# Patient Record
Sex: MALE | Race: WHITE | NOT HISPANIC OR LATINO | Employment: FULL TIME | URBAN - METROPOLITAN AREA
[De-identification: names, ages, dates, MRNs, and addresses within clinical notes are randomized per-mention and may not be internally consistent; named-entity substitution may affect disease eponyms.]

---

## 2017-08-03 ENCOUNTER — ALLSCRIPTS OFFICE VISIT (OUTPATIENT)
Dept: OTHER | Facility: OTHER | Age: 27
End: 2017-08-03

## 2017-09-07 ENCOUNTER — APPOINTMENT (OUTPATIENT)
Dept: URGENT CARE | Facility: MEDICAL CENTER | Age: 27
End: 2017-09-07
Payer: OTHER MISCELLANEOUS

## 2017-09-07 ENCOUNTER — TRANSCRIBE ORDERS (OUTPATIENT)
Dept: URGENT CARE | Facility: MEDICAL CENTER | Age: 27
End: 2017-09-07

## 2017-09-07 ENCOUNTER — APPOINTMENT (OUTPATIENT)
Dept: RADIOLOGY | Facility: MEDICAL CENTER | Age: 27
End: 2017-09-07
Payer: OTHER MISCELLANEOUS

## 2017-09-07 DIAGNOSIS — T14.90XA INJURY: Primary | ICD-10-CM

## 2017-09-07 DIAGNOSIS — T14.90XA INJURY: ICD-10-CM

## 2017-09-07 PROCEDURE — G0382 LEV 3 HOSP TYPE B ED VISIT: HCPCS

## 2017-09-07 PROCEDURE — 99283 EMERGENCY DEPT VISIT LOW MDM: CPT

## 2017-09-07 PROCEDURE — 73110 X-RAY EXAM OF WRIST: CPT

## 2017-09-11 ENCOUNTER — APPOINTMENT (OUTPATIENT)
Dept: URGENT CARE | Facility: MEDICAL CENTER | Age: 27
End: 2017-09-11
Payer: OTHER MISCELLANEOUS

## 2017-09-11 PROCEDURE — 99213 OFFICE O/P EST LOW 20 MIN: CPT

## 2017-09-18 ENCOUNTER — APPOINTMENT (OUTPATIENT)
Dept: URGENT CARE | Facility: MEDICAL CENTER | Age: 27
End: 2017-09-18
Payer: OTHER MISCELLANEOUS

## 2017-09-18 PROCEDURE — 99213 OFFICE O/P EST LOW 20 MIN: CPT

## 2017-09-27 ENCOUNTER — APPOINTMENT (OUTPATIENT)
Dept: URGENT CARE | Facility: MEDICAL CENTER | Age: 27
End: 2017-09-27
Payer: OTHER MISCELLANEOUS

## 2017-09-27 PROCEDURE — 99213 OFFICE O/P EST LOW 20 MIN: CPT

## 2018-01-10 NOTE — RESULT NOTES
Verified Results  * XR WRIST 3+ VIEW RIGHT 20Jan2016 02:03PM [facility] Order Number: [id number]     Test Name Result Flag Reference   XR WRIST 3+ VW RIGHT (Report)     RIGHT WRIST     INDICATION:  Right wrist pain  COMPARISON: None     VIEWS: 3; 3 images     FINDINGS:     Plate within the distal radius for previous fracture fixation  Fracture appears healed  Old ulnar styloid fracture fragments noted  Appropriate alignment of the distal radioulnar joint  No acute fracture  No degenerative changes  No lytic or blastic lesions are seen  Soft tissues are unremarkable  IMPRESSION:     No acute osseous abnormality  Signed by:   Saad Nix DO   1/20/16     * XR HAND 3+ VIEW LEFT 20Jan2016 02:02PM [facility] Order Number: [id number]     Test Name Result Flag Reference   XR HAND 3+ VW LEFT (Report)     LEFT HAND     INDICATION: Mid 1st metacarpal pain  COMPARISON: None     VIEWS: 3; 4 images     For the purposes of institution wide universal language the following terms will apply: (thumb=1st digit/finger, index finger=2nd digit/finger, long finger=3rd digit/finger, ring=4th digit/finger and small finger=5th digit/finger)     FINDINGS:     There is no acute fracture or dislocation  No degenerative changes  No lytic or blastic lesions are seen  Soft tissues are unremarkable  IMPRESSION:     No acute osseous abnormality           Signed by:   Saad Nix DO   1/20/16

## 2018-01-11 NOTE — RESULT NOTES
Verified Results  (1) CBC/PLT/DIFF [id number] 03:13PM [facility]     Test Name Result Flag Reference   WHITE BLOOD CELL COUNT 8 5 Thousand/uL  3 8-10 8   RED BLOOD CELL COUNT 5 80 Million/uL  4 20-5 80   HEMOGLOBIN 17 4 g/dL H 13 2-17 1   HEMATOCRIT 52 7 % H 38 5-50 0   MCV 90 9 fL  80 0-100 0   MCH 30 0 pg  27 0-33 0   MCHC 33 0 g/dL  32 0-36 0   RDW 13 1 %  11 0-15 0   PLATELET COUNT 327 Thousand/uL  140-400   MPV 9 3 fL  7 5-11 5   ABSOLUTE NEUTROPHILS 5338 cells/uL  1441-4889   ABSOLUTE LYMPHOCYTES 2219 cells/uL  850-3900   ABSOLUTE MONOCYTES 765 cells/uL  200-950   ABSOLUTE EOSINOPHILS 128 cells/uL     ABSOLUTE BASOPHILS 51 cells/uL  0-200   NEUTROPHILS 62 8 %     LYMPHOCYTES 26 1 %     MONOCYTES 9 0 %     EOSINOPHILS 1 5 %     BASOPHILS 0 6 %       (1) COMPREHENSIVE METABOLIC PANEL [id number] 20:41AG [facility]     Test Name Result Flag Reference   GLUCOSE 75 mg/dL  65-99   Fasting reference interval   UREA NITROGEN (BUN) 11 mg/dL  7-25   CREATININE 0 88 mg/dL  0 60-1 35   eGFR NON-AFR   AMERICAN 119 mL/min/1 73m2  > OR = 60   eGFR AFRICAN AMERICAN 138 mL/min/1 73m2  > OR = 60   BUN/CREATININE RATIO   7-64   NOT APPLICABLE (calc)   SODIUM 137 mmol/L  135-146   POTASSIUM 4 4 mmol/L  3 5-5 3   CHLORIDE 101 mmol/L     CARBON DIOXIDE 25 mmol/L  19-30   CALCIUM 9 7 mg/dL  8 6-10 3   PROTEIN, TOTAL 7 5 g/dL  6 1-8 1   ALBUMIN 4 6 g/dL  3 6-5 1   GLOBULIN 2 9 g/dL (calc)  1 9-3 7   ALBUMIN/GLOBULIN RATIO 1 6 (calc)  1 0-2 5   BILIRUBIN, TOTAL 2 1 mg/dL H 0 2-1 2   ALKALINE PHOSPHATASE 40 U/L     AST 30 U/L  10-40   ALT 46 U/L  9-46     (1) LIPID PANEL, FASTING [id number] 03:13PM [facility]     Test Name Result Flag Reference   CHOLESTEROL, TOTAL 161 mg/dL  125-200   HDL CHOLESTEROL 39 mg/dL L > OR = 40   TRIGLICERIDES 82 mg/dL  <226   LDL-CHOLESTEROL 106 mg/dL (calc)  <130   Desirable range <100 mg/dL for patients with CHD or  diabetes and <70 mg/dL for diabetic patients with  known heart disease  CHOL/HDLC RATIO 4 1 (calc)  < OR = 5 0   NON HDL CHOLESTEROL 122 mg/dL (calc)     Target for non-HDL cholesterol is 30 mg/dL higher than   LDL cholesterol target  (Q) TSH, 3RD GENERATION [id number] 03:13PM [facility]     Test Name Result Flag Reference   TSH 1 05 mIU/L  0 40-4 50     (Q) HEMOGLOBIN A1c [id number] 03:13PM [facility]   REPORT COMMENT:  FASTING:YES     Test Name Result Flag Reference   HEMOGLOBIN A1c 5 3 % of total Hgb  <5 7   According to ADA guidelines, hemoglobin A1c <7 0%  represents optimal control in non-pregnant diabetic  patients  Different metrics may apply to specific  patient populations  Standards of Medical Care in  [phone]  Diabetes Care  2013;36:s11-s66     For the purpose of screening for the presence of  diabetes  <5 7%       Consistent with the absence of diabetes  5 7-6 4%    Consistent with increased risk for diabetes              (prediabetes)  >or=6 5%    Consistent with diabetes     This assay result is consistent with a decreased risk  of diabetes  Currently, no consensus exists for use of hemoglobin  A1c for diagnosis of diabetes for children

## 2018-01-12 NOTE — MISCELLANEOUS
Provider Comments  Provider Comments:   Dear Tenzin Parish called the office to cancel your appointment for today but did not reschedule for another day  It is very important that you follow up with us so that we can assess your physical and nutritional safety  Please call our office at [phone] to reschedule your appointment       Sincerely,     Kevin Akers [facility]        Signatures   Electronically signed by : Miko Hurtado, ; Jun 1 2016  2:41PM EST                       (Author)    Electronically signed by : HARRIET Espinoza ; Jun 2 2016  2:31PM EST                       (Co-author)

## 2018-01-13 NOTE — PROGRESS NOTES
History of Present Illness  HPI: Damion Frazier presented for menu planning  He used to just consume 3 large meals daily  He saw Dr Beny Clancy and began a modified fast of 4454-8353 calories daily using meal replacements and he dropped it himself to 800 for the rest of the month  He will increase to 1200 on his exercise days  We reviewed interval eating, menu planning, appropriate snacks  Dr Beny Clancy instructed to liberalize to 5735-7906 moving forward  [facility]ric [facility]:   Weight Assessment: IBW: 193# ( 88kg), Goal Weight: 225#  Excess calories come from in between meal snacking, large portions at mealtimes and high calorie high fat food choices  Dietary Recall:   Breakfast: 6am - 2 eggs        Lunch: Atkins protein bar (200/20 gram )  Dinner: chicken / pork   or protein shake (whey 16 grams with milk 16 and 12 from milk carb)  Snack: goldfish / yogurt   Beverages: water - energy drinks   Estimated fluid intake: >64oz   He dines out seldom  Exercise Frequency:  He exercises kick   His obesity/being overweight is related to excessive energy intake and as evidenced by BMI 35 5  Nutrition Prescription: Estimated calories for weight loss: Ecal BMR: 2318 Weight loss range 1109-7999 calories, Estimated daily protein needs: 106-132 gm ( 1 2-1 5 gm/kg IBW) and Estimated daily fluid needs: 103oz ( 35ml/kgIBW)  Breakfast: 300  Snack: 150  Lunch: 300  Snack: 150  Dinner: 300-400  Snack: 150   Nutrition Intervention: Counseling provided with emphasis on meal planning, portion sizes, healthy snack choices, hydration, fiber intake, protein intake, exercise and food journaling  Education materials provided: calorie controlled menus, low carbohydrate meal planning, low carb, high protein snack choices, lean protein food choices and food journal tips  Comprehension: good     Goals: follow meal plan prescribed, reduce portion sizes at mealtimes, plan meals and snacks daily, Choose lower-calorie, lower-fat meal options at home and when dining out, food journal, do not skip meals or snacks, increase protein intake 100-130 grams daily, increase fluid intake 100oz daily and 5507-1808 calories daily  Monitor/Evaluation: weekly weight, food journal, fluid intake and exercise level  Active Problems    1  Adult BMI > 30 (V85 30) (E66 8)   2  Allergic contact dermatitis due to other agents (692 89) (L23 89)   3  Asthma (493 90) (J45 909)   4  Left wrist pain (719 43) (M25 532)   5  Nodule of finger (782 2) (R22 30)   6  Obesity (BMI 30-39 9) (278 00) (E66 9)   7  Snoring (786 09) (R06 83)   8  Weight gain (783 1) (R63 5)    Past Medical History    1  History of Dental disorder (525 9) (K08 9)   2  History of Fracture Of The Nasal Bones (802 0)   3  History of Gross hematuria (599 71) (R31 0)   4  History of acute bronchitis (V12 69) (Z87 09)   5  History of esophageal reflux (V12 79) (Z87 19)   6  History of Orthostatic hypotension (458 0) (I95 1)   7  History of Pain in joint of right wrist (719 43) (M25 531)   8  Viral gastroenteritis (008 8) (A08 4)    Surgical History    1  History of Wrist Surgery    Family History  Father    1  Family history of Type 2 diabetes mellitus with diabetic retinopathy and without macular   edema, unspecified retinopathy severity    Social History    · Denied: History of Alcohol   · Denied: History of Current Smoker   · Former smoker (V15 82) (T39 735)   · No drug use    Current Meds   1  No Reported Medications Recorded    Allergies    1  Penicillins   2   Sulfa Drugs    Vitals  Signs [Data Includes: Current Encounter]   Recorded: 23Apr2016 07:50AM   Height: 6 ft 2 5 in  Weight: 280 lb 4 8 oz  BMI Calculated: 35 51  BSA Calculated: 2 52    Results/Data  Encounter Results   Tanita Flowsheet 22Apr2016 07:55AM Micah Del Rio     Test Name Result Flag Reference   Height 74 5     Weight 278     Body Fat % 34 3     Fat Mass 95 4     FFM ( Fat Free Mass) 182 6 Muscle Mass 173 6     TBW ( Total Body Water) 126     TBW % 45 3     Bone Mass 9 0     BMR ( Basal Metabolic Rate) 6054     Metabolic Age 40     Visceral Fat Rating 14     BMI 35 2         Future Appointments    Date/Time Provider Specialty Site   06/02/2016 03:00 PM Kacie Zamudio  [facility] [place]   06/02/2016 03:30 PM HARRIET Gu   Bariatric Medicine [facility] [place]     Signatures   Electronically signed by : Mati Merrill, ; Apr 23 2016  7:53AM EST                       (Author)    Electronically signed by : HARRIET Matthews ; Apr 25 2016  7:54AM EST                       (Co-author)

## 2018-01-13 NOTE — PROGRESS NOTES
Assessment    1  Allergic rhinitis (477 9) (J30 9)   2  Impacted molar (520 6) (K01 1)    Plan  Allergic rhinitis    · Montelukast Sodium 10 MG Oral Tablet; take 1 tablet by mouth daily  Impacted molar    · Dentist Referral Other Physician Referral  Consult  Status: Hold For - Scheduling   Requested for: [id number]  are Referring to a non- Preferred Provider : Services not provided in network  Care Summary provided  : Yes    Refer to Dr Tyshawn Gonsalez DDS     Chief Complaint    1  Cold Symptoms  COUGH,CONGESTION,SORE THROAT X 1 WK  TAKING IBUPROFEN  History of Present Illness  HPI: Patient is a 24-year-old male complaining of coughing, congestion, sinus pressure for over a week  Having trouble with his wisdom teeth also  His dentist wants him to give $5000 upfront to get them out  Cold Symptoms:   Associated symptoms include sneezing, nasal congestion, runny nose, post nasal drainage, scratchy throat and dry cough, but no productive cough, no facial pressure, no facial pain, no headache, no ear pain, no swollen lymph nodes, no wheezing, no shortness of breath, no fatigue, no weakness, no nausea, no vomiting, no diarrhea, no fever and no chills  Active Problems    1  Adult BMI > 30 (V85 30) (E66 8)   2  Allergic contact dermatitis due to other agents (692 89) (L23 89)   3  Asthma (493 90) (J45 909)   4  Dysfunction of both eustachian tubes (381 81) (H69 83)   5  Left wrist pain (719 43) (M25 532)   6  Nodule of finger (782 2) (R22 30)   7  Obesity (BMI 30-39 9) (278 00) (E66 9)   8  Snoring (786 09) (R06 83)   9  Weight gain (783 1) (R63 5)    Past Medical History    1  History of Dental disorder (525 9) (K08 9)   2  History of Fracture Of The Nasal Bones (802 0)   3  History of Gross hematuria (599 71) (R31 0)   4  History of acute bronchitis (V12 69) (Z87 09)   5  History of esophageal reflux (V12 79) (Z87 19)   6  History of Orthostatic hypotension (458 0) (I95 1)   7   History of Pain in joint of right wrist (719 43) (M25 301)   8  Viral gastroenteritis (008 8) (A08 4)    Family History  Father    1  Family history of Type 2 diabetes mellitus with diabetic retinopathy and without macular   edema, unspecified retinopathy severity    Social History    · Denied: History of Alcohol   · Denied: History of Current Smoker   · Former smoker (V15 82) (A51 956)   · No drug use  The social history was reviewed and updated today  Surgical History    1  History of Wrist Surgery  Surgical History Reviewed: The surgical history was reviewed and updated today  Current Meds   1  Fluticasone Propionate 50 MCG/ACT Nasal Suspension; USE 2 SPRAYS IN EACH   NOSTRIL ONCE DAILY; Therapy: 41Bsr0194 to (Last Rx:45Tkb2544)  Requested for: 75Pro2112 Ordered   2  Pseudoephedrine HCl - 30 MG Oral Tablet; 1 tablet every 8 hours for 5 days; Therapy: 13Lfe8757 to (Last Rx:64Exi2542)  Requested for: 10Ylw1929 Ordered    The medication list was reviewed and updated today  Allergies    1  Penicillins   2  Sulfa Drugs    Vitals   Recorded: 55MES9691 47:44NO   Systolic 333   Diastolic 82   Temperature 98 4 F   Weight 279 lb    BMI Calculated 35 34   BSA Calculated 2 52     Physical Exam    Constitutional   General appearance: No acute distress, well appearing and well nourished  Eyes   Conjunctiva and lids: No swelling, erythema, or discharge  Pupils and irises: Equal, round and reactive to light  Ears, Nose, Mouth, and Throat   External inspection of ears and nose: Normal     Otoscopic examination: Tympanic membrance translucent with normal light reflex  Canals patent without erythema  Nasal mucosa, septum, and turbinates: Abnormal   There was clear rhinorrhea from both nares  The bilateral nasal mucosa was boggy and edematous  Oropharynx: Abnormal   All 4 wisdom teeth are impacted  Crowded in the posterior pharynx  Pulmonary   Respiratory effort: No increased work of breathing or signs of respiratory distress  Auscultation of lungs: Clear to auscultation, equal breath sounds bilaterally, no wheezes, no rales, no rhonci  Cardiovascular   Palpation of heart: Normal PMI, no thrills  Auscultation of heart: Normal rate and rhythm, normal S1 and S2, without murmurs  Examination of extremities for edema and/or varicosities: Normal     Carotid pulses: Normal     Abdomen   Abdomen: Non-tender, no masses  Liver and spleen: No hepatomegaly or splenomegaly  Lymphatic   Palpation of lymph nodes in neck: No lymphadenopathy  Musculoskeletal   Gait and station: Normal     Digits and nails: Normal without clubbing or cyanosis  Inspection/palpation of joints, bones, and muscles: Normal     Skin   Skin and subcutaneous tissue: Normal without rashes or lesions  Neurologic   Cranial nerves: Cranial nerves 2-12 intact  Reflexes: 2+ and symmetric  Sensation: No sensory loss      Psychiatric   Orientation to person, place and time: Normal     Mood and affect: Normal          Signatures   Electronically signed by : Robles Huerta DO; Sep 14 2016  3:27PM EST                       (Author)

## 2018-01-14 VITALS
SYSTOLIC BLOOD PRESSURE: 124 MMHG | DIASTOLIC BLOOD PRESSURE: 80 MMHG | BODY MASS INDEX: 36.93 KG/M2 | WEIGHT: 297 LBS | HEIGHT: 75 IN

## 2018-01-14 NOTE — CONSULTS
Chief Complaint  Chief Complaint Free Text Note Form: Pt is here for his MWM consult  Referred by Dr Alee Cuadra, Stop [facility] 5/8  Pt currently works out at [employer] several times a week  History of Present Illness  Free Text HPI: Obesity-  Severity: Moderate  Onset: Since teenage years  Modifiers: has tried to cut down portions, but never feels satisfied -always feels hungry, binges  Associated Symptoms: increased back pain  Past Medical History    1  History of Dental disorder (525 9) (K08 9)   2  History of Fracture Of The Nasal Bones (802 0)   3  History of Gross hematuria (599 71) (R31 0)   4  History of acute bronchitis (V12 69) (Z87 09)   5  History of esophageal reflux (V12 79) (Z87 19)   6  History of Orthostatic hypotension (458 0) (I95 1)   7  History of Pain in joint of right wrist (719 43) (M25 531)   8  Viral gastroenteritis (008 8) (A08 4)  Active Problems And Past Medical History Reviewed: The active problems and past medical history were reviewed and updated today  Assessment    1  Weight gain (783 1) (R63 5)   2  Obesity (BMI 30-39 9) (278 00) (E66 9)   3  Adult BMI > 30 (V85 30) (E66 8)    Discussion/Summary  Discussion Summary:   21 yo male w/ obesity here to pursue medical weight management to improve weight and health      Obesity class 2:  -discussed options of conservative vs NANCY program +/- meal replacement  -initial focus of 5-10% weight loss over 3-6 mos for improved health  -screening labs-completed, within acceptable range  -Try VLCD-try to "reshrink" stomach, create stimulus control, re-recognize hunger  -will check electrolyte panel at next visit  -try to distinguish between a true behavioral binging issue or whether through appropriate diet modification we can   make progress his feelings for satiety  -may be a candidate for certain medications, vyvanse- if true BED(phentermine may also work) but would also suggest behavioral therapy, may be a candidate for satiety inducing medications ie Saxenda, Belviq     RTC in 2 weeks    Goals:  1  Food log www  [email or web address]  2  No sugary beverages  At least 64oz of water daily  3  Decrease activity to no more than walking 30 minutes daily  4  4 protein shakes per day, may substitute 1 shake w/ lean protein, try different non-starchy veggies and different preparations  5  Have regular sodium broth daily  6  Take a multivitamin daily  7  Stay under 50 grams of carbs per day  Bariatric Medicine Diagnostic [facility]:   Patient Discussion: minute visit, greater than half of the time was spent on counseling  Counseling spent on physiology of feel VLCD and ketosis as well as potential side effects  We also discussed the need to decreased physical activity during this time and the importance of trying to get over his dislike for all non-starchy vegetables      Review of Systems  Focused-Male:   Constitutional: fever  ENT: no sore throat  Cardiovascular: no chest pain and no palpitations  Respiratory: no shortness of breath  Gastrointestinal: no abdominal pain  Genitourinary: no dysuria  Musculoskeletal: arthralgias  Integumentary: no rashes  Neurological: no headache  Other Symptoms: Psych: Denies depression/anxiety  Active Problems    1  Allergic contact dermatitis due to other agents (692 89) (L23 89)   2  Asthma (493 90) (J45 909)   3  Left wrist pain (719 43) (M25 532)   4  Nodule of finger (782 2) (R22 30)   5  Obesity (BMI 30-39 9) (278 00) (E66 9)   6  Snoring (786 09) (R06 83)    Surgical History    1  History of Wrist Surgery  Surgical History Reviewed: The surgical history was reviewed and updated today  Family History    1  Family history of Type 2 diabetes mellitus with diabetic retinopathy and without macular   edema, unspecified retinopathy severity  Family History Reviewed: The family history was reviewed and updated today         Social History    · Denied: History of Alcohol   · Denied: History of Current Smoker  Social History Reviewed: The social history was reviewed and updated today  Current Meds   1  No Reported Medications Recorded  Medication List Reviewed: The medication list was reviewed and updated today  Allergies    1  Penicillins   2  Sulfa Drugs    Vitals  Vital Signs [Data Includes: Current Encounter]    Recorded: 42OAW7709 09:44AM   Temperature 97 6 F    Heart Rate 92    Respiration 16    Systolic 235    Diastolic 78    Height 6 ft 2 5 in    Weight 297 lb 9 6 oz    BMI Calculated 37 7    BSA Calculated 2 59    Waist Circumference  50   Neck Circumference  17     Physical Exam    Constitutional   General appearance: No acute distress, well appearing and well nourished  well developed and obese  Eyes conjunctival pallor  Ears, Nose, Mouth, and Throat Moist oral mucosa  Pulmonary   Respiratory effort: No increased work of breathing or signs of respiratory distress  Auscultation of lungs: Clear to auscultation, equal breath sounds bilaterally, no wheezes, no rales, no rhonci  Cardiovascular   Auscultation of heart: Normal rate and rhythm, normal S1 and S2, without murmurs  Examination of extremities for edema and/or varicosities: Normal     Abdomen   Abdomen: Non-tender, no masses  The abdomen was obese  The abdomen was soft and nontender     Musculoskeletal   Gait and station: Normal     Psychiatric   Orientation to person, place and time: Normal     Mood and affect: Normal          Results/Data  Encounter Results   STOP BANG Questionnaire 69FIQ3562 10:07AM User, Ahs     Test Name Result Flag Reference   STOP BANG Questionnaire Risk of JASON High Risk     Snoring: Yes  Tired: Yes  Observed: No  Blood Pressure: No  BMI: Yes  Age: No  Neck Circumference: Yes  Gender: Yes   STOP BANG Questionnaire JASON Total Score 5     Snoring: Yes  Tired: Yes  Observed: No  Blood Pressure: No  BMI: Yes  Age: No  Neck Circumference: Yes  Gender: Yes       Future Appointments    Date/Time Provider Specialty Site   04/21/2016 03:45 PM HARRIET Guidry   Bariatric Medicine [facility]     Signatures   Electronically signed by : HARRIET Carr ; Mar 30 2016 11:12AM EST                       (Author)

## 2018-01-16 NOTE — PROGRESS NOTES
Chief Complaint  Chief Complaint Free Text Note Form: Pt is here for his 3 week MWM f/u  He stated he is eating smaller portions, doing kickboxing almost on a daily basis and watching his calorie intake  History of Present Illness  HPI: excellent, lost 18lbs  sticking to ~1000 luz  feels tired and hungry      Past Medical History    1  History of Dental disorder (525 9) (K08 9)   2  History of Fracture Of The Nasal Bones (802 0)   3  History of Gross hematuria (599 71) (R31 0)   4  History of acute bronchitis (V12 69) (Z87 09)   5  History of esophageal reflux (V12 79) (Z87 19)   6  History of Orthostatic hypotension (458 0) (I95 1)   7  History of Pain in joint of right wrist (719 43) (M25 531)   8  Viral gastroenteritis (008 8) (A08 4)    Assessment    1  Weight gain (783 1) (R63 5)   2  Adult BMI > 30 (V85 30) (E66 8)    Discussion/Summary  Discussion Summary:   23 yo male w/ obesity here to pursue medical weight management to improve weight and health  Obesity class 2:  -enrolled in conservative program   -initial focus of 5-10% weight loss over 3-6 mos for improved health  -screening labs-completed, within acceptable range  -on restrictive diet-felt full while being more strict, no binging, when he started adding regular food-experienced increased hunger     RTC in 2 weeks w/ RD to review a more balanced diet(does not like veggies, enjoys more carbs) f/u w/ me in 6 weeks from then    Goals:  1  Food log www  [email or web address]  2  No sugary beverages  At least 64oz of water daily  3  Decrease activity to no more than walking 30 minutes daily  4  Liberalize calories to 6026-3837 calories per day, and <100g CHO  5  Take a multivitamin daily     Active Problems    1  Adult BMI > 30 (V85 30) (E66 8)   2  Allergic contact dermatitis due to other agents (692 89) (L23 89)   3  Asthma (493 90) (J45 909)   4  Left wrist pain (719 43) (M25 532)   5  Nodule of finger (782 2) (R22 30)   6  Obesity (BMI 30-39  9) (278 00) (E66 9)   7  Snoring (786 09) (R06 83)   8  Weight gain (783 1) (R63 5)    Surgical History    1  History of Wrist Surgery    Family History  Father    1  Family history of Type 2 diabetes mellitus with diabetic retinopathy and without macular   edema, unspecified retinopathy severity    Social History    · Denied: History of Alcohol   · Denied: History of Current Smoker   · Former smoker (V15 82) (F20 512)   · No drug use    Current Meds   1  No Reported Medications Recorded    Allergies    1  Penicillins   2  Sulfa Drugs    Vitals  Vital Signs [Data Includes: Current Encounter]    Recorded: 21Apr2016 03:41PM   Temperature 98 3 F   Heart Rate 84   Respiration 16   Systolic 619   Diastolic 70   Height 6 ft 1 5 in   Weight 280 lb 4 8 oz   BMI Calculated 36 48   BSA Calculated 2 49     Physical Exam    Constitutional   General appearance: No acute distress, well appearing and well nourished  well developed and obese  Eyes conjunctival pallor  Ears, Nose, Mouth, and Throat Moist oral mucosa  Pulmonary   Respiratory effort: No increased work of breathing or signs of respiratory distress  Auscultation of lungs: Clear to auscultation, equal breath sounds bilaterally, no wheezes, no rales, no rhonci  Cardiovascular   Auscultation of heart: Normal rate and rhythm, normal S1 and S2, without murmurs  Examination of extremities for edema and/or varicosities: Normal     Abdomen   Abdomen: Non-tender, no masses  The abdomen was obese  The abdomen was soft and nontender     Musculoskeletal   Gait and station: Normal     Psychiatric   Orientation to person, place and time: Normal     Mood and affect: Normal          Signatures   Electronically signed by : HARRIET Juarez ; Apr 21 2016  4:06PM EST                       (Author)

## 2018-08-03 ENCOUNTER — OFFICE VISIT (OUTPATIENT)
Dept: FAMILY MEDICINE CLINIC | Facility: CLINIC | Age: 28
End: 2018-08-03
Payer: COMMERCIAL

## 2018-08-03 VITALS
HEIGHT: 77 IN | DIASTOLIC BLOOD PRESSURE: 68 MMHG | BODY MASS INDEX: 37.19 KG/M2 | TEMPERATURE: 97.4 F | SYSTOLIC BLOOD PRESSURE: 112 MMHG | WEIGHT: 315 LBS

## 2018-08-03 DIAGNOSIS — R19.4 CHANGE IN BOWEL HABITS: Primary | ICD-10-CM

## 2018-08-03 DIAGNOSIS — R19.7 DIARRHEA IN ADULT PATIENT: ICD-10-CM

## 2018-08-03 PROCEDURE — 99214 OFFICE O/P EST MOD 30 MIN: CPT | Performed by: FAMILY MEDICINE

## 2018-08-03 PROCEDURE — 3008F BODY MASS INDEX DOCD: CPT | Performed by: FAMILY MEDICINE

## 2018-08-03 RX ORDER — POLYETHYLENE GLYCOL 3350 17 G/17G
POWDER, FOR SOLUTION ORAL
Qty: 850 G | Refills: 0 | Status: SHIPPED | OUTPATIENT
Start: 2018-08-03 | End: 2019-08-23

## 2018-08-03 NOTE — PATIENT INSTRUCTIONS
Patient likely with functional diarrhea due to lack of fiber Patient will have labs done He will use the miralax daily Patient needs to increase fruits and vegetables to 5 servings per day Patient should also do a trial of eliminating all dairy and all caffeine from his diet    Chronic Diarrhea   AMBULATORY CARE:   Chronic diarrhea  lasts more than 4 weeks  You may have 3 or more episodes of diarrhea each day  Signs and symptoms that may happen with chronic diarrhea:   · Abdominal tenderness     · Nausea and vomiting    · Urgent need to have a bowel movement, or loss of bowel control     · Weight loss    · Anal irritation and inflammation  Seek care immediately if:   · Your skin, mouth, and tongue are dry, and you feel very thirsty  · You have blood or pus in your bowel movement  · You have trouble eating, drinking, or keeping food down  · You have severe abdominal pain  · You feel lightheaded, weak, or you faint  · Your heart beats faster than normal or you have trouble breathing  · You are confused or cannot think clearly  Contact your healthcare provider if:   · You have a fever  · You have new symptoms  · Your symptoms do not improve, or they get worse  · You have questions or concerns about your condition or care  Treatment for chronic diarrhea  will depend on the condition causing your chronic diarrhea  Medicines may be given to treat an infection  Medicines may also be given to stop or slow the diarrhea  Medicines that are causing diarrhea may be stopped or changed  You may need to change your diet and avoid certain foods  Manage chronic diarrhea:   · Drink more liquids  to replace body fluids lost through diarrhea  You may also need to drink an oral rehydration solution (ORS)  An ORS has the right amounts of sugar, salt, and minerals in water to replace body fluids  ORS can be found at most grocery stores or pharmacies   Ask how much liquid to drink each day and which liquids are best for you  Do not drink liquids with caffeine or alcohol  These can increase your risk for dehydration  · Do not drink or eat foods that may make your symptoms worse  These include milk and dairy products, greasy and fatty foods, spicy foods, caffeine, and alcohol  Keep a food diary to see if your symptoms are caused by certain foods  Bring this to your follow-up visits  · Eat foods that are easy to digest   These include bananas, boiled potatoes, cooked carrots, cooked chicken, plain rice, and toast  You can also try yogurt and applesauce  · Wash your hands often  Germs on your hands can get into your mouth and cause diarrhea  Use soap and water  Use an alcohol-based hand rub if soap and water are not available  Wash your hands after you use the bathroom, change a child's diaper, or sneeze  Wash your hands before you prepare or eat food  Follow up with your healthcare provider as directed:  Write down your questions so you remember to ask them during your visits  © 2017 [employer]  Information is for End User's use only and may not be sold, redistributed or otherwise used for commercial purposes  All illustrations and images included in CareNotes® are the copyrighted property of A [employer] M [employer]  or [employer]  The above information is an  only  It is not intended as medical advice for individual conditions or treatments  Talk to your doctor, nurse or pharmacist before following any medical regimen to see if it is safe and effective for you

## 2018-08-03 NOTE — ASSESSMENT & PLAN NOTE
Suspect that this is dietary in nature Patient will have labs done Patient should increase fiber and needs to eliminate fatty foods

## 2018-08-03 NOTE — PROGRESS NOTES
Assessment/Plan:    No problem-specific Assessment & Plan notes found for this encounter  There are no diagnoses linked to this encounter  Subjective:   Chief Complaint   Patient presents with    Diarrhea     "for awhile"        Patient ID: Krupa Taylor is a 32 y o  male  Patient is here for change in his bowel habits He feels it started one year ago Patient would notice that after eating out he would immediately upon finishing eating have to have a bowel movement The bowel movement will be loose  Patient has this about 2 times per day every day where he has a soft stool immediately after eating Patient notes that it will happen more often with fatty foods mainly and also with meats  He has gained 31 pounds in the last one year Patient states that his diet is only one fruit, no vegetables and a lot of carbohydrates Patient has a 11 month old daughter now and thinks this contributes as he is not working out and he is not dieting any longer Patient has not taken anything for this       Diarrhea    This is a chronic problem  The current episode started more than 1 year ago  The problem occurs 2 to 4 times per day  The problem has been unchanged  The stool consistency is described as watery  The patient states that diarrhea does not awaken him from sleep  Pertinent negatives include no abdominal pain, arthralgias, bloating, chills, coughing, fever, headaches, increased  flatus, myalgias, sweats, URI, vomiting or weight loss  Nothing aggravates the symptoms  There are no known risk factors  He has tried nothing for the symptoms  There is no history of bowel resection, inflammatory bowel disease, irritable bowel syndrome, malabsorption, a recent abdominal surgery or short gut syndrome         The following portions of the patient's history were reviewed and updated as appropriate: allergies, current medications, past social history and problem list     Review of Systems   Constitutional: Positive for unexpected weight change  Negative for chills, fever and weight loss  Weight gain   Respiratory: Negative for cough  Gastrointestinal: Positive for diarrhea  Negative for abdominal distention, abdominal pain, anal bleeding, bloating, blood in stool, constipation, flatus, nausea, rectal pain and vomiting  Musculoskeletal: Negative for arthralgias and myalgias  Neurological: Negative for headaches  Objective:      /68   Temp (!) 97 4 °F (36 3 °C)   Ht 6' 5" (1 956 m)   Wt (!) 149 kg (328 lb 4 oz)   BMI 38 92 kg/m²          Physical Exam   Constitutional: He is oriented to person, place, and time  He appears well-developed  HENT:   Head: Normocephalic and atraumatic  Right Ear: External ear normal    Left Ear: External ear normal    Nose: Nose normal    Mouth/Throat: Oropharynx is clear and moist    Eyes: Conjunctivae and EOM are normal  Pupils are equal, round, and reactive to light  Neck: Normal range of motion  Neck supple  Cardiovascular: Normal rate, regular rhythm and normal heart sounds  Pulmonary/Chest: Effort normal and breath sounds normal  No respiratory distress  He has no wheezes  He has no rales  Abdominal: Soft  Bowel sounds are normal  He exhibits no distension and no mass  There is no tenderness  There is no rebound and no guarding  Lymphadenopathy:     He has no cervical adenopathy  Neurological: He is alert and oriented to person, place, and time  Psychiatric: He has a normal mood and affect   His behavior is normal  Judgment and thought content normal

## 2019-08-01 ENCOUNTER — OFFICE VISIT (OUTPATIENT)
Dept: FAMILY MEDICINE CLINIC | Facility: CLINIC | Age: 29
End: 2019-08-01

## 2019-08-01 VITALS
DIASTOLIC BLOOD PRESSURE: 80 MMHG | HEIGHT: 75 IN | BODY MASS INDEX: 39.17 KG/M2 | WEIGHT: 315 LBS | SYSTOLIC BLOOD PRESSURE: 118 MMHG

## 2019-08-01 DIAGNOSIS — L23.9 ALLERGIC DERMATITIS: Primary | ICD-10-CM

## 2019-08-01 PROCEDURE — 99212 OFFICE O/P EST SF 10 MIN: CPT | Performed by: FAMILY MEDICINE

## 2019-08-01 RX ORDER — HYDROXYZINE PAMOATE 25 MG/1
25 CAPSULE ORAL 3 TIMES DAILY PRN
Qty: 30 CAPSULE | Refills: 0 | Status: SHIPPED | OUTPATIENT
Start: 2019-08-01 | End: 2019-08-23

## 2019-08-01 RX ORDER — PREDNISONE 10 MG/1
TABLET ORAL
Qty: 15 TABLET | Refills: 0 | Status: SHIPPED | OUTPATIENT
Start: 2019-08-01 | End: 2019-08-23

## 2019-08-01 NOTE — PROGRESS NOTES
Assessment/Plan:    Allergic dermatitis    Will check with the landlord and see if there was a flea problem in the house before  May need an   If symptoms do not improve, would refer to Dermatology  Diagnoses and all orders for this visit:    Allergic dermatitis  -     predniSONE 10 mg tablet; 5 x 1 day, 4 x1 day, 3 x 1 day,2 x1 day,  1 x 1 day  -     hydrOXYzine pamoate (VISTARIL) 25 mg capsule; Take 1 capsule (25 mg total) by mouth 3 (three) times a day as needed for itching          Subjective:   Chief Complaint   Patient presents with    Rash          Patient ID: Hermes Caceres is a 29 y o  male  Patient is a 49-year-old male presenting to the office complaining of a rash on his torso and lower extremities  His wife has similar rash  They recently moved into a new home  Carpeting was torn out, they think there may have been pets therapy for, they do not have any pets  Water has a very strong chlorine smell  Denies any recent exposure to [place], tree lines, or weeds  Denies any new soaps, detergents, fabric softeners  Itching is worse at night  The following portions of the patient's history were reviewed and updated as appropriate: allergies, current medications, past family history, past medical history, past social history, past surgical history and problem list     Review of Systems   Constitutional: Negative  HENT: Negative  Eyes: Negative for visual disturbance  Respiratory: Negative  Cardiovascular: Negative  Gastrointestinal: Negative  Endocrine: Negative for cold intolerance and heat intolerance  Genitourinary: Negative  Musculoskeletal: Negative  Skin: Positive for rash  Allergic/Immunologic: Negative for immunocompromised state  Neurological: Negative  Psychiatric/Behavioral: Negative            Objective:      /80   Ht 6' 3" (1 905 m)   Wt (!) 148 kg (327 lb 3 2 oz)   BMI 40 90 kg/m²          Physical Exam   Constitutional: He is oriented to person, place, and time  He appears well-developed and well-nourished  HENT:   Head: Normocephalic and atraumatic  Eyes: EOM are normal    Cardiovascular: Normal heart sounds  Pulmonary/Chest: Effort normal and breath sounds normal    Abdominal: Soft  Bowel sounds are normal    Neurological: He is alert and oriented to person, place, and time  Skin:     Multiple excoriated patches on the lower extremities, upper extremities, and torso  Face and scalp are spared  No interdigital lesions  Palm and plantar surfaces are clear  Nursing note and vitals reviewed

## 2019-08-01 NOTE — ASSESSMENT & PLAN NOTE
Will check with the landlord and see if there was a flea problem in the house before  May need an   If symptoms do not improve, would refer to Dermatology

## 2019-08-03 ENCOUNTER — TELEPHONE (OUTPATIENT)
Dept: OTHER | Facility: OTHER | Age: 29
End: 2019-08-03

## 2019-08-03 NOTE — TELEPHONE ENCOUNTER
Mayuri Browning 1990  CONFIDENTIALTY NOTICE: This fax transmission is intended only for the addressee  It contains information that is legally privileged,  confidential or otherwise protected from use or disclosure  If you are not the intended recipient, you are strictly prohibited from reviewing,  disclosing, copying using or disseminating any of this information or taking any action in reliance on or regarding this information  If you have  received this fax in error, please notify us immediately by telephone so that we can arrange for its return to us  Page:   Call Id: [id number]  Health Call  Standard Call Report  Health Call  Patient Name: Mayuri Browning  Gender: Male  : 1990  Age: 29 Y 6 M 15 D  Return Phone  Number: [phone] (Home)  Address: [address] [place] [address]  City/State/Zip: [address] [place] [id number]  Practice Name: [id number] [facility]  Practice Charged:  Physician:  [facility] Name:  Relationship To  Patient: Self  Return Phone Number: [phone] (Home)  Presenting Problem: " I'm not sure how take my prednisone  One at a time or split up "  Service Type: Triage  Charged Service 1: Messages  Pharmacy Name and  Number:  Nurse Assessment  Protocols  Protocol Title Nurse Date/Time  Disp  Time Disposition Final User  8/3/2019 1:11:46 PM Send to Follow Up Kei Allen RN, Luis Miguel Sams  8/3/2019 1:28:37 PM Close Yes Garfield Henry RN, Luis Miguel Sams  Comments  User: Leslie Goldstein RN Date/Time: 8/3/2019 1:11:38 PM  Attempted to call patient back  Rings several times and then goes to voice mail  Message left for pt  to call back  I will attempt a  second call back within 15 minutes  User: Leslie Goldstein RN Date/Time: 8/3/2019 1:27:57 PM  Called pt a second time and was able to speak with him  Patient wanted to know how to take them   Looking at pt's chart, Patient  is to take (5) 10 mg tablets day 1, (4) 10 mg tablets the next day, (3) 10 mg tablets the next day, (2) 10 mg tablets the next day,  and (1) 10 mg tablet the last day  Advised patient that he could take each daily dose at once and advised that he eat something  prior to taking them  Patient verbalized understanding understanding  Patient stated that he did also call the pharmacy who told  him the same information

## 2019-08-23 ENCOUNTER — OFFICE VISIT (OUTPATIENT)
Dept: FAMILY MEDICINE CLINIC | Facility: CLINIC | Age: 29
End: 2019-08-23

## 2019-08-23 VITALS
BODY MASS INDEX: 39.17 KG/M2 | SYSTOLIC BLOOD PRESSURE: 120 MMHG | HEIGHT: 75 IN | DIASTOLIC BLOOD PRESSURE: 80 MMHG | WEIGHT: 315 LBS

## 2019-08-23 DIAGNOSIS — L23.9 ALLERGIC DERMATITIS: Primary | ICD-10-CM

## 2019-08-23 PROCEDURE — 99212 OFFICE O/P EST SF 10 MIN: CPT | Performed by: FAMILY MEDICINE

## 2019-08-23 RX ORDER — TRIAMCINOLONE ACETONIDE 1 MG/G
CREAM TOPICAL 2 TIMES DAILY
Qty: 45 G | Refills: 2 | Status: SHIPPED | OUTPATIENT
Start: 2019-08-23 | End: 2020-12-28

## 2019-08-23 RX ORDER — DESLORATADINE 5 MG/1
5 TABLET ORAL DAILY
Qty: 30 TABLET | Refills: 2 | Status: SHIPPED | OUTPATIENT
Start: 2019-08-23 | End: 2020-12-28

## 2019-08-23 NOTE — PROGRESS NOTES
Assessment/Plan:         Diagnoses and all orders for this visit:    Allergic dermatitis  -     triamcinolone (KENALOG) 0 1 % cream; Apply topically 2 (two) times a day  -     desloratadine (CLARINEX) 5 MG tablet; Take 1 tablet (5 mg total) by mouth daily          Subjective:   Chief Complaint   Patient presents with    Rash     "worse"        Patient ID: Rajinder Hemphill is a 29 y o  male  Patient is a 59-year-old male presenting to the office for follow-up on his rash  States that when he was taking the higher dose of the prednisone that the rash did improve somewhat but as soon as he started to taper it the rash came back with a vengeance  States that his landlord told him there have never been any pets in his apartment  They do not have pets  He is not sure what his wife uses as far as soaps and detergents for the health hold  They do not use fabric softeners  His wife has similar rash, and his 3year-old daughter has the rash as well  The following portions of the patient's history were reviewed and updated as appropriate: allergies, current medications, past family history, past medical history, past social history, past surgical history and problem list     Review of Systems   Constitutional: Negative  HENT: Negative  Eyes: Negative  Respiratory: Negative  Cardiovascular: Negative  Gastrointestinal: Negative  Endocrine: Negative  Genitourinary: Negative  Musculoskeletal: Negative for arthralgias and myalgias  Skin: Positive for rash (  Generalized rash over the extremities   And torso)  Negative for color change  Allergic/Immunologic: Negative for immunocompromised state  Neurological: Negative  Psychiatric/Behavioral: Negative  Objective:      /80   Ht 6' 3" (1 905 m)   Wt (!) 150 kg (331 lb 2 oz)   BMI 41 39 kg/m²          Physical Exam   Constitutional: He is oriented to person, place, and time      Morbidly obese   HENT:   Head: Normocephalic and atraumatic  Eyes: Pupils are equal, round, and reactive to light  Conjunctivae and EOM are normal    Cardiovascular: Normal rate and regular rhythm  Pulmonary/Chest: Effort normal and breath sounds normal    Abdominal: Bowel sounds are normal    Neurological: He is alert and oriented to person, place, and time  Skin:    Nonspecific macular poorly demarcated rash over the torso, and extremities  The face and palmar surfaces of the hands and soles of the feet are spared  There are some very discrete areas of focal intensity that could represent insect bites but that could also represent excoriation from the patient scratching

## 2019-08-23 NOTE — ASSESSMENT & PLAN NOTE
Highly recommend he switch to hypoallergenic soaps and detergents  May want to few min gait his  Apartment  If symptoms do not improve, I would recommend he follow up with Dermatology

## 2020-12-21 ENCOUNTER — APPOINTMENT (EMERGENCY)
Dept: CT IMAGING | Facility: HOSPITAL | Age: 30
DRG: 446 | End: 2020-12-21

## 2020-12-21 ENCOUNTER — APPOINTMENT (EMERGENCY)
Dept: RADIOLOGY | Facility: HOSPITAL | Age: 30
DRG: 446 | End: 2020-12-21

## 2020-12-21 ENCOUNTER — HOSPITAL ENCOUNTER (INPATIENT)
Facility: HOSPITAL | Age: 30
LOS: 1 days | Discharge: HOME | DRG: 446 | End: 2020-12-22
Attending: EMERGENCY MEDICINE | Admitting: FAMILY MEDICINE

## 2020-12-21 DIAGNOSIS — E80.6 HYPERBILIRUBINEMIA: ICD-10-CM

## 2020-12-21 DIAGNOSIS — R10.9 ABDOMINAL PAIN: ICD-10-CM

## 2020-12-21 DIAGNOSIS — K81.9 CHOLECYSTITIS: Primary | ICD-10-CM

## 2020-12-21 DIAGNOSIS — R07.9 CHEST PAIN: ICD-10-CM

## 2020-12-21 LAB
ALBUMIN SERPL BCP-MCNC: 3.8 G/DL (ref 3.5–5)
ALP SERPL-CCNC: 53 U/L (ref 46–116)
ALT SERPL W P-5'-P-CCNC: 47 U/L (ref 12–78)
ANION GAP SERPL CALCULATED.3IONS-SCNC: 12 MMOL/L (ref 4–13)
AST SERPL W P-5'-P-CCNC: 29 U/L (ref 5–45)
BASOPHILS # BLD AUTO: 0.06 THOUSANDS/ΜL (ref 0–0.1)
BASOPHILS NFR BLD AUTO: 0 % (ref 0–1)
BILIRUB SERPL-MCNC: 2.79 MG/DL (ref 0.2–1)
BUN SERPL-MCNC: 9 MG/DL (ref 5–25)
CALCIUM SERPL-MCNC: 9.4 MG/DL (ref 8.3–10.1)
CHLORIDE SERPL-SCNC: 100 MMOL/L (ref 100–108)
CO2 SERPL-SCNC: 26 MMOL/L (ref 21–32)
CREAT SERPL-MCNC: 1.14 MG/DL (ref 0.6–1.3)
D DIMER PPP FEU-MCNC: 1.1 UG/ML FEU
EOSINOPHIL # BLD AUTO: 0.21 THOUSAND/ΜL (ref 0–0.61)
EOSINOPHIL NFR BLD AUTO: 2 % (ref 0–6)
ERYTHROCYTE [DISTWIDTH] IN BLOOD BY AUTOMATED COUNT: 12.5 % (ref 11.6–15.1)
GFR SERPL CREATININE-BSD FRML MDRD: 86 ML/MIN/1.73SQ M
GLUCOSE SERPL-MCNC: 102 MG/DL (ref 65–140)
HCT VFR BLD AUTO: 50.5 % (ref 36.5–49.3)
HGB BLD-MCNC: 16.9 G/DL (ref 12–17)
IMM GRANULOCYTES # BLD AUTO: 0.05 THOUSAND/UL (ref 0–0.2)
IMM GRANULOCYTES NFR BLD AUTO: 0 % (ref 0–2)
LIPASE SERPL-CCNC: 107 U/L (ref 73–393)
LYMPHOCYTES # BLD AUTO: 2 THOUSANDS/ΜL (ref 0.6–4.47)
LYMPHOCYTES NFR BLD AUTO: 15 % (ref 14–44)
MCH RBC QN AUTO: 30.3 PG (ref 26.8–34.3)
MCHC RBC AUTO-ENTMCNC: 33.5 G/DL (ref 31.4–37.4)
MCV RBC AUTO: 91 FL (ref 82–98)
MONOCYTES # BLD AUTO: 1.16 THOUSAND/ΜL (ref 0.17–1.22)
MONOCYTES NFR BLD AUTO: 9 % (ref 4–12)
NEUTROPHILS # BLD AUTO: 9.94 THOUSANDS/ΜL (ref 1.85–7.62)
NEUTS SEG NFR BLD AUTO: 74 % (ref 43–75)
NRBC BLD AUTO-RTO: 0 /100 WBCS
PLATELET # BLD AUTO: 398 THOUSANDS/UL (ref 149–390)
PMV BLD AUTO: 10 FL (ref 8.9–12.7)
POTASSIUM SERPL-SCNC: 3.8 MMOL/L (ref 3.5–5.3)
PROT SERPL-MCNC: 8.4 G/DL (ref 6.4–8.2)
RBC # BLD AUTO: 5.57 MILLION/UL (ref 3.88–5.62)
SODIUM SERPL-SCNC: 138 MMOL/L (ref 136–145)
TROPONIN I SERPL-MCNC: <0.02 NG/ML
WBC # BLD AUTO: 13.42 THOUSAND/UL (ref 4.31–10.16)

## 2020-12-21 PROCEDURE — U0003 INFECTIOUS AGENT DETECTION BY NUCLEIC ACID (DNA OR RNA); SEVERE ACUTE RESPIRATORY SYNDROME CORONAVIRUS 2 (SARS-COV-2) (CORONAVIRUS DISEASE [COVID-19]), AMPLIFIED PROBE TECHNIQUE, MAKING USE OF HIGH THROUGHPUT TECHNOLOGIES AS DESCRIBED BY CMS-2020-01-R: HCPCS | Performed by: EMERGENCY MEDICINE

## 2020-12-21 PROCEDURE — 85379 FIBRIN DEGRADATION QUANT: CPT | Performed by: EMERGENCY MEDICINE

## 2020-12-21 PROCEDURE — 99285 EMERGENCY DEPT VISIT HI MDM: CPT

## 2020-12-21 PROCEDURE — 83690 ASSAY OF LIPASE: CPT | Performed by: EMERGENCY MEDICINE

## 2020-12-21 PROCEDURE — 36415 COLL VENOUS BLD VENIPUNCTURE: CPT | Performed by: EMERGENCY MEDICINE

## 2020-12-21 PROCEDURE — 80053 COMPREHEN METABOLIC PANEL: CPT | Performed by: EMERGENCY MEDICINE

## 2020-12-21 PROCEDURE — 93005 ELECTROCARDIOGRAM TRACING: CPT

## 2020-12-21 PROCEDURE — 71045 X-RAY EXAM CHEST 1 VIEW: CPT

## 2020-12-21 PROCEDURE — 85025 COMPLETE CBC W/AUTO DIFF WBC: CPT | Performed by: EMERGENCY MEDICINE

## 2020-12-21 PROCEDURE — G1004 CDSM NDSC: HCPCS

## 2020-12-21 PROCEDURE — 71275 CT ANGIOGRAPHY CHEST: CPT

## 2020-12-21 PROCEDURE — 99285 EMERGENCY DEPT VISIT HI MDM: CPT | Performed by: EMERGENCY MEDICINE

## 2020-12-21 PROCEDURE — 84484 ASSAY OF TROPONIN QUANT: CPT | Performed by: EMERGENCY MEDICINE

## 2020-12-21 RX ADMIN — IOHEXOL 85 ML: 350 INJECTION, SOLUTION INTRAVENOUS at 22:57

## 2020-12-22 ENCOUNTER — APPOINTMENT (INPATIENT)
Dept: RADIOLOGY | Facility: HOSPITAL | Age: 30
DRG: 446 | End: 2020-12-22

## 2020-12-22 ENCOUNTER — APPOINTMENT (EMERGENCY)
Dept: ULTRASOUND IMAGING | Facility: HOSPITAL | Age: 30
DRG: 446 | End: 2020-12-22

## 2020-12-22 VITALS
SYSTOLIC BLOOD PRESSURE: 122 MMHG | OXYGEN SATURATION: 96 % | TEMPERATURE: 98.8 F | WEIGHT: 315 LBS | HEART RATE: 107 BPM | DIASTOLIC BLOOD PRESSURE: 73 MMHG | RESPIRATION RATE: 18 BRPM | HEIGHT: 75 IN | BODY MASS INDEX: 39.17 KG/M2

## 2020-12-22 PROBLEM — K80.20 CALCULUS OF GALLBLADDER WITHOUT BILIARY OBSTRUCTION: Status: ACTIVE | Noted: 2020-12-22

## 2020-12-22 PROBLEM — K81.9 CHOLECYSTITIS: Status: ACTIVE | Noted: 2020-12-22

## 2020-12-22 PROBLEM — R65.10 SIRS (SYSTEMIC INFLAMMATORY RESPONSE SYNDROME) (HCC): Status: ACTIVE | Noted: 2020-12-22

## 2020-12-22 LAB
ALBUMIN SERPL BCP-MCNC: 3.4 G/DL (ref 3.5–5)
ALP SERPL-CCNC: 47 U/L (ref 46–116)
ALT SERPL W P-5'-P-CCNC: 48 U/L (ref 12–78)
ANION GAP SERPL CALCULATED.3IONS-SCNC: 14 MMOL/L (ref 4–13)
APTT PPP: 27 SECONDS (ref 23–37)
AST SERPL W P-5'-P-CCNC: 30 U/L (ref 5–45)
ATRIAL RATE: 0 BPM
ATRIAL RATE: 118 BPM
BASOPHILS # BLD AUTO: 0.03 THOUSANDS/ΜL (ref 0–0.1)
BASOPHILS NFR BLD AUTO: 0 % (ref 0–1)
BILIRUB DIRECT SERPL-MCNC: 0.49 MG/DL (ref 0–0.2)
BILIRUB SERPL-MCNC: 2.72 MG/DL (ref 0.2–1)
BUN SERPL-MCNC: 12 MG/DL (ref 5–25)
CALCIUM ALBUM COR SERPL-MCNC: 9.5 MG/DL (ref 8.3–10.1)
CALCIUM SERPL-MCNC: 9 MG/DL (ref 8.3–10.1)
CHLORIDE SERPL-SCNC: 100 MMOL/L (ref 100–108)
CO2 SERPL-SCNC: 24 MMOL/L (ref 21–32)
CREAT SERPL-MCNC: 1.12 MG/DL (ref 0.6–1.3)
EOSINOPHIL # BLD AUTO: 0.02 THOUSAND/ΜL (ref 0–0.61)
EOSINOPHIL NFR BLD AUTO: 0 % (ref 0–6)
ERYTHROCYTE [DISTWIDTH] IN BLOOD BY AUTOMATED COUNT: 12.5 % (ref 11.6–15.1)
FLUAV RNA RESP QL NAA+PROBE: NEGATIVE
FLUBV RNA RESP QL NAA+PROBE: NEGATIVE
GFR SERPL CREATININE-BSD FRML MDRD: 88 ML/MIN/1.73SQ M
GLUCOSE SERPL-MCNC: 106 MG/DL (ref 65–140)
HCT VFR BLD AUTO: 47.4 % (ref 36.5–49.3)
HGB BLD-MCNC: 15.8 G/DL (ref 12–17)
IMM GRANULOCYTES # BLD AUTO: 0.05 THOUSAND/UL (ref 0–0.2)
IMM GRANULOCYTES NFR BLD AUTO: 0 % (ref 0–2)
INR PPP: 1.01 (ref 0.84–1.19)
LACTATE SERPL-SCNC: 1.4 MMOL/L (ref 0.5–2)
LYMPHOCYTES # BLD AUTO: 1.23 THOUSANDS/ΜL (ref 0.6–4.47)
LYMPHOCYTES NFR BLD AUTO: 9 % (ref 14–44)
MCH RBC QN AUTO: 30.4 PG (ref 26.8–34.3)
MCHC RBC AUTO-ENTMCNC: 33.3 G/DL (ref 31.4–37.4)
MCV RBC AUTO: 91 FL (ref 82–98)
MONOCYTES # BLD AUTO: 1.23 THOUSAND/ΜL (ref 0.17–1.22)
MONOCYTES NFR BLD AUTO: 9 % (ref 4–12)
NEUTROPHILS # BLD AUTO: 10.99 THOUSANDS/ΜL (ref 1.85–7.62)
NEUTS SEG NFR BLD AUTO: 82 % (ref 43–75)
NRBC BLD AUTO-RTO: 0 /100 WBCS
P AXIS: 64 DEGREES
PLATELET # BLD AUTO: 334 THOUSANDS/UL (ref 149–390)
PMV BLD AUTO: 9.8 FL (ref 8.9–12.7)
POTASSIUM SERPL-SCNC: 3.9 MMOL/L (ref 3.5–5.3)
PR INTERVAL: 154 MS
PROT SERPL-MCNC: 8 G/DL (ref 6.4–8.2)
PROTHROMBIN TIME: 13.1 SECONDS (ref 11.6–14.5)
QRS AXIS: 0 DEGREES
QRS AXIS: 32 DEGREES
QRSD INTERVAL: 0 MS
QRSD INTERVAL: 84 MS
QT INTERVAL: 0 MS
QT INTERVAL: 302 MS
QTC INTERVAL: 0 MS
QTC INTERVAL: 423 MS
RBC # BLD AUTO: 5.2 MILLION/UL (ref 3.88–5.62)
RSV RNA RESP QL NAA+PROBE: NEGATIVE
SARS-COV-2 RNA RESP QL NAA+PROBE: NEGATIVE
SODIUM SERPL-SCNC: 138 MMOL/L (ref 136–145)
T WAVE AXIS: 0 DEGREES
T WAVE AXIS: 57 DEGREES
VENTRICULAR RATE: 0 BPM
VENTRICULAR RATE: 118 BPM
WBC # BLD AUTO: 13.55 THOUSAND/UL (ref 4.31–10.16)

## 2020-12-22 PROCEDURE — 85730 THROMBOPLASTIN TIME PARTIAL: CPT | Performed by: EMERGENCY MEDICINE

## 2020-12-22 PROCEDURE — 85610 PROTHROMBIN TIME: CPT | Performed by: EMERGENCY MEDICINE

## 2020-12-22 PROCEDURE — 76705 ECHO EXAM OF ABDOMEN: CPT

## 2020-12-22 PROCEDURE — NC001 PR NO CHARGE: Performed by: SURGERY

## 2020-12-22 PROCEDURE — 83605 ASSAY OF LACTIC ACID: CPT | Performed by: EMERGENCY MEDICINE

## 2020-12-22 PROCEDURE — 0241U HB NFCT DS VIR RESP RNA 4 TRGT: CPT | Performed by: EMERGENCY MEDICINE

## 2020-12-22 PROCEDURE — 93010 ELECTROCARDIOGRAM REPORT: CPT

## 2020-12-22 PROCEDURE — 87040 BLOOD CULTURE FOR BACTERIA: CPT | Performed by: EMERGENCY MEDICINE

## 2020-12-22 PROCEDURE — 82248 BILIRUBIN DIRECT: CPT | Performed by: PHYSICIAN ASSISTANT

## 2020-12-22 PROCEDURE — 99236 HOSP IP/OBS SAME DATE HI 85: CPT | Performed by: FAMILY MEDICINE

## 2020-12-22 PROCEDURE — 85025 COMPLETE CBC W/AUTO DIFF WBC: CPT | Performed by: PHYSICIAN ASSISTANT

## 2020-12-22 PROCEDURE — 80053 COMPREHEN METABOLIC PANEL: CPT | Performed by: PHYSICIAN ASSISTANT

## 2020-12-22 PROCEDURE — 36415 COLL VENOUS BLD VENIPUNCTURE: CPT | Performed by: EMERGENCY MEDICINE

## 2020-12-22 PROCEDURE — 96360 HYDRATION IV INFUSION INIT: CPT

## 2020-12-22 PROCEDURE — 99222 1ST HOSP IP/OBS MODERATE 55: CPT | Performed by: FAMILY MEDICINE

## 2020-12-22 PROCEDURE — 99203 OFFICE O/P NEW LOW 30 MIN: CPT | Performed by: INTERNAL MEDICINE

## 2020-12-22 RX ORDER — ONDANSETRON 2 MG/ML
4 INJECTION INTRAMUSCULAR; INTRAVENOUS EVERY 6 HOURS PRN
Status: DISCONTINUED | OUTPATIENT
Start: 2020-12-22 | End: 2020-12-22 | Stop reason: HOSPADM

## 2020-12-22 RX ORDER — ACETAMINOPHEN 325 MG/1
650 TABLET ORAL EVERY 6 HOURS PRN
Status: DISCONTINUED | OUTPATIENT
Start: 2020-12-22 | End: 2020-12-22 | Stop reason: HOSPADM

## 2020-12-22 RX ORDER — MAGNESIUM HYDROXIDE/ALUMINUM HYDROXICE/SIMETHICONE 120; 1200; 1200 MG/30ML; MG/30ML; MG/30ML
30 SUSPENSION ORAL EVERY 6 HOURS PRN
Status: DISCONTINUED | OUTPATIENT
Start: 2020-12-22 | End: 2020-12-22 | Stop reason: HOSPADM

## 2020-12-22 RX ORDER — SODIUM CHLORIDE, SODIUM LACTATE, POTASSIUM CHLORIDE, CALCIUM CHLORIDE 600; 310; 30; 20 MG/100ML; MG/100ML; MG/100ML; MG/100ML
175 INJECTION, SOLUTION INTRAVENOUS CONTINUOUS
Status: DISCONTINUED | OUTPATIENT
Start: 2020-12-22 | End: 2020-12-22 | Stop reason: HOSPADM

## 2020-12-22 RX ORDER — METRONIDAZOLE 500 MG/1
500 TABLET ORAL EVERY 8 HOURS SCHEDULED
Qty: 30 TABLET | Refills: 0 | Status: SHIPPED | OUTPATIENT
Start: 2020-12-22 | End: 2021-01-01

## 2020-12-22 RX ORDER — LEVOFLOXACIN 500 MG/1
500 TABLET, FILM COATED ORAL EVERY 24 HOURS
Qty: 10 TABLET | Refills: 0 | Status: SHIPPED | OUTPATIENT
Start: 2020-12-22 | End: 2021-01-01

## 2020-12-22 RX ADMIN — METRONIDAZOLE 500 MG: 500 INJECTION, SOLUTION INTRAVENOUS at 11:51

## 2020-12-22 RX ADMIN — CEFTRIAXONE 2000 MG: 2 INJECTION, POWDER, FOR SOLUTION INTRAMUSCULAR; INTRAVENOUS at 07:39

## 2020-12-22 RX ADMIN — METRONIDAZOLE 500 MG: 500 INJECTION, SOLUTION INTRAVENOUS at 03:50

## 2020-12-22 RX ADMIN — CEFEPIME HYDROCHLORIDE 2000 MG: 2 INJECTION, POWDER, FOR SOLUTION INTRAVENOUS at 03:50

## 2020-12-22 RX ADMIN — SODIUM CHLORIDE, SODIUM LACTATE, POTASSIUM CHLORIDE, AND CALCIUM CHLORIDE 175 ML/HR: .6; .31; .03; .02 INJECTION, SOLUTION INTRAVENOUS at 02:48

## 2020-12-22 NOTE — QUICK NOTE
Patient concerned about lapse in his health insurance  States that he is feeling better  Discussed with Dr Kym Mancilla by phone and patient is okay for discharge with the understanding that if he comes back with renewed symptoms he will most likely require a Perc Chely tube by IR as opposed to a laparoscopic cholecystectomy  The patient states that he understands  He has been seen by Case Management and will also be seen by representative to discuss whether he qualifies for Medicaid or not prior to leaving  We will give him Rx's for 10 day course of Levaquin and Flagyl  He can follow up with Dr Abhay Elizalde as an outpatient prn

## 2020-12-22 NOTE — H&P
H&P- Isabella Loving 1990, 27 y o  male MRN: [id number]    Unit/Bed#: ED 21 Encounter: [id number]    Primary Care Provider: Beth Sylvester DO   Date and time admitted to hospital: 12/21/2020  8:31 PM        * Cholecystitis  Assessment & Plan  Pt presents with epigastric pain, back pain, and vomiting  CTA 12/21 - Inflammatory changes around the gallbladder  No PE  RUQ U/S 12/21 - Mild hepatomegaly and moderate hepatic steatosis  Cholelithiasis with gallbladder wall thickening   Findings are equivocal for acute cholecystitis  AST 29  ALT 47  ALK Phos 35  T  Bili 2 79 --> concerning for choledocholithiasis  Will order direct bili  GI and ACS were consulted  Appreciate recs  Obesity (BMI 30-39  9)  Assessment & Plan  Lifestyle modification counseling       VTE Prophylaxis: ambulate patient  / sequential compression device   Code Status: Full code   Discussion with family: patient    Anticipated Length of Stay:  Patient will be admitted on an Inpatient basis with an anticipated length of stay of  Greater than 2 midnights  Justification for Hospital Stay:  Cholecystitis with possible choledocholithiasis requiring further workup    Total Time for Visit, including Counseling / Coordination of Care: 30 minutes  Greater than 50% of this total time spent on direct patient counseling and coordination of care  Chief Complaint:   Epigastric pain    History of Present Illness:    Isabella Loving is a 27 y o  male with no past medical history who presents with epigastric pain x2 days  Patient reports initially pain starting in his lower and upper back which he attributed to sleeping wrong  Pain wraps around to his epigastrium, which feels like a soreness  Patient states he may have pulled abdominal muscle  Then, patient had severe pain following dinner and had associated vomiting which she describes as bilious but nonbloody    In the ED, patient received CTA for epigastric/chest pain which revealed inflammation around the gallbladder without evidence for pulmonary embolism  Patient then received right upper quadrant ultrasound which revealed gallstones with gallbladder wall thickening  Review of Systems:    Review of Systems   Constitutional: Negative for chills, diaphoresis, fatigue and fever  HENT: Negative for sore throat and trouble swallowing  Eyes: Negative  Negative for visual disturbance  Respiratory: Negative for cough, choking, chest tightness, shortness of breath, wheezing and stridor  Cardiovascular: Positive for chest pain  Negative for palpitations and leg swelling  Gastrointestinal: Positive for abdominal pain  Negative for abdominal distention, anal bleeding, constipation, diarrhea, nausea and vomiting  Endocrine: Negative  Genitourinary: Negative  Musculoskeletal: Positive for back pain  Skin: Negative  Neurological: Negative for dizziness, syncope, weakness, light-headedness, numbness and headaches  Past Medical and Surgical History:     Past Medical History:   Diagnosis Date    Fracture of nasal bone     GERD (gastroesophageal reflux disease)     Gross hematuria     Orthostatic hypotension        Past Surgical History:   Procedure Laterality Date    WRIST SURGERY         Meds/Allergies:    Prior to Admission medications    Medication Sig Start Date End Date Taking? Authorizing Provider   desloratadine (CLARINEX) 5 MG tablet Take 1 tablet (5 mg total) by mouth daily  Patient not taking: Reported on 12/21/2020 8/23/19   Yessica Gonzalez DO   triamcinolone (KENALOG) 0 1 % cream Apply topically 2 (two) times a day  Patient not taking: Reported on 12/21/2020 8/23/19   Yessica Gonzalez DO     I have reviewed home medications with patient personally  Allergies:    Allergies   Allergen Reactions    Penicillins     Sulfa Antibiotics        Social History:     Marital Status: Single   Occupation: Non contributory  Patient Pre-hospital Living Situation: Independent  Patient Pre-hospital Level of Mobility: independent  Patient Pre-hospital Diet Restrictions: independent  Substance Use History:   Social History     Substance and Sexual Activity   Alcohol Use No     Social History     Tobacco Use   Smoking Status Former Smoker    Quit date: 2014    Years since quittin 9   Smokeless Tobacco Never Used     Social History     Substance and Sexual Activity   Drug Use No       Family History:    non-contributory    Physical Exam:     Vitals:   Blood Pressure: 129/81 (20)  Pulse: 72 (20)  Temperature: 97 9 °F (36 6 °C) (20)  Temp Source: Temporal (20)  Respirations: 18 (20)  Height: 6' 3" (190 5 cm) (20)  Weight - Scale: (!) 157 kg (345 lb 14 4 oz) (20)  SpO2: 94 % (20)    Physical Exam  Vitals signs and nursing note reviewed  Constitutional:       General: He is not in acute distress  Appearance: He is obese  HENT:      Mouth/Throat:      Mouth: Mucous membranes are moist       Pharynx: Oropharynx is clear  No oropharyngeal exudate  Cardiovascular:      Rate and Rhythm: Normal rate and regular rhythm  Pulses: Normal pulses  Heart sounds: Normal heart sounds  No murmur  No friction rub  No gallop  Pulmonary:      Effort: Pulmonary effort is normal  No respiratory distress  Breath sounds: Normal breath sounds  No stridor  No wheezing, rhonchi or rales  Abdominal:      General: Abdomen is flat  Bowel sounds are normal       Palpations: Abdomen is soft  Tenderness: There is abdominal tenderness (Epigastric)  Musculoskeletal: Normal range of motion  Skin:     General: Skin is warm and dry  Neurological:      Mental Status: He is alert and oriented to person, place, and time  Additional Data:     Lab Results: I have personally reviewed pertinent reports        Results from last 7 days   Lab Units 20   WBC Thousand/uL 13 42* HEMOGLOBIN g/dL 16 9   HEMATOCRIT % 50 5*   PLATELETS Thousands/uL 398*   NEUTROS PCT % 74   LYMPHS PCT % 15   MONOS PCT % 9   EOS PCT % 2     Results from last 7 days   Lab Units 12/21/20  2054   SODIUM mmol/L 138   POTASSIUM mmol/L 3 8   CHLORIDE mmol/L 100   CO2 mmol/L 26   BUN mg/dL 9   CREATININE mg/dL 1 14   ANION GAP mmol/L 12   CALCIUM mg/dL 9 4   ALBUMIN g/dL 3 8   TOTAL BILIRUBIN mg/dL 2 79*   ALK PHOS U/L 53   ALT U/L 47   AST U/L 29   GLUCOSE RANDOM mg/dL 102     Results from last 7 days   Lab Units 12/22/20  0340   INR  1 01             Results from last 7 days   Lab Units 12/22/20  0340   LACTIC ACID mmol/L 1 4       Imaging: I have personally reviewed pertinent reports  US right upper quadrant   Final Result by Ava Samano DO (12/22 0118)      Limited study as described above  Mild hepatomegaly and moderate hepatic steatosis  Cholelithiasis with gallbladder wall thickening  Findings are equivocal for acute cholecystitis  Clinical correlation is recommended  The study was marked in [facility] for immediate notification  Workstation performed: [id number]         CTA ED chest PE Study   Final Result by Ava Samano DO (12/21 5339)      No evidence of pulmonary artery embolus      Nodular airspace opacities scattered throughout the lungs which may represent infection  Recommend short-term follow-up chest CT scan evaluate for resolution in 3 months  Inflammatory changes around the gallbladder  Findings are suspicious for acute cholecystitis  Recommend confirmation with right upper quadrant sonogram       The study was marked in EPIC for immediate notification  Workstation performed: [id number]         XR chest 1 view portable   ED Interpretation by Husam Drew MD (12/21 2100)   No acute cardiopulmonary abnormality      Final Result by Luciano Starr MD (12/21 2155)      No acute cardiopulmonary disease                    Workstation performed: [id number] EKG, Pathology, and Other Studies Reviewed on Admission:   · EKG: NSR     Allscripts / Epic Records Reviewed: Yes     ** Please Note: This note has been constructed using a voice recognition system   **

## 2020-12-22 NOTE — CONSULTS
Consultation - General Surgery   Jadon Forman 27 y o  male MRN: [id number]  Unit/Bed#: ED 21 Encounter: [id number]    Assessment/Plan     Assessment:  The patient is here with choledocholithiasis and probable cholecystitis  He is currently hemodynamically stable  He has had no prior surgeries  Plan:  Admit to [facility]   GI consult for ERCP  NPO  IV fluids  IV antibiotics, recommend levaquin and flagyl  Pain control  Eventual laparoscopic cholecystectomy this admission, the day after the ERCP is done  History of Present Illness       HPI:  Jadon Forman is a 27 y o  male who presents with abdominal pain and chest pain since 1 day  He says he had pain over the lower abdomen which subsequently moved up into his chest  Pain is not associated with food  He occasionally feels nausea  He says that he is having bowel movements but has the feeling of difficulty with evacuating his bowel  He has no prior surgeries on the abdomen  Consults    Review of Systems   Constitutional: Negative for activity change, chills, fatigue and unexpected weight change  HENT: Negative for congestion, drooling, facial swelling, mouth sores, rhinorrhea, sinus pressure, tinnitus and voice change  Eyes: Negative for photophobia, pain, discharge and visual disturbance  Respiratory: Negative for apnea, cough, choking, chest tightness, shortness of breath, wheezing and stridor  Cardiovascular: Negative for chest pain, palpitations and leg swelling  Gastrointestinal: Positive for abdominal pain and nausea  Negative for abdominal distention, anal bleeding, blood in stool, constipation, diarrhea, rectal pain and vomiting  Endocrine: Negative for cold intolerance, heat intolerance, polydipsia, polyphagia and polyuria  Genitourinary: Negative for dysuria and flank pain  Musculoskeletal: Positive for back pain  Negative for arthralgias, gait problem, joint swelling and neck stiffness     Skin: Negative for color change, pallor, rash and wound  Allergic/Immunologic: Negative for environmental allergies and food allergies  Neurological: Negative  Negative for dizziness, seizures, syncope, facial asymmetry, weakness and light-headedness  Psychiatric/Behavioral: Negative for agitation, behavioral problems, confusion, decreased concentration, hallucinations and sleep disturbance  The patient is not nervous/anxious          Historical Information   Past Medical History:   Diagnosis Date    Fracture of nasal bone     GERD (gastroesophageal reflux disease)     Gross hematuria     Orthostatic hypotension      Past Surgical History:   Procedure Laterality Date    WRIST SURGERY       Social History   Social History     Substance and Sexual Activity   Alcohol Use No     Social History     Substance and Sexual Activity   Drug Use No     E-Cigarette/Vaping    E-Cigarette Use Never User      E-Cigarette/Vaping Substances     Social History     Tobacco Use   Smoking Status Former Smoker    Quit date: 2014    Years since quittin 9   Smokeless Tobacco Never Used     Family History: non-contributory    Meds/Allergies   all current active meds have been reviewed  Allergies   Allergen Reactions    Penicillins     Sulfa Antibiotics        Objective   First Vitals:   Blood Pressure: 135/69 (20)  Pulse: (!) 123 (20)  Temperature: 97 9 °F (36 6 °C) (20)  Temp Source: Temporal (20)  Respirations: 20 (20)  Weight - Scale: (!) 157 kg (345 lb 14 4 oz) (20)  SpO2: 95 % (20)    Current Vitals:   Blood Pressure: 129/81 (20)  Pulse: 72 (20)  Temperature: 97 9 °F (36 6 °C) (20)  Temp Source: Temporal (20)  Respirations: 18 (20)  Weight - Scale: (!) 157 kg (345 lb 14 4 oz) (20)  SpO2: 94 % (20)    No intake or output data in the 24 hours ending 20    Invasive Devices     Peripheral Intravenous Line            Peripheral IV 12/21/20 Left Antecubital less than 1 day                Physical Exam  Constitutional:       General: He is not in acute distress  Appearance: He is well-developed  He is not diaphoretic  HENT:      Head: Normocephalic and atraumatic  Right Ear: External ear normal       Left Ear: External ear normal       Nose: Nose normal       Mouth/Throat:      Pharynx: No oropharyngeal exudate  Eyes:      General: No scleral icterus  Right eye: No discharge  Left eye: No discharge  Conjunctiva/sclera: Conjunctivae normal       Pupils: Pupils are equal, round, and reactive to light  Neck:      Musculoskeletal: Normal range of motion and neck supple  Thyroid: No thyromegaly  Vascular: No JVD  Trachea: No tracheal deviation  Cardiovascular:      Rate and Rhythm: Normal rate and regular rhythm  Heart sounds: Normal heart sounds  No murmur  No friction rub  No gallop  Pulmonary:      Effort: Pulmonary effort is normal  No respiratory distress  Breath sounds: Normal breath sounds  No stridor  No wheezing or rales  Chest:      Chest wall: No tenderness  Abdominal:      General: There is no distension  Palpations: Abdomen is soft  There is no mass  Tenderness: There is no abdominal tenderness  There is no guarding or rebound  Hernia: No hernia is present  Musculoskeletal: Normal range of motion  General: No tenderness or deformity  Lymphadenopathy:      Cervical: No cervical adenopathy  Skin:     General: Skin is warm and dry  Capillary Refill: Capillary refill takes less than 2 seconds  Coloration: Skin is not pale  Findings: No erythema or rash  Neurological:      Mental Status: He is alert and oriented to person, place, and time  Cranial Nerves: No cranial nerve deficit  Sensory: No sensory deficit  Motor: No abnormal muscle tone        Coordination: Coordination normal       Deep Tendon Reflexes: Reflexes normal    Psychiatric:         Behavior: Behavior normal          Thought Content: Thought content normal          Judgment: Judgment normal          Lab Results: I have personally reviewed pertinent lab results  Imaging: I have personally reviewed pertinent reports  EKG, Pathology, and Other Studies: I have personally reviewed pertinent reports  Counseling / Coordination of Care  Total floor / unit time spent today 30 minutes  Greater than 50% of total time was spent with the patient and / or family counseling and / or coordination of care  A description of the counseling / coordination of care: new consult

## 2020-12-22 NOTE — ED ATTENDING ATTESTATION
12/21/2020  I, Turner Navarrete MD, saw and evaluated the patient  I have discussed the patient with the resident/non-physician practitioner and agree with the resident's/non-physician practitioner's findings, Plan of Care, and MDM as documented in the resident's/non-physician practitioner's note, except where noted  All available labs and Radiology studies were reviewed  I was present for key portions of any procedure(s) performed by the resident/non-physician practitioner and I was immediately available to provide assistance  At this point I agree with the current assessment done in the Emergency Department  I have conducted an independent evaluation of this patient a history and physical is as follows:      49-year-old male presents for evaluation of multiple complaints over the past 2 days  Patient reports sharp pain started gradually 2 days ago is constant, radiates towards his stomach and is without modifying factors  He states that after having nausea and nonbloody nonbilious vomitus last night he began to have sharp substernal chest pain  States the pain is worse with movement/inspiration  He denies feeling short of breath, cough, UR symptoms  He does report body aches and generalized malaise  Ten systems reviewed otherwise negative  On exam no distress, lungs nml, cardiac nml, abd nml   MDM: multiple complaints-will do cardiac work up, with single ekg/trop given duratiion of symptoms greater than 6 hours , abd labs, d-dimer, covid swab, reassess  ED Course         Critical Care Time  Procedures

## 2020-12-22 NOTE — PLAN OF CARE
Problem: Potential for Falls  Goal: Patient will remain free of falls  Description: INTERVENTIONS:  - Assess patient frequently for physical needs  -  Identify cognitive and physical deficits and behaviors that affect risk of falls    -  [place] fall precautions as indicated by assessment   - Educate patient/family on patient safety including physical limitations  - Instruct patient to call for assistance with activity based on assessment  - Modify environment to reduce risk of injury  - Consider OT/PT consult to assist with strengthening/mobility  Note: Met needs

## 2020-12-22 NOTE — ASSESSMENT & PLAN NOTE
Pt presents with epigastric pain, back pain, and vomiting  CTA 12/21 - Inflammatory changes around the gallbladder  No PE  RUQ U/S 12/21 - Mild hepatomegaly and moderate hepatic steatosis  Cholelithiasis with gallbladder wall thickening   Findings are equivocal for acute cholecystitis  AST 29  ALT 47  ALK Phos 35  T  Bili 2 79 --> concerning for choledocholithiasis  Will order direct bili  White count of 12 2  GI and ACS were consulted  Appreciate recs

## 2020-12-22 NOTE — ED NOTES
Patient transported to [facility], [address] [facility]  12/22/20 2236

## 2020-12-22 NOTE — CONSULTS
Consultation - [facility]  Jim Dorianshania 27 y o  male MRN: [id number]  Unit/Bed#: ED 21 Encounter: [id number]        Consults    Reason for Consult / Principal Problem:  Abdominal pain    HPI:  80-year-old male with obesity admitted with abdominal pain  Patient reports onset of lower chest pain and epigastric pain 2 days ago  He also had mid back pain as well  The pain was sharp  He states the pain worsens with movement  He has occasional nausea but no vomiting  He has been constipated  No fevers or chills  He has had no prior surgeries on his abdomen  REVIEW OF SYSTEMS:    CONSTITUTIONAL: Denies any fever, chills  Good appetite, and no recent weight loss  HEENT: No earache or tinnitus  Denies hearing loss or visual disturbances  CARDIOVASCULAR: No chest pain or palpitations  RESPIRATORY: Denies any cough, hemoptysis, shortness of breath or dyspnea on exertion  GASTROINTESTINAL: As noted in the History of Present Illness  GENITOURINARY: No problems with urination  Denies any hematuria or dysuria  NEUROLOGIC: No dizziness or vertigo, denies headaches  MUSCULOSKELETAL: Denies any muscle or joint pain  SKIN: Denies skin rashes or itching  ENDOCRINE: Denies excessive thirst  Denies intolerance to heat or cold  PSYCHOSOCIAL: Denies depression or anxiety  Denies any recent memory loss         Historical Information   Past Medical History:   Diagnosis Date    Fracture of nasal bone     GERD (gastroesophageal reflux disease)     Gross hematuria     Orthostatic hypotension      Past Surgical History:   Procedure Laterality Date    WRIST SURGERY       Social History   Social History     Substance and Sexual Activity   Alcohol Use No     Social History     Substance and Sexual Activity   Drug Use No     Social History     Tobacco Use   Smoking Status Former Smoker    Quit date: 2014    Years since quittin 9   Smokeless Tobacco Never Used     Family History   Problem Relation Age of Onset    Diabetes Father        Meds/Allergies     (Not in a hospital admission)    Current Facility-Administered Medications   Medication Dose Route Frequency    acetaminophen (TYLENOL) tablet 650 mg  650 mg Oral Q6H PRN    aluminum-magnesium hydroxide-simethicone (MYLANTA) oral suspension 30 mL  30 mL Oral Q6H PRN    ceftriaxone (ROCEPHIN) 2 g/50 mL in dextrose IVPB  2,000 mg Intravenous Q24H    lactated ringers infusion  175 mL/hr Intravenous Continuous    metroNIDAZOLE (FLAGYL) IVPB (premix) 500 mg 100 mL  500 mg Intravenous Q8H    ondansetron (ZOFRAN) injection 4 mg  4 mg Intravenous Q6H PRN       Allergies   Allergen Reactions    Penicillins     Sulfa Antibiotics            Objective     Blood pressure 110/62, pulse (!) 117, temperature 98 8 °F (37 1 °C), temperature source Oral, resp  rate 20, height 6' 3" (1 905 m), weight (!) 157 kg (345 lb 14 4 oz), SpO2 95 %        Intake/Output Summary (Last 24 hours) at 12/22/2020 1010  Last data filed at 12/22/2020 9900  Gross per 24 hour   Intake 200 ml   Output    Net 200 ml         PHYSICAL EXAM:      General Appearance:   Alert, obese male, cooperative, no distress, appears stated age    HEENT:   Normocephalic, atraumatic, anicteric      Neck:  Supple, symmetrical, trachea midline, no adenopathy   Lungs:   Clear to auscultation bilaterally   Heart[de-identified]   S1 and S2 normal; regular rate and rhythm   Abdomen:   Soft, non-tender, non-distended; normal bowel sounds   Genitalia:   Deferred    Rectal:   Deferred    Extremities:  No cyanosis, clubbing or edema    Pulses:  2+ and symmetric all extremities    Skin:  Skin color, texture, turgor normal, no rashes or lesions    Lymph nodes:  No palpable cervical lymphadenopathy        Lab Results:   Results from last 7 days   Lab Units 12/22/20  0540   WBC Thousand/uL 13 55*   HEMOGLOBIN g/dL 15 8   HEMATOCRIT % 47 4   PLATELETS Thousands/uL 334   NEUTROS PCT % 82*   LYMPHS PCT % 9*   MONOS PCT % 9   EOS PCT % 0     Results from last 7 days   Lab Units 12/22/20  0540   POTASSIUM mmol/L 3 9   CHLORIDE mmol/L 100   CO2 mmol/L 24   BUN mg/dL 12   CREATININE mg/dL 1 12   CALCIUM mg/dL 9 0   ALK PHOS U/L 47   ALT U/L 48   AST U/L 30     Results from last 7 days   Lab Units 12/22/20  0340   INR  1 01     Results from last 7 days   Lab Units 12/21/20  2054   LIPASE u/L 107       Imaging Studies: I have personally reviewed pertinent imaging studies  Xr Chest 1 View Portable    Result Date: 12/21/2020  Impression: No acute cardiopulmonary disease  Workstation performed: [id number]     Us Right Upper Quadrant    Result Date: 12/22/2020  Impression: Limited study as described above  Mild hepatomegaly and moderate hepatic steatosis  Cholelithiasis with gallbladder wall thickening  Findings are equivocal for acute cholecystitis  Clinical correlation is recommended  The study was marked in [facility] for immediate notification  Workstation performed: [id number]     Cta Ed Chest Pe Study    Result Date: 12/21/2020  Impression: No evidence of pulmonary artery embolus Nodular airspace opacities scattered throughout the lungs which may represent infection  Recommend short-term follow-up chest CT scan evaluate for resolution in 3 months  Inflammatory changes around the gallbladder  Findings are suspicious for acute cholecystitis  Recommend confirmation with right upper quadrant sonogram  The study was marked in EPIC for immediate notification  Workstation performed: [id number]       ASSESSMENT and PLAN:       26-year-old obese male admitted with abdominal pain found to have hyperbilirubinemia and probable cholecystitis  1)  Hyperbilirubinemia:  Total bilirubin 2 72, mostly indirect  His indirect hyperbilirubinemia is likely secondary to [place] syndrome  His CBD is not dilated and there is no choledocholithiasis noted on ultrasound      - ERCP not indicated at this time  - Liver enzymes can be repeated by PCP    2)  Acute cholecystitis: Cholecystectomy as per surgery    GI will sign off  Please call with questions or concerns

## 2020-12-22 NOTE — ED CARE HANDOFF
Emergency Department Sign Out Note        Sign out and transfer of care from Dr Claudetta Grey  See Separate Emergency Department note  The patient, Nubia Ye, was evaluated by the previous provider for chest pain  Workup Completed:  Labs, CT, RUQ u/s    ED Course / Workup Pending (followup):  Surgery consult      HEART Risk Score      Most Recent Value   Heart Score Risk Calculator   History  0 Filed at: 12/21/2020 2155   ECG  0 Filed at: 12/21/2020 2155   Age  0 Filed at: 12/21/2020 2155   Risk Factors  1 Filed at: 12/21/2020 2155   Troponin  0 Filed at: 12/21/2020 2155   HEART Score  1 Filed at: 12/21/2020 2155                    Bhakti Mendoza' Criteria for PE      Most Recent Value   Sonu' Criteria for PE   Clinical signs and symptoms of DVT  0 Filed at: 12/21/2020 2144   PE is primary diagnosis or equally likely  3 Filed at: 12/21/2020 2144   HR >100  1 5 Filed at: 12/21/2020 2144   Immobilization at least 3 days or Surgery in the previous 4 weeks  0 Filed at: 12/21/2020 2144   Previous, objectively diagnosed PE or DVT  0 Filed at: 12/21/2020 2144   Hemoptysis  0 Filed at: 12/21/2020 2144   Malignancy with treatment within 6 months or palliative  0 Filed at: 12/21/2020 2144   Wells' Criteria Total  4 5 Filed at: 12/21/2020 2144            3:23 AM  Case discussed with General surgery  They recommend admission to Medicine for possible choledocholithiasis with a GI consultation for probable ERCP  I did discuss with Internal Medicine  They will perform a septic workup, start antibiotics and admit to their service             Procedures  MDM    Disposition  Final diagnoses:   Hyperbilirubinemia   Chest pain   Abdominal pain     Time reflects when diagnosis was documented in both MDM as applicable and the Disposition within this note     Time User Action Codes Description Comment    12/21/2020 10:50 PM Check, Levonne Corinth Add [E80 6] Hyperbilirubinemia     12/21/2020 10:50 PM Check, Levonne Corinth Add [R07 9] Chest pain 12/21/2020 10:50 PM Check, Nicole Hernandez Modify [E80 6] Hyperbilirubinemia     12/21/2020 10:50 PM Check, Nicole Hernandez Modify [R07 9] Chest pain     12/22/2020 12:00 AM Check, Nicole Hernandez Add [R10 9] Abdominal pain       ED Disposition     ED Disposition Condition Date/Time Comment    Admit Stable Tue Dec 22, 2020 0239 Case was discussed with NADEEM and the patient's admission status was agreed to be Admission Status: inpatient status to the service of Dr Ana Downey   Follow-up Information    None       Patient's Medications   Discharge Prescriptions    No medications on file     No discharge procedures on file         ED Provider  Electronically Signed by     Rachel Steffany , DO 12/22/20 2020

## 2020-12-22 NOTE — ED PROVIDER NOTES
History  Chief Complaint   Patient presents with    Chest Pain     Pt reports pain began with back 2 days ago and started in chest last night  Pt reports one episode of vomiting after eating  Pt also states he has not had BM in 2 days  Pt states the pain is sharp and is worse when moving and has SOB     63-year-old male with history of GERD presents for evaluation of chest pain  Patient states his symptoms began 2 days ago with what he described as a sharp pain in the middle of his back  He then states he began to develop some epigastric pain as well as chest pain last night  It is associated with some shortness of breath  He denies any associated lightheadedness, diaphoresis, palpitations, or syncope  The pain is made worse with movement as well as deep inspiration  He denies any prior history of DVT or PE  No leg pain or swelling  No prolonged immobilization or recent surgical procedures  He denies any headache, fever, chills, cough  No known sick contacts  He does endorse constipation  He states he has only had very small bowel movements over the past few days  He feels like he is unable to fully relieve himself  Prior to Admission Medications   Prescriptions Last Dose Informant Patient Reported? Taking?    desloratadine (CLARINEX) 5 MG tablet Not Taking at Unknown time  No No   Sig: Take 1 tablet (5 mg total) by mouth daily   Patient not taking: Reported on 12/21/2020   triamcinolone (KENALOG) 0 1 % cream Not Taking at Unknown time  No No   Sig: Apply topically 2 (two) times a day   Patient not taking: Reported on 12/21/2020      Facility-Administered Medications: None       Past Medical History:   Diagnosis Date    Fracture of nasal bone     GERD (gastroesophageal reflux disease)     Gross hematuria     Orthostatic hypotension        Past Surgical History:   Procedure Laterality Date    WRIST SURGERY         Family History   Problem Relation Age of Onset    Diabetes Father      I have reviewed and agree with the history as documented  E-Cigarette/Vaping    E-Cigarette Use Never User      E-Cigarette/Vaping Substances     Social History     Tobacco Use    Smoking status: Former Smoker     Quit date: 2014     Years since quittin 9    Smokeless tobacco: Never Used   Substance Use Topics    Alcohol use: No    Drug use: No        Review of Systems   Constitutional: Negative for chills and fever  HENT: Negative for ear pain and sore throat  Eyes: Negative for photophobia, pain and visual disturbance  Respiratory: Positive for shortness of breath  Negative for cough  Cardiovascular: Positive for chest pain  Negative for palpitations  Gastrointestinal: Positive for abdominal pain  Negative for vomiting  Genitourinary: Negative for dysuria and hematuria  Musculoskeletal: Positive for back pain  Negative for arthralgias  Skin: Negative for color change and rash  Neurological: Negative for seizures and syncope  Hematological: Negative  Psychiatric/Behavioral: Negative  All other systems reviewed and are negative  Physical Exam  ED Triage Vitals   Temperature Pulse Respirations Blood Pressure SpO2   20   97 9 °F (36 6 °C) (!) 123 20 135/69 95 %      Temp Source Heart Rate Source Patient Position - Orthostatic VS BP Location FiO2 (%)   20 --   Temporal Monitor Sitting Right arm       Pain Score       20       7             Orthostatic Vital Signs  Vitals:    20 0030 20 0130 20 0736 20 1130   BP: 116/62 129/81 110/62 122/73   Pulse: 104 72 (!) 117 (!) 107   Patient Position - Orthostatic VS: Lying Lying Lying Lying       Physical Exam  Vitals signs and nursing note reviewed  Constitutional:       Appearance: He is well-developed and normal weight  HENT:      Head: Normocephalic and atraumatic     Eyes: Extraocular Movements: Extraocular movements intact  Conjunctiva/sclera: Conjunctivae normal       Pupils: Pupils are equal, round, and reactive to light  Neck:      Musculoskeletal: Normal range of motion and neck supple  Cardiovascular:      Rate and Rhythm: Normal rate and regular rhythm  Heart sounds: Normal heart sounds  No murmur  Pulmonary:      Effort: Pulmonary effort is normal  No respiratory distress  Breath sounds: Normal breath sounds  No wheezing  Abdominal:      Palpations: Abdomen is soft  Tenderness: There is abdominal tenderness  There is no guarding or rebound  Musculoskeletal: Normal range of motion  Right lower leg: He exhibits no tenderness  No edema  Left lower leg: He exhibits no tenderness  No edema  Skin:     General: Skin is warm and dry  Capillary Refill: Capillary refill takes less than 2 seconds  Neurological:      General: No focal deficit present  Mental Status: He is alert and oriented to person, place, and time     Psychiatric:         Mood and Affect: Mood normal          Behavior: Behavior normal          ED Medications  Medications   lactated ringers infusion (0 mL/hr Intravenous Stopped 12/22/20 1314)   acetaminophen (TYLENOL) tablet 650 mg (has no administration in time range)   ondansetron (ZOFRAN) injection 4 mg (has no administration in time range)   aluminum-magnesium hydroxide-simethicone (MYLANTA) oral suspension 30 mL (has no administration in time range)   ceftriaxone (ROCEPHIN) 2 g/50 mL in dextrose IVPB (0 mg Intravenous Stopped 12/22/20 0829)   metroNIDAZOLE (FLAGYL) IVPB (premix) 500 mg 100 mL (0 mg Intravenous Stopped 12/22/20 1314)   iohexol (OMNIPAQUE) 350 MG/ML injection (MULTI-DOSE) 85 mL (85 mL Intravenous Given 12/21/20 2257)   cefepime (MAXIPIME) 2 g/50 mL dextrose IVPB (0 mg Intravenous Stopped 12/22/20 0429)   metroNIDAZOLE (FLAGYL) IVPB (premix) 500 mg 100 mL (0 mg Intravenous Stopped 12/22/20 2276)       Diagnostic Studies  Results Reviewed     Procedure Component Value Units Date/Time    Blood culture #1 [[id number]] Collected: 12/22/20 0340    Lab Status: Preliminary result Specimen: Blood from Arm, Right Updated: 12/22/20 0801     Blood Culture Received in Microbiology Lab  Culture in Progress  Blood culture #2 [[id number]] Collected: 12/22/20 0340    Lab Status: Preliminary result Specimen: Blood from Hand, Right Updated: 12/22/20 0801     Blood Culture Received in Microbiology Lab  Culture in Progress      Comprehensive metabolic panel [[id number]]  (Abnormal) Collected: 12/22/20 0540    Lab Status: Final result Specimen: Blood from Arm, Left Updated: 12/22/20 0644     Sodium 138 mmol/L      Potassium 3 9 mmol/L      Chloride 100 mmol/L      CO2 24 mmol/L      ANION GAP 14 mmol/L      BUN 12 mg/dL      Creatinine 1 12 mg/dL      Glucose 106 mg/dL      Calcium 9 0 mg/dL      Corrected Calcium 9 5 mg/dL      AST 30 U/L      ALT 48 U/L      Alkaline Phosphatase 47 U/L      Total Protein 8 0 g/dL      Albumin 3 4 g/dL      Total Bilirubin 2 72 mg/dL      eGFR 88 ml/min/1 73sq m     Narrative:      [facility] guidelines for Chronic Kidney Disease (CKD):     Stage 1 with normal or high GFR (GFR > 90 mL/min/1 73 square meters)    Stage 2 Mild CKD (GFR = 60-89 mL/min/1 73 square meters)    Stage 3A Moderate CKD (GFR = 45-59 mL/min/1 73 square meters)    Stage 3B Moderate CKD (GFR = 30-44 mL/min/1 73 square meters)    Stage 4 Severe CKD (GFR = 15-29 mL/min/1 73 square meters)    Stage 5 End Stage CKD (GFR <15 mL/min/1 73 square meters)  Note: GFR calculation is accurate only with a steady state creatinine    Bilirubin, direct [[id number]]  (Abnormal) Collected: 12/22/20 0540    Lab Status: Final result Specimen: Blood from Arm, Left Updated: 12/22/20 0607     Bilirubin, Direct 0 49 mg/dL     CBC and differential [[id number]]  (Abnormal) Collected: 12/22/20 0540    Lab Status: Final result Specimen: Blood from Arm, Left Updated: 12/22/20 0559     WBC 13 55 Thousand/uL      RBC 5 20 Million/uL      Hemoglobin 15 8 g/dL      Hematocrit 47 4 %      MCV 91 fL      MCH 30 4 pg      MCHC 33 3 g/dL      RDW 12 5 %      MPV 9 8 fL      Platelets 045 Thousands/uL      nRBC 0 /100 WBCs      Neutrophils Relative 82 %      Immat GRANS % 0 %      Lymphocytes Relative 9 %      Monocytes Relative 9 %      Eosinophils Relative 0 %      Basophils Relative 0 %      Neutrophils Absolute 10 99 Thousands/µL      Immature Grans Absolute 0 05 Thousand/uL      Lymphocytes Absolute 1 23 Thousands/µL      Monocytes Absolute 1 23 Thousand/µL      Eosinophils Absolute 0 02 Thousand/µL      Basophils Absolute 0 03 Thousands/µL     Protime-INR [[id number]]  (Normal) Collected: 12/22/20 0340    Lab Status: Final result Specimen: Blood from Hand, Right Updated: 12/22/20 0435     Protime 13 1 seconds      INR 1 01    APTT [[id number]]  (Normal) Collected: 12/22/20 0340    Lab Status: Final result Specimen: Blood from Hand, Right Updated: 12/22/20 0435     PTT 27 seconds     Lactic acid [[id number]]  (Normal) Collected: 12/22/20 0340    Lab Status: Final result Specimen: Blood from Hand, Right Updated: 12/22/20 0407     LACTIC ACID 1 4 mmol/L     Narrative:      Result may be elevated if tourniquet was used during collection  COVID[facility], [facility]/B, [facility] [[id number]]  (Normal) Collected: 12/22/20 0248    Lab Status: Final result Specimen: Nares from Nasopharyngeal Swab Updated: 12/22/20 0344     SARS-CoV-2 Negative     INFLUENZA A PCR Negative     INFLUENZA B PCR Negative     RSV PCR Negative    Narrative: This test has been authorized by FDA under an EUA (Emergency Use Assay) for use by authorized laboratories  Clinical caution and judgement should be used with the interpretation of these results with consideration of the clinical impression and other laboratory testing    Testing reported as "Positive" or "Negative" has been proven to be accurate according to standard laboratory validation requirements  All testing is performed with control materials showing appropriate reactivity at standard intervals  Comprehensive metabolic panel [[id number]]  (Abnormal) Collected: 12/21/20 2054    Lab Status: Final result Specimen: Blood from Arm, Left Updated: 12/21/20 2241     Sodium 138 mmol/L      Potassium 3 8 mmol/L      Chloride 100 mmol/L      CO2 26 mmol/L      ANION GAP 12 mmol/L      BUN 9 mg/dL      Creatinine 1 14 mg/dL      Glucose 102 mg/dL      Calcium 9 4 mg/dL      AST 29 U/L      ALT 47 U/L      Alkaline Phosphatase 53 U/L      Total Protein 8 4 g/dL      Albumin 3 8 g/dL      Total Bilirubin 2 79 mg/dL      eGFR 86 ml/min/1 73sq m     Narrative:      [facility] guidelines for Chronic Kidney Disease (CKD):     Stage 1 with normal or high GFR (GFR > 90 mL/min/1 73 square meters)    Stage 2 Mild CKD (GFR = 60-89 mL/min/1 73 square meters)    Stage 3A Moderate CKD (GFR = 45-59 mL/min/1 73 square meters)    Stage 3B Moderate CKD (GFR = 30-44 mL/min/1 73 square meters)    Stage 4 Severe CKD (GFR = 15-29 mL/min/1 73 square meters)    Stage 5 End Stage CKD (GFR <15 mL/min/1 73 square meters)  Note: GFR calculation is accurate only with a steady state creatinine    Novel Coronavirus (COVID-19), PCR [facility] [[id number]] Collected: 12/21/20 2206    Lab Status:  In process Specimen: Nasopharyngeal Swab Updated: 12/21/20 2210    Troponin I [[id number]]  (Normal) Collected: 12/21/20 2054    Lab Status: Final result Specimen: Blood from Arm, Left Updated: 12/21/20 2151     Troponin I <0 02 ng/mL     D-Dimer [[id number]]  (Abnormal) Collected: 12/21/20 2054    Lab Status: Final result Specimen: Blood from Arm, Left Updated: 12/21/20 2143     D-Dimer, Quant 1 10 ug/ml FEU     Lipase [[id number]]  (Normal) Collected: 12/21/20 2054    Lab Status: Final result Specimen: Blood from Arm, Left Updated: 12/21/20 2142     Lipase 107 u/L     CBC and differential [[id number]]  (Abnormal) Collected: 12/21/20 2054    Lab Status: Final result Specimen: Blood from Arm, Left Updated: 12/21/20 2122     WBC 13 42 Thousand/uL      RBC 5 57 Million/uL      Hemoglobin 16 9 g/dL      Hematocrit 50 5 %      MCV 91 fL      MCH 30 3 pg      MCHC 33 5 g/dL      RDW 12 5 %      MPV 10 0 fL      Platelets 608 Thousands/uL      nRBC 0 /100 WBCs      Neutrophils Relative 74 %      Immat GRANS % 0 %      Lymphocytes Relative 15 %      Monocytes Relative 9 %      Eosinophils Relative 2 %      Basophils Relative 0 %      Neutrophils Absolute 9 94 Thousands/µL      Immature Grans Absolute 0 05 Thousand/uL      Lymphocytes Absolute 2 00 Thousands/µL      Monocytes Absolute 1 16 Thousand/µL      Eosinophils Absolute 0 21 Thousand/µL      Basophils Absolute 0 06 Thousands/µL                  US right upper quadrant   Final Result by Zoë Mondragon DO (12/22 0118)      Limited study as described above  Mild hepatomegaly and moderate hepatic steatosis  Cholelithiasis with gallbladder wall thickening  Findings are equivocal for acute cholecystitis  Clinical correlation is recommended  The study was marked in [facility] for immediate notification  Workstation performed: [id number]         CTA ED chest PE Study   Final Result by Zoë Mondragon DO (12/21 7157)      No evidence of pulmonary artery embolus      Nodular airspace opacities scattered throughout the lungs which may represent infection  Recommend short-term follow-up chest CT scan evaluate for resolution in 3 months  Inflammatory changes around the gallbladder  Findings are suspicious for acute cholecystitis  Recommend confirmation with right upper quadrant sonogram       The study was marked in EPIC for immediate notification                    Workstation performed: [id number]         XR chest 1 view portable   ED Interpretation by Armando Gamble MD (12/21 2100)   No acute cardiopulmonary abnormality      Final Result by Emerson Perdomo MD (12/21 2155)      No acute cardiopulmonary disease  Workstation performed: [id number]               Procedures  ECG 12 Lead Documentation Only    Date/Time: 12/21/2020 8:54 PM  Performed by: Jamel Campbell MD  Authorized by: Jamel Campbell MD     Indications / Diagnosis:  Chest pain  ECG reviewed by me, the ED Provider: yes    Patient location:  ED  Previous ECG:     Previous ECG:  Unavailable  Interpretation:     Interpretation: abnormal    Rate:     ECG rate:  118    ECG rate assessment: tachycardic    Rhythm:     Rhythm: sinus rhythm    Ectopy:     Ectopy: none    QRS:     QRS axis:  Normal  Conduction:     Conduction: normal    ST segments:     ST segments:  Normal  T waves:     T waves: normal            ED Course  ED Course as of Dec 22 1405   Mon Dec 21, 2020   2044 Blood Pressure: 135/69   2044 Temperature: 97 9 °F (36 6 °C)   2044 Pulse(!): 142   2044 Respirations: 20   2044 SpO2: 95 %   2044 Heart ernestine 88 while I was in the room       2123 WBC(!): 13 42   2143 No desaturation with ambulation       2143 D-Dimer, Quant(!): 1 10   2155 Troponin I: <0 02   2250 TOTAL BILIRUBIN(!): 2 79   Tue Dec 22, 2020   0001 Tiger texted surgery       0008 Surgery to evaluate       0014 Nurse called ultrasound tech                HEART Risk Score      Most Recent Value   Heart Score Risk Calculator   History  0 Filed at: 12/21/2020 2155   ECG  0 Filed at: 12/21/2020 2155   Age  0 Filed at: 12/21/2020 2155   Risk Factors  1 Filed at: 12/21/2020 2155   Troponin  0 Filed at: 12/21/2020 2155   HEART Score  1 Filed at: 12/21/2020 2155                      SBIRT 22yo+      Most Recent Value   SBIRT (23 yo +)   In order to provide better care to our patients, we are screening all of our patients for alcohol and drug use  Would it be okay to ask you these screening questions?   Yes Filed at: 12/22/2020 0422   Initial Alcohol Screen: US AUDIT-C    1  How often do you have a drink containing alcohol?  0 Filed at: 12/22/2020 0422   2  How many drinks containing alcohol do you have on a typical day you are drinking? 0 Filed at: 12/22/2020 0422   3a  Male UNDER 65: How often do you have five or more drinks on one occasion? 0 Filed at: 12/22/2020 0422   Audit-C Score  0 Filed at: 12/22/2020 0422   ABIGAIL: How many times in the past year have you    Used an illegal drug or used a prescription medication for non-medical reasons? Never Filed at: 12/22/2020 2620          Wells' Criteria for PE      Most Recent Value   Wells' Criteria for PE   Clinical signs and symptoms of DVT  0 Filed at: 12/21/2020 2144   PE is primary diagnosis or equally likely  3 Filed at: 12/21/2020 2144   HR >100  1 5 Filed at: 12/21/2020 2144   Immobilization at least 3 days or Surgery in the previous 4 weeks  0 Filed at: 12/21/2020 2144   Previous, objectively diagnosed PE or DVT  0 Filed at: 12/21/2020 2144   Hemoptysis  0 Filed at: 12/21/2020 2144   Malignancy with treatment within 6 months or palliative  0 Filed at: 12/21/2020 2144   [facility]  4 5 Filed at: 12/21/2020 2144            MDM  Number of Diagnoses or Management Options  Abdominal pain:   Chest pain:   Hyperbilirubinemia:   Diagnosis management comments: 45-year-old obese male presents for evaluation of chest pain  On exam patient is overall well appearing in no acute distress  Lungs are clear to auscultation bilaterally  Patient's tachycardia improved upon my evaluation  He believes the tachycardia noted in triage with secondary to him walking into the hospital   Differential diagnosis includes but is not limited to PE, pneumothorax, pericarditis, ACS, pneumonia, gastritis, peptic ulcer disease, gallbladder pathology  Will check cardiac workup as well as a lipase  EKG demonstrated sinus tachycardia without ischemic changes  Patient has mild leukocytosis as well as elevated bilirubin    D-dimer is also elevated  CT PE study negative for any pulmonary emboli, but did show inflammatory changes around the gallbladder  Will consult surgery and obtain right upper quadrant ultrasound  Disposition  Final diagnoses:   Hyperbilirubinemia   Chest pain   Abdominal pain     Time reflects when diagnosis was documented in both MDM as applicable and the Disposition within this note     Time User Action Codes Description Comment    12/21/2020 10:50 PM Check, Luvenia Record Add [E80 6] Hyperbilirubinemia     12/21/2020 10:50 PM Check, Luvenia Record Add [R07 9] Chest pain     12/21/2020 10:50 PM Check, Luvenia Record Modify [E80 6] Hyperbilirubinemia     12/21/2020 10:50 PM Check, Luvenia Record Modify [R07 9] Chest pain     12/22/2020 12:00 AM Check, Luvenia Record Add [R10 9] Abdominal pain     12/22/2020  4:58 AM Malvin Morris Add [K81 9] Cholecystitis     12/22/2020 11:11 AM Merlin Ng D Modify [R07 9] Chest pain     12/22/2020 11:11 AM Merlin Ng D Modify [K81 9] Cholecystitis       ED Disposition     ED Disposition Condition Date/Time Comment    Admit Stable Tue Dec 22, 2020  2:39 AM Case was discussed with NADEEM and the patient's admission status was agreed to be Admission Status: inpatient status to the service of Dr Juanjo Gamble           Follow-up Information     Follow up With Specialties Details Why Contact Info Additional Information    Wendy Novoa MD General Surgery, Wound Care Follow up  [address]       [facility] [place] [facility]  Follow up  [address] [id number] [facility], [address] [place], [place], [address] [phone]          Discharge Medication List as of 12/22/2020 11:57 AM      START taking these medications    Details   levofloxacin (LEVAQUIN) 500 mg tablet Take 1 tablet (500 mg total) by mouth every 24 hours for 10 days, Starting Tue 12/22/2020, Until Fri 1/1/2021, Print      metroNIDAZOLE (FLAGYL) 500 mg tablet Take 1 tablet (500 mg total) by mouth every 8 (eight) hours for 10 days, Starting Tue 12/22/2020, Until Fri 1/1/2021, Print         CONTINUE these medications which have NOT CHANGED    Details   desloratadine (CLARINEX) 5 MG tablet Take 1 tablet (5 mg total) by mouth daily, Starting Fri 8/23/2019, Print      triamcinolone (KENALOG) 0 1 % cream Apply topically 2 (two) times a day, Starting Fri 8/23/2019, Print           Outpatient Discharge Orders   Ambulatory referral to Family Practice   Standing Status: Future Standing Exp  Date: 12/22/21      Ambulatory referral to General Surgery   Standing Status: Future Standing Exp  Date: 12/22/21      Discharge Diet     Activity as tolerated     Call provider for:  persistent nausea or vomiting     Call provider for:  severe uncontrolled pain     Call provider for:  redness, tenderness, or signs of infection (pain, swelling, redness, odor or green/yellow discharge around incision site)     Call provider for: active or persistent bleeding     Call provider for:  difficulty breathing, headache or visual disturbances     Call provider for:  hives     Call provider for:  persistent dizziness or light-headedness     Call provider for:  extreme fatigue       PDMP Review       Value Time User    PDMP Reviewed  Yes 12/22/2020 11:08 AM Berline Severance, PA-C           ED Provider  Attending physically available and evaluated Windy Cazares I managed the patient along with the ED Attending      Electronically Signed by         Zoltan Lemons MD  12/22/20 0394

## 2020-12-22 NOTE — ED NOTES
Ambulatory pulse Ox from 96% going up to 99% with a HR of 120  Provider made aware        Isabel Cabezas  12/21/20 2124

## 2020-12-23 ENCOUNTER — TRANSITIONAL CARE MANAGEMENT (OUTPATIENT)
Dept: FAMILY MEDICINE CLINIC | Facility: CLINIC | Age: 30
End: 2020-12-23

## 2020-12-23 LAB — SARS-COV-2 RNA SPEC QL NAA+PROBE: NOT DETECTED

## 2020-12-23 NOTE — UTILIZATION REVIEW
Initial Clinical Review    Admission: Date/Time/Statement:   Admission Orders (From admission, onward)     Ordered        12/22/20 0322  Inpatient Admission  Once                   Orders Placed This Encounter   Procedures    Inpatient Admission     Standing Status:   Standing     Number of Occurrences:   1     Order Specific Question:   Admitting Physician     Answer:   Case Aceves [[id number]]     Order Specific Question:   Level of Care     Answer:   Med Surg [16]     Order Specific Question:   Estimated length of stay     Answer:   More than 2 Midnights     Order Specific Question:   Certification     Answer:   I certify that inpatient services are medically necessary for this patient for a duration of greater than two midnights  See H&P and MD Progress Notes for additional information about the patient's course of treatment  ED Arrival Information     Expected Arrival Acuity Means of Arrival Escorted By Service Admission Type    - 12/21/2020 20:19 Emergent Walk-In Self Hospitalist Emergency    Arrival Complaint    abdominal pain, chest pain        Chief Complaint   Patient presents with    Chest Pain     Pt reports pain began with back 2 days ago and started in chest last night  Pt reports one episode of vomiting after eating  Pt also states he has not had BM in 2 days  Pt states the pain is sharp and is worse when moving and has SOB     Assessment/Plan: 26 yo m w/no pmhx to ED from home admitted as inpatient due to acute cholecystitis  Presented with 2 days of epigastric pain that started as back pain and wrapped around to epigastrium  Feels sore, thought pulled a muscle but then had severe pain after dinner associated with vomiting  CTA suspicious for acute cholecystitis  US showed gallstones  Exam reveals epigastric tenderness  Bili elevated  GI and surgery consulted  IVF and analgesics in progress       12/22: per surgery: keep NPO, continue IVF, IV antbx, analgesics, to have laparoscopic cholecystectomy after ERCP      12/22 per GI: states epigastric and mid back pain was sharp, worse w/movement, associated with constipation  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  No further GI intervention needed, Holding off on ERCP      12/22 update @1101: feeling better and wants to go home  Is concerned about no insurance  He was discharged to home on 10 days of levaquin and flagyl, instructed to return if new symptoms  He will most likely need a perc chester tube by IR as opposed to a lap cholecystectomy, which he understands         ED Triage Vitals   Temperature Pulse Respirations Blood Pressure SpO2   12/21/20 2029 12/21/20 2028 12/21/20 2029 12/21/20 2029 12/21/20 2029   97 9 °F (36 6 °C) (!) 123 20 135/69 95 %      Temp Source Heart Rate Source Patient Position - Orthostatic VS BP Location FiO2 (%)   12/21/20 2029 12/21/20 2028 12/21/20 2029 12/21/20 2029 --   Temporal Monitor Sitting Right arm       Pain Score       12/21/20 2029       7          Wt Readings from Last 1 Encounters:   12/21/20 (!) 157 kg (345 lb 14 4 oz)     Additional Vital Signs:   Date/Time  Temp  Pulse  Resp  BP  MAP (mmHg)  SpO2  O2 Device  Patient Position - Orthostatic VS   12/22/20 1130    107Abnormal   18  122/73    96 %  None (Room air)  Lying   12/22/20 0736  98 8 °F (37 1 °C)  117Abnormal   20  110/62    95 %  None (Room air)  Lying   12/22/20 0130    72  18  129/81  93  94 %  None (Room air)  Lying   12/22/20 0030    104  19  116/62  83  95 %  None (Room air)  Lying   12/21/20 2324    96  22  120/71    96 %  None (Room air)  Lying   12/21/20 2213    109Abnormal   22  124/72    96 %  None (Room air)  Lying   12/21/20 2130    104  16      93 %  None (Room air)  Lying       Pertinent Labs/Diagnostic Test Results:   US right upper quadrant   Final Result by Nita Damian DO (12/22 0118)       Limited study as described above        Mild hepatomegaly and moderate hepatic steatosis        Cholelithiasis with gallbladder wall thickening  Findings are equivocal for acute cholecystitis  CTA ED chest PE Study   Final Result by Precious Ruiz DO (12/21 2319)       No evidence of pulmonary artery embolus       Nodular airspace opacities scattered throughout the lungs which may represent infection  Recommend short-term follow-up chest CT scan evaluate for resolution in 3 months        Inflammatory changes around the gallbladder  Findings are suspicious for acute cholecystitis    Recommend confirmation with right upper quadrant sonogram      12/21 PCXR: nothing acute  12/21 EKG sinus tach    Results from last 7 days   Lab Units 12/22/20  0248 12/21/20 2206   SARS-COV-2  Negative Not Detected     Results from last 7 days   Lab Units 12/22/20  0540 12/21/20 2054   WBC Thousand/uL 13 55* 13 42*   HEMOGLOBIN g/dL 15 8 16 9   HEMATOCRIT % 47 4 50 5*   PLATELETS Thousands/uL 334 398*   NEUTROS ABS Thousands/µL 10 99* 9 94*         Results from last 7 days   Lab Units 12/22/20  0540 12/21/20 2054   SODIUM mmol/L 138 138   POTASSIUM mmol/L 3 9 3 8   CHLORIDE mmol/L 100 100   CO2 mmol/L 24 26   ANION GAP mmol/L 14* 12   BUN mg/dL 12 9   CREATININE mg/dL 1 12 1 14   EGFR ml/min/1 73sq m 88 86   CALCIUM mg/dL 9 0 9 4     Results from last 7 days   Lab Units 12/22/20  0540 12/21/20 2054   AST U/L 30 29   ALT U/L 48 47   ALK PHOS U/L 47 53   TOTAL PROTEIN g/dL 8 0 8 4*   ALBUMIN g/dL 3 4* 3 8   TOTAL BILIRUBIN mg/dL 2 72* 2 79*   BILIRUBIN DIRECT mg/dL 0 49*  --          Results from last 7 days   Lab Units 12/22/20  0540 12/21/20 2054   GLUCOSE RANDOM mg/dL 106 102       Results from last 7 days   Lab Units 12/21/20 2054   TROPONIN I ng/mL <0 02     Results from last 7 days   Lab Units 12/21/20 2054   D-DIMER QUANTITATIVE ug/ml FEU 1 10*     Results from last 7 days   Lab Units 12/22/20  0340   PROTIME seconds 13 1   INR  1 01   PTT seconds 27             Results from last 7 days   Lab Units 12/22/20 0340   LACTIC ACID mmol/L 1 4     Results from last 7 days   Lab Units 12/21/20  2054   LIPASE u/L 107       Results from last 7 days   Lab Units 12/22/20  0248   INFLUENZA A PCR  Negative   INFLUENZA B PCR  Negative   RSV PCR  Negative       Results from last 7 days   Lab Units 12/22/20  0340   BLOOD CULTURE  No Growth at 24 hrs  No Growth at 24 hrs  ED Treatment:   Medication Administration from 12/21/2020 2017 to 12/23/2020 1156       Date/Time Order Dose Route Action     12/22/2020 0248 lactated ringers infusion 175 mL/hr Intravenous New Bag     12/22/2020 0350 cefepime (MAXIPIME) 2 g/50 mL dextrose IVPB 2,000 mg Intravenous New Bag     12/22/2020 0350 metroNIDAZOLE (FLAGYL) IVPB (premix) 500 mg 100 mL 500 mg Intravenous New Bag     12/22/2020 0739 ceftriaxone (ROCEPHIN) 2 g/50 mL in dextrose IVPB 2,000 mg Intravenous New Bag     12/22/2020 1151 metroNIDAZOLE (FLAGYL) IVPB (premix) 500 mg 100 mL 500 mg Intravenous New Bag        Past Medical History:   Diagnosis Date    Fracture of nasal bone     GERD (gastroesophageal reflux disease)     Gross hematuria     Orthostatic hypotension      Present on Admission:   Obesity (BMI 30-39  9)      Admitting Diagnosis: Chest pain [R07 9]  Age/Sex: 27 y o  male  Admission Orders:  Scheduled Medications:  cefepime (MAXIPIME) 2 g/50 mL dextrose IVPB   Dose: 2,000 mg  Freq: Once Route: IV  Last Dose: Stopped (12/22/20 0429)  Start: 12/22/20 0330 End: 12/22/20 0429ceftriaxone (ROCEPHIN) 2 g/50 mL in dextrose IVPB   Dose: 2,000 mg  Freq: Every 24 hours Route: IV  Last Dose: Stopped (12/22/20 0829)  Start: 12/22/20 0800 End: 12/22/20 1514  metroNIDAZOLE (FLAGYL) IVPB (premix) 500 mg 100 mL   Dose: 500 mg  Freq: Every 8 hours Route: IV  Indications of Use: INTRA-ABDOMINAL INFECTION  Last Dose: Stopped (12/22/20 1314)  Start: 12/22/20 1200 End: 12/22/20 1514  metroNIDAZOLE (FLAGYL) IVPB (premix) 500 mg 100 mL   Dose: 500 mg  Freq:  Once Route: IV  Last Dose: Stopped (12/22/20 6922)  Start: 12/22/20 0330 End: 12/22/20 0429  Continuous IV Infusions:  lactated ringers infusion   Rate: 175 mL/hr Dose: 175 mL/hr  Freq: Continuous Route: IV  Indications of Use: IV Hydration  Last Dose: Stopped (12/22/20 1314)  Start: 12/22/20 0245 End: 12/22/20 1514  PRN Meds: none given    scd's  NPO  oob as tolerated    IP CONSULT TO ACUTE CARE SURGERY  Consult GI    Network Utilization Review Department  ATTENTION: Please call with any questions or concerns to [phone] and carefully listen to the prompts so that you are directed to the right person  All voicemails are confidential   Chayo Patel all requests for admission clinical reviews, approved or denied determinations and any other requests to dedicated fax number below belonging to the campus where the patient is receiving treatment   List of dedicated fax numbers for the Facilities:  [address] DENIALS (Administrative/Medical Necessity) [phone]   [address] (Maternity/NICU/Pediatrics) [phone]   [address] [place] [address] Dr Cindy De La Cruz 2024 (  Malika Garrett "Tracee" 103) [address] [address] [address] Toni Gandhi 1481 [address] [place]) [address] [place] [address] [phone]

## 2020-12-23 NOTE — ASSESSMENT & PLAN NOTE
-Met SIRS criteria with leukocytosis however requested to be discharged  Afebrile with symptoms resolved   Discharged on course of antibiotics as below   -Will f/u culture results upon patient's discharge

## 2020-12-23 NOTE — DISCHARGE SUMMARY
Discharge- Isabella Loving 1990, 27 y o  male MRN: [id number]    Unit/Bed#: ED 21 Encounter: [id number]    Primary Care Provider: Beth Sylvester DO   Date and time admitted to hospital: 12/21/2020  8:31 PM        SIRS (systemic inflammatory response syndrome) ([facility] [address] )  Assessment & Plan  -Met SIRS criteria with leukocytosis however requested to be discharged  Afebrile with symptoms resolved  Discharged on course of antibiotics as below   -Will f/u culture results upon patient's discharge    Obesity (BMI 30-39  9)  Assessment & Plan  Lifestyle modification counseling    * Calculus of gallbladder without biliary obstruction  Assessment & Plan  Pt presents with epigastric pain, back pain, and vomiting  CTA 12/21 - Inflammatory changes around the gallbladder  No PE  RUQ U/S 12/21 - Mild hepatomegaly and moderate hepatic steatosis  Cholelithiasis with gallbladder wall thickening   Findings are equivocal for acute cholecystitis  Normal LFTs  T  Bili 2 79 -->GI evaluated and do not suspect choledocholithiasis  - General surgery offered cholecystectomy however patient declined due to lack of insurance  - Patient met SIRS criteria with leukocytosis however requested to be discharged  Afebrile with symptoms resolved  Discharged on course of antibiotics with Levaquin and Flagy   Return precautions discussed  - General surgery and Family Medicine referrals placed  - Encouraged to obtain health insurance plan ASAP            Discharging Physician / Practitioner: Nidhi Sahu MD  PCP: Beth Sylvester DO  Admission Date:   Admission Orders (From admission, onward)     Ordered        12/22/20 0322  Inpatient Admission  Once                   Discharge Date: 12/22/20    Resolved Problems  Date Reviewed: 12/22/2020    None          Consultations During Hospital Stay:  · General surgery, GI    Procedures Performed:   US right upper quadrant   Final Result by Gabino Alfonso DO (12/22 0118)      Limited study as described above       Mild hepatomegaly and moderate hepatic steatosis  Cholelithiasis with gallbladder wall thickening  Findings are equivocal for acute cholecystitis  Clinical correlation is recommended  The study was marked in [facility] for immediate notification  Workstation performed: [id number]         CTA ED chest PE Study   Final Result by Natasha Peres DO (12/21 2319)      No evidence of pulmonary artery embolus      Nodular airspace opacities scattered throughout the lungs which may represent infection  Recommend short-term follow-up chest CT scan evaluate for resolution in 3 months  Inflammatory changes around the gallbladder  Findings are suspicious for acute cholecystitis  Recommend confirmation with right upper quadrant sonogram       The study was marked in [employer] for immediate notification  Workstation performed: [id number]         XR chest 1 view portable   ED Interpretation by Abilio Parrish MD (12/21 2100)   No acute cardiopulmonary abnormality      Final Result by Elisabet Glaser MD (12/21 2155)      No acute cardiopulmonary disease  Workstation performed: [id number]               Significant Findings / Test Results:   Results from last 7 days   Lab Units 12/22/20  0540   WBC Thousand/uL 13 55*   HEMOGLOBIN g/dL 15 8   HEMATOCRIT % 47 4   PLATELETS Thousands/uL 334     Results from last 7 days   Lab Units 12/22/20  0540   SODIUM mmol/L 138   CHLORIDE mmol/L 100   CO2 mmol/L 24   BUN mg/dL 12   CREATININE mg/dL 1 12   CALCIUM mg/dL 9 0   ALK PHOS U/L 47   ALT U/L 48   AST U/L 30         Incidental Findings:   · None    Test Results Pending at Discharge (will require follow up): · Blood cultures x 2     Outpatient Tests Requested:  · None    Complications:  None    Reason for Admission: abdominal pain    Hospital Course:     Cheryle Taylor is a 27 y o  male patient who originally presented to the hospital on 12/21/2020 due to abdominal pain and nausea   Was found to have to have cholecystitis with equivocal findings of cholecystitis for cholecystitis  Elevated bilirubin but normal LFTs  GI evaluated and did not suspect choledolithiasis  General surgery offered surgical intervention with cholecystectomy, however, the patient declined due to lack of health insurance  Symptoms resolved prior to discharge  Met SIRS criteria and was discharged on course of antibiotics with return precautions, per patient's request  Will f/u blood cultures upon discharge  Referral for outpatient f/u with PCP and General Surgery placed  Discharged in improved and stable condition  Please see above list of diagnoses and related plan for additional information  Condition at Discharge: good     Discharge Day Visit / Exam:     Subjective:  Patient seen and examined  Reports feeling well and is requesting to be discharged home  Reports resolution of abdominal pain and nausea  Reports intention to obtain health insurance ASAP  Also states will seek medical care immediately if symptoms recur  Vitals: Blood Pressure: 122/73 (12/22/20 1130)  Pulse: (!) 107 (12/22/20 1130)  Temperature: 98 8 °F (37 1 °C) (12/22/20 0736)  Temp Source: Oral (12/22/20 0736)  Respirations: 18 (12/22/20 1130)  Height: 6' 3" (190 5 cm) (12/22/20 0423)  Weight - Scale: (!) 157 kg (345 lb 14 4 oz) (12/21/20 2029)  SpO2: 96 % (12/22/20 1130)    Exam:     Physical Exam  Constitutional:       General: He is not in acute distress  HENT:      Head: Normocephalic  Nose: No congestion  Mouth/Throat:      Pharynx: Oropharynx is clear  Eyes:      Conjunctiva/sclera: Conjunctivae normal    Cardiovascular:      Rate and Rhythm: Regular rhythm  Tachycardia present  Heart sounds: No murmur  Pulmonary:      Effort: Pulmonary effort is normal  No respiratory distress  Breath sounds: No wheezing or rales  Abdominal:      General: Bowel sounds are normal  There is no distension        Palpations: Abdomen is soft       Tenderness: There is no abdominal tenderness  There is no guarding  Comments: obese   Musculoskeletal:      Right lower leg: No edema  Left lower leg: No edema  Skin:     General: Skin is warm and dry  Neurological:      Mental Status: He is oriented to person, place, and time  Psychiatric:         Mood and Affect: Mood normal          Discussion with Family: patient to update family    Discharge instructions/Information to patient and family:   See after visit summary for information provided to patient and family  Provisions for Follow-Up Care:  See after visit summary for information related to follow-up care and any pertinent home health orders  Disposition:     Home    For Discharges to [facility] SNF:   · Not Applicable to this Patient - Not Applicable to this Patient    Planned Readmission: No     Discharge Statement:  I spent 35 minutes discharging the patient  This time was spent on the day of discharge  I had direct contact with the patient on the day of discharge  Greater than 50% of the total time was spent examining patient, answering all patient questions, arranging and discussing plan of care with patient as well as directly providing post-discharge instructions  Additional time then spent on discharge activities  Discharge Medications:  See after visit summary for reconciled discharge medications provided to patient and family        ** Please Note: This note has been constructed using a voice recognition system **

## 2020-12-23 NOTE — ASSESSMENT & PLAN NOTE
Pt presents with epigastric pain, back pain, and vomiting  CTA 12/21 - Inflammatory changes around the gallbladder  No PE  RUQ U/S 12/21 - Mild hepatomegaly and moderate hepatic steatosis  Cholelithiasis with gallbladder wall thickening   Findings are equivocal for acute cholecystitis  Normal LFTs  T  Bili 2 79 -->GI evaluated and do not suspect choledocholithiasis  - General surgery offered cholecystectomy however patient declined due to lack of insurance  - Patient met SIRS criteria with leukocytosis however requested to be discharged  Afebrile with symptoms resolved  Discharged on course of antibiotics with Levaquin and Flagy   Return precautions discussed  - General surgery and Family Medicine referrals placed  - Encouraged to obtain health insurance plan ASAP

## 2020-12-27 LAB
BACTERIA BLD CULT: NORMAL
BACTERIA BLD CULT: NORMAL

## 2020-12-28 ENCOUNTER — HOSPITAL ENCOUNTER (EMERGENCY)
Facility: HOSPITAL | Age: 30
Discharge: HOME | End: 2020-12-28
Attending: EMERGENCY MEDICINE | Admitting: EMERGENCY MEDICINE

## 2020-12-28 VITALS
DIASTOLIC BLOOD PRESSURE: 69 MMHG | OXYGEN SATURATION: 96 % | RESPIRATION RATE: 20 BRPM | TEMPERATURE: 97.9 F | HEART RATE: 90 BPM | SYSTOLIC BLOOD PRESSURE: 115 MMHG

## 2020-12-28 DIAGNOSIS — R10.11 RIGHT UPPER QUADRANT ABDOMINAL PAIN: Primary | ICD-10-CM

## 2020-12-28 LAB
ALBUMIN SERPL BCP-MCNC: 3.7 G/DL (ref 3.5–5)
ALP SERPL-CCNC: 54 U/L (ref 46–116)
ALT SERPL W P-5'-P-CCNC: 57 U/L (ref 12–78)
ANION GAP SERPL CALCULATED.3IONS-SCNC: 9 MMOL/L (ref 4–13)
APTT PPP: 28 SECONDS (ref 23–37)
AST SERPL W P-5'-P-CCNC: 39 U/L (ref 5–45)
BACTERIA UR QL AUTO: ABNORMAL /HPF
BASOPHILS # BLD AUTO: 0.07 THOUSANDS/ΜL (ref 0–0.1)
BASOPHILS NFR BLD AUTO: 1 % (ref 0–1)
BILIRUB SERPL-MCNC: 0.9 MG/DL (ref 0.2–1)
BILIRUB UR QL STRIP: ABNORMAL
BUN SERPL-MCNC: 5 MG/DL (ref 5–25)
CALCIUM SERPL-MCNC: 9 MG/DL (ref 8.3–10.1)
CHLORIDE SERPL-SCNC: 100 MMOL/L (ref 100–108)
CLARITY UR: CLEAR
CO2 SERPL-SCNC: 28 MMOL/L (ref 21–32)
COLOR UR: ABNORMAL
CREAT SERPL-MCNC: 1.16 MG/DL (ref 0.6–1.3)
EOSINOPHIL # BLD AUTO: 0.25 THOUSAND/ΜL (ref 0–0.61)
EOSINOPHIL NFR BLD AUTO: 2 % (ref 0–6)
ERYTHROCYTE [DISTWIDTH] IN BLOOD BY AUTOMATED COUNT: 12.1 % (ref 11.6–15.1)
GFR SERPL CREATININE-BSD FRML MDRD: 84 ML/MIN/1.73SQ M
GLUCOSE SERPL-MCNC: 125 MG/DL (ref 65–140)
GLUCOSE UR STRIP-MCNC: NEGATIVE MG/DL
HCT VFR BLD AUTO: 49.5 % (ref 36.5–49.3)
HGB BLD-MCNC: 16.9 G/DL (ref 12–17)
HGB UR QL STRIP.AUTO: ABNORMAL
HYALINE CASTS #/AREA URNS LPF: ABNORMAL /LPF
IMM GRANULOCYTES # BLD AUTO: 0.05 THOUSAND/UL (ref 0–0.2)
IMM GRANULOCYTES NFR BLD AUTO: 0 % (ref 0–2)
INR PPP: 1.08 (ref 0.84–1.19)
KETONES UR STRIP-MCNC: ABNORMAL MG/DL
LACTATE SERPL-SCNC: 1.8 MMOL/L (ref 0.5–2)
LEUKOCYTE ESTERASE UR QL STRIP: ABNORMAL
LYMPHOCYTES # BLD AUTO: 2.09 THOUSANDS/ΜL (ref 0.6–4.47)
LYMPHOCYTES NFR BLD AUTO: 17 % (ref 14–44)
MCH RBC QN AUTO: 30.8 PG (ref 26.8–34.3)
MCHC RBC AUTO-ENTMCNC: 34.1 G/DL (ref 31.4–37.4)
MCV RBC AUTO: 90 FL (ref 82–98)
MONOCYTES # BLD AUTO: 1.04 THOUSAND/ΜL (ref 0.17–1.22)
MONOCYTES NFR BLD AUTO: 8 % (ref 4–12)
MUCOUS THREADS UR QL AUTO: ABNORMAL
NEUTROPHILS # BLD AUTO: 9.03 THOUSANDS/ΜL (ref 1.85–7.62)
NEUTS SEG NFR BLD AUTO: 72 % (ref 43–75)
NITRITE UR QL STRIP: NEGATIVE
NON-SQ EPI CELLS URNS QL MICRO: ABNORMAL /HPF
NRBC BLD AUTO-RTO: 0 /100 WBCS
PH UR STRIP.AUTO: 5.5 [PH] (ref 4.5–8)
PLATELET # BLD AUTO: 516 THOUSANDS/UL (ref 149–390)
PMV BLD AUTO: 10 FL (ref 8.9–12.7)
POTASSIUM SERPL-SCNC: 3.3 MMOL/L (ref 3.5–5.3)
PROCALCITONIN SERPL-MCNC: 0.06 NG/ML
PROT SERPL-MCNC: 8.5 G/DL (ref 6.4–8.2)
PROT UR STRIP-MCNC: ABNORMAL MG/DL
PROTHROMBIN TIME: 13.8 SECONDS (ref 11.6–14.5)
RBC # BLD AUTO: 5.49 MILLION/UL (ref 3.88–5.62)
RBC #/AREA URNS AUTO: ABNORMAL /HPF
SODIUM SERPL-SCNC: 137 MMOL/L (ref 136–145)
SP GR UR STRIP.AUTO: >=1.03 (ref 1–1.03)
UROBILINOGEN UR QL STRIP.AUTO: 0.2 E.U./DL
WBC # BLD AUTO: 12.53 THOUSAND/UL (ref 4.31–10.16)
WBC #/AREA URNS AUTO: ABNORMAL /HPF

## 2020-12-28 PROCEDURE — 87040 BLOOD CULTURE FOR BACTERIA: CPT | Performed by: EMERGENCY MEDICINE

## 2020-12-28 PROCEDURE — 85610 PROTHROMBIN TIME: CPT | Performed by: EMERGENCY MEDICINE

## 2020-12-28 PROCEDURE — 85730 THROMBOPLASTIN TIME PARTIAL: CPT | Performed by: EMERGENCY MEDICINE

## 2020-12-28 PROCEDURE — 85025 COMPLETE CBC W/AUTO DIFF WBC: CPT | Performed by: EMERGENCY MEDICINE

## 2020-12-28 PROCEDURE — 99284 EMERGENCY DEPT VISIT MOD MDM: CPT

## 2020-12-28 PROCEDURE — 80053 COMPREHEN METABOLIC PANEL: CPT | Performed by: EMERGENCY MEDICINE

## 2020-12-28 PROCEDURE — 81001 URINALYSIS AUTO W/SCOPE: CPT

## 2020-12-28 PROCEDURE — 83605 ASSAY OF LACTIC ACID: CPT | Performed by: EMERGENCY MEDICINE

## 2020-12-28 PROCEDURE — 84145 PROCALCITONIN (PCT): CPT | Performed by: EMERGENCY MEDICINE

## 2020-12-28 PROCEDURE — 99285 EMERGENCY DEPT VISIT HI MDM: CPT | Performed by: EMERGENCY MEDICINE

## 2020-12-28 PROCEDURE — 36415 COLL VENOUS BLD VENIPUNCTURE: CPT | Performed by: EMERGENCY MEDICINE

## 2020-12-28 PROCEDURE — 96361 HYDRATE IV INFUSION ADD-ON: CPT

## 2020-12-28 PROCEDURE — 96374 THER/PROPH/DIAG INJ IV PUSH: CPT

## 2020-12-28 RX ORDER — ONDANSETRON 2 MG/ML
4 INJECTION INTRAMUSCULAR; INTRAVENOUS ONCE
Status: COMPLETED | OUTPATIENT
Start: 2020-12-28 | End: 2020-12-28

## 2020-12-28 RX ORDER — MORPHINE SULFATE 4 MG/ML
4 INJECTION, SOLUTION INTRAMUSCULAR; INTRAVENOUS ONCE
Status: DISCONTINUED | OUTPATIENT
Start: 2020-12-28 | End: 2020-12-28 | Stop reason: HOSPADM

## 2020-12-28 RX ADMIN — SODIUM CHLORIDE 1000 ML: 0.9 INJECTION, SOLUTION INTRAVENOUS at 01:40

## 2020-12-28 RX ADMIN — ONDANSETRON 4 MG: 2 INJECTION INTRAMUSCULAR; INTRAVENOUS at 01:32

## 2021-01-02 LAB
BACTERIA BLD CULT: NORMAL
BACTERIA BLD CULT: NORMAL

## 2021-01-05 ENCOUNTER — OFFICE VISIT (OUTPATIENT)
Dept: SURGERY | Facility: CLINIC | Age: 31
End: 2021-01-05

## 2021-01-05 VITALS
TEMPERATURE: 96.9 F | HEIGHT: 75 IN | HEART RATE: 76 BPM | WEIGHT: 315 LBS | SYSTOLIC BLOOD PRESSURE: 110 MMHG | BODY MASS INDEX: 39.17 KG/M2 | DIASTOLIC BLOOD PRESSURE: 80 MMHG

## 2021-01-05 DIAGNOSIS — E66.01 MORBID OBESITY WITH BMI OF 40.0-44.9, ADULT (HCC): ICD-10-CM

## 2021-01-05 DIAGNOSIS — K80.00 CALCULUS OF GALLBLADDER WITH ACUTE CHOLECYSTITIS WITHOUT OBSTRUCTION: Primary | ICD-10-CM

## 2021-01-05 DIAGNOSIS — K81.9 CHOLECYSTITIS: ICD-10-CM

## 2021-01-05 PROCEDURE — 99213 OFFICE O/P EST LOW 20 MIN: CPT | Performed by: SURGERY

## 2021-01-05 NOTE — PROGRESS NOTES
Assessment/Plan:    Calculus of gallbladder without biliary obstruction    Discussed options including conservative management versus surgical intervention  At this point he does not have any insurance coverage and would like to hold off on surgery at this time  We did direct him to price check her stool you would be aware of the cost   I did explain the risks of delaying his surgery however he is currently asymptomatic and did well with antibiotics alone and therefore is reasonable to consider conservative management for now  I did explain that he will likely need surgery in the near future as his stones will not go away  A total of he developed severe pains and that do not improve or develops fever in conjunction he needs to go to the ER for evaluation  Otherwise he can follow-up p r n  Diagnoses and all orders for this visit:    Calculus of gallbladder with acute cholecystitis without obstruction    Cholecystitis  -     Ambulatory referral to General Surgery    Morbid obesity with BMI of 40 0-44 9, [facility]          Subjective:      Patient ID: Tonia Russo is a 27 y o  male  Mr Christiano Raya  Is a 59-year-old gentleman that is here for follow-up from an emergency department visit where he was diagnosed with acute cholecystitis  At that time he elected to follow a conservative approach as he is concerned about not having insurance coverage and being home with his family for the holidays  As he was not toxic this was reasonable and he was treated with antibiotics alone  He did have return to the ER 1 week later for a twinge of pain but was found to be improving and did not need surgery at that time either  He is here now to discuss his options  Since then he has been falling more low-fat diet and has not had any significant flare ups  He is interested in following a conservative approach at this time        The following portions of the patient's history were reviewed and updated as appropriate: allergies, current medications, past family history, past medical history, past social history, past surgical history and problem list     Review of Systems   Constitutional: Negative for appetite change, chills and fever  HENT: Negative for congestion and ear pain  Eyes: Negative for discharge and itching  Respiratory: Negative for chest tightness and shortness of breath  Cardiovascular: Negative for chest pain and palpitations  Gastrointestinal: Negative for abdominal distention and abdominal pain  Genitourinary: Negative for difficulty urinating and frequency  Musculoskeletal: Negative for arthralgias and gait problem  Skin: Negative for color change and rash  Neurological: Negative for dizziness and numbness  Psychiatric/Behavioral: Negative for agitation and confusion  Objective:      /80   Pulse 76   Temp (!) 96 9 °F (36 1 °C)   Ht 6' 3" (1 905 m)   Wt (!) 150 kg (331 lb)   BMI 41 37 kg/m²          Physical Exam  Vitals signs and nursing note reviewed  Constitutional:       General: He is not in acute distress  Appearance: He is well-developed  He is obese  He is not diaphoretic  HENT:      Head: Normocephalic and atraumatic  Eyes:      Pupils: Pupils are equal, round, and reactive to light  Neck:      Musculoskeletal: Normal range of motion and neck supple  Cardiovascular:      Rate and Rhythm: Normal rate and regular rhythm  Heart sounds: Normal heart sounds  No murmur  Pulmonary:      Effort: Pulmonary effort is normal  No respiratory distress  Breath sounds: Normal breath sounds  No wheezing  Abdominal:      General: Bowel sounds are normal  There is no distension  Palpations: Abdomen is soft  Tenderness: There is no abdominal tenderness  Musculoskeletal: Normal range of motion  Skin:     General: Skin is warm and dry  Neurological:      Mental Status: He is alert and oriented to person, place, and time  Psychiatric:         Behavior: Behavior normal

## 2021-01-05 NOTE — ASSESSMENT & PLAN NOTE
Discussed options including conservative management versus surgical intervention  At this point he does not have any insurance coverage and would like to hold off on surgery at this time  We did direct him to price check her stool you would be aware of the cost   I did explain the risks of delaying his surgery however he is currently asymptomatic and did well with antibiotics alone and therefore is reasonable to consider conservative management for now  I did explain that he will likely need surgery in the near future as his stones will not go away  A total of he developed severe pains and that do not improve or develops fever in conjunction he needs to go to the ER for evaluation  Otherwise he can follow-up p r n

## 2021-01-08 ENCOUNTER — NURSE TRIAGE (OUTPATIENT)
Dept: OTHER | Facility: OTHER | Age: 31
End: 2021-01-08

## 2021-01-08 ENCOUNTER — OFFICE VISIT (OUTPATIENT)
Dept: FAMILY MEDICINE CLINIC | Facility: CLINIC | Age: 31
End: 2021-01-08

## 2021-01-08 ENCOUNTER — TELEPHONE (OUTPATIENT)
Dept: FAMILY MEDICINE CLINIC | Facility: CLINIC | Age: 31
End: 2021-01-08

## 2021-01-08 VITALS
TEMPERATURE: 97.7 F | HEIGHT: 75 IN | WEIGHT: 315 LBS | BODY MASS INDEX: 39.17 KG/M2 | SYSTOLIC BLOOD PRESSURE: 110 MMHG | DIASTOLIC BLOOD PRESSURE: 72 MMHG

## 2021-01-08 DIAGNOSIS — N23 RENAL COLIC ON LEFT SIDE: Primary | ICD-10-CM

## 2021-01-08 PROBLEM — R65.10 SIRS (SYSTEMIC INFLAMMATORY RESPONSE SYNDROME) (HCC): Status: RESOLVED | Noted: 2020-12-22 | Resolved: 2021-01-08

## 2021-01-08 PROCEDURE — 99212 OFFICE O/P EST SF 10 MIN: CPT | Performed by: FAMILY MEDICINE

## 2021-01-08 RX ORDER — TAMSULOSIN HYDROCHLORIDE 0.4 MG/1
0.4 CAPSULE ORAL
Qty: 30 CAPSULE | Refills: 1 | Status: SHIPPED | OUTPATIENT
Start: 2021-01-08 | End: 2021-09-28

## 2021-01-08 RX ORDER — KETOROLAC TROMETHAMINE 30 MG/ML
30 INJECTION, SOLUTION INTRAMUSCULAR; INTRAVENOUS ONCE
Status: COMPLETED | OUTPATIENT
Start: 2021-01-08 | End: 2021-01-08

## 2021-01-08 RX ORDER — OXYCODONE HYDROCHLORIDE AND ACETAMINOPHEN 5; 325 MG/1; MG/1
1 TABLET ORAL EVERY 6 HOURS PRN
Qty: 20 TABLET | Refills: 0 | Status: SHIPPED | OUTPATIENT
Start: 2021-01-08 | End: 2021-09-28

## 2021-01-08 RX ADMIN — KETOROLAC TROMETHAMINE 30 MG: 30 INJECTION, SOLUTION INTRAMUSCULAR; INTRAVENOUS at 12:15

## 2021-01-08 NOTE — TELEPHONE ENCOUNTER
Regarding: medication question  ----- Message from Tina Whitlock sent at 1/8/2021  5:32 PM EST -----  I saw Dr Kevyn Martin today in the office for kidney stones and he prescribed me oxycodoen, my question is can I take it more often then every 6hrs  Advised pt is preferred that he takes med as prescribed but also advised him he can take it no earlier than 1 hour prior to the time is due if pain is 8-10/10 he understood and agreed

## 2021-01-08 NOTE — TELEPHONE ENCOUNTER
Reason for Disposition   History of kidney stones   Caller has medication question, adult has minor symptoms, caller declines triage, AND triager answers question    Answer Assessment - Initial Assessment Questions  1  NAME of MEDICATION: "What medicine are you calling about?"      Oxycodone   2  QUESTION: "What is your question?"      Can he take pain medicine earlier then 6 hours  3  PRESCRIBING HCP: "Who prescribed it?" Reason: if prescribed by specialist, call should be referred to that group  PCP  4  SYMPTOMS: "Do you have any symptoms?"      Pain from passing a kidney stone  5  SEVERITY: If symptoms are present, ask "Are they mild, moderate or severe?"      8-10 in pain  6  PREGNANCY:  "Is there any chance that you are pregnant?" "When was your last menstrual period?"      N/a    Protocols used:  FLANK PAIN-ADULT-, MEDICATION QUESTION CALL-ADULT-

## 2021-01-08 NOTE — PROGRESS NOTES
Assessment/Plan:    No problem-specific Assessment & Plan notes found for this encounter  Diagnoses and all orders for this visit:    Renal colic on left side  Comments:   hydrate, take medication as directed  Call if symptoms do not improve  Orders:  -     oxyCODONE-acetaminophen (PERCOCET) 5-325 mg per tablet; Take 1 tablet by mouth every 6 (six) hours as needed for moderate painMax Daily Amount: 4 tablets  -     tamsulosin (FLOMAX) 0 4 mg; Take 1 capsule (0 4 mg total) by mouth daily with dinner  -     ketorolac (TORADOL) 60 mg/2 mL IM injection 30 mg          Subjective:   Chief Complaint   Patient presents with    Back Pain     lower left     Abdominal Pain     lower left           Patient ID: Leobardo Ring is a 27 y o  male  He is complaining of left flank pain radiating around to the anterior abdominal wall and into the groin that started last night  He has had kidney stones twice in the past and this is what it feels like  He did have an episode of hematuria prior to arriving at the office  He has taken ibuprofen without relief  The following portions of the patient's history were reviewed and updated as appropriate: allergies, current medications, past family history, past medical history, past social history, past surgical history and problem list     Review of Systems   Constitutional: Positive for activity change, appetite change and fatigue  HENT: Negative  Eyes: Positive for discharge  Respiratory: Negative  Cardiovascular: Negative  Gastrointestinal: Positive for abdominal pain and nausea  Negative for diarrhea and vomiting  Genitourinary: Positive for flank pain and hematuria  Skin: Negative  Allergic/Immunologic: Negative for immunocompromised state  Neurological: Negative  Hematological: Negative  Psychiatric/Behavioral: Negative            Objective:      /72   Temp 97 7 °F (36 5 °C)   Ht 6' 3" (1 905 m)   Wt (!) 153 kg (338 lb)   BMI 42 25 kg/m²          Physical Exam  Constitutional:       Appearance: He is well-developed  He is obese  HENT:      Head: Normocephalic and atraumatic  Right Ear: Tympanic membrane and ear canal normal       Left Ear: Tympanic membrane and ear canal normal       Nose: Nose normal    Eyes:      Conjunctiva/sclera: Conjunctivae normal       Pupils: Pupils are equal, round, and reactive to light  Neck:      Musculoskeletal: Normal range of motion and neck supple  Thyroid: No thyromegaly  Vascular: No JVD  Trachea: No tracheal deviation  Cardiovascular:      Rate and Rhythm: Normal rate and regular rhythm  Pulmonary:      Effort: Pulmonary effort is normal       Breath sounds: Normal breath sounds  Abdominal:      General: Bowel sounds are normal       Palpations: Abdomen is soft  There is no mass  Tenderness: There is abdominal tenderness in the left lower quadrant  There is left CVA tenderness  There is no right CVA tenderness, guarding or rebound  Musculoskeletal:         General: No tenderness or deformity  Lymphadenopathy:      Cervical: No cervical adenopathy  Skin:     General: Skin is warm and dry  Findings: No lesion or rash  Nails: There is no clubbing  Neurological:      Mental Status: He is alert and oriented to person, place, and time  Motor: No abnormal muscle tone  Coordination: Coordination normal       Deep Tendon Reflexes: Reflexes are normal and symmetric   Reflexes normal    Psychiatric:         Judgment: Judgment normal

## 2021-01-09 NOTE — TELEPHONE ENCOUNTER
Regarding: Kidney stone pain   ----- Message from Adriana Wallace sent at 1/8/2021 11:24 PM EST -----  " I am calling back to see if there is anything I can do for this pain I saw Dr Vinita Bergeron today in the office for kidney stones and he prescribed me oxycodoen, my question is can I take it more often then every 6hrs "

## 2021-01-09 NOTE — TELEPHONE ENCOUNTER
Patient called and states he is passing kidney stone was seen in [facility] today  Request to take 2 Oxy instead of 1 tab Q 6  Pateint denies fever chills nausea or vomiting  Pt states he continues with lower left abdominal pain and 1 tab oxy is not working  Tigar text sent to on call doctor & order received from Dr Joni Martin to tell patient yes its OK to take Oxycodone 2 tabs Q 6hrs PRN pain and go to ER if no improvement

## 2021-01-09 NOTE — TELEPHONE ENCOUNTER
Reason for Disposition   [1] SEVERE pain with urination  (e g , excruciating) AND [2] not improved after 2 hours of pain medicine (e g , acetaminophen or ibuprofen)    Answer Assessment - Initial Assessment Questions  1  SEVERITY: "How bad is the pain?"  (e g , Scale 1-10; mild, moderate, or severe)    - MILD (1-3): complains slightly about urination hurting    - MODERATE (4-7): interferes with normal activities      - SEVERE (8-10): excruciating, unwilling or unable to urinate because of the pain       Severe 7 8 I have kidney stones and dr Lai Villarreal prescribed oxy but its not working,  2  FREQUENCY: "How many times have you had painful urination today?"     SEVERAL  3  PATTERN: "Is pain present every time you urinate or just sometimes?"       EVERY TIME PAIN IN OWER LEFTABDOMEN  4  ONSET: "When did the painful urination start?"      YESTERDAY 1 7 21  5  FEVER: "Do you have a fever?" If so, ask: "What is your temperature, how was it measured, and when did it start?"     DENIES  6   PAST UTI: "Have you had a urine infection before?" If so, ask: "When was the last time?" and "What happened that time?"      NO  7  CAUSE: "What do you think is causing the painful urination?"     STONES  8  OTHER SYMPTOMS: "Do you have any other symptoms?" (e g , flank pain, penile discharge, scrotal pain, blood in urine)     DENIES    Protocols used: [facility]

## 2021-01-22 ENCOUNTER — OFFICE VISIT (OUTPATIENT)
Dept: FAMILY MEDICINE CLINIC | Facility: CLINIC | Age: 31
End: 2021-01-22

## 2021-01-22 VITALS
DIASTOLIC BLOOD PRESSURE: 80 MMHG | TEMPERATURE: 97.8 F | BODY MASS INDEX: 39.17 KG/M2 | HEIGHT: 75 IN | WEIGHT: 315 LBS | SYSTOLIC BLOOD PRESSURE: 118 MMHG

## 2021-01-22 DIAGNOSIS — E66.01 MORBID OBESITY WITH BMI OF 40.0-44.9, ADULT (HCC): ICD-10-CM

## 2021-01-22 DIAGNOSIS — K21.9 GERD WITHOUT ESOPHAGITIS: Primary | ICD-10-CM

## 2021-01-22 DIAGNOSIS — M53.3 PAIN OF LEFT SACROILIAC JOINT: ICD-10-CM

## 2021-01-22 PROCEDURE — 99212 OFFICE O/P EST SF 10 MIN: CPT | Performed by: FAMILY MEDICINE

## 2021-01-22 RX ORDER — FAMOTIDINE 40 MG/1
TABLET, FILM COATED ORAL
Qty: 90 TABLET | Refills: 1 | Status: SHIPPED | OUTPATIENT
Start: 2021-01-22 | End: 2021-09-28

## 2021-01-22 NOTE — PROGRESS NOTES
Assessment/Plan:    No problem-specific Assessment & Plan notes found for this encounter  Diagnoses and all orders for this visit:    GERD without esophagitis  Comments:    Dietary modifications discussed, take medication as prescribed for at least 2 months  Call the office if symptoms do not improve  Orders:  -     famotidine (PEPCID) 40 MG tablet; 1 tablet at bedtime    Morbid obesity with BMI of 40 0-44 9, adult ([address] )  Comments:   diet and exercise discussed    Pain of left sacroiliac joint  Comments:   ice, Tylenol as needed  Subjective:   Chief Complaint   Patient presents with    GERD    Back Pain     lower left           Patient ID: Tonia Russo is a 27 y o  male  Presents to the office today complaining of intermittent abdominal pain  Only happens when he lays down at night  Feels like food is coming up on him, gets a painful burning sensation  Also has occasional constipation  Admits his diet is not the best   Has been trying to modify it as he has gallstones and wants try to avoid surgery if he can  He is also complaining of left lower back pain that occurs mostly when he sits  It resolves when he gets up and walks around  Also occur sometimes when he lays down  The following portions of the patient's history were reviewed and updated as appropriate: allergies, current medications, past family history, past medical history, past social history, past surgical history and problem list     Review of Systems   Constitutional: Positive for activity change, appetite change and fatigue  Negative for unexpected weight change (  5 lb designed weight loss  )  HENT: Negative  Eyes: Negative  Respiratory: Negative  Cardiovascular: Negative  Gastrointestinal: Positive for abdominal pain  Negative for anal bleeding, blood in stool, constipation, diarrhea and nausea  Endocrine: Negative for cold intolerance and heat intolerance  Genitourinary: Negative  Recently passed a kidney stone  Musculoskeletal: Positive for back pain  Allergic/Immunologic: Negative for immunocompromised state  Neurological: Negative  Psychiatric/Behavioral: Negative for agitation, confusion, decreased concentration and dysphoric mood  The patient is not nervous/anxious  Objective:      /80   Temp 97 8 °F (36 6 °C)   Ht 6' 3" (1 905 m)   Wt (!) 151 kg (332 lb)   BMI 41 50 kg/m²          Physical Exam  Constitutional:       Appearance: He is well-developed  He is obese  HENT:      Head: Normocephalic and atraumatic  Right Ear: Tympanic membrane and ear canal normal       Left Ear: Tympanic membrane and ear canal normal       Nose: Nose normal    Eyes:      Conjunctiva/sclera: Conjunctivae normal       Pupils: Pupils are equal, round, and reactive to light  Neck:      Musculoskeletal: Normal range of motion and neck supple  Thyroid: No thyromegaly  Vascular: No JVD  Trachea: No tracheal deviation  Cardiovascular:      Rate and Rhythm: Normal rate and regular rhythm  Heart sounds: No murmur  Pulmonary:      Effort: Pulmonary effort is normal       Breath sounds: Normal breath sounds  No wheezing or rales  Abdominal:      General: Bowel sounds are normal       Palpations: Abdomen is soft  There is no mass  Tenderness: There is no abdominal tenderness  There is no guarding or rebound  Comments: Mild epigastric tenderness   Musculoskeletal:         General: Tenderness present  No deformity  Comments: Mild left sacroiliac tenderness   Lymphadenopathy:      Cervical: No cervical adenopathy  Skin:     General: Skin is warm and dry  Findings: No lesion or rash  Nails: There is no clubbing  Neurological:      Mental Status: He is alert and oriented to person, place, and time  Cranial Nerves: No cranial nerve deficit  Motor: No abnormal muscle tone        Coordination: Coordination normal       Deep Tendon Reflexes: Reflexes are normal and symmetric   Reflexes normal    Psychiatric:         Judgment: Judgment normal

## 2021-09-28 ENCOUNTER — OFFICE VISIT (OUTPATIENT)
Dept: FAMILY MEDICINE CLINIC | Facility: CLINIC | Age: 31
End: 2021-09-28

## 2021-09-28 VITALS
SYSTOLIC BLOOD PRESSURE: 122 MMHG | DIASTOLIC BLOOD PRESSURE: 80 MMHG | TEMPERATURE: 98.2 F | WEIGHT: 315 LBS | BODY MASS INDEX: 42.3 KG/M2

## 2021-09-28 DIAGNOSIS — K80.00 CALCULUS OF GALLBLADDER WITH ACUTE CHOLECYSTITIS WITHOUT OBSTRUCTION: Primary | ICD-10-CM

## 2021-09-28 DIAGNOSIS — K62.5 RECTAL BLEEDING: ICD-10-CM

## 2021-09-28 DIAGNOSIS — Z59.89 DOES NOT HAVE HEALTH INSURANCE: ICD-10-CM

## 2021-09-28 DIAGNOSIS — R19.7 DIARRHEA IN ADULT PATIENT: ICD-10-CM

## 2021-09-28 DIAGNOSIS — R10.11 RIGHT UPPER QUADRANT ABDOMINAL PAIN: ICD-10-CM

## 2021-09-28 PROBLEM — Z59.71 DOES NOT HAVE HEALTH INSURANCE: Status: ACTIVE | Noted: 2021-09-28

## 2021-09-28 PROCEDURE — 99213 OFFICE O/P EST LOW 20 MIN: CPT | Performed by: FAMILY MEDICINE

## 2021-09-28 SDOH — ECONOMIC STABILITY - INCOME SECURITY: OTHER PROBLEMS RELATED TO HOUSING AND ECONOMIC CIRCUMSTANCES: Z59.89

## 2021-09-28 NOTE — ASSESSMENT & PLAN NOTE
Patient is heme negative in office Patient hemoglobin in office 17 Patient to discuss with social work finding insurance

## 2021-09-28 NOTE — PROGRESS NOTES
Assessment/Plan:    No problem-specific Assessment & Plan notes found for this encounter  Diagnoses and all orders for this visit:    Calculus of gallbladder with acute cholecystitis without obstruction  -     Ambulatory referral to social work care management program; Future    Right upper quadrant abdominal pain  -     Ambulatory referral to social work care management program; Future    Diarrhea in adult patient  -     Ambulatory referral to social work care management program; Future    Rectal bleeding  -     Ambulatory referral to social work care management program; Future    Does not have health insurance  -     Ambulatory referral to social work care management program; Future          Subjective:   Chief Complaint   Patient presents with    Abdominal Pain     right x 3 days   Rectal Bleeding          Patient ID: Nereida Netwon is a 27 y o  male      Patient has known gallstones and gallbladder isseus Patient ntoes that 3 days ago he started with the same pain right upper quadrant He states that he took a dose of motrin with some decrease in pain He also placed himself on a liquid diet at that time Patient pain has decreased it is at 1 out of 10 Patient has no fever Patient notes he had some chills at night but keeps the house at 60 degrees as they like it cold Patient had antibiotics in the past and was to have removal of gallbladder However as his condition responded to the medication he followed up as an outpatietn He has no insurance and so did not have surgery 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 they decided he should stay home He is not  to the mom of the kids Patient has no vomiting and no nausea Patient has all liquid stool for last 9 months about 2-3 times per day Patient notes no hemorrhoid He does note there is blood on the toilet tissue some times Patient states he applied in 12/2020 for medical assistance and was denied     Abdominal Pain  This is a recurrent problem  Episode onset: 9 months ago Latest episode 3 days ago  The onset quality is sudden  The problem occurs constantly  The most recent episode lasted 3 days  The problem has been gradually improving  The pain is located in the RUQ  The pain is at a severity of 1/10  The pain is mild  The quality of the pain is colicky and aching  The abdominal pain does not radiate  Associated symptoms include diarrhea  Pertinent negatives include no anorexia, arthralgias, belching, constipation, dysuria, fever, flatus, frequency, headaches, hematochezia, hematuria, melena, myalgias, nausea, vomiting or weight loss  Exacerbated by: fatty foods and dairy  Relieved by: clear liquid diet  Treatments tried: advil one dose  Prior diagnostic workup includes CT scan (surgical consult)  There is no history of abdominal surgery, colon cancer, Crohn's disease, irritable bowel syndrome, pancreatitis or PUD  The following portions of the patient's history were reviewed and updated as appropriate: allergies, current medications, past family history, past medical history, past social history, past surgical history and problem list     Review of Systems   Constitutional: Negative for fever and weight loss  Gastrointestinal: Positive for abdominal pain and diarrhea  Negative for anorexia, constipation, flatus, hematochezia, melena, nausea and vomiting  Genitourinary: Negative for dysuria, frequency and hematuria  Musculoskeletal: Negative for arthralgias and myalgias  Neurological: Negative for headaches  Objective:      /80   Temp 98 2 °F (36 8 °C)   Wt (!) 153 kg (338 lb 6 4 oz)   BMI 42 30 kg/m²          Physical Exam  Vitals and nursing note reviewed  Constitutional:       Appearance: He is obese  HENT:      Head: Normocephalic and atraumatic  Eyes:      Extraocular Movements: Extraocular movements intact        Conjunctiva/sclera: Conjunctivae normal       Pupils: Pupils are equal, round, and reactive to light  Cardiovascular:      Rate and Rhythm: Normal rate and regular rhythm  Pulmonary:      Effort: Pulmonary effort is normal       Breath sounds: Normal breath sounds  Abdominal:      General: Bowel sounds are normal  There is no distension  Palpations: Abdomen is soft  There is no mass  Tenderness: There is abdominal tenderness  There is no right CVA tenderness, left CVA tenderness, guarding or rebound  Hernia: No hernia is present  Comments: Mild right upper quadrant pain to palpation   Genitourinary:     Rectum: Normal  Guaiac result negative  Neurological:      General: No focal deficit present  Mental Status: He is alert and oriented to person, place, and time  Psychiatric:         Mood and Affect: Mood normal          Behavior: Behavior normal          Thought Content:  Thought content normal          Judgment: Judgment normal

## 2021-09-28 NOTE — ASSESSMENT & PLAN NOTE
Due to gall bladder Patient does not have acute abdomein reviewed signs of acute abdomin and patient is aware to go to ER if that happens

## 2021-09-28 NOTE — ASSESSMENT & PLAN NOTE
Reviewed signs and symptoms of acute gall bladder Patient will advance diet to bland diet now that the pain has lessened If he gets vomiting increase pain fever or redness/swelling in the area then ER Discussed low fat diet

## 2021-09-28 NOTE — PATIENT INSTRUCTIONS
Gallstones   WHAT YOU NEED TO KNOW:   What are gallstones? Gallstones are hard substances that form in your gallbladder or bile duct  Your gallbladder and bile duct are located on the right side of your abdomen, near your liver  Your gallbladder stores bile  Bile helps break down the fat that you eat  Your gallbladder also helps remove certain chemicals from your body  What causes gallstones? Gallstones develop when your gallbladder does not empty correctly  Stones can form from different bile materials  The following may increase your risk:  · Obesity or not enough physical activity    · Pregnancy    · A family history of gallstones    · A health condition such as diabetes, cirrhosis, or nonalcoholic fatty liver disease    · Rapid weight loss    · Surgery on your intestines    · Certain medicines, such as estrogen, antibiotics, and cholesterol-lowering medicines    What are the signs and symptoms of gallstones? The most common symptom is severe, constant pain in the right upper abdomen  It is usually just below the ribcage  The pain may also be felt in the right shoulder and between the shoulder blades  You may also have any of the following:  · Nausea and vomiting    · Feeling bloated    · Marc-colored bowel movements    · Dark urine    How are gallstones diagnosed? Ultrasound pictures may be used to check for gallstones  If these tests do not show clear signs of gallstones, you may need a test that uses contrast liquid  Examples include a gallbladder scan, endoscopic retrograde cholangiopancreatography (ERCP), and an oral cholecystography  For any of these tests, tell your healthcare provider if you have ever had an allergic reaction to contrast liquid  If you are a woman, tell your healthcare provider if there is a chance you may be pregnant  How are gallstones treated? You may not need treatment if you do not have signs or symptoms   Your healthcare provider may want to monitor the gallstones over time  You may need any of the following:  · Antinausea medicine  may be given to calm your stomach and to help prevent vomiting  · Prescription pain medicine  may be given  Ask your healthcare provider how to take this medicine safely  Some prescription pain medicines contain acetaminophen  Do not take other medicines that contain acetaminophen without talking to your healthcare provider  Too much acetaminophen may cause liver damage  Prescription pain medicine may cause constipation  Ask your healthcare provider how to prevent or treat constipation  · Surgery  may be needed to remove your gallbladder  This may be done after symptoms become severe or you develop health problems from the gallstones  Your healthcare provider may recommend gallbladder removal before you develop symptoms  This may be done if you are at high risk for gallstones or health problems they can cause  What can I do to manage or prevent gallstones? · Eat a variety of healthy foods  This may help you have more energy and heal faster  Healthy foods include fruits, vegetables, whole-grain breads, low-fat dairy products, beans, lean meat, and fish  Ask if you need to be on a special diet  Try to eat regular meals during the day  This will help your gallbladder empty  · Exercise as directed  Talk to your healthcare provider about the best exercise plan for you  Exercise can help you lose weight and improve your health  · Manage your weight  If you are overweight, it is important to reach a healthy weight  You will need to lose weight slowly because rapid weight loss can increase your risk for gallstones  Talk to your healthcare provider about your weight  He or she can help you create a safe weight loss plan if you need to lose weight  When should I seek immediate care? · You have a fever and chills  · Your eyes or skin turn yellow  · You have severe pain in your upper abdomen, just below the right ribcage      When should I contact my healthcare provider? · You have nausea and are vomiting  · Your urine is dark  · You have zunilda-colored bowel movements  · You have questions or concerns about your condition or care  CARE AGREEMENT:   You have the right to help plan your care  Learn about your health condition and how it may be treated  Discuss treatment options with your healthcare providers to decide what care you want to receive  You always have the right to refuse treatment  The above information is an  only  It is not intended as medical advice for individual conditions or treatments  Talk to your doctor, nurse or pharmacist before following any medical regimen to see if it is safe and effective for you  © Copyright [employer] 2021 Information is for End User's use only and may not be sold, redistributed or otherwise used for commercial purposes   All illustrations and images included in CareNotes® are the copyrighted property of [employer]  or [address] [place] [address]

## 2021-09-29 ENCOUNTER — HOSPITAL ENCOUNTER (EMERGENCY)
Facility: HOSPITAL | Age: 31
Discharge: HOME | End: 2021-09-30
Attending: EMERGENCY MEDICINE

## 2021-09-29 ENCOUNTER — PATIENT OUTREACH (OUTPATIENT)
Dept: FAMILY MEDICINE CLINIC | Facility: CLINIC | Age: 31
End: 2021-09-29

## 2021-09-29 DIAGNOSIS — R10.11 RUQ ABDOMINAL PAIN: Primary | ICD-10-CM

## 2021-09-29 DIAGNOSIS — R17 TOTAL BILIRUBIN, ELEVATED: ICD-10-CM

## 2021-09-29 DIAGNOSIS — Z59.89 DOES NOT HAVE HEALTH INSURANCE: Primary | ICD-10-CM

## 2021-09-29 PROCEDURE — 80053 COMPREHEN METABOLIC PANEL: CPT | Performed by: EMERGENCY MEDICINE

## 2021-09-29 PROCEDURE — 83690 ASSAY OF LIPASE: CPT | Performed by: EMERGENCY MEDICINE

## 2021-09-29 PROCEDURE — 36415 COLL VENOUS BLD VENIPUNCTURE: CPT | Performed by: EMERGENCY MEDICINE

## 2021-09-29 PROCEDURE — 96374 THER/PROPH/DIAG INJ IV PUSH: CPT

## 2021-09-29 PROCEDURE — 99284 EMERGENCY DEPT VISIT MOD MDM: CPT

## 2021-09-29 PROCEDURE — 96361 HYDRATE IV INFUSION ADD-ON: CPT

## 2021-09-29 PROCEDURE — 85025 COMPLETE CBC W/AUTO DIFF WBC: CPT | Performed by: EMERGENCY MEDICINE

## 2021-09-29 PROCEDURE — 99284 EMERGENCY DEPT VISIT MOD MDM: CPT | Performed by: EMERGENCY MEDICINE

## 2021-09-29 RX ORDER — KETOROLAC TROMETHAMINE 30 MG/ML
30 INJECTION, SOLUTION INTRAMUSCULAR; INTRAVENOUS ONCE
Status: COMPLETED | OUTPATIENT
Start: 2021-09-29 | End: 2021-09-29

## 2021-09-29 RX ADMIN — KETOROLAC TROMETHAMINE 30 MG: 30 INJECTION, SOLUTION INTRAMUSCULAR; INTRAVENOUS at 23:39

## 2021-09-29 RX ADMIN — SODIUM CHLORIDE 1000 ML: 0.9 INJECTION, SOLUTION INTRAVENOUS at 23:39

## 2021-09-29 SDOH — ECONOMIC STABILITY - INCOME SECURITY: OTHER PROBLEMS RELATED TO HOUSING AND ECONOMIC CIRCUMSTANCES: Z59.89

## 2021-09-29 NOTE — PROGRESS NOTES
OP CM Called to pts cell in regards to not having insurance  Called to pts cell which went right to VM and LM  Also placed referral to [facility] Correia to see if she can assist pt with medicaid juwan/loni care

## 2021-09-30 ENCOUNTER — APPOINTMENT (EMERGENCY)
Dept: CT IMAGING | Facility: HOSPITAL | Age: 31
End: 2021-09-30

## 2021-09-30 VITALS
DIASTOLIC BLOOD PRESSURE: 88 MMHG | HEIGHT: 75 IN | BODY MASS INDEX: 39.17 KG/M2 | WEIGHT: 315 LBS | SYSTOLIC BLOOD PRESSURE: 136 MMHG | HEART RATE: 93 BPM | TEMPERATURE: 98.3 F | OXYGEN SATURATION: 99 % | RESPIRATION RATE: 18 BRPM

## 2021-09-30 LAB
ALBUMIN SERPL BCP-MCNC: 4.4 G/DL (ref 3.5–5)
ALP SERPL-CCNC: 52 U/L (ref 46–116)
ALT SERPL W P-5'-P-CCNC: 75 U/L (ref 12–78)
ANION GAP SERPL CALCULATED.3IONS-SCNC: 13 MMOL/L (ref 4–13)
AST SERPL W P-5'-P-CCNC: 33 U/L (ref 5–45)
BASOPHILS # BLD AUTO: 0.07 THOUSANDS/ΜL (ref 0–0.1)
BASOPHILS NFR BLD AUTO: 1 % (ref 0–1)
BILIRUB SERPL-MCNC: 3.11 MG/DL (ref 0.2–1)
BUN SERPL-MCNC: 10 MG/DL (ref 5–25)
CALCIUM SERPL-MCNC: 9.4 MG/DL (ref 8.3–10.1)
CHLORIDE SERPL-SCNC: 100 MMOL/L (ref 100–108)
CO2 SERPL-SCNC: 27 MMOL/L (ref 21–32)
CREAT SERPL-MCNC: 1.02 MG/DL (ref 0.6–1.3)
EOSINOPHIL # BLD AUTO: 0.08 THOUSAND/ΜL (ref 0–0.61)
EOSINOPHIL NFR BLD AUTO: 1 % (ref 0–6)
ERYTHROCYTE [DISTWIDTH] IN BLOOD BY AUTOMATED COUNT: 12.2 % (ref 11.6–15.1)
GFR SERPL CREATININE-BSD FRML MDRD: 98 ML/MIN/1.73SQ M
GLUCOSE SERPL-MCNC: 93 MG/DL (ref 65–140)
HCT VFR BLD AUTO: 51.1 % (ref 36.5–49.3)
HGB BLD-MCNC: 17.8 G/DL (ref 12–17)
IMM GRANULOCYTES # BLD AUTO: 0.04 THOUSAND/UL (ref 0–0.2)
IMM GRANULOCYTES NFR BLD AUTO: 0 % (ref 0–2)
LIPASE SERPL-CCNC: 96 U/L (ref 73–393)
LYMPHOCYTES # BLD AUTO: 2.11 THOUSANDS/ΜL (ref 0.6–4.47)
LYMPHOCYTES NFR BLD AUTO: 20 % (ref 14–44)
MCH RBC QN AUTO: 31.1 PG (ref 26.8–34.3)
MCHC RBC AUTO-ENTMCNC: 34.8 G/DL (ref 31.4–37.4)
MCV RBC AUTO: 89 FL (ref 82–98)
MONOCYTES # BLD AUTO: 0.91 THOUSAND/ΜL (ref 0.17–1.22)
MONOCYTES NFR BLD AUTO: 9 % (ref 4–12)
NEUTROPHILS # BLD AUTO: 7.34 THOUSANDS/ΜL (ref 1.85–7.62)
NEUTS SEG NFR BLD AUTO: 69 % (ref 43–75)
NRBC BLD AUTO-RTO: 0 /100 WBCS
PLATELET # BLD AUTO: 389 THOUSANDS/UL (ref 149–390)
PMV BLD AUTO: 10.5 FL (ref 8.9–12.7)
POTASSIUM SERPL-SCNC: 3.7 MMOL/L (ref 3.5–5.3)
PROT SERPL-MCNC: 8.6 G/DL (ref 6.4–8.2)
RBC # BLD AUTO: 5.72 MILLION/UL (ref 3.88–5.62)
SODIUM SERPL-SCNC: 140 MMOL/L (ref 136–145)
WBC # BLD AUTO: 10.55 THOUSAND/UL (ref 4.31–10.16)

## 2021-09-30 PROCEDURE — 96361 HYDRATE IV INFUSION ADD-ON: CPT

## 2021-09-30 PROCEDURE — 74177 CT ABD & PELVIS W/CONTRAST: CPT

## 2021-09-30 RX ADMIN — IOHEXOL 100 ML: 350 INJECTION, SOLUTION INTRAVENOUS at 00:38

## 2021-10-01 ENCOUNTER — PATIENT OUTREACH (OUTPATIENT)
Dept: FAMILY MEDICINE CLINIC | Facility: CLINIC | Age: 31
End: 2021-10-01

## 2021-10-04 ENCOUNTER — PATIENT OUTREACH (OUTPATIENT)
Dept: FAMILY MEDICINE CLINIC | Facility: CLINIC | Age: 31
End: 2021-10-04

## 2021-10-05 ENCOUNTER — PATIENT OUTREACH (OUTPATIENT)
Dept: FAMILY MEDICINE CLINIC | Facility: CLINIC | Age: 31
End: 2021-10-05

## 2021-11-19 ENCOUNTER — OFFICE VISIT (OUTPATIENT)
Dept: FAMILY MEDICINE CLINIC | Facility: CLINIC | Age: 31
End: 2021-11-19

## 2021-11-19 VITALS
DIASTOLIC BLOOD PRESSURE: 80 MMHG | TEMPERATURE: 97.2 F | WEIGHT: 315 LBS | SYSTOLIC BLOOD PRESSURE: 120 MMHG | BODY MASS INDEX: 39.17 KG/M2 | HEIGHT: 75 IN

## 2021-11-19 DIAGNOSIS — M25.361 INSTABILITY OF RIGHT KNEE JOINT: Primary | ICD-10-CM

## 2021-11-19 PROCEDURE — 99212 OFFICE O/P EST SF 10 MIN: CPT | Performed by: FAMILY MEDICINE

## 2022-01-13 ENCOUNTER — TELEMEDICINE (OUTPATIENT)
Dept: FAMILY MEDICINE CLINIC | Facility: CLINIC | Age: 32
End: 2022-01-13

## 2022-01-13 DIAGNOSIS — B34.9 VIRAL INFECTION, UNSPECIFIED: ICD-10-CM

## 2022-01-13 DIAGNOSIS — Z20.822 EXPOSURE TO COVID-19 VIRUS: ICD-10-CM

## 2022-01-13 PROCEDURE — 99213 OFFICE O/P EST LOW 20 MIN: CPT | Performed by: FAMILY MEDICINE

## 2022-01-13 PROCEDURE — U0005 INFEC AGEN DETEC AMPLI PROBE: HCPCS | Performed by: FAMILY MEDICINE

## 2022-01-13 PROCEDURE — U0003 INFECTIOUS AGENT DETECTION BY NUCLEIC ACID (DNA OR RNA); SEVERE ACUTE RESPIRATORY SYNDROME CORONAVIRUS 2 (SARS-COV-2) (CORONAVIRUS DISEASE [COVID-19]), AMPLIFIED PROBE TECHNIQUE, MAKING USE OF HIGH THROUGHPUT TECHNOLOGIES AS DESCRIBED BY CMS-2020-01-R: HCPCS | Performed by: FAMILY MEDICINE

## 2022-01-13 RX ORDER — PREDNISONE 10 MG/1
TABLET ORAL
Qty: 15 TABLET | Refills: 0 | Status: SHIPPED | OUTPATIENT
Start: 2022-01-13 | End: 2022-03-20 | Stop reason: HOSPADM

## 2022-01-13 RX ORDER — BENZONATATE 200 MG/1
200 CAPSULE ORAL 3 TIMES DAILY PRN
Qty: 20 CAPSULE | Refills: 0 | Status: SHIPPED | OUTPATIENT
Start: 2022-01-13 | End: 2022-01-17 | Stop reason: ALTCHOICE

## 2022-01-13 NOTE — PROGRESS NOTES
COVID-19 Outpatient Progress Note    Assessment/Plan:    Problem List Items Addressed This Visit     None      Visit Diagnoses     Exposure to COVID-19 virus        Relevant Orders    COVID Only- Collected at [facility]    Viral infection, unspecified        Relevant Medications    predniSONE 10 mg tablet    benzonatate (TESSALON) 200 MG capsule    Other Relevant Orders    COVID Only- Collected at [employer] or [facility]         Disposition:     Referred patient to centralized site to test for COVID-19  [address]    Your healthcare provider and/or public health staff have evaluated you and have determined that you do not need to remain in the hospital at this time   At this time you can be isolated at home where you will be monitored by staff from your local or state health department  You should carefully follow the prevention and isolation steps below until a healthcare provider or local or state health department says that you can return to your normal activities  Stay home except to get medical care    People who are mildly ill with COVID-19 are able to isolate at home during their illness  You should restrict activities outside your home, except for getting medical care  Do not go to work, school, or public areas  Avoid using public transportation, ride-sharing, or taxis  Separate yourself from other people and animals in your home    People: As much as possible, you should stay in a specific room and away from other people in your home  Also, you should use a separate bathroom, if available  Animals: You should restrict contact with pets and other animals while you are sick with COVID-19, just like you would around other people  Although there have not been reports of pets or other animals becoming sick with COVID-19, it is still recommended that people sick with COVID-19 limit contact with animals until more information is known about the virus   When possible, have another member of your household care for your animals while you are sick  If you are sick with COVID-19, avoid contact with your pet, including petting, snuggling, being kissed or licked, and sharing food  If you must care for your pet or be around animals while you are sick, wash your hands before and after you interact with pets and wear a facemask  See COVID-19 and Animals for more information  Call ahead before visiting your doctor    If you have a medical appointment, call the healthcare provider and tell them that you have or may have COVID-19  This will help the healthcare providers office take steps to keep other people from getting infected or exposed  Wear a facemask    You should wear a facemask when you are around other people (e g , sharing a room or vehicle) or pets and before you enter a healthcare providers office  If you are not able to wear a facemask (for example, because it causes trouble breathing), then people who live with you should not stay in the same room with you, or they should wear a facemask if they enter your room  Cover your coughs and sneezes    Cover your mouth and nose with a tissue when you cough or sneeze  Throw used tissues in a lined trash can  Immediately wash your hands with soap and water for at least 20 seconds or, if soap and water are not available, clean your hands with an alcohol-based hand  that contains at least 60% alcohol  Clean your hands often    Wash your hands often with soap and water for at least 20 seconds, especially after blowing your nose, coughing, or sneezing; going to the bathroom; and before eating or preparing food  If soap and water are not readily available, use an alcohol-based hand  with at least 60% alcohol, covering all surfaces of your hands and rubbing them together until they feel dry  Soap and water are the best option if hands are visibly dirty   Avoid touching your eyes, nose, and mouth with unwashed hands  Avoid sharing personal household items    You should not share dishes, drinking glasses, cups, eating utensils, towels, or bedding with other people or pets in your home  After using these items, they should be washed thoroughly with soap and water  Clean all high-touch surfaces everyday    High touch surfaces include counters, tabletops, doorknobs, bathroom fixtures, toilets, phones, keyboards, tablets, and bedside tables  Also, clean any surfaces that may have blood, stool, or body fluids on them  Use a household cleaning spray or wipe, according to the label instructions  Labels contain instructions for safe and effective use of the cleaning product including precautions you should take when applying the product, such as wearing gloves and making sure you have good ventilation during use of the product  Monitor your symptoms    Seek prompt medical attention if your illness is worsening (e g , difficulty breathing)  Before seeking care, call your healthcare provider and tell them that you have, or are being evaluated for, COVID-19  Put on a facemask before you enter the facility  These steps will help the healthcare providers office to keep other people in the office or waiting room from getting infected or exposed  Ask your healthcare provider to call the local or [facility] health department  Persons who are placed under active monitoring or facilitated self-monitoring should follow instructions provided by their local health department or occupational health professionals, as appropriate  If you have a medical emergency and need to call 911, notify the dispatch personnel that you have, or are being evaluated for COVID-19  If possible, put on a facemask before emergency medical services arrive  Discontinuing home isolation    Patients with confirmed COVID-19 should remain under home isolation precautions until the following conditions are met:    They have had no fever for at least 24 hours (that is one full day of no fever without the use medicine that reduces fevers)  AND  other symptoms have improved (for example, when their cough or shortness of breath have improved)  AND  If had mild or moderate illness, at least 10 days have passed since their symptoms first appeared or if severe illness (needed oxygen) or immunosuppressed, at least 20 days have passed since symptoms first appeared  Patients with confirmed COVID-19 should also notify close contacts (including their workplace) and ask that they self-quarantine  Currently, close contact is defined as being within 6 feet for 15 minutes or more from the period 24 hours starting 48 hours before symptom onset to the time at which the patient went into isolation   Close contacts of patients diagnosed with COVID-19 should be instructed by the patient to self-quarantine for 14 days from the last time of their last contact with the patient  Source: [employer]  Recommend vitamin C vitamin D  Take medication as directed  Corinne teen until results are available  I have spent 10 minutes directly with the patient  Greater than 50% of this time was spent in counseling/coordination of care regarding: prognosis, risks and benefits of treatment options, instructions for management, patient and family education and importance of treatment compliance  Encounter provider Ricco Brannon DO    Provider located at [address] [place] [address] [address]  [facility] [address] [phone]    Recent Visits  No visits were found meeting these conditions    Showing recent visits within past 7 days and meeting all other requirements  Today's Visits  Date Type Provider Dept   01/13/22 [address], [address] [place] [address] Primary Care   Showing today's visits and meeting all other requirements  Future Appointments  No visits were found meeting these conditions  Showing future appointments within next 150 days and meeting all other requirements       Patient agrees to participate in a virtual check in via telephone or video visit instead of presenting to the office to address urgent/immediate medical needs  Patient is aware this is a billable service  After connecting through [facility], the patient was identified by name and date of birth  Hitesh Doll was informed that this was a telemedicine visit and that the exam was being conducted confidentially over secure lines  My office door was closed  No one else was in the room  Hitesh Doll acknowledged consent and understanding of privacy and security of the telemedicine visit  I informed the patient that I have reviewed his record in Epic and presented the opportunity for him to ask any questions regarding the visit today  The patient agreed to participate  Verification of patient location:  Patient is located in the following state in which I hold an active license: [place]    Subjective:   Hitesh Doll is a 32 y o  male who is concerned about COVID-19  Patient's symptoms include fatigue, malaise, nasal congestion, rhinorrhea, sore throat, cough, shortness of breath, chest tightness and headache  Patient denies fever, anosmia, loss of taste, nausea, vomiting, diarrhea and myalgias       - Date of symptom onset: 1/10/2022  - Date of exposure: 1/5/2022      COVID-19 vaccination status: Fully vaccinated (primary series)    Exposure:   Contact with a person who is under investigation (PUI) for or who is positive for COVID-19 within the last 14 days?: Yes    Hospitalized recently for fever and/or lower respiratory symptoms?: No      Currently a healthcare worker that is involved in direct patient care?: No      Works in a special setting where the risk of COVID-19 transmission may be high? (this may include long-term care, correctional and senior care facilities; homeless shelters; assisted-living facilities and group homes ): No      Resident in a special setting where the risk of COVID-19 transmission may be high? (this may include long-term care, correctional and senior care facilities; homeless shelters; assisted-living facilities and group homes ): No      Had some symptoms late last week, tested twice and was negative  He was exposed so he stayed home for 5 days, right as he was ending neck warranting he became much more symptomatic  He is fully vaccinated  Lab Results   Component Value Date    SARSCOV2 Negative 12/22/2020    [facility] 98 Not Detected 12/21/2020     Past Medical History:   Diagnosis Date    Fracture of nasal bone     GERD (gastroesophageal reflux disease)     Gross hematuria     Orthostatic hypotension      Past Surgical History:   Procedure Laterality Date    WRIST SURGERY       Current Outpatient Medications   Medication Sig Dispense Refill    benzonatate (TESSALON) 200 MG capsule Take 1 capsule (200 mg total) by mouth 3 (three) times a day as needed for cough 20 capsule 0    Elastic Bandages & Supports (Knee Brace/Hinged Bars XL) MISC Use daily 1 each 0    predniSONE 10 mg tablet 5 x 1 day, 4 x1 day, 3 x 1 day,2 x1 day,  1 x 1 day 15 tablet 0     No current facility-administered medications for this visit  Allergies   Allergen Reactions    Penicillins      UNKNOWN    Sulfa Antibiotics      UNKNOWN       Review of Systems   Constitutional: Positive for activity change, appetite change and fatigue  Negative for fever  HENT: Positive for congestion, rhinorrhea and sore throat  Respiratory: Positive for cough, chest tightness and shortness of breath  Gastrointestinal: Negative for diarrhea, nausea and vomiting  Musculoskeletal: Negative for myalgias  Neurological: Positive for headaches  Objective: There were no vitals filed for this visit  Physical Exam  Constitutional:       Appearance: He is well-developed  He is obese   He is ill-appearing  HENT:      Head: Normocephalic and atraumatic  Eyes:      Conjunctiva/sclera: Conjunctivae normal    Pulmonary:      Effort: Pulmonary effort is normal    Neurological:      Mental Status: He is alert and oriented to person, place, and time  Psychiatric:         Judgment: Judgment normal          VIRTUAL VISIT DISCLAIMER    Kevin Sharma verbally agrees to participate in [employer]  Pt is aware that [employer] could be limited without vital signs or the ability to perform a full hands-on physical exam  Gerry Perkins understands he or the provider may request at any time to terminate the video visit and request the patient to seek care or treatment in person

## 2022-01-17 ENCOUNTER — APPOINTMENT (OUTPATIENT)
Dept: RADIOLOGY | Facility: MEDICAL CENTER | Age: 32
End: 2022-01-17

## 2022-01-17 ENCOUNTER — TELEMEDICINE (OUTPATIENT)
Dept: FAMILY MEDICINE CLINIC | Facility: CLINIC | Age: 32
End: 2022-01-17

## 2022-01-17 DIAGNOSIS — J40 BRONCHITIS: ICD-10-CM

## 2022-01-17 DIAGNOSIS — J40 BRONCHITIS: Primary | ICD-10-CM

## 2022-01-17 PROCEDURE — 71046 X-RAY EXAM CHEST 2 VIEWS: CPT

## 2022-01-17 PROCEDURE — 99213 OFFICE O/P EST LOW 20 MIN: CPT | Performed by: FAMILY MEDICINE

## 2022-01-17 RX ORDER — DOXYCYCLINE HYCLATE 100 MG/1
100 CAPSULE ORAL EVERY 12 HOURS SCHEDULED
Qty: 20 CAPSULE | Refills: 0 | Status: SHIPPED | OUTPATIENT
Start: 2022-01-17 | End: 2022-01-27

## 2022-01-17 RX ORDER — BROMPHENIRAMINE MALEATE, PSEUDOEPHEDRINE HYDROCHLORIDE, AND DEXTROMETHORPHAN HYDROBROMIDE 2; 30; 10 MG/5ML; MG/5ML; MG/5ML
5 SYRUP ORAL 4 TIMES DAILY PRN
Qty: 240 ML | Refills: 0 | Status: SHIPPED | OUTPATIENT
Start: 2022-01-17 | End: 2022-03-20 | Stop reason: HOSPADM

## 2022-01-17 RX ORDER — ALBUTEROL SULFATE 90 UG/1
2 AEROSOL, METERED RESPIRATORY (INHALATION) EVERY 6 HOURS PRN
Qty: 6.7 G | Refills: 5 | Status: SHIPPED | OUTPATIENT
Start: 2022-01-17 | End: 2022-03-23

## 2022-01-17 NOTE — PROGRESS NOTES
COVID-19 Outpatient Progress Note    Assessment/Plan:    Problem List Items Addressed This Visit     None      Visit Diagnoses     Bronchitis    -  Primary    Relevant Medications    albuterol (Proventil HFA) 90 mcg/act inhaler    doxycycline hyclate (VIBRAMYCIN) 100 mg capsule    brompheniramine-pseudoephedrine-DM 30-2-10 MG/5ML syrup    Other Relevant Orders    XR chest pa & lateral         Disposition:     Will check a chest x-ray, start antibiotics inhaler and different cough medication  Patient is to call if symptoms get worse  I have spent 10 minutes directly with the patient  Greater than 50% of this time was spent in counseling/coordination of care regarding: prognosis, risks and benefits of treatment options, instructions for management, patient and family education, importance of treatment compliance, risk factor reductions and impressions  Encounter provider Kerwin Perea DO    Provider located at [address] [place] [address] [address] [facility] [address] [phone]    Recent Visits  Date Type Provider Dept   01/13/22 [address], [address] [facility]   Showing recent visits within past 7 days and meeting all other requirements  Today's Visits  Date Type Provider Dept   01/17/22 [address], [address] [facility]   Showing today's visits and meeting all other requirements  Future Appointments  No visits were found meeting these conditions  Showing future appointments within next 150 days and meeting all other requirements       Patient agrees to participate in a virtual check in via telephone or video visit instead of presenting to the office to address urgent/immediate medical needs  Patient is aware this is a billable service  After connecting through [facility], the patient was identified by name and date of birth   Charli Alas was informed that this was a telemedicine visit and that the exam was being conducted confidentially over secure lines  My office door was closed  Sriram Woodard acknowledged consent and understanding of privacy and security of the telemedicine visit  I informed the patient that I have reviewed his record in Epic and presented the opportunity for him to ask any questions regarding the visit today  The patient agreed to participate  Verification of patient location:  Patient is located in the following state in which I hold an active license: [place]    Subjective:   Sriram Woodard is a 32 y o  male who has been screened for COVID-19  Symptom change since last report: worsening  Patient's symptoms include fatigue, malaise, cough, shortness of breath, chest tightness and headache  Patient denies fever, chills, congestion, rhinorrhea, sore throat, anosmia, loss of taste, abdominal pain, nausea, vomiting, diarrhea and myalgias  - Date of symptom onset: 1/10/2022  - Date of exposure: 1/5/2022      COVID-19 vaccination status: Fully vaccinated (primary series)    Leonidas Azevedo has been staying home and has isolated themselves in his home  He is taking care to not share personal items and is cleaning all surfaces that are touched often, like counters, tabletops, and doorknobs using household cleaning sprays or wipes  He is wearing a mask when he leaves his room  Patient feels his wheezing is getting worse  He has had trouble with asthmatic bronchitis in the past it feels similar to that  Cough medicine really is not helping at this time  His COVID test was negative      Lab Results   Component Value Date    SARSCOV2 Negative 01/13/2022    SARSCOV2 Not Detected 12/21/2020     Past Medical History:   Diagnosis Date    Fracture of nasal bone     GERD (gastroesophageal reflux disease)     Gross hematuria     Orthostatic hypotension      Past Surgical History:   Procedure Laterality Date    WRIST SURGERY       Current Outpatient Medications Medication Sig Dispense Refill    albuterol (Proventil HFA) 90 mcg/act inhaler Inhale 2 puffs every 6 (six) hours as needed for wheezing 6 7 g 5    brompheniramine-pseudoephedrine-DM 30-2-10 MG/5ML syrup Take 5 mL by mouth 4 (four) times a day as needed for congestion or cough 240 mL 0    doxycycline hyclate (VIBRAMYCIN) 100 mg capsule Take 1 capsule (100 mg total) by mouth every 12 (twelve) hours for 10 days 20 capsule 0    Elastic Bandages & Supports (Knee Brace/Hinged Bars XL) MISC Use daily 1 each 0    predniSONE 10 mg tablet 5 x 1 day, 4 x1 day, 3 x 1 day,2 x1 day,  1 x 1 day 15 tablet 0     No current facility-administered medications for this visit  Allergies   Allergen Reactions    Penicillins      UNKNOWN    Sulfa Antibiotics      UNKNOWN       Review of Systems   Constitutional: Positive for activity change, appetite change and fatigue  Negative for chills and fever  HENT: Negative for congestion, rhinorrhea and sore throat  Respiratory: Positive for cough, chest tightness and shortness of breath  Gastrointestinal: Negative for abdominal pain, diarrhea, nausea and vomiting  Musculoskeletal: Negative for myalgias  Neurological: Positive for headaches  Objective: There were no vitals filed for this visit  Physical Exam  Vitals reviewed  Constitutional:       Appearance: He is well-developed  He is obese  HENT:      Head: Normocephalic and atraumatic  Eyes:      Conjunctiva/sclera: Conjunctivae normal    Pulmonary:      Effort: Pulmonary effort is normal    Neurological:      Mental Status: He is alert and oriented to person, place, and time  Psychiatric:         Mood and Affect: Mood normal          Judgment: Judgment normal          VIRTUAL VISIT DISCLAIMER    Eze Matos verbally agrees to participate in [employer]   Pt is aware that [employer] could be limited without vital signs or the ability to perform a full hands-on physical mando Perkins understands he or the provider may request at any time to terminate the video visit and request the patient to seek care or treatment in person

## 2022-02-28 ENCOUNTER — APPOINTMENT (EMERGENCY)
Dept: CT IMAGING | Facility: HOSPITAL | Age: 32
End: 2022-02-28
Payer: COMMERCIAL

## 2022-02-28 ENCOUNTER — HOSPITAL ENCOUNTER (EMERGENCY)
Facility: HOSPITAL | Age: 32
Discharge: HOME | End: 2022-02-28
Attending: EMERGENCY MEDICINE
Payer: COMMERCIAL

## 2022-02-28 VITALS
HEART RATE: 84 BPM | OXYGEN SATURATION: 98 % | HEIGHT: 76 IN | BODY MASS INDEX: 38.36 KG/M2 | SYSTOLIC BLOOD PRESSURE: 126 MMHG | TEMPERATURE: 98.2 F | DIASTOLIC BLOOD PRESSURE: 76 MMHG | WEIGHT: 315 LBS | RESPIRATION RATE: 17 BRPM

## 2022-02-28 DIAGNOSIS — R10.9 LEFT FLANK PAIN: Primary | ICD-10-CM

## 2022-02-28 LAB
ALBUMIN SERPL BCP-MCNC: 3.8 G/DL (ref 3.5–5)
ALP SERPL-CCNC: 44 U/L (ref 46–116)
ALT SERPL W P-5'-P-CCNC: 40 U/L (ref 12–78)
ANION GAP SERPL CALCULATED.3IONS-SCNC: 9 MMOL/L (ref 4–13)
AST SERPL W P-5'-P-CCNC: 22 U/L (ref 5–45)
BASOPHILS # BLD AUTO: 0.04 THOUSANDS/ΜL (ref 0–0.1)
BASOPHILS NFR BLD AUTO: 0 % (ref 0–1)
BILIRUB SERPL-MCNC: 1.27 MG/DL (ref 0.2–1)
BILIRUB UR QL STRIP: NEGATIVE
BUN SERPL-MCNC: 14 MG/DL (ref 5–25)
CALCIUM SERPL-MCNC: 9.1 MG/DL (ref 8.3–10.1)
CHLORIDE SERPL-SCNC: 105 MMOL/L (ref 100–108)
CLARITY UR: CLEAR
CO2 SERPL-SCNC: 24 MMOL/L (ref 21–32)
COLOR UR: YELLOW
CREAT SERPL-MCNC: 0.99 MG/DL (ref 0.6–1.3)
EOSINOPHIL # BLD AUTO: 0.1 THOUSAND/ΜL (ref 0–0.61)
EOSINOPHIL NFR BLD AUTO: 1 % (ref 0–6)
ERYTHROCYTE [DISTWIDTH] IN BLOOD BY AUTOMATED COUNT: 12.3 % (ref 11.6–15.1)
GFR SERPL CREATININE-BSD FRML MDRD: 101 ML/MIN/1.73SQ M
GLUCOSE SERPL-MCNC: 110 MG/DL (ref 65–140)
GLUCOSE UR STRIP-MCNC: NEGATIVE MG/DL
HCT VFR BLD AUTO: 49.3 % (ref 36.5–49.3)
HGB BLD-MCNC: 16.9 G/DL (ref 12–17)
HGB UR QL STRIP.AUTO: NEGATIVE
IMM GRANULOCYTES # BLD AUTO: 0.04 THOUSAND/UL (ref 0–0.2)
IMM GRANULOCYTES NFR BLD AUTO: 0 % (ref 0–2)
KETONES UR STRIP-MCNC: ABNORMAL MG/DL
LEUKOCYTE ESTERASE UR QL STRIP: NEGATIVE
LIPASE SERPL-CCNC: 103 U/L (ref 73–393)
LYMPHOCYTES # BLD AUTO: 2.01 THOUSANDS/ΜL (ref 0.6–4.47)
LYMPHOCYTES NFR BLD AUTO: 18 % (ref 14–44)
MCH RBC QN AUTO: 31.4 PG (ref 26.8–34.3)
MCHC RBC AUTO-ENTMCNC: 34.3 G/DL (ref 31.4–37.4)
MCV RBC AUTO: 92 FL (ref 82–98)
MONOCYTES # BLD AUTO: 1.01 THOUSAND/ΜL (ref 0.17–1.22)
MONOCYTES NFR BLD AUTO: 9 % (ref 4–12)
NEUTROPHILS # BLD AUTO: 7.74 THOUSANDS/ΜL (ref 1.85–7.62)
NEUTS SEG NFR BLD AUTO: 72 % (ref 43–75)
NITRITE UR QL STRIP: NEGATIVE
NRBC BLD AUTO-RTO: 0 /100 WBCS
PH UR STRIP.AUTO: 6 [PH]
PLATELET # BLD AUTO: 366 THOUSANDS/UL (ref 149–390)
PMV BLD AUTO: 9.9 FL (ref 8.9–12.7)
POTASSIUM SERPL-SCNC: 4 MMOL/L (ref 3.5–5.3)
PROT SERPL-MCNC: 7.7 G/DL (ref 6.4–8.2)
PROT UR STRIP-MCNC: NEGATIVE MG/DL
RBC # BLD AUTO: 5.38 MILLION/UL (ref 3.88–5.62)
SODIUM SERPL-SCNC: 138 MMOL/L (ref 136–145)
SP GR UR STRIP.AUTO: >=1.03 (ref 1–1.03)
UROBILINOGEN UR QL STRIP.AUTO: 1 E.U./DL
WBC # BLD AUTO: 10.94 THOUSAND/UL (ref 4.31–10.16)

## 2022-02-28 PROCEDURE — 36415 COLL VENOUS BLD VENIPUNCTURE: CPT | Performed by: EMERGENCY MEDICINE

## 2022-02-28 PROCEDURE — 81003 URINALYSIS AUTO W/O SCOPE: CPT | Performed by: EMERGENCY MEDICINE

## 2022-02-28 PROCEDURE — 99284 EMERGENCY DEPT VISIT MOD MDM: CPT | Performed by: EMERGENCY MEDICINE

## 2022-02-28 PROCEDURE — 85025 COMPLETE CBC W/AUTO DIFF WBC: CPT | Performed by: EMERGENCY MEDICINE

## 2022-02-28 PROCEDURE — 83690 ASSAY OF LIPASE: CPT | Performed by: EMERGENCY MEDICINE

## 2022-02-28 PROCEDURE — 80053 COMPREHEN METABOLIC PANEL: CPT | Performed by: EMERGENCY MEDICINE

## 2022-02-28 PROCEDURE — 99284 EMERGENCY DEPT VISIT MOD MDM: CPT

## 2022-02-28 PROCEDURE — 74176 CT ABD & PELVIS W/O CONTRAST: CPT

## 2022-02-28 PROCEDURE — 96374 THER/PROPH/DIAG INJ IV PUSH: CPT

## 2022-02-28 RX ORDER — IBUPROFEN 800 MG/1
800 TABLET ORAL EVERY 6 HOURS PRN
Qty: 30 TABLET | Refills: 0 | Status: SHIPPED | OUTPATIENT
Start: 2022-02-28 | End: 2022-03-23

## 2022-02-28 RX ORDER — KETOROLAC TROMETHAMINE 30 MG/ML
30 INJECTION, SOLUTION INTRAMUSCULAR; INTRAVENOUS ONCE
Status: COMPLETED | OUTPATIENT
Start: 2022-02-28 | End: 2022-02-28

## 2022-02-28 RX ADMIN — KETOROLAC TROMETHAMINE 30 MG: 30 INJECTION, SOLUTION INTRAMUSCULAR at 21:27

## 2022-03-01 ENCOUNTER — TELEPHONE (OUTPATIENT)
Dept: PHYSICAL THERAPY | Facility: OTHER | Age: 32
End: 2022-03-01

## 2022-03-01 NOTE — ED PROVIDER NOTES
History  Chief Complaint   Patient presents with    Flank Pain     pt reports left flank pain that started today  No relief with ALeve  No urinary symptoms  Patient is a 28-year-old male who has had some degree of back pain for about 3 weeks  Denies injury  He was seen in urgent care and was felt to be musculoskeletal   However today the pain became more severe  It is left flank  It does not radiate  No rash  He does have a history of kidney stones  He became nauseated when the pain was really bad  It did subside somewhat since getting to the emergency room  No associated motor sensory complaints in the lower extremities  No incontinence  No dysuria, hematuria, frequency or other urinary complaints  No vomiting  No fever or chills  Prior to Admission Medications   Prescriptions Last Dose Informant Patient Reported? Taking? Elastic Bandages & Supports (Knee Brace/Hinged Bars XL) MISC   No No   Sig: Use daily   albuterol (Proventil HFA) 90 mcg/act inhaler   No No   Sig: Inhale 2 puffs every 6 (six) hours as needed for wheezing   brompheniramine-pseudoephedrine-DM 30-2-10 MG/5ML syrup   No No   Sig: Take 5 mL by mouth 4 (four) times a day as needed for congestion or cough   predniSONE 10 mg tablet   No No   Sig: 5 x 1 day, 4 x1 day, 3 x 1 day,2 x1 day,  1 x 1 day      Facility-Administered Medications: None       Past Medical History:   Diagnosis Date    Fracture of nasal bone     GERD (gastroesophageal reflux disease)     Gross hematuria     Orthostatic hypotension        Past Surgical History:   Procedure Laterality Date    WRIST SURGERY         Family History   Problem Relation Age of Onset    Diabetes Father      I have reviewed and agree with the history as documented      E-Cigarette/Vaping    E-Cigarette Use Never User      E-Cigarette/Vaping Substances     Social History     Tobacco Use    Smoking status: Former Smoker     Quit date: 2014     Years since quittin   Smokeless tobacco: Never Used   Vaping Use    Vaping Use: Never used   Substance Use Topics    Alcohol use: No    Drug use: No       Review of Systems   Constitutional: Negative for chills and fever  HENT: Negative for rhinorrhea and sore throat  Eyes: Negative for pain, redness and visual disturbance  Respiratory: Negative for cough and shortness of breath  Cardiovascular: Negative for chest pain and leg swelling  Gastrointestinal: Positive for nausea  Negative for abdominal pain, diarrhea and vomiting  Endocrine: Negative for polydipsia and polyuria  Genitourinary: Positive for flank pain  Negative for dysuria, frequency and hematuria  Musculoskeletal: Positive for back pain  Negative for neck pain  Skin: Negative for rash and wound  Allergic/Immunologic: Negative for immunocompromised state  Neurological: Negative for weakness, numbness and headaches  Psychiatric/Behavioral: Negative for hallucinations and suicidal ideas  All other systems reviewed and are negative  Physical Exam  Physical Exam  Vitals reviewed  Constitutional:       General: He is not in acute distress  Appearance: He is obese  He is not toxic-appearing  HENT:      Head: Normocephalic and atraumatic  Nose: Nose normal       Mouth/Throat:      Mouth: Mucous membranes are moist    Eyes:      General:         Right eye: No discharge  Left eye: No discharge  Conjunctiva/sclera: Conjunctivae normal    Cardiovascular:      Rate and Rhythm: Regular rhythm  Tachycardia present  Pulses: Normal pulses  Heart sounds: Normal heart sounds  No murmur heard  No friction rub  No gallop  Pulmonary:      Effort: Pulmonary effort is normal  No respiratory distress  Breath sounds: Normal breath sounds  No stridor  No wheezing, rhonchi or rales  Abdominal:      General: Bowel sounds are normal  There is no distension  Palpations: Abdomen is soft  Tenderness:  There is no abdominal tenderness  There is no right CVA tenderness, left CVA tenderness, guarding or rebound  Musculoskeletal:         General: No swelling, tenderness, deformity or signs of injury  Normal range of motion  Cervical back: Normal range of motion and neck supple  No rigidity  Right lower leg: No edema  Left lower leg: No edema  Comments: No calf pain or unilateral leg swelling   Skin:     General: Skin is warm and dry  Coloration: Skin is not jaundiced or pale  Findings: No bruising, erythema or rash  Neurological:      General: No focal deficit present  Mental Status: He is alert and oriented to person, place, and time  Cranial Nerves: No facial asymmetry  Sensory: No sensory deficit  Motor: Motor function is intact  Comments: No saddle anesthesia     Psychiatric:         Mood and Affect: Mood normal          Behavior: Behavior normal          Vital Signs  ED Triage Vitals [02/28/22 1941]   Temperature Pulse Respirations Blood Pressure SpO2   98 °F (36 7 °C) 105 18 164/87 99 %      Temp Source Heart Rate Source Patient Position - Orthostatic VS BP Location FiO2 (%)   Oral Monitor Sitting Right arm --      Pain Score       7           Vitals:    02/28/22 1941 02/28/22 2141   BP: 164/87 126/91   Pulse: 105 94   Patient Position - Orthostatic VS: Sitting Lying         Visual Acuity      ED Medications  Medications   ketorolac (TORADOL) injection 30 mg (30 mg Intravenous Given 2/28/22 2127)       Diagnostic Studies  Results Reviewed     Procedure Component Value Units Date/Time    Comprehensive metabolic panel [[id number]]  (Abnormal) Collected: 02/28/22 2125    Lab Status: Final result Specimen: Blood from Hand, Right Updated: 02/28/22 2219     Sodium 138 mmol/L      Potassium 4 0 mmol/L      Chloride 105 mmol/L      CO2 24 mmol/L      ANION GAP 9 mmol/L      BUN 14 mg/dL      Creatinine 0 99 mg/dL      Glucose 110 mg/dL      Calcium 9 1 mg/dL      AST 22 U/L ALT 40 U/L      Alkaline Phosphatase 44 U/L      Total Protein 7 7 g/dL      Albumin 3 8 g/dL      Total Bilirubin 1 27 mg/dL      eGFR 101 ml/min/1 73sq m     Narrative:      [place] guidelines for Chronic Kidney Disease (CKD):     Stage 1 with normal or high GFR (GFR > 90 mL/min/1 73 square meters)    Stage 2 Mild CKD (GFR = 60-89 mL/min/1 73 square meters)    Stage 3A Moderate CKD (GFR = 45-59 mL/min/1 73 square meters)    Stage 3B Moderate CKD (GFR = 30-44 mL/min/1 73 square meters)    Stage 4 Severe CKD (GFR = 15-29 mL/min/1 73 square meters)    Stage 5 End Stage CKD (GFR <15 mL/min/1 73 square meters)  Note: GFR calculation is accurate only with a steady state creatinine    Lipase [[id number]]  (Normal) Collected: 02/28/22 2125    Lab Status: Final result Specimen: Blood from Hand, Right Updated: 02/28/22 2219     Lipase 103 u/L     UA w Reflex to Microscopic w Reflex to Culture [[id number]]  (Abnormal) Collected: 02/28/22 2138    Lab Status: Final result Specimen: Urine, Clean Catch Updated: 02/28/22 2151     Color, UA Yellow     Clarity, UA Clear     Specific Gravity, UA >=1 030     pH, UA 6 0     Leukocytes, UA Negative     Nitrite, UA Negative     Protein, UA Negative mg/dl      Glucose, UA Negative mg/dl      Ketones, UA Trace mg/dl      Urobilinogen, UA 1 0 E U /dl      Bilirubin, UA Negative     Blood, UA Negative    CBC and differential [[id number]]  (Abnormal) Collected: 02/28/22 2125    Lab Status: Final result Specimen: Blood from Hand, Right Updated: 02/28/22 2133     WBC 10 94 Thousand/uL      RBC 5 38 Million/uL      Hemoglobin 16 9 g/dL      Hematocrit 49 3 %      MCV 92 fL      MCH 31 4 pg      MCHC 34 3 g/dL      RDW 12 3 %      MPV 9 9 fL      Platelets 429 Thousands/uL      nRBC 0 /100 WBCs      Neutrophils Relative 72 %      Immat GRANS % 0 %      Lymphocytes Relative 18 %      Monocytes Relative 9 %      Eosinophils Relative 1 %      Basophils Relative 0 % Neutrophils Absolute 7 74 Thousands/µL      Immature Grans Absolute 0 04 Thousand/uL      Lymphocytes Absolute 2 01 Thousands/µL      Monocytes Absolute 1 01 Thousand/µL      Eosinophils Absolute 0 10 Thousand/µL      Basophils Absolute 0 04 Thousands/µL                  CT renal stone study abdomen pelvis without contrast   Final Result by Reji Hill MD (02/28 2310)      No acute intra-abdominal abnormality  No hydronephrosis or intrarenal calculus  Workstation performed: [id number]                    Procedures  Procedures         ED Course                                             MDM  Number of Diagnoses or Management Options  Diagnosis management comments: CT scan was negative for kidney stones  There is no pyelonephritis  No AAA  No calcified gallstones or cholecystitis  Most likely this is musculoskeletal   Appropriate for discharge and outpatient management  Amount and/or Complexity of Data Reviewed  Clinical lab tests: ordered and reviewed  Tests in the radiology section of CPT®: reviewed and ordered        Disposition  Final diagnoses:   Left flank pain     Time reflects when diagnosis was documented in both MDM as applicable and the Disposition within this note     Time User Action Codes Description Comment    2/28/2022 11:33 PM Denita Chavezt Add [R10 9] Left flank pain       ED Disposition     ED Disposition Condition Date/Time Comment    Discharge Stable Mon Feb 28, 2022 11:33 PM Andria Paulino discharge to home/self care              Follow-up Information     Follow up With Specialties Details Why Contact Info Additional Information    Andrei Caro DO Family Medicine In 1 week  [address] [place] [address]  [phone]       [facility] Physical Therapy In 1 week  [phone]          Patient's Medications   Discharge Prescriptions    IBUPROFEN (MOTRIN) 800 MG TABLET    Take 1 tablet (800 mg total) by mouth every 6 (six) hours as needed (Pain)       Start Date: 2/28/2022 End Date: --       Order Dose: 800 mg       Quantity: 30 tablet    Refills: 0           PDMP Review       Value Time User    PDMP Reviewed  Yes 1/8/2021 12:03  N [facility]          ED Provider  Electronically Signed by           Libia Curran MD  02/28/22 8978

## 2022-03-01 NOTE — TELEPHONE ENCOUNTER
Nurse reached out to discuss recent referral entered for  Comprehensive Sp[facility] program and offerings  Nurse reviewed the program with him and discussed current HI status  Patient stated he is not insured, but "might be"  Nurse provided pt with number to site she would have sent referral to see if clerical could assist him  Nurse stated they also have a reduced rate if uninsured  Patient to CB if he has HI  Patient declined to participate in the program at this time, but will CB if possible  Nurse confirmed patient has [facility] contact information and encouraged to call program back if needed in the future  Patient agreed and very appreciative of call and discussion  Nurse wished him well and referral closed per protocol

## 2022-03-01 NOTE — ED NOTES
AVS reviewed with pt, pt verbalized understanding of d/c instructions  Follow up care reviewed  VSS  Pt left ambulatory with steady gait; alert and oriented with all belongings        Zeb De Jesus, PATTY  02/28/22 7075

## 2022-03-13 ENCOUNTER — APPOINTMENT (EMERGENCY)
Dept: RADIOLOGY | Facility: HOSPITAL | Age: 32
DRG: 284 | End: 2022-03-13
Payer: COMMERCIAL

## 2022-03-13 ENCOUNTER — APPOINTMENT (EMERGENCY)
Dept: ULTRASOUND IMAGING | Facility: HOSPITAL | Age: 32
DRG: 284 | End: 2022-03-13
Payer: COMMERCIAL

## 2022-03-13 ENCOUNTER — HOSPITAL ENCOUNTER (INPATIENT)
Facility: HOSPITAL | Age: 32
LOS: 7 days | Discharge: HOME | DRG: 284 | End: 2022-03-20
Attending: EMERGENCY MEDICINE | Admitting: INTERNAL MEDICINE
Payer: COMMERCIAL

## 2022-03-13 ENCOUNTER — APPOINTMENT (EMERGENCY)
Dept: CT IMAGING | Facility: HOSPITAL | Age: 32
DRG: 284 | End: 2022-03-13
Payer: COMMERCIAL

## 2022-03-13 DIAGNOSIS — K81.9 CHOLECYSTITIS: ICD-10-CM

## 2022-03-13 DIAGNOSIS — R10.11 RIGHT UPPER QUADRANT ABDOMINAL PAIN: ICD-10-CM

## 2022-03-13 DIAGNOSIS — M62.838 MUSCLE SPASM: ICD-10-CM

## 2022-03-13 DIAGNOSIS — R65.10 SIRS (SYSTEMIC INFLAMMATORY RESPONSE SYNDROME) (HCC): ICD-10-CM

## 2022-03-13 DIAGNOSIS — R74.01 TRANSAMINITIS: ICD-10-CM

## 2022-03-13 DIAGNOSIS — K59.00 CONSTIPATION: ICD-10-CM

## 2022-03-13 DIAGNOSIS — R10.10 UPPER ABDOMINAL PAIN: Primary | ICD-10-CM

## 2022-03-13 LAB
ALBUMIN SERPL BCP-MCNC: 3.6 G/DL (ref 3.5–5)
ALP SERPL-CCNC: 56 U/L (ref 46–116)
ALT SERPL W P-5'-P-CCNC: 274 U/L (ref 12–78)
ANION GAP SERPL CALCULATED.3IONS-SCNC: 9 MMOL/L (ref 4–13)
APAP SERPL-MCNC: <2 UG/ML (ref 10–20)
AST SERPL W P-5'-P-CCNC: 374 U/L (ref 5–45)
ATRIAL RATE: 95 BPM
BASOPHILS # BLD AUTO: 0.03 THOUSANDS/ΜL (ref 0–0.1)
BASOPHILS NFR BLD AUTO: 0 % (ref 0–1)
BILIRUB DIRECT SERPL-MCNC: 1.83 MG/DL (ref 0–0.2)
BILIRUB SERPL-MCNC: 2.25 MG/DL (ref 0.2–1)
BUN SERPL-MCNC: 8 MG/DL (ref 5–25)
CALCIUM SERPL-MCNC: 8.9 MG/DL (ref 8.3–10.1)
CARDIAC TROPONIN I PNL SERPL HS: <2 NG/L
CHLORIDE SERPL-SCNC: 104 MMOL/L (ref 100–108)
CO2 SERPL-SCNC: 26 MMOL/L (ref 21–32)
CREAT SERPL-MCNC: 0.98 MG/DL (ref 0.6–1.3)
EOSINOPHIL # BLD AUTO: 0.06 THOUSAND/ΜL (ref 0–0.61)
EOSINOPHIL NFR BLD AUTO: 1 % (ref 0–6)
ERYTHROCYTE [DISTWIDTH] IN BLOOD BY AUTOMATED COUNT: 12.2 % (ref 11.6–15.1)
GFR SERPL CREATININE-BSD FRML MDRD: 102 ML/MIN/1.73SQ M
GLUCOSE SERPL-MCNC: 147 MG/DL (ref 65–140)
HCT VFR BLD AUTO: 49.4 % (ref 36.5–49.3)
HGB BLD-MCNC: 17.1 G/DL (ref 12–17)
IMM GRANULOCYTES # BLD AUTO: 0.06 THOUSAND/UL (ref 0–0.2)
IMM GRANULOCYTES NFR BLD AUTO: 1 % (ref 0–2)
LIPASE SERPL-CCNC: 102 U/L (ref 73–393)
LYMPHOCYTES # BLD AUTO: 1.64 THOUSANDS/ΜL (ref 0.6–4.47)
LYMPHOCYTES NFR BLD AUTO: 15 % (ref 14–44)
MCH RBC QN AUTO: 31.7 PG (ref 26.8–34.3)
MCHC RBC AUTO-ENTMCNC: 34.6 G/DL (ref 31.4–37.4)
MCV RBC AUTO: 92 FL (ref 82–98)
MONOCYTES # BLD AUTO: 0.79 THOUSAND/ΜL (ref 0.17–1.22)
MONOCYTES NFR BLD AUTO: 7 % (ref 4–12)
NEUTROPHILS # BLD AUTO: 8.14 THOUSANDS/ΜL (ref 1.85–7.62)
NEUTS SEG NFR BLD AUTO: 76 % (ref 43–75)
NRBC BLD AUTO-RTO: 0 /100 WBCS
P AXIS: 35 DEGREES
PLATELET # BLD AUTO: 323 THOUSANDS/UL (ref 149–390)
PMV BLD AUTO: 10 FL (ref 8.9–12.7)
POTASSIUM SERPL-SCNC: 4 MMOL/L (ref 3.5–5.3)
PR INTERVAL: 162 MS
PROT SERPL-MCNC: 7.5 G/DL (ref 6.4–8.2)
QRS AXIS: 49 DEGREES
QRSD INTERVAL: 80 MS
QT INTERVAL: 328 MS
QTC INTERVAL: 412 MS
RBC # BLD AUTO: 5.4 MILLION/UL (ref 3.88–5.62)
SODIUM SERPL-SCNC: 139 MMOL/L (ref 136–145)
T WAVE AXIS: 20 DEGREES
VENTRICULAR RATE: 95 BPM
WBC # BLD AUTO: 10.72 THOUSAND/UL (ref 4.31–10.16)

## 2022-03-13 PROCEDURE — 36415 COLL VENOUS BLD VENIPUNCTURE: CPT | Performed by: EMERGENCY MEDICINE

## 2022-03-13 PROCEDURE — 96374 THER/PROPH/DIAG INJ IV PUSH: CPT

## 2022-03-13 PROCEDURE — 99222 1ST HOSP IP/OBS MODERATE 55: CPT | Performed by: INTERNAL MEDICINE

## 2022-03-13 PROCEDURE — 86663 EPSTEIN-BARR ANTIBODY: CPT | Performed by: PHYSICIAN ASSISTANT

## 2022-03-13 PROCEDURE — 86592 SYPHILIS TEST NON-TREP QUAL: CPT | Performed by: PHYSICIAN ASSISTANT

## 2022-03-13 PROCEDURE — 82248 BILIRUBIN DIRECT: CPT | Performed by: PHYSICIAN ASSISTANT

## 2022-03-13 PROCEDURE — 76705 ECHO EXAM OF ABDOMEN: CPT

## 2022-03-13 PROCEDURE — 87389 HIV-1 AG W/HIV-1&-2 AB AG IA: CPT | Performed by: PHYSICIAN ASSISTANT

## 2022-03-13 PROCEDURE — 85025 COMPLETE CBC W/AUTO DIFF WBC: CPT | Performed by: EMERGENCY MEDICINE

## 2022-03-13 PROCEDURE — 84484 ASSAY OF TROPONIN QUANT: CPT | Performed by: EMERGENCY MEDICINE

## 2022-03-13 PROCEDURE — 96375 TX/PRO/DX INJ NEW DRUG ADDON: CPT

## 2022-03-13 PROCEDURE — 80074 ACUTE HEPATITIS PANEL: CPT | Performed by: PHYSICIAN ASSISTANT

## 2022-03-13 PROCEDURE — 71045 X-RAY EXAM CHEST 1 VIEW: CPT

## 2022-03-13 PROCEDURE — 86645 CMV ANTIBODY IGM: CPT | Performed by: PHYSICIAN ASSISTANT

## 2022-03-13 PROCEDURE — 93010 ELECTROCARDIOGRAM REPORT: CPT

## 2022-03-13 PROCEDURE — 93005 ELECTROCARDIOGRAM TRACING: CPT

## 2022-03-13 PROCEDURE — 99285 EMERGENCY DEPT VISIT HI MDM: CPT

## 2022-03-13 PROCEDURE — 99285 EMERGENCY DEPT VISIT HI MDM: CPT | Performed by: EMERGENCY MEDICINE

## 2022-03-13 PROCEDURE — 86664 EPSTEIN-BARR NUCLEAR ANTIGEN: CPT | Performed by: PHYSICIAN ASSISTANT

## 2022-03-13 PROCEDURE — NC001 PR NO CHARGE: Performed by: SURGERY

## 2022-03-13 PROCEDURE — 80143 DRUG ASSAY ACETAMINOPHEN: CPT | Performed by: PHYSICIAN ASSISTANT

## 2022-03-13 PROCEDURE — 86644 CMV ANTIBODY: CPT | Performed by: PHYSICIAN ASSISTANT

## 2022-03-13 PROCEDURE — 80053 COMPREHEN METABOLIC PANEL: CPT | Performed by: EMERGENCY MEDICINE

## 2022-03-13 PROCEDURE — 86665 EPSTEIN-BARR CAPSID VCA: CPT | Performed by: PHYSICIAN ASSISTANT

## 2022-03-13 PROCEDURE — 83690 ASSAY OF LIPASE: CPT | Performed by: EMERGENCY MEDICINE

## 2022-03-13 PROCEDURE — 74177 CT ABD & PELVIS W/CONTRAST: CPT

## 2022-03-13 RX ORDER — ONDANSETRON 2 MG/ML
4 INJECTION INTRAMUSCULAR; INTRAVENOUS EVERY 6 HOURS PRN
Status: DISCONTINUED | OUTPATIENT
Start: 2022-03-13 | End: 2022-03-20 | Stop reason: HOSPADM

## 2022-03-13 RX ORDER — KETOROLAC TROMETHAMINE 30 MG/ML
30 INJECTION, SOLUTION INTRAMUSCULAR; INTRAVENOUS ONCE
Status: COMPLETED | OUTPATIENT
Start: 2022-03-13 | End: 2022-03-13

## 2022-03-13 RX ORDER — LIDOCAINE HYDROCHLORIDE 20 MG/ML
15 SOLUTION OROPHARYNGEAL ONCE
Status: COMPLETED | OUTPATIENT
Start: 2022-03-13 | End: 2022-03-13

## 2022-03-13 RX ORDER — ACETAMINOPHEN 325 MG/1
650 TABLET ORAL EVERY 6 HOURS PRN
Status: DISCONTINUED | OUTPATIENT
Start: 2022-03-13 | End: 2022-03-20 | Stop reason: HOSPADM

## 2022-03-13 RX ORDER — SUCRALFATE 1 G/1
1 TABLET ORAL EVERY 6 HOURS PRN
Status: DISCONTINUED | OUTPATIENT
Start: 2022-03-13 | End: 2022-03-20

## 2022-03-13 RX ORDER — MAGNESIUM HYDROXIDE/ALUMINUM HYDROXICE/SIMETHICONE 120; 1200; 1200 MG/30ML; MG/30ML; MG/30ML
30 SUSPENSION ORAL ONCE
Status: COMPLETED | OUTPATIENT
Start: 2022-03-13 | End: 2022-03-13

## 2022-03-13 RX ORDER — ALBUTEROL SULFATE 90 UG/1
2 AEROSOL, METERED RESPIRATORY (INHALATION) EVERY 6 HOURS PRN
Status: DISCONTINUED | OUTPATIENT
Start: 2022-03-13 | End: 2022-03-20 | Stop reason: HOSPADM

## 2022-03-13 RX ORDER — MORPHINE SULFATE 4 MG/ML
4 INJECTION, SOLUTION INTRAMUSCULAR; INTRAVENOUS EVERY 4 HOURS PRN
Status: DISCONTINUED | OUTPATIENT
Start: 2022-03-13 | End: 2022-03-16

## 2022-03-13 RX ORDER — SODIUM CHLORIDE 9 MG/ML
75 INJECTION, SOLUTION INTRAVENOUS CONTINUOUS
Status: DISCONTINUED | OUTPATIENT
Start: 2022-03-13 | End: 2022-03-14

## 2022-03-13 RX ORDER — CALCIUM CARBONATE 200(500)MG
1000 TABLET,CHEWABLE ORAL DAILY PRN
Status: DISCONTINUED | OUTPATIENT
Start: 2022-03-13 | End: 2022-03-20 | Stop reason: HOSPADM

## 2022-03-13 RX ORDER — PANTOPRAZOLE SODIUM 40 MG/1
40 TABLET, DELAYED RELEASE ORAL
Status: DISCONTINUED | OUTPATIENT
Start: 2022-03-13 | End: 2022-03-16

## 2022-03-13 RX ORDER — KETOROLAC TROMETHAMINE 30 MG/ML
15 INJECTION, SOLUTION INTRAMUSCULAR; INTRAVENOUS EVERY 6 HOURS PRN
Status: DISCONTINUED | OUTPATIENT
Start: 2022-03-13 | End: 2022-03-16

## 2022-03-13 RX ORDER — MORPHINE SULFATE 4 MG/ML
4 INJECTION, SOLUTION INTRAMUSCULAR; INTRAVENOUS ONCE
Status: COMPLETED | OUTPATIENT
Start: 2022-03-13 | End: 2022-03-13

## 2022-03-13 RX ADMIN — MORPHINE SULFATE 4 MG: 4 INJECTION INTRAVENOUS at 08:44

## 2022-03-13 RX ADMIN — FAMOTIDINE 20 MG: 10 INJECTION INTRAVENOUS at 03:42

## 2022-03-13 RX ADMIN — LIDOCAINE HYDROCHLORIDE 15 ML: 20 SOLUTION ORAL; TOPICAL at 03:44

## 2022-03-13 RX ADMIN — SODIUM CHLORIDE 75 ML/HR: 0.9 INJECTION, SOLUTION INTRAVENOUS at 23:43

## 2022-03-13 RX ADMIN — KETOROLAC TROMETHAMINE 30 MG: 30 INJECTION, SOLUTION INTRAMUSCULAR at 05:35

## 2022-03-13 RX ADMIN — PANTOPRAZOLE SODIUM 40 MG: 40 TABLET, DELAYED RELEASE ORAL at 15:58

## 2022-03-13 RX ADMIN — IOHEXOL 100 ML: 350 INJECTION, SOLUTION INTRAVENOUS at 04:45

## 2022-03-13 RX ADMIN — KETOROLAC TROMETHAMINE 15 MG: 30 INJECTION, SOLUTION INTRAMUSCULAR at 23:38

## 2022-03-13 RX ADMIN — KETOROLAC TROMETHAMINE 15 MG: 30 INJECTION, SOLUTION INTRAMUSCULAR at 16:25

## 2022-03-13 RX ADMIN — ALUMINUM HYDROXIDE, MAGNESIUM HYDROXIDE, AND SIMETHICONE 30 ML: 200; 200; 20 SUSPENSION ORAL at 03:44

## 2022-03-13 RX ADMIN — ONDANSETRON 4 MG: 2 INJECTION INTRAMUSCULAR; INTRAVENOUS at 23:38

## 2022-03-13 NOTE — ED PROVIDER NOTES
History  Chief Complaint   Patient presents with    Chest Pain     c/o chest and back pain  states last time he was here it was d/t his gall bladder  trouble breathing d/t pain  Pt is a 32year old male with a PMH of GERD, cholelithiasis, obesity presenting with chest and back pain x 6 hours  Pt states he ate ham, mashed potatoes and corn for dinner  30 minutes later he had gradual onset of back tightness  Eventually the pain radiated to his substernal chest as well  He describes the pain as warm  Associated nausea  States that a heating pad did improve the pain, but as soon as he took it off, he began to have pain again  Associated SOB  Took Protonix as well, but not sure if it helped  No MI or PE history  History provided by:  Patient   used: No    Chest Pain  Pain location:  Substernal area  Pain quality: tightness    Pain radiates to:  Mid back  Pain radiates to the back: yes    Pain severity:  Severe  Onset quality:  Gradual  Duration:  6 hours  Timing:  Constant  Progression:  Waxing and waning  Chronicity:  New  Context: eating and at rest    Relieved by:  Nothing  Worsened by:  Nothing tried  Ineffective treatments:  Antacids (heating pad)  Associated symptoms: back pain, nausea and shortness of breath    Associated symptoms: no cough, no palpitations and not vomiting    Risk factors: male sex and obesity    Risk factors: no coronary artery disease, no diabetes mellitus, no high cholesterol, no hypertension, no prior DVT/PE and no smoking        Prior to Admission Medications   Prescriptions Last Dose Informant Patient Reported? Taking?    Elastic Bandages & Supports (Knee Brace/Hinged Bars XL) MISC Unknown at Unknown time  No No   Sig: Use daily   albuterol (Proventil HFA) 90 mcg/act inhaler Past Month at Unknown time  No Yes   Sig: Inhale 2 puffs every 6 (six) hours as needed for wheezing   brompheniramine-pseudoephedrine-DM 30-2-10 MG/5ML syrup More than a month at Unknown time  No No   Sig: Take 5 mL by mouth 4 (four) times a day as needed for congestion or cough   ibuprofen (MOTRIN) 800 mg tablet Past Month at Unknown time  No Yes   Sig: Take 1 tablet (800 mg total) by mouth every 6 (six) hours as needed (Pain)   predniSONE 10 mg tablet Unknown at Unknown time  No No   Sig: 5 x 1 day, 4 x1 day, 3 x 1 day,2 x1 day,  1 x 1 day   Patient not taking: Reported on 3/13/2022       Facility-Administered Medications: None       Past Medical History:   Diagnosis Date    Fracture of nasal bone     GERD (gastroesophageal reflux disease)     Gross hematuria     Orthostatic hypotension        Past Surgical History:   Procedure Laterality Date    WRIST SURGERY         Family History   Problem Relation Age of Onset    Diabetes Father      I have reviewed and agree with the history as documented  E-Cigarette/Vaping    E-Cigarette Use Never User      E-Cigarette/Vaping Substances     Social History     Tobacco Use    Smoking status: Former Smoker     Quit date: 2014     Years since quittin 2    Smokeless tobacco: Never Used   Vaping Use    Vaping Use: Never used   Substance Use Topics    Alcohol use: No    Drug use: No       Review of Systems   Constitutional: Negative  HENT: Negative  Respiratory: Positive for shortness of breath  Negative for cough, chest tightness and wheezing  Cardiovascular: Positive for chest pain  Negative for palpitations and leg swelling  Gastrointestinal: Positive for nausea  Negative for abdominal distention, blood in stool, constipation, diarrhea and vomiting  Genitourinary: Negative  Musculoskeletal: Positive for back pain  Neurological: Negative  All other systems reviewed and are negative  Physical Exam  Physical Exam  Constitutional:       General: He is not in acute distress  Appearance: He is well-developed  He is not diaphoretic  HENT:      Head: Normocephalic and atraumatic        Right Ear: External ear normal       Left Ear: External ear normal       Nose: Nose normal       Mouth/Throat:      Mouth: Mucous membranes are moist       Pharynx: Oropharynx is clear  No oropharyngeal exudate or posterior oropharyngeal erythema  Eyes:      General: No scleral icterus  Right eye: No discharge  Left eye: No discharge  Extraocular Movements: Extraocular movements intact  Conjunctiva/sclera: Conjunctivae normal    Cardiovascular:      Rate and Rhythm: Normal rate and regular rhythm  Heart sounds: Normal heart sounds  Pulmonary:      Effort: Pulmonary effort is normal       Breath sounds: Normal breath sounds  Abdominal:      General: Abdomen is flat  Bowel sounds are normal  There is no distension  Palpations: Abdomen is soft  Tenderness: There is no abdominal tenderness  Musculoskeletal:         General: Normal range of motion  Cervical back: Normal range of motion and neck supple  Right lower leg: No tenderness  No edema  Left lower leg: No tenderness  No edema  Skin:     General: Skin is warm and dry  Neurological:      General: No focal deficit present  Mental Status: He is alert and oriented to person, place, and time  Mental status is at baseline     Psychiatric:         Mood and Affect: Mood normal          Behavior: Behavior normal          Vital Signs  ED Triage Vitals [03/13/22 0314]   Temperature Pulse Respirations Blood Pressure SpO2   97 9 °F (36 6 °C) (!) 121 18 159/72 96 %      Temp Source Heart Rate Source Patient Position - Orthostatic VS BP Location FiO2 (%)   Oral Monitor Lying Left arm --      Pain Score       5           Vitals:    03/13/22 0515 03/13/22 0530 03/13/22 0545 03/13/22 0600   BP:       Pulse: 90 82 80 80   Patient Position - Orthostatic VS:             Visual Acuity      ED Medications  Medications   famotidine (PEPCID) injection 20 mg (20 mg Intravenous Given 3/13/22 8162)   aluminum-magnesium hydroxide-simethicone (MYLANTA) oral suspension 30 mL (30 mL Oral Not Given 3/13/22 0350)   Lidocaine Viscous HCl (XYLOCAINE) 2 % mucosal solution 15 mL (15 mL Swish & Swallow Not Given 3/13/22 0350)   iohexol (OMNIPAQUE) 350 MG/ML injection (SINGLE-DOSE) 100 mL (100 mL Intravenous Given 3/13/22 4780)   ketorolac (TORADOL) injection 30 mg (30 mg Intravenous Given 3/13/22 1953)       Diagnostic Studies  Results Reviewed     Procedure Component Value Units Date/Time    HS Troponin 0hr (reflex protocol) [[id number]]  (Normal) Collected: 03/13/22 0341    Lab Status: Final result Specimen: Blood from Arm, Right Updated: 03/13/22 0409     hs TnI 0hr <2 ng/L     Comprehensive metabolic panel [[id number]]  (Abnormal) Collected: 03/13/22 0341    Lab Status: Final result Specimen: Blood from Arm, Right Updated: 03/13/22 0401     Sodium 139 mmol/L      Potassium 4 0 mmol/L      Chloride 104 mmol/L      CO2 26 mmol/L      ANION GAP 9 mmol/L      BUN 8 mg/dL      Creatinine 0 98 mg/dL      Glucose 147 mg/dL      Calcium 8 9 mg/dL       U/L       U/L      Alkaline Phosphatase 56 U/L      Total Protein 7 5 g/dL      Albumin 3 6 g/dL      Total Bilirubin 2 25 mg/dL      eGFR 102 ml/min/1 73sq m     Narrative:      [place] guidelines for Chronic Kidney Disease (CKD):     Stage 1 with normal or high GFR (GFR > 90 mL/min/1 73 square meters)    Stage 2 Mild CKD (GFR = 60-89 mL/min/1 73 square meters)    Stage 3A Moderate CKD (GFR = 45-59 mL/min/1 73 square meters)    Stage 3B Moderate CKD (GFR = 30-44 mL/min/1 73 square meters)    Stage 4 Severe CKD (GFR = 15-29 mL/min/1 73 square meters)    Stage 5 End Stage CKD (GFR <15 mL/min/1 73 square meters)  Note: GFR calculation is accurate only with a steady state creatinine    Lipase [[id number]]  (Normal) Collected: 03/13/22 0341    Lab Status: Final result Specimen: Blood from Arm, Right Updated: 03/13/22 0401     Lipase 102 u/L     CBC and differential [[id number]] (Abnormal) Collected: 03/13/22 0341    Lab Status: Final result Specimen: Blood from Arm, Right Updated: 03/13/22 0348     WBC 10 72 Thousand/uL      RBC 5 40 Million/uL      Hemoglobin 17 1 g/dL      Hematocrit 49 4 %      MCV 92 fL      MCH 31 7 pg      MCHC 34 6 g/dL      RDW 12 2 %      MPV 10 0 fL      Platelets 177 Thousands/uL      nRBC 0 /100 WBCs      Neutrophils Relative 76 %      Immat GRANS % 1 %      Lymphocytes Relative 15 %      Monocytes Relative 7 %      Eosinophils Relative 1 %      Basophils Relative 0 %      Neutrophils Absolute 8 14 Thousands/µL      Immature Grans Absolute 0 06 Thousand/uL      Lymphocytes Absolute 1 64 Thousands/µL      Monocytes Absolute 0 79 Thousand/µL      Eosinophils Absolute 0 06 Thousand/µL      Basophils Absolute 0 03 Thousands/µL                  CT abdomen pelvis with contrast   Final Result by Sherri Rodriguez DO (03/13 1287)   1  Markedly distended, debris-filled stomach, despite the fasting state of the patient  Correlate for gastroparesis  Consider follow-up GI consultation and nuclear medicine gastric emptying study  2   Normally distended gallbladder  Suggestion of noncalcified gallstones within the gallbladder  Mild hyperemia around the gallbladder neck  Correlate for early, acute cholecystitis  If there is continued concern for cholelithiasis and acute    cholecystitis, a follow-up right upper quadrant ultrasound is recommended, given the limitations of the previous right upper quadrant ultrasound  At that time, the study was performed utilizing Covid protocol and was markedly limited  Follow-up    surgical and/or GI consultation as indicated  Ultimately, if there is continued concern for gallbladder dysfunction, a follow-up nuclear medicine HIDA scan with gallbladder ejection fraction may be necessary  3   Hepatomegaly with fatty infiltration  4   Partially duplicated right collecting system                 The study was marked in EPIC for immediate notification  Workstation performed: [id number]         XR chest 1 view portable   ED Interpretation by Keerthi Leroy PA-C (03/13 0406)   No acute cardiopulmonary disease      US right upper quadrant    (Results Pending)              Procedures  Procedures         ED Course  ED Course as of 03/13/22 0614   Sun Mar 13, 2022   0526 Pt presenting with more chest and back pain without abdominal tenderness or positive Castle's sign  His white count is 10 72, and LFTs and total Bili are elevated  His CT is showing possible early acute chester and recommending US and surgery/GI consult  Will reach out to surgery first     0528 Dr Chaz Leung recommending [address] at this time  9113 Will sign out to Lisa Acosta PA-C                                SBIRT 20yo+      Most Recent Value   SBIRT (23 yo +)    In order to provide better care to our patients, we are screening all of our patients for alcohol and drug use  Would it be okay to ask you these screening questions? Yes Filed at: 03/13/2022 9747   Initial Alcohol Screen: US AUDIT-C     1  How often do you have a drink containing alcohol? 0 Filed at: 03/13/2022 0326   2  How many drinks containing alcohol do you have on a typical day you are drinking? 0 Filed at: 03/13/2022 0326   3a  Male UNDER 65: How often do you have five or more drinks on one occasion? 0 Filed at: 03/13/2022 0326   3b  FEMALE Any Age, or MALE 65+: How often do you have 4 or more drinks on one occassion? 0 Filed at: 03/13/2022 0326   Audit-C Score 0 Filed at: 03/13/2022 6755   ABIGALI: How many times in the past year have you    Used an illegal drug or used a prescription medication for non-medical reasons?  Never Filed at: 03/13/2022 0326                    MDM  Number of Diagnoses or Management Options     Amount and/or Complexity of Data Reviewed  Clinical lab tests: ordered and reviewed  Tests in the radiology section of CPT®: ordered and reviewed  Tests in the medicine section of CPT®: ordered and reviewed  Discussion of test results with the performing providers: yes  Review and summarize past medical records: yes  Discuss the patient with other providers: yes  Independent visualization of images, tracings, or specimens: yes    Risk of Complications, Morbidity, and/or Mortality  Presenting problems: high  Management options: high    Patient Progress  Patient progress: stable      Disposition  Final diagnoses:   None     ED Disposition     None      Follow-up Information    None         Patient's Medications   Discharge Prescriptions    No medications on file       No discharge procedures on file      PDMP Review       Value Time User    PDMP Reviewed  Yes 1/8/2021 12:03 PM Sapna Frank DO          ED Provider  Electronically Signed by           Tiara Fierro PA-C  03/13/22 2461

## 2022-03-13 NOTE — ED CARE HANDOFF
Emergency Department Sign Out Note        Sign out and transfer of care from [facility]  See Separate Emergency Department note  The patient, Eze Matos, was evaluated by the previous provider for chest and back pain for 6 hours  Workup Completed:  Troponin  CMP  Lipase  CBC  CT abdomen/pelvis  CXR    ED Course / Workup Pending (followup):  US right upper quadrant    4633 - Per sign out, awaiting US and will speak with surgery  9722 Informed patient of imaging findings  300 [facility] text sent to general surgery  [address] [facility] with Creasie Anes from surgery  She will see patient  1110 Patient seen by surgery  Resident will get back to us but can speak with GI for possible admission for MRCP      [address] [place] text sent to GI     1156 Spoke with Heinz Fabry from GI  Will admit for MRCP  [place] text sent to [facility]     1200    Spoke with Dr Denice Pitts from [facility] who is agreeable to admission  Patient agreeable to plan  Patient stable at time of transfer to [facility] care  ED Course as of 03/13/22 1329   Sun Mar 13, 2022   2970 Per sign out, awaiting US and will speak with surgery  2840 Informed patient of imaging findings  300 [facility] text sent to general surgery  [address] with Creasie Anes from surgery  She will see patient  1110 Patient seen by surgery  Resident will get back to us but can speak with GI    [address] [place] text sent to GI    1156 Spoke with Heinz Fabry from GI  Will admit for MRCP   [place] text sent to [facility]     Procedures  MDM  Number of Diagnoses or Management Options  Upper abdominal pain: new and requires workup          Disposition  Final diagnoses:   Upper abdominal pain     Time reflects when diagnosis was documented in both MDM as applicable and the Disposition within this note     Time User Action Codes Description Comment    3/13/2022  8:58 AM Param GREENE Add [R10 10] Upper abdominal pain     3/13/2022 12:07 PM Nicole Momin Katy Guserick Add [R74 01] Transaminitis       ED Disposition     ED Disposition Condition Date/Time Comment    Admit Stable Janel Mar 13, 2022 12:03 PM Case was discussed with Dr Millie Ramos from [facility] and the patient's admission status was agreed to be Admission Status: inpatient status to the service of Dr Millie Ramos   Follow-up Information     Follow up With Specialties Details Why Contact Info    Jemal Li MD General Surgery, Wound Care Schedule an appointment as soon as possible for a visit Please call to set up apt to discuss gallbladder removal  [address]  [facility] [address]  [phone]          Current Discharge Medication List      CONTINUE these medications which have NOT CHANGED    Details   albuterol (Proventil HFA) 90 mcg/act inhaler Inhale 2 puffs every 6 (six) hours as needed for wheezing  Qty: 6 7 g, Refills: 5    Comments: Substitution to a formulary equivalent within the same pharmaceutical class is authorized  Associated Diagnoses: Bronchitis      ibuprofen (MOTRIN) 800 mg tablet Take 1 tablet (800 mg total) by mouth every 6 (six) hours as needed (Pain)  Qty: 30 tablet, Refills: 0    Associated Diagnoses: Left flank pain      brompheniramine-pseudoephedrine-DM 30-2-10 MG/5ML syrup Take 5 mL by mouth 4 (four) times a day as needed for congestion or cough  Qty: 240 mL, Refills: 0    Associated Diagnoses: Bronchitis      Elastic Bandages & Supports (Knee Brace/Hinged Bars XL) MISC Use daily  Qty: 1 each, Refills: 0    Associated Diagnoses: Instability of right knee joint      predniSONE 10 mg tablet 5 x 1 day, 4 x1 day, 3 x 1 day,2 x1 day,  1 x 1 day  Qty: 15 tablet, Refills: 0    Associated Diagnoses: Viral infection, unspecified           No discharge procedures on file         ED Provider  Electronically Signed by     Porfirio Ricketts PA-C  03/13/22 2739

## 2022-03-13 NOTE — ED NOTES
Pt  Assessed, VSS, visitor at bedside  Pt  Medicated for pain, NAD, will continue to monitor       Oswaldo File, PATTY  03/13/22 0613

## 2022-03-13 NOTE — LETTER
[address] [place] [address]  Dept: [phone]    March 20, 2022     Patient: Delon Sharma   YOB: 1990   Date of Visit: 3/13/2022       To Whom it May Concern:    Rebecca Cabello is under my professional care  He was seen in the hospital from 3/13/2022   to 03/20/22  He may return to work on 3/23/22 without limitations  If you have any questions or concerns, please don't hesitate to call           Sincerely,          Marilynn Alvarado, DO

## 2022-03-13 NOTE — PLAN OF CARE
Problem: PAIN - ADULT  Goal: Verbalizes/displays adequate comfort level or baseline comfort level  Description: Interventions:  - Encourage patient to monitor pain and request assistance  - Assess pain using appropriate pain scale  - Administer analgesics based on type and severity of pain and evaluate response  - Implement non-pharmacological measures as appropriate and evaluate response  - Consider cultural and social influences on pain and pain management  - Notify physician/advanced practitioner if interventions unsuccessful or patient reports new pain  Outcome: Progressing     Problem: INFECTION - ADULT  Goal: Absence or prevention of progression during hospitalization  Description: INTERVENTIONS:  - Assess and monitor for signs and symptoms of infection  - Monitor lab/diagnostic results  - Monitor all insertion sites, i e  indwelling lines, tubes, and drains  - Monitor endotracheal if appropriate and nasal secretions for changes in amount and color  - [place] appropriate cooling/warming therapies per order  - Administer medications as ordered  - Instruct and encourage patient and family to use good hand hygiene technique  - Identify and instruct in appropriate isolation precautions for identified infection/condition  Outcome: Progressing  Goal: Absence of fever/infection during neutropenic period  Description: INTERVENTIONS:  - Monitor WBC    Outcome: Progressing     Problem: SAFETY ADULT  Goal: Patient will remain free of falls  Description: INTERVENTIONS:  - Educate patient/family on patient safety including physical limitations  - Instruct patient to call for assistance with activity   - Consult OT/PT to assist with strengthening/mobility   - Keep Call bell within reach  - Keep bed low and locked with side rails adjusted as appropriate  - Keep care items and personal belongings within reach  - Initiate and maintain comfort rounds  - Make Fall Risk Sign visible to staff  - Apply yellow socks and bracelet for high fall risk patients  - Consider moving patient to room near nurses station  Outcome: Progressing  Goal: Maintain or return to baseline ADL function  Description: INTERVENTIONS:  -  Assess patient's ability to carry out ADLs; assess patient's baseline for ADL function and identify physical deficits which impact ability to perform ADLs (bathing, care of mouth/teeth, toileting, grooming, dressing, etc )  - Assess/evaluate cause of self-care deficits   - Assess range of motion  - Assess patient's mobility; develop plan if impaired  - Assess patient's need for assistive devices and provide as appropriate  - Encourage maximum independence but intervene and supervise when necessary  - Involve family in performance of ADLs  - Assess for home care needs following discharge   - Consider OT consult to assist with ADL evaluation and planning for discharge  - Provide patient education as appropriate  Outcome: Progressing  Goal: Maintains/Returns to pre admission functional level  Description: INTERVENTIONS:  - Perform BMAT or MOVE assessment daily    - Set and communicate daily mobility goal to care team and patient/family/caregiver     - Collaborate with rehabilitation services on mobility goals if consulted  - Out of bed for toileting  - Record patient progress and toleration of activity level   Outcome: Progressing     Problem: DISCHARGE PLANNING  Goal: Discharge to home or other facility with appropriate resources  Description: INTERVENTIONS:  - Identify barriers to discharge w/patient and caregiver  - Arrange for needed discharge resources and transportation as appropriate  - Identify discharge learning needs (meds, wound care, etc )  - Arrange for interpretive services to assist at discharge as needed  - Refer to Case Management Department for coordinating discharge planning if the patient needs post-hospital services based on physician/advanced practitioner order or complex needs related to functional status, cognitive ability, or social support system  Outcome: Progressing     Problem: Knowledge Deficit  Goal: Patient/family/caregiver demonstrates understanding of disease process, treatment plan, medications, and discharge instructions  Description: Complete learning assessment and assess knowledge base  Interventions:  - Provide teaching at level of understanding  - Provide teaching via preferred learning methods  Outcome: Progressing     Problem: Nutrition/Hydration-ADULT  Goal: Nutrient/Hydration intake appropriate for improving, restoring or maintaining nutritional needs  Description: Monitor and assess patient's nutrition/hydration status for malnutrition  Collaborate with interdisciplinary team and initiate plan and interventions as ordered  Monitor patient's weight and dietary intake as ordered or per policy  Utilize nutrition screening tool and intervene as necessary  Determine patient's food preferences and provide high-protein, high-caloric foods as appropriate       INTERVENTIONS:  - Monitor oral intake, urinary output, labs, and treatment plans  - Assess nutrition and hydration status and recommend course of action  - Evaluate amount of meals eaten  - Assist patient with eating if necessary   - Allow adequate time for meals  - Recommend/ encourage appropriate diets, oral nutritional supplements, and vitamin/mineral supplements  - Order, calculate, and assess calorie counts as needed  - Recommend, monitor, and adjust tube feedings and TPN/PPN based on assessed needs  - Assess need for intravenous fluids  - Provide specific nutrition/hydration education as appropriate  - Include patient/family/caregiver in decisions related to nutrition  Outcome: Progressing

## 2022-03-13 NOTE — CONSULTS
Consult: General Surgery  Alonso Wiggins 32 y o  male MRN: [id number]  Unit/Bed#: ED 32 Encounter: [id number]        Assessment/Plan     Assessment:  Patient is a 32 y o  male who presented with symptomatic cholelithiasis and transaminitis  Afebrile, VSS  WBC: 10 7; Hb 1ll AST: 374; ALTL: 274: Tbili: 2 25  CTAP showed distended stomach, filled with ingested food and air, noncalcified gallstones, mild hyperemia of neck, hepatomegally with fatty infiltration  3/13 RUQ US: gallstones, no cholecystitis, no choledocholithiasis, CBD 6 mm; no intrahepatic dilation      Plan:  No acute surgical intervention at this time  Recommend GI consult for elevated LFTs and tbili  Recommend outpatient follow-up for cholecystectomy when patient get his insurance sorted out  History of Present Illness     HPI:  Alonso Wiggins is a 32 y o  male with pmhx of GERD, asthma, obesity who presents with b/l shoulder/back pain radiating to epigastrum  that started suddenly 2 hours after eating dinner  Pt reports previous episodes of similar pain several years ago and he was treated with antibiotics and deferred surgery  Pt denies nausea and vomiting, fevers chills, constipation, dysuria, or SOB  He does have diarrhea a couple times a week especially after eating fatty foods  Pt denies  taking any anticoagulant or antiplatelet medications  He denies previous abdominal surgeries  Review of Systems   All other systems reviewed and are negative  Except as noted in above HPI      Consults    Historical Information   Past Medical History:   Diagnosis Date    Fracture of nasal bone     GERD (gastroesophageal reflux disease)     Gross hematuria     Orthostatic hypotension      Past Surgical History:   Procedure Laterality Date    WRIST SURGERY       Social History   Social History     Substance and Sexual Activity   Alcohol Use No     Social History     Substance and Sexual Activity   Drug Use No     Social History     Tobacco Use   Smoking Status Former Smoker    Quit date: 2014    Years since quittin 2   Smokeless Tobacco Never Used     Family History:   Family History   Problem Relation Age of Onset    Diabetes Father        Meds/Allergies   PTA meds:   Prior to Admission Medications   Prescriptions Last Dose Informant Patient Reported? Taking? Elastic Bandages & Supports (Knee Brace/Hinged Bars XL) MISC Unknown at Unknown time  No No   Sig: Use daily   albuterol (Proventil HFA) 90 mcg/act inhaler Past Month at Unknown time  No Yes   Sig: Inhale 2 puffs every 6 (six) hours as needed for wheezing   brompheniramine-pseudoephedrine-DM 30-2-10 MG/5ML syrup More than a month at Unknown time  No No   Sig: Take 5 mL by mouth 4 (four) times a day as needed for congestion or cough   ibuprofen (MOTRIN) 800 mg tablet Past Month at Unknown time  No Yes   Sig: Take 1 tablet (800 mg total) by mouth every 6 (six) hours as needed (Pain)   predniSONE 10 mg tablet Unknown at Unknown time  No No   Sig: 5 x 1 day, 4 x1 day, 3 x 1 day,2 x1 day,  1 x 1 day   Patient not taking: Reported on 3/13/2022       Facility-Administered Medications: None     Allergies   Allergen Reactions    Penicillins      UNKNOWN    Sulfa Antibiotics      UNKNOWN       Objective   First Vitals:   Blood Pressure: 159/72 (22)  Pulse: (!) 121 (22)  Temperature: 97 9 °F (36 6 °C) (22)  Temp Source: Oral (22)  Respirations: 18 (22)  SpO2: 96 % (22)    Current Vitals:   Blood Pressure: 118/74 (22 0843)  Pulse: 83 (22 08)  Temperature: 97 9 °F (36 6 °C) (22)  Temp Source: Oral (22)  Respirations: 18 (22)  SpO2: 98 % (22)    No intake or output data in the 24 hours ending 22 1025    Invasive Devices  Report    Peripheral Intravenous Line            Peripheral IV 22 Right Hand <1 day                Physical Exam  Vitals reviewed  Constitutional:       General: He is not in acute distress  Appearance: He is obese  He is not ill-appearing, toxic-appearing or diaphoretic  HENT:      Head: Normocephalic and atraumatic  Eyes:      Extraocular Movements: Extraocular movements intact  Cardiovascular:      Rate and Rhythm: Normal rate  Pulmonary:      Effort: Pulmonary effort is normal  No respiratory distress  Abdominal:      General: There is no distension  Palpations: Abdomen is soft  Tenderness: There is no abdominal tenderness  There is no guarding or rebound  Musculoskeletal:      Right lower leg: No edema  Left lower leg: No edema  Skin:     General: Skin is warm and dry  Neurological:      Mental Status: He is alert and oriented to person, place, and time  Psychiatric:         Mood and Affect: Mood normal          Behavior: Behavior normal          Lab Results:   CBC:   Lab Results   Component Value Date    WBC 10 72 (H) 03/13/2022    HGB 17 1 (H) 03/13/2022    HCT 49 4 (H) 03/13/2022    MCV 92 03/13/2022     03/13/2022    MCH 31 7 03/13/2022    MCHC 34 6 03/13/2022    RDW 12 2 03/13/2022    MPV 10 0 03/13/2022    NRBC 0 03/13/2022   , CMP:   Lab Results   Component Value Date    SODIUM 139 03/13/2022    K 4 0 03/13/2022     03/13/2022    CO2 26 03/13/2022    BUN 8 03/13/2022    CREATININE 0 98 03/13/2022    CALCIUM 8 9 03/13/2022     (H) 03/13/2022     (H) 03/13/2022    ALKPHOS 56 03/13/2022    EGFR 102 03/13/2022     Imaging: I have personally reviewed pertinent reports  US right upper quadrant   Final Result by Candee Lombard, MD (03/13 1503)   Mild hepatomegaly with fat infiltration  Gallstones with no signs of cholecystitis  Workstation performed: [id number]         CT abdomen pelvis with contrast   Final Result by Annmarie PresidentDO (03/13 8527)   1  Markedly distended, debris-filled stomach, despite the fasting state of the patient    Correlate for gastroparesis  Consider follow-up GI consultation and nuclear medicine gastric emptying study  2   Normally distended gallbladder  Suggestion of noncalcified gallstones within the gallbladder  Mild hyperemia around the gallbladder neck  Correlate for early, acute cholecystitis  If there is continued concern for cholelithiasis and acute    cholecystitis, a follow-up right upper quadrant ultrasound is recommended, given the limitations of the previous right upper quadrant ultrasound  At that time, the study was performed utilizing Covid protocol and was markedly limited  Follow-up    surgical and/or GI consultation as indicated  Ultimately, if there is continued concern for gallbladder dysfunction, a follow-up nuclear medicine HIDA scan with gallbladder ejection fraction may be necessary  3   Hepatomegaly with fatty infiltration  4   Partially duplicated right collecting system  The study was marked in [facility] for immediate notification  Workstation performed: [id number]         XR chest 1 view portable   ED Interpretation by Dina Lara PA-C (03/13 0793)   No acute cardiopulmonary disease      Final Result by Dora Chavez MD (03/13 5026)      No acute cardiopulmonary disease  Workstation performed: [id number]             EKG, Pathology, and Other Studies: I have personally reviewed pertinent reports        Code Status: Prior  Advance Directive and Living Will:      Power of :    POLST:

## 2022-03-13 NOTE — ASSESSMENT & PLAN NOTE
· Patient presented to ED with complaint of back pain  · Found have gallstones with no signs of cholecystitis  · Does have prior admission for similar complaint, plan was for outpatient cholecystectomy but this was never performed  · GI consult  · Possible MRCP  · Seen in consult by surgery, no acute surgical intervention required  · Continue PRN analgesics

## 2022-03-13 NOTE — H&P
[facility]  H&P- King Correa 1990, 32 y o  male MRN: [id number]  Unit/Bed#: [facility] Encounter: [id number]  Primary Care Provider: Francisca Ruiz DO   Date and time admitted to hospital: 3/13/2022  3:09 AM    * Calculus of gallbladder without biliary obstruction  Assessment & Plan  · Patient presented to ED with complaint of back pain  · Found have gallstones with no signs of cholecystitis  · Does have prior admission for similar complaint, plan was for outpatient cholecystectomy but this was never performed  · GI consult  · Possible MRCP  · Seen in consult by surgery, no acute surgical intervention required  · Continue PRN analgesics    Transaminitis  Assessment & Plan  · LFTs elevated at time of admission, , , T bili 2 25  · GI consult  · Plan for possible MRCP    Obesity  Assessment & Plan  · BMI 41    VTE Pharmacologic Prophylaxis: VTE Score: 2 Low Risk (Score 0-2) - Encourage Ambulation  Code Status: Prior full code  Discussion with family: none  Anticipated Length of Stay: Patient will be admitted on an inpatient basis with an anticipated length of stay of greater than 2 midnights secondary to Transaminitis  Total Time for Visit, including Counseling / Coordination of Care: 60 minutes Greater than 50% of this total time spent on direct patient counseling and coordination of care  Chief Complaint:  Back pain    History of Present Illness:  King Correa is a 32 y o  male with a PMH of obesity who presents with back pain  Patient presents hospital with complaint of back pain radiating into his abdomen  He does report similar episodes in the past, told it was his gallbladder previously  He was hospitalized 2 years ago for similar complaints but never had his gallbladder removed as he was improving  He denies any associated nausea or vomiting    He reports this episode started last evening, believes this is dietary as he has been eating lots of soda and candy recently  He denies any recent fevers or chills  Denies any recent acute illnesses  He reports significant improvement in his pain with morphine given in the emergency room  Review of Systems:  Review of Systems   Constitutional: Negative for chills and fever  HENT: Negative for dental problem  Eyes: Negative for visual disturbance  Respiratory: Positive for shortness of breath  Negative for cough  Cardiovascular: Negative for chest pain  Gastrointestinal: Positive for abdominal pain  Negative for nausea and vomiting  Genitourinary: Negative for difficulty urinating  Musculoskeletal: Positive for back pain  Negative for gait problem  Skin: Negative for rash  Neurological: Negative for dizziness and weakness  Psychiatric/Behavioral: Negative for sleep disturbance  Past Medical and Surgical History:   Past Medical History:   Diagnosis Date    Fracture of nasal bone     GERD (gastroesophageal reflux disease)     Gross hematuria     Orthostatic hypotension        Past Surgical History:   Procedure Laterality Date    WRIST SURGERY         Meds/Allergies:  Prior to Admission medications    Medication Sig Start Date End Date Taking?  Authorizing Provider   albuterol (Proventil HFA) 90 mcg/act inhaler Inhale 2 puffs every 6 (six) hours as needed for wheezing 1/17/22  Yes To Long DO   ibuprofen (MOTRIN) 800 mg tablet Take 1 tablet (800 mg total) by mouth every 6 (six) hours as needed (Pain) 2/28/22  Yes Ten Gould MD   brompheniramine-pseudoephedrine-DM 30-2-10 MG/5ML syrup Take 5 mL by mouth 4 (four) times a day as needed for congestion or cough 1/17/22   To Long DO   Elastic Bandages & Supports (Knee Brace/Hinged Bars XL) MISC Use daily 11/19/21   To Long DO   predniSONE 10 mg tablet 5 x 1 day, 4 x1 day, 3 x 1 day,2 x1 day,  1 x 1 day  Patient not taking: Reported on 3/13/2022  1/13/22   To Long DO I have reviewed home medications with patient personally  Allergies: Allergies   Allergen Reactions    Penicillins      UNKNOWN    Sulfa Antibiotics      UNKNOWN       Social History:  Marital Status: Single   Occupation:  Unknown  Patient Pre-hospital Living Situation: Home  Patient Pre-hospital Level of Mobility: walks  Patient Pre-hospital Diet Restrictions: none  Substance Use History:   Social History     Substance and Sexual Activity   Alcohol Use No     Social History     Tobacco Use   Smoking Status Former Smoker    Quit date: 2014    Years since quittin 2   Smokeless Tobacco Never Used     Social History     Substance and Sexual Activity   Drug Use No       Family History:  Family History   Problem Relation Age of Onset    Diabetes Father        Physical Exam:     Vitals:   Blood Pressure: 121/76 (22 1216)  Pulse: 88 (22 1216)  Temperature: 97 9 °F (36 6 °C) (22 08)  Temp Source: Oral (22)  Respirations: 17 (22 1216)  SpO2: 97 % (22 1216)    Physical Exam  Vitals reviewed  Constitutional:       General: He is not in acute distress  HENT:      Head: Normocephalic and atraumatic  Eyes:      General: No scleral icterus  Conjunctiva/sclera: Conjunctivae normal    Cardiovascular:      Rate and Rhythm: Normal rate and regular rhythm  Heart sounds: No murmur heard  Pulmonary:      Effort: Pulmonary effort is normal  No respiratory distress  Breath sounds: Normal breath sounds  Abdominal:      General: Bowel sounds are normal  There is no distension  Palpations: Abdomen is soft  Tenderness: There is no abdominal tenderness  Musculoskeletal:      Cervical back: Neck supple  Right lower leg: No edema  Left lower leg: No edema  Skin:     General: Skin is warm and dry  Neurological:      Mental Status: He is alert and oriented to person, place, and time     Psychiatric:         Mood and Affect: Mood normal          Behavior: Behavior normal           Additional Data:     Lab Results:  Results from last 7 days   Lab Units 03/13/22  0341   WBC Thousand/uL 10 72*   HEMOGLOBIN g/dL 17 1*   HEMATOCRIT % 49 4*   PLATELETS Thousands/uL 323   NEUTROS PCT % 76*   LYMPHS PCT % 15   MONOS PCT % 7   EOS PCT % 1     Results from last 7 days   Lab Units 03/13/22  0341   SODIUM mmol/L 139   POTASSIUM mmol/L 4 0   CHLORIDE mmol/L 104   CO2 mmol/L 26   BUN mg/dL 8   CREATININE mg/dL 0 98   ANION GAP mmol/L 9   CALCIUM mg/dL 8 9   ALBUMIN g/dL 3 6   TOTAL BILIRUBIN mg/dL 2 25*   ALK PHOS U/L 56   ALT U/L 274*   AST U/L 374*   GLUCOSE RANDOM mg/dL 147*                       Imaging: Reviewed radiology reports from this admission including: abdominal/pelvic CT and ultrasound(s)  US right upper quadrant   Final Result by Den Fischer MD (03/13 4832)   Mild hepatomegaly with fat infiltration  Gallstones with no signs of cholecystitis  Workstation performed: [id number]         CT abdomen pelvis with contrast   Final Result by Merleen Jeans, DO (03/13 2943)   1  Markedly distended, debris-filled stomach, despite the fasting state of the patient  Correlate for gastroparesis  Consider follow-up GI consultation and nuclear medicine gastric emptying study  2   Normally distended gallbladder  Suggestion of noncalcified gallstones within the gallbladder  Mild hyperemia around the gallbladder neck  Correlate for early, acute cholecystitis  If there is continued concern for cholelithiasis and acute    cholecystitis, a follow-up right upper quadrant ultrasound is recommended, given the limitations of the previous right upper quadrant ultrasound  At that time, the study was performed utilizing Covid protocol and was markedly limited  Follow-up    surgical and/or GI consultation as indicated    Ultimately, if there is continued concern for gallbladder dysfunction, a follow-up nuclear medicine HIDA scan with gallbladder ejection fraction may be necessary  3   Hepatomegaly with fatty infiltration  4   Partially duplicated right collecting system  The study was marked in [facility] for immediate notification  Workstation performed: [id number]         XR chest 1 view portable   ED Interpretation by Manny Wynn PA-C (03/13 5866)   No acute cardiopulmonary disease      Final Result by Fawn Powell MD (03/13 5745)      No acute cardiopulmonary disease  Workstation performed: [id number]             EKG and Other Studies Reviewed on Admission:   · EKG: NSR  HR 95     ** Please Note: This note has been constructed using a voice recognition system   **

## 2022-03-13 NOTE — CONSULTS
Consultation - 126 [facility]  Mel Cervantes 32 y o  male MRN: [id number]  Unit/Bed#: Pedro Renner Encounter: [id number]        Inpatient consult to gastroenterology  Consult performed by: Tc Christine PA-C  Consult ordered by: Dennys Aguero PA-C          Reason for Consult / Principal Problem:     1  Upper abdominal Pain  2  Transaminitis       ASSESSMENT AND PLAN:      Yoli Darby is a 33 y/o male who presents with chest pain and back pain  1  Upper abdominal pain  2  Transaminitis  3  Cholelithiasis  4  Abnormal imaging  5  NSAID use  Pt presents to the ED with CP that radiates to his back x 1 night; CT in the ED noted markedly distended, and debris-filled stomach, fatty liver and distended GB and stones  U/S confirmed fatty liver but also noted cholelithiasis without signs of cholecystitis, thus surgery deemed no emergent surgical intervention needed  LFTs today:  , , T  Bili 2 25  Lipase normal  Mild elevated white count but no fever  When pt points to where the pain is, he points to RUQ area  Pt denies any recent or current n/v but notes that he does get episodes of "random" n/v being able to identify triggers at home  Pt denies tylenol use, alcohol/tobacco/illicit drug use  Pt admits to using NSAIDs frequently for pain    -explained to pt that at this point it is unclear what exactly is causing his pain: his pain may be due to biliary colic in which he could be passing stones intermittently vs biliary obstruction (although very unlikely) vs underlying gastroparesis vs NSAID-induced PUD vs H pylori vs a combination of multiple etiologies   -I do suspect that a large component of his pain is likely due to biliary colic as he notes he gets this same type of "gallbladder attack" when he eats fatty/greasy foods   -MRCP ordered to rule out biliary obstruction but without signs of dilation on imaging, this is low yield  -acute hep panel, ebv, cmv, hiv, rpr, tylenol level ordered to [facility]  General Surgery  Discharge Summary      Patient Name: Fatou Lorenzo  MRN: [id number]  Admission Date: 8/30/2020  Hospital Length of Stay: 66 days  Discharge Date and Time:  11/05/2020 4:03 PM  Attending Physician: Antoni Waddell MD   Discharging Provider: Mir Croft MD  Primary Care Provider: Ludin Rivas MD     HPI:   74 yo female with extensive cardiac hx s/p Mvr, Tvr, MAZE 9/09/2020 with complicated post-operative course including protracted ICU stay, reintubation, pericardial window for cardiac tamponade, arrythmia, JONAH. She was eventually stepped down but readmitted to SICU shortly after for increasing oxygen requirements, found to have worsening right sided pleural effusion. IR US showed multiple loculations; thoracentesis able to drain 60 cc of fluid.      Thoracic surgery was consulted, VATS / decortication was done on 10/05/20, a multiloculated pleural effusion was found, IV abx for E. Coli in pleural fluid. Patient was treated with antibiotics and was stepdown on 10/12/20, however she had an increase white count overnight and was more lethargic so she was stepped up to the ICU. Antibiotics were broaden, a PEG tube was placed, her mental status improved and white count went down. She was step down to the CSU.      This morning patient became bradycardic and had a respiratory code this morning, she was intubated and brought to the SICU. Creatine and BUN elevated to 150/4. Nephrology was consulted.     Procedure(s) (LRB):  INSERTION, PEG TUBE (N/A)     Hospital Course:   74 yo female with extensive cardiac hx and a long and complicated hospital stay s/p Mvr, Tvr, MAZE 9/09/2020 with complicated post-operative course including protracted ICU stay, reintubation, pericardial window for cardiac tamponade, arrythmia, JONAH. S/p  VATS / decortication  on 10/05/20. Patient was deconditioned and needed a trach, however patient's family decided to withdraw care,  palliative was consulted, and a palliative extubation was performed, comfort care measurements were initiated, and patient .     Time of death: 1430      Consults:   Consults (From admission, onward)        Status Ordering Provider     Inpatient consult to Cardiology  Once     Provider:  Jevon Jones MD    Completed LU HERNANDEZ     Inpatient consult to Cardiology  Once     Provider:  (Not yet assigned)    Completed SYLVIA YEAGER     Inpatient consult to Cardiothoracic Surgery  Once     Provider:  (Not yet assigned)    Completed SYLVIA YEAGER     Inpatient consult to Cardiothoracic Surgery  Once     Provider:  (Not yet assigned)    Completed MARSHAL BOWSER     Inpatient consult to ENT  Once     Provider:  (Not yet assigned)    Completed GRUPO DAVILA     Inpatient consult to General Surgery  Once     Provider:  (Not yet assigned)    Completed IMELDA SEXTON     Inpatient consult to Infectious Diseases  Once     Provider:  (Not yet assigned)    Completed GRUPO DAVILA     Inpatient consult to Infectious Diseases  Once     Provider:  (Not yet assigned)    Completed OLESYA EAGLE     Inpatient consult to Interventional Cardiology  Once     Provider:  (Not yet assigned)    Completed MELI MONZON     Inpatient consult to Midline team  Once     Provider:  (Not yet assigned)    Completed MARSHAL BOWSER     Inpatient consult to Midline team  Once     Provider:  (Not yet assigned)    Completed IMELDA SEXTON     Inpatient consult to Nephrology  Once     Provider:  (Not yet assigned)    Completed LOIDA BOOTH     Inpatient consult to Nephrology  Once     Provider:  (Not yet assigned)    Completed IMELDA SEXTON     Inpatient consult to Nephrology  Once     Provider:  (Not yet assigned)    Completed IMELDA SEXTON     Inpatient consult to Palliative Care  Once     Provider:  (Not yet assigned)    Completed IMELDA SEXTON     Inpatient  rule out acute etiology for elevated LFTs but it is likely these are elevated due to passing of GB stones   -start PPI BID  -start Carafate PRN  -pain control per SLIM  -monitor and trend LFTs  -would keep NPO until after MRCP is done  -if MRCP is WNL and LFTs trend down with an improvement in pt's symptoms, pt can undergo OP cholecystectomy and OP EGD (and eventually GES); if pt's symptoms do not improve while IP and MRCP is -, he should undergo EGD but would also recommend re-evaluation per surgery at that time as it may remain unclear what is causing the pain  -NSAID cessation discussed with pt in detail   -appreciate surgical recommendations   ______________________________________________________________________    HPI:  Jason Rosas is a 31 y/o male who presents with chest pain and back pain  Pt notes that the pain began yesterday after eating ham and mashed potatoes  Pt says that he "knows" this pain is due to his GB as this has occurred in the past  Pt notes that he has had "gallbladder attacks" similar to this x2 in the past where he will eat fatty/greasy foods and have CP that radiates to the back after  When pointing to the area, pt points to the RUQ area  Pt says that this only occurs when he "overdoes" eating fatty/greasy foods and notes that he usually does not have this chest pain  Pt denies any n/v but says that he does get random episodes of n/v at home but is unaware of any triggers  Cardiac work-up WNL  CT does depict distended and debris-filled stomach, fatty liver, and distended gallbladder with stones  Ultrasound then depicted hepatomegaly and gallstones without cholecystitis  Lipase normal but LFTs: , , T bili 2 25  Surgery deemed the pt did not need emergent surgical intervention  Pt says he uses ibuprofen frequently for pain  Pt denies tylenol use  Pt denies alcohol, tobacco or illicit drug use  Pt denies prior abdominal surgical hx  No prior EGD         REVIEW OF SYSTEMS:    CONSTITUTIONAL: Denies any fever, chills, rigors, and weight loss  HEENT: No earache or tinnitus  Denies hearing loss or visual disturbances  CARDIOVASCULAR: No chest pain or palpitations  RESPIRATORY: Denies any cough, hemoptysis, shortness of breath or dyspnea on exertion  GASTROINTESTINAL: As noted in the History of Present Illness  GENITOURINARY: No problems with urination  Denies any hematuria or dysuria  NEUROLOGIC: No dizziness or vertigo, denies headaches  MUSCULOSKELETAL: Denies any muscle or joint pain  SKIN: Denies skin rashes or itching  ENDOCRINE: Denies excessive thirst  Denies intolerance to heat or cold  PSYCHOSOCIAL: Denies depression or anxiety  Denies any recent memory loss  Historical Information   Past Medical History:   Diagnosis Date    Fracture of nasal bone     GERD (gastroesophageal reflux disease)     Gross hematuria     Orthostatic hypotension      Past Surgical History:   Procedure Laterality Date    WRIST SURGERY       Social History   Social History     Substance and Sexual Activity   Alcohol Use No     Social History     Substance and Sexual Activity   Drug Use No     Social History     Tobacco Use   Smoking Status Former Smoker    Quit date: 2014    Years since quittin 2   Smokeless Tobacco Never Used     Family History   Problem Relation Age of Onset    Diabetes Father        Meds/Allergies     (Not in a hospital admission)    No current facility-administered medications for this encounter  Allergies   Allergen Reactions    Penicillins      UNKNOWN    Sulfa Antibiotics      UNKNOWN           Objective     Blood pressure 121/76, pulse 88, temperature 97 9 °F (36 6 °C), temperature source Oral, resp  rate 17, SpO2 97 %  There is no height or weight on file to calculate BMI      No intake or output data in the 24 hours ending 22 1232      PHYSICAL EXAM:      General Appearance:   Alert, cooperative, no distress   HEENT:   consult to Physical Medicine Rehab  Once     Provider:  (Not yet assigned)    Completed JAYY CATALAN     Inpatient consult to PICC team ([facility])  Once     Provider:  (Not yet assigned)    Completed DANIELLE EDMONDSON     Inpatient consult to Psychiatry  Once     Provider:  (Not yet assigned)    Completed GRUPO DAVILA     Inpatient consult to Psychiatry  Once     Provider:  (Not yet assigned)    Completed DANIELLE EDMONDSON     Inpatient consult to Registered Dietitian/Nutritionist  Once     Provider:  (Not yet assigned)    Completed KATHI RENEE     Inpatient consult to Registered Dietitian/Nutritionist  Once     Provider:  (Not yet assigned)    Completed WINSOME TORRES     Inpatient consult to Social Work/Case Management  Once     Provider:  (Not yet assigned)    Completed KATHI RENEE     Nutrition Services Referral  Once     Provider:  (Not yet assigned)    Completed JAYY CATALAN     Pharmacy to dose Vancomycin consult  Once     Provider:  (Not yet assigned)    Completed JAYY CATALAN     Pharmacy to dose Vancomycin consult  Once     Provider:  (Not yet assigned)    Completed WINSOME TORRES          Pending Diagnostic Studies:     Procedure Component Value Units Date/Time    CBC auto differential [[id number]] Collected: 10/24/20 0307    Order Status: Sent Lab Status: In process Updated: 10/24/20 0308    Specimen: Blood     Calcium, ionized [[id number]] Collected: 11/03/20 1834    Order Status: Sent Lab Status: In process Updated: 11/03/20 1835    Specimen: Blood     Calcium, ionized [[id number]] Collected: 10/30/20 1130    Order Status: Sent Lab Status: In process Updated: 10/30/20 2326    Specimen: Blood     Chloride, Random Urine [[id number]] Collected: 10/26/20 1310    Order Status: Sent Lab Status: In process Updated: 10/26/20 1310    Specimen: Urine, Clean Catch     Magnesium [[id number]] Collected: 11/03/20 1834    Order Status: Sent Lab Status: In process Updated:  11/03/20 1835    Specimen: Blood     Magnesium [[id number]] Collected: 10/30/20 1130    Order Status: Sent Lab Status: In process Updated: 10/30/20 2326    Specimen: Blood     Potassium [[id number]] Collected: 09/23/20 2208    Order Status: Sent Lab Status: In process Updated: 09/23/20 2208    Specimen: Blood     Potassium [[id number]] Collected: 09/16/20 0936    Order Status: Sent Lab Status: In process Updated: 09/16/20 0936    Specimen: Blood     Potassium, Random Urine [[id number]] Collected: 10/26/20 1310    Order Status: Sent Lab Status: In process Updated: 10/26/20 1310    Specimen: Urine, Clean Catch     Renal function panel [[id number]] Collected: 11/03/20 1834    Order Status: Sent Lab Status: In process Updated: 11/03/20 1835    Specimen: Blood     Renal function panel [[id number]] Collected: 10/30/20 1130    Order Status: Sent Lab Status: In process Updated: 10/30/20 2326    Specimen: Blood     Sodium, Random Urine [[id number]] Collected: 10/26/20 1310    Order Status: Sent Lab Status: In process Updated: 10/26/20 1310    Specimen: Urine, Clean Catch         Final Active Diagnoses:    Diagnosis Date Noted POA    Comfort measures only status [Z51.5] 11/04/2020 Not Applicable    Palliative care encounter [Z51.5] 11/03/2020 Not Applicable    Advanced care planning/counseling discussion [Z71.89]  Not Applicable    Goals of care, counseling/discussion [Z71.89]  Not Applicable    Anxiety [F41.9]  Unknown    Pain [R52]  Unknown    Dyspnea [R06.00]  Unknown    Increased oropharyngeal secretions [K11.7]  Unknown    Encephalopathy, metabolic [G93.41]  No    Empyema lung [J86.9] 10/21/2020 Yes    Acute blood loss anemia [D62] 10/13/2020 Unknown    Pleural effusion [J90] 10/05/2020 No    Adjustment disorder with mixed anxiety and depressed mood [F43.23] 10/02/2020 Unknown    Debility [R53.81] 09/29/2020 Unknown    Vocal fold paresis, 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] 09/28/2020 No    S/P MVR (mitral valve repair) [Z98.890]  Normocephalic, atraumatic, anicteric      Neck:  Supple, symmetrical, trachea midline   Lungs:   Clear to auscultation bilaterally; no rales, rhonchi or wheezing; respirations unlabored    Heart[de-identified]   Regular rate and rhythm; no murmur, rub, or gallop     Abdomen:   Soft, non-tender, non-distended; normal bowel sounds; no masses, no organomegaly    Genitalia:   Deferred    Rectal:   Deferred    Extremities:  No cyanosis, clubbing or edema    Pulses:  2+ and symmetric all extremities    Skin:  No jaundice, rashes, or lesions    Lymph nodes:  No palpable cervical lymphadenopathy        Lab Results:   Admission on 03/13/2022   Component Date Value    WBC 03/13/2022 10 72*    RBC 03/13/2022 5 40     Hemoglobin 03/13/2022 17 1*    Hematocrit 03/13/2022 49 4*    MCV 03/13/2022 92     MCH 03/13/2022 31 7     MCHC 03/13/2022 34 6     RDW 03/13/2022 12 2     MPV 03/13/2022 10 0     Platelets 78/57/9945 323     nRBC 03/13/2022 0     Neutrophils Relative 03/13/2022 76*    Immat GRANS % 03/13/2022 1     Lymphocytes Relative 03/13/2022 15     Monocytes Relative 03/13/2022 7     Eosinophils Relative 03/13/2022 1     Basophils Relative 03/13/2022 0     Neutrophils Absolute 03/13/2022 8 14*    Immature Grans Absolute 03/13/2022 0 06     Lymphocytes Absolute 03/13/2022 1 64     Monocytes Absolute 03/13/2022 0 79     Eosinophils Absolute 03/13/2022 0 06     Basophils Absolute 03/13/2022 0 03     Sodium 03/13/2022 139     Potassium 03/13/2022 4 0     Chloride 03/13/2022 104     CO2 03/13/2022 26     ANION GAP 03/13/2022 9     BUN 03/13/2022 8     Creatinine 03/13/2022 0 98     Glucose 03/13/2022 147*    Calcium 03/13/2022 8 9     AST 03/13/2022 374*    ALT 03/13/2022 274*    Alkaline Phosphatase 03/13/2022 56     Total Protein 03/13/2022 7 5     Albumin 03/13/2022 3 6     Total Bilirubin 03/13/2022 2 25*    eGFR 03/13/2022 102     Lipase 03/13/2022 102     hs TnI 0hr 03/13/2022 <2     Ventricular 2020 Not Applicable    S/P TVR (tricuspid valve repair) [Z98.890] 2020 Not Applicable    Pericardial effusion with cardiac tamponade [I31.3, I31.4]  No    JONAH (acute kidney injury) [N17.9] 2020 Unknown    Essential hypertension [I10] 2020 Yes    Other hyperlipidemia [E78.49] 2020 Yes    Coronary artery disease involving native coronary artery of native heart without angina pectoris [I25.10] 2020 Yes    Acute on chronic diastolic (congestive) heart failure [I50.33] 2020 Yes    Severe mitral regurgitation [I34.0] 2020 Yes    Left atrial thrombus [I51.3] 2020 Yes    Paroxysmal atrial fibrillation [I48.0] 2020 Yes    Acute respiratory failure with hypoxia [J96.01] 2020 Yes      Problems Resolved During this Admission:    Diagnosis Date Noted Date Resolved POA    PRINCIPAL PROBLEM:  Leukocytosis [D72.829] 2020 10/26/2020 Yes    Hypokalemia [E87.6]  10/26/2020 No    Oliguria and anuria [R34] 2020 No    Atrial fibrillation with RVR [I48.91] 2020 10/27/2020 No    SOB (shortness of breath) [R06.02] 2020 Yes      Discharged Condition:     Disposition:     Follow Up:    Patient Instructions:   No discharge procedures on file.  Medications:  None (patient  at medical facility)    Mir Croft MD  General Surgery  [facility]   Rate 03/13/2022 95     Atrial Rate 03/13/2022 95     NJ Interval 03/13/2022 162     QRSD Interval 03/13/2022 80     QT Interval 03/13/2022 328     QTC Interval 03/13/2022 412     P Axis 03/13/2022 35     QRS Axis 03/13/2022 49     T Wave Axis 03/13/2022 20        Imaging Studies: I have personally reviewed pertinent imaging studies

## 2022-03-14 ENCOUNTER — APPOINTMENT (INPATIENT)
Dept: MRI IMAGING | Facility: HOSPITAL | Age: 32
DRG: 284 | End: 2022-03-14
Payer: COMMERCIAL

## 2022-03-14 LAB
ALBUMIN SERPL BCP-MCNC: 3.4 G/DL (ref 3.5–5)
ALP SERPL-CCNC: 63 U/L (ref 46–116)
ALT SERPL W P-5'-P-CCNC: 466 U/L (ref 12–78)
ANION GAP SERPL CALCULATED.3IONS-SCNC: 6 MMOL/L (ref 4–13)
AST SERPL W P-5'-P-CCNC: 300 U/L (ref 5–45)
BASOPHILS # BLD AUTO: 0.04 THOUSANDS/ΜL (ref 0–0.1)
BASOPHILS NFR BLD AUTO: 1 % (ref 0–1)
BILIRUB SERPL-MCNC: 5.99 MG/DL (ref 0.2–1)
BUN SERPL-MCNC: 7 MG/DL (ref 5–25)
CALCIUM ALBUM COR SERPL-MCNC: 8.9 MG/DL (ref 8.3–10.1)
CALCIUM SERPL-MCNC: 8.4 MG/DL (ref 8.3–10.1)
CHLORIDE SERPL-SCNC: 106 MMOL/L (ref 100–108)
CMV IGG SERPL IA-ACNC: <0.6 U/ML (ref 0–0.59)
CMV IGM SERPL IA-ACNC: <30 AU/ML (ref 0–29.9)
CO2 SERPL-SCNC: 28 MMOL/L (ref 21–32)
CREAT SERPL-MCNC: 0.95 MG/DL (ref 0.6–1.3)
EBV NA IGG SER IA-ACNC: 398 U/ML (ref 0–17.9)
EBV VCA IGG SER IA-ACNC: >600 U/ML (ref 0–17.9)
EBV VCA IGM SER IA-ACNC: <36 U/ML (ref 0–35.9)
EOSINOPHIL # BLD AUTO: 0.2 THOUSAND/ΜL (ref 0–0.61)
EOSINOPHIL NFR BLD AUTO: 4 % (ref 0–6)
ERYTHROCYTE [DISTWIDTH] IN BLOOD BY AUTOMATED COUNT: 12.5 % (ref 11.6–15.1)
GFR SERPL CREATININE-BSD FRML MDRD: 106 ML/MIN/1.73SQ M
GLUCOSE SERPL-MCNC: 81 MG/DL (ref 65–140)
HAV IGM SER QL: NORMAL
HBV CORE IGM SER QL: NORMAL
HBV SURFACE AG SER QL: NORMAL
HCT VFR BLD AUTO: 46 % (ref 36.5–49.3)
HCV AB SER QL: NORMAL
HGB BLD-MCNC: 15.7 G/DL (ref 12–17)
IMM GRANULOCYTES # BLD AUTO: 0.01 THOUSAND/UL (ref 0–0.2)
IMM GRANULOCYTES NFR BLD AUTO: 0 % (ref 0–2)
INTERPRETATION: ABNORMAL
LYMPHOCYTES # BLD AUTO: 1.49 THOUSANDS/ΜL (ref 0.6–4.47)
LYMPHOCYTES NFR BLD AUTO: 26 % (ref 14–44)
MCH RBC QN AUTO: 30.1 PG (ref 26.8–34.3)
MCHC RBC AUTO-ENTMCNC: 34.1 G/DL (ref 31.4–37.4)
MCV RBC AUTO: 88 FL (ref 82–98)
MONOCYTES # BLD AUTO: 0.58 THOUSAND/ΜL (ref 0.17–1.22)
MONOCYTES NFR BLD AUTO: 10 % (ref 4–12)
NEUTROPHILS # BLD AUTO: 3.33 THOUSANDS/ΜL (ref 1.85–7.62)
NEUTS SEG NFR BLD AUTO: 59 % (ref 43–75)
NRBC BLD AUTO-RTO: 0 /100 WBCS
PLATELET # BLD AUTO: 320 THOUSANDS/UL (ref 149–390)
PMV BLD AUTO: 11 FL (ref 8.9–12.7)
POTASSIUM SERPL-SCNC: 3.6 MMOL/L (ref 3.5–5.3)
PROT SERPL-MCNC: 6.9 G/DL (ref 6.4–8.2)
RBC # BLD AUTO: 5.22 MILLION/UL (ref 3.88–5.62)
RPR SER QL: NORMAL
SODIUM SERPL-SCNC: 140 MMOL/L (ref 136–145)
WBC # BLD AUTO: 5.65 THOUSAND/UL (ref 4.31–10.16)

## 2022-03-14 PROCEDURE — 80053 COMPREHEN METABOLIC PANEL: CPT | Performed by: PHYSICIAN ASSISTANT

## 2022-03-14 PROCEDURE — 99232 SBSQ HOSP IP/OBS MODERATE 35: CPT | Performed by: INTERNAL MEDICINE

## 2022-03-14 PROCEDURE — 85025 COMPLETE CBC W/AUTO DIFF WBC: CPT | Performed by: PHYSICIAN ASSISTANT

## 2022-03-14 RX ORDER — POTASSIUM CHLORIDE 14.9 MG/ML
20 INJECTION INTRAVENOUS
Status: DISPENSED | OUTPATIENT
Start: 2022-03-14 | End: 2022-03-14

## 2022-03-14 RX ORDER — DEXTROSE, SODIUM CHLORIDE, AND POTASSIUM CHLORIDE 5; .9; .15 G/100ML; G/100ML; G/100ML
100 INJECTION INTRAVENOUS CONTINUOUS
Status: DISCONTINUED | OUTPATIENT
Start: 2022-03-14 | End: 2022-03-15

## 2022-03-14 RX ORDER — LEVOFLOXACIN 5 MG/ML
500 INJECTION, SOLUTION INTRAVENOUS EVERY 24 HOURS
Status: DISCONTINUED | OUTPATIENT
Start: 2022-03-14 | End: 2022-03-18

## 2022-03-14 RX ADMIN — ONDANSETRON 4 MG: 2 INJECTION INTRAMUSCULAR; INTRAVENOUS at 07:42

## 2022-03-14 RX ADMIN — DEXTROSE, SODIUM CHLORIDE, AND POTASSIUM CHLORIDE 100 ML/HR: 5; .9; .15 INJECTION INTRAVENOUS at 13:56

## 2022-03-14 RX ADMIN — POTASSIUM CHLORIDE 20 MEQ: 200 INJECTION, SOLUTION INTRAVENOUS at 10:41

## 2022-03-14 RX ADMIN — METRONIDAZOLE 500 MG: 500 INJECTION, SOLUTION INTRAVENOUS at 15:27

## 2022-03-14 RX ADMIN — KETOROLAC TROMETHAMINE 15 MG: 30 INJECTION, SOLUTION INTRAMUSCULAR at 07:41

## 2022-03-14 RX ADMIN — PANTOPRAZOLE SODIUM 40 MG: 40 TABLET, DELAYED RELEASE ORAL at 05:40

## 2022-03-14 RX ADMIN — LEVOFLOXACIN 500 MG: 5 INJECTION, SOLUTION INTRAVENOUS at 14:03

## 2022-03-14 RX ADMIN — METRONIDAZOLE 500 MG: 500 INJECTION, SOLUTION INTRAVENOUS at 21:49

## 2022-03-14 RX ADMIN — KETOROLAC TROMETHAMINE 15 MG: 30 INJECTION, SOLUTION INTRAMUSCULAR at 21:48

## 2022-03-14 RX ADMIN — KETOROLAC TROMETHAMINE 15 MG: 30 INJECTION, SOLUTION INTRAMUSCULAR at 15:43

## 2022-03-14 NOTE — PROGRESS NOTES
Progress Note- Meek Pulido 32 y o  male MRN: [id number]    Unit/Bed#: E2 -01 Encounter: [id number]      Assessment and Plan:    35-year-old male with concern for recurrent episodes of gallstones and possible choledocholithiasis presenting with chest pain radiating to his back    1  Upper abdominal pain  2  Elevated liver enzymes  The patient presented with chest pain radiating to his back which he states is typical of prior episodes related to gallstones  It was recommended he undergo cholecystectomy in the past but the patient has deferred given resolution of symptoms at the time  In addition to his pain he was found to have elevated liver enzymes: -200, -466, alk phos 63, t bili 2 25-5  99  Currently pain controlled on medications, VSS, WBC 10 72-5 65   - continue supportive measures  - proceed with MRCP, if concerning for choledocholithiasis will proceed with ERCP  - will require cholecystectomy following ERCP    ______________________________________________________________________    Subjective:     Patient doing well without complaints       Medication Administration - last 24 hours from 03/13/2022 1245 to 03/14/2022 1245       Date/Time Order Dose Route Action Action by     03/14/2022 0742 ondansetron (ZOFRAN) injection 4 mg 4 mg Intravenous Given Juan Barraza RN     03/13/2022 2338 ondansetron (ZOFRAN) injection 4 mg 4 mg Intravenous Given Daniel Ventura RN     03/14/2022 0741 ketorolac (TORADOL) injection 15 mg 15 mg Intravenous Given Juan Barraza RN     03/13/2022 2338 ketorolac (TORADOL) injection 15 mg 15 mg Intravenous Given Daniel Ventura RN     03/13/2022 1625 ketorolac (TORADOL) injection 15 mg 15 mg Intravenous Given Reggie Blank RN     03/14/2022 0540 pantoprazole (PROTONIX) EC tablet 40 mg 40 mg Oral Given Daniel Ventura RN     03/13/2022 1558 pantoprazole (PROTONIX) EC tablet 40 mg 40 mg Oral Given Reggie Blank RN     03/13/2022 4754 sodium chloride 0 9 % infusion 75 mL/hr [facility]     03/14/2022 1041 potassium chloride 20 mEq IVPB (premix) 20 mEq Intravenous New Bag Lis Hedrick RN          Objective:     Vitals: Blood pressure 110/79, pulse 89, temperature (!) 96 5 °F (35 8 °C), temperature source Tympanic, resp  rate 18, SpO2 96 %  ,There is no height or weight on file to calculate BMI      No intake or output data in the 24 hours ending 03/14/22 1245    Physical Exam:   General Appearance: Awake and alert, in no acute distress  Abdomen: Soft, non-tender, non-distended    Invasive Devices  Report    Peripheral Intravenous Line            Peripheral IV 03/13/22 Right Hand 1 day                Lab Results:  Admission on 03/13/2022   Component Date Value    WBC 03/13/2022 10 72*    RBC 03/13/2022 5 40     Hemoglobin 03/13/2022 17 1*    Hematocrit 03/13/2022 49 4*    MCV 03/13/2022 92     MCH 03/13/2022 31 7     MCHC 03/13/2022 34 6     RDW 03/13/2022 12 2     MPV 03/13/2022 10 0     Platelets 97/61/3959 323     nRBC 03/13/2022 0     Neutrophils Relative 03/13/2022 76*    Immat GRANS % 03/13/2022 1     Lymphocytes Relative 03/13/2022 15     Monocytes Relative 03/13/2022 7     Eosinophils Relative 03/13/2022 1     Basophils Relative 03/13/2022 0     Neutrophils Absolute 03/13/2022 8 14*    Immature Grans Absolute 03/13/2022 0 06     Lymphocytes Absolute 03/13/2022 1 64     Monocytes Absolute 03/13/2022 0 79     Eosinophils Absolute 03/13/2022 0 06     Basophils Absolute 03/13/2022 0 03     Sodium 03/13/2022 139     Potassium 03/13/2022 4 0     Chloride 03/13/2022 104     CO2 03/13/2022 26     ANION GAP 03/13/2022 9     BUN 03/13/2022 8     Creatinine 03/13/2022 0 98     Glucose 03/13/2022 147*    Calcium 03/13/2022 8 9     AST 03/13/2022 374*    ALT 03/13/2022 274*    Alkaline Phosphatase 03/13/2022 56     Total Protein 03/13/2022 7 5     Albumin 03/13/2022 3 6     Total Bilirubin 03/13/2022 2 25*    eGFR 03/13/2022 102     Lipase 03/13/2022 102     hs TnI 0hr 03/13/2022 <2     Ventricular Rate 03/13/2022 95     Atrial Rate 03/13/2022 95     PA Interval 03/13/2022 162     QRSD Interval 03/13/2022 80     QT Interval 03/13/2022 328     QTC Interval 03/13/2022 412     P Axis 03/13/2022 35     QRS Axis 03/13/2022 49     T Wave Axis 03/13/2022 20     Hepatitis B Surface Ag 03/13/2022 Non-reactive     Hep A IgM 03/13/2022 Non-reactive     Hepatitis C Ab 03/13/2022 Non-reactive     Hep B C IgM 03/13/2022 Non-reactive     RPR 03/13/2022 Non-Reactive     Acetaminophen Level 03/13/2022 <2*    Bilirubin, Direct 03/13/2022 1 83*    Sodium 03/14/2022 140     Potassium 03/14/2022 3 6     Chloride 03/14/2022 106     CO2 03/14/2022 28     ANION GAP 03/14/2022 6     BUN 03/14/2022 7     Creatinine 03/14/2022 0 95     Glucose 03/14/2022 81     Calcium 03/14/2022 8 4     Corrected Calcium 03/14/2022 8 9     AST 03/14/2022 300*    ALT 03/14/2022 466*    Alkaline Phosphatase 03/14/2022 63     Total Protein 03/14/2022 6 9     Albumin 03/14/2022 3 4*    Total Bilirubin 03/14/2022 5 99*    eGFR 03/14/2022 106     WBC 03/14/2022 5 65     RBC 03/14/2022 5 22     Hemoglobin 03/14/2022 15 7     Hematocrit 03/14/2022 46 0     MCV 03/14/2022 88     MCH 03/14/2022 30 1     MCHC 03/14/2022 34 1     RDW 03/14/2022 12 5     MPV 03/14/2022 11 0     Platelets 01/91/4939 320     nRBC 03/14/2022 0     Neutrophils Relative 03/14/2022 59     Immat GRANS % 03/14/2022 0     Lymphocytes Relative 03/14/2022 26     Monocytes Relative 03/14/2022 10     Eosinophils Relative 03/14/2022 4     Basophils Relative 03/14/2022 1     Neutrophils Absolute 03/14/2022 3 33     Immature Grans Absolute 03/14/2022 0 01     Lymphocytes Absolute 03/14/2022 1 49     Monocytes Absolute 03/14/2022 0 58     Eosinophils Absolute 03/14/2022 0 20     Basophils Absolute 03/14/2022 0 04        Imaging Studies: I have personally reviewed pertinent imaging studies

## 2022-03-14 NOTE — PROGRESS NOTES
[facility]  Progress Note - Carmelo Cano 1990, 32 y o  male MRN: [id number]  Unit/Bed#: E2 -01 Encounter: [id number]  Primary Care Provider: Claudia Trinidad DO   Date and time admitted to hospital: 3/13/2022  3:09 AM    Transaminitis  Assessment & Plan  · LFTs elevated at time of admission, , , T bili 2 25 with worsening in lfts: ast 300 alt 466 t bili 5 9  · Appreciate gi recommendations  · Will cover with levaquin and flagyl for possible choledocholithiasis  · Awaiting mrcp but pt will likely require ercp and lap cholecystectomy   · Check cmp in am      Obesity  Assessment & Plan  · BMI 41    * Calculus of gallbladder without biliary obstruction  Assessment & Plan  · Patient presented to ED with complaint of back pain  · Found have gallstones with no signs of cholecystitis  · Does have prior admission for similar complaint, plan was for outpatient cholecystectomy but this was never performed  · Appreciate gi recommendations  · Awaiting mrcp  Likely will require ercp with lap cholecystectomy  · Started on antibiotics in case of choledocholithiasis   · Seen in consult by surgery, no acute surgical intervention required  · Continue PRN analgesics        VTE Pharmacologic Prophylaxis:low risk, early ambulation     Time Spent for Care: 20 minutes  More than 50% of total time spent on counseling and coordination of care as described above  Current Length of Stay: 1 day(s)  Current Patient Status: Inpatient     Discharge Plan / Estimated Discharge Date: greater than 72 hours  Code Status: Level 1 - Full Code      Subjective:   Pt seen and examined  He states he has been sleeping a lot in the hospital because he doesn't get to sleep at home a lot  He still has abd pain at times  He has noticed that his urine is getting darker and feels that he might be getting dehydrated  He does not remember his reaction to pcn  No other problems were reported  Objective:     Vitals:   Temp (24hrs), Av 9 °F (36 1 °C), Min:96 5 °F (35 8 °C), Max:97 2 °F (36 2 °C)    Temp:  [96 5 °F (35 8 °C)-97 2 °F (36 2 °C)] 96 5 °F (35 8 °C)  HR:  [75-89] 89  Resp:  [18] 18  BP: (105-117)/(65-79) 110/79  SpO2:  [96 %-100 %] 96 %  There is no height or weight on file to calculate BMI  Input and Output Summary (last 24 hours):     No intake or output data in the 24 hours ending 22 1325    Physical Exam:   Physical Exam  Constitutional:       Appearance: Normal appearance  HENT:      Head: Normocephalic and atraumatic  Eyes:      Extraocular Movements: Extraocular movements intact  Pupils: Pupils are equal, round, and reactive to light  Cardiovascular:      Rate and Rhythm: Normal rate and regular rhythm  Heart sounds: No murmur heard  No friction rub  No gallop  Pulmonary:      Effort: Pulmonary effort is normal  No respiratory distress  Breath sounds: Normal breath sounds  No wheezing or rales  Abdominal:      General: Bowel sounds are normal  There is no distension  Palpations: Abdomen is soft  Tenderness: There is abdominal tenderness  There is no guarding or rebound  Musculoskeletal:      Right lower leg: No edema  Left lower leg: No edema  Neurological:      Mental Status: He is alert and oriented to person, place, and time            Additional Data:     Labs:  Results from last 7 days   Lab Units 22  0427   WBC Thousand/uL 5 65   HEMOGLOBIN g/dL 15 7   HEMATOCRIT % 46 0   PLATELETS Thousands/uL 320   NEUTROS PCT % 59   LYMPHS PCT % 26   MONOS PCT % 10   EOS PCT % 4     Results from last 7 days   Lab Units 22  0427   SODIUM mmol/L 140   POTASSIUM mmol/L 3 6   CHLORIDE mmol/L 106   CO2 mmol/L 28   BUN mg/dL 7   CREATININE mg/dL 0 95   ANION GAP mmol/L 6   CALCIUM mg/dL 8 4   ALBUMIN g/dL 3 4*   TOTAL BILIRUBIN mg/dL 5 99*   ALK PHOS U/L 63   ALT U/L 466*   AST U/L 300*   GLUCOSE RANDOM mg/dL 81 Recent Cultures (last 7 days):           Lines/Drains:  Invasive Devices  Report    Peripheral Intravenous Line            Peripheral IV 03/13/22 Right Hand 1 day              Last 24 Hours Medication List:   Current Facility-Administered Medications   Medication Dose Route Frequency Provider Last Rate    acetaminophen  650 mg Oral Q6H PRN Mark Avalos PA-C      albuterol  2 puff Inhalation Q6H PRN Mark Avalos PA-C      calcium carbonate  1,000 mg Oral Daily PRN Mark Avalos PA-C      dextrose 5 % and sodium chloride 0 9 % with KCl 20 mEq/L  100 mL/hr Intravenous Continuous Azalea Ambron, DO      ketorolac  15 mg Intravenous Q6H PRN Mark Avalos PA-C      levofloxacin  500 mg Intravenous Q24H Azalea Ambron, DO      metroNIDAZOLE  500 mg Intravenous Q8H Azalea Ambron, DO      morphine injection  4 mg Intravenous Q4H PRN Mark Avalos PA-C      ondansetron  4 mg Intravenous Q6H PRN Mark Avalos PA-C      pantoprazole  40 mg Oral BID AC Concha Capps PA-C      sucralfate  1 g Oral Q6H PRN Concha Capps PA-C          Today, Patient Was Seen By: Marti Seaman DO

## 2022-03-14 NOTE — PLAN OF CARE
Problem: PAIN - ADULT  Goal: Verbalizes/displays adequate comfort level or baseline comfort level  Description: Interventions:  - Encourage patient to monitor pain and request assistance  - Assess pain using appropriate pain scale  - Administer analgesics based on type and severity of pain and evaluate response  - Implement non-pharmacological measures as appropriate and evaluate response  - Consider cultural and social influences on pain and pain management  - Notify physician/advanced practitioner if interventions unsuccessful or patient reports new pain  Outcome: Progressing     Problem: INFECTION - ADULT  Goal: Absence or prevention of progression during hospitalization  Description: INTERVENTIONS:  - Assess and monitor for signs and symptoms of infection  - Monitor lab/diagnostic results  - Monitor all insertion sites, i e  indwelling lines, tubes, and drains  - Monitor endotracheal if appropriate and nasal secretions for changes in amount and color  - [place] appropriate cooling/warming therapies per order  - Administer medications as ordered  - Instruct and encourage patient and family to use good hand hygiene technique  - Identify and instruct in appropriate isolation precautions for identified infection/condition  Outcome: Progressing  Goal: Absence of fever/infection during neutropenic period  Description: INTERVENTIONS:  - Monitor WBC    Outcome: Progressing     Problem: SAFETY ADULT  Goal: Patient will remain free of falls  Description: INTERVENTIONS:  - Educate patient/family on patient safety including physical limitations  - Instruct patient to call for assistance with activity   - Consult OT/PT to assist with strengthening/mobility   - Keep Call bell within reach  - Keep bed low and locked with side rails adjusted as appropriate  - Keep care items and personal belongings within reach  - Initiate and maintain comfort rounds  - Make Fall Risk Sign visible to staff  - Offer Toileting every 2 Hours, in advance of need  - Initiate/Maintain bedalarm  - Obtain necessary fall risk management equipment: at bedside  - Apply yellow socks and bracelet for high fall risk patients  - Consider moving patient to room near nurses station  Outcome: Progressing  Goal: Maintain or return to baseline ADL function  Description: INTERVENTIONS:  -  Assess patient's ability to carry out ADLs; assess patient's baseline for ADL function and identify physical deficits which impact ability to perform ADLs (bathing, care of mouth/teeth, toileting, grooming, dressing, etc )  - Assess/evaluate cause of self-care deficits   - Assess range of motion  - Assess patient's mobility; develop plan if impaired  - Assess patient's need for assistive devices and provide as appropriate  - Encourage maximum independence but intervene and supervise when necessary  - Involve family in performance of ADLs  - Assess for home care needs following discharge   - Consider OT consult to assist with ADL evaluation and planning for discharge  - Provide patient education as appropriate  Outcome: Progressing  Goal: Maintains/Returns to pre admission functional level  Description: INTERVENTIONS:  - Perform BMAT or MOVE assessment daily    - Set and communicate daily mobility goal to care team and patient/family/caregiver  - Collaborate with rehabilitation services on mobility goals if consulted  - Perform Range of Motion 2 times a day  - Reposition patient every 2 hours    - Dangle patient 2 times a day  - Stand patient 2 times a day  - Ambulate patient 2 times a day  - Out of bed to chair 2 times a day   - Out of bed for meals 2 times a day  - Out of bed for toileting  - Record patient progress and toleration of activity level   Outcome: Progressing     Problem: DISCHARGE PLANNING  Goal: Discharge to home or other facility with appropriate resources  Description: INTERVENTIONS:  - Identify barriers to discharge w/patient and caregiver  - Arrange for needed discharge resources and transportation as appropriate  - Identify discharge learning needs (meds, wound care, etc )  - Arrange for interpretive services to assist at discharge as needed  - Refer to Case Management Department for coordinating discharge planning if the patient needs post-hospital services based on physician/advanced practitioner order or complex needs related to functional status, cognitive ability, or social support system  Outcome: Progressing     Problem: Knowledge Deficit  Goal: Patient/family/caregiver demonstrates understanding of disease process, treatment plan, medications, and discharge instructions  Description: Complete learning assessment and assess knowledge base  Interventions:  - Provide teaching at level of understanding  - Provide teaching via preferred learning methods  Outcome: Progressing     Problem: Nutrition/Hydration-ADULT  Goal: Nutrient/Hydration intake appropriate for improving, restoring or maintaining nutritional needs  Description: Monitor and assess patient's nutrition/hydration status for malnutrition  Collaborate with interdisciplinary team and initiate plan and interventions as ordered  Monitor patient's weight and dietary intake as ordered or per policy  Utilize nutrition screening tool and intervene as necessary  Determine patient's food preferences and provide high-protein, high-caloric foods as appropriate       INTERVENTIONS:  - Monitor oral intake, urinary output, labs, and treatment plans  - Assess nutrition and hydration status and recommend course of action  - Evaluate amount of meals eaten  - Assist patient with eating if necessary   - Allow adequate time for meals  - Recommend/ encourage appropriate diets, oral nutritional supplements, and vitamin/mineral supplements  - Order, calculate, and assess calorie counts as needed  - Recommend, monitor, and adjust tube feedings and TPN/PPN based on assessed needs  - Assess need for intravenous fluids  - Provide specific nutrition/hydration education as appropriate  - Include patient/family/caregiver in decisions related to nutrition  Outcome: Progressing

## 2022-03-14 NOTE — UTILIZATION REVIEW
Initial Clinical Review    Admission: Date/Time/Statement:   Admission Orders (From admission, onward)     Ordered        03/13/22 1204  Inpatient Admission  Once                      Orders Placed This Encounter   Procedures    Inpatient Admission     Standing Status:   Standing     Number of Occurrences:   1     Order Specific Question:   Level of Care     Answer:   Med Surg [16]     Order Specific Question:   Estimated length of stay     Answer:   More than 2 Midnights     Order Specific Question:   Certification     Answer:   I certify that inpatient services are medically necessary for this patient for a duration of greater than two midnights  See H&P and MD Progress Notes for additional information about the patient's course of treatment  ED Arrival Information     Expected Arrival Acuity    - 3/13/2022 03:06 Urgent         Means of arrival Escorted by Service Admission type    Walk-In Self Hospitalist Urgent         Arrival complaint    chest pain,back pain        Chief Complaint   Patient presents with    Chest Pain     c/o chest and back pain  states last time he was here it was d/t his gall bladder  trouble breathing d/t pain  Initial Presentation:   Mr Ralph Gardiner is a 32 yom who presents to the ED from home with c/o back pain with radiation to abd  Has had this pain before and told it was gallbladder issue  No c/o N/V  PMH: obesity  In the ED he felt improvement with Morphine  Imaging shows Mild hepatomegaly with fat infiltration, Gallstones with no signs of cholecystitis,  Markedly distended, debris-filled stomach, despite the fasting state of the patient  Correlate for gastroparesis  Consider follow-up GI consultation and nuclear medicine gastric emptying study  Normally distended gallbladder  Suggestion of noncalcified gallstones within the gallbladder  Mild hyperemia around the gallbladder neck    Correlate for early, acute cholecystitis, Hepatomegaly with fatty infiltration, Partially duplicated right collecting system  Also had elevated LFTs  On exam there is no abd tenderness  He is admitted to INPATIENT status with Calculus of gallbladder without biliary obstruction - GI Consult, surgery consult - no operative intervention, PRN analgesia  Transaminitis - possible MRCP, IV fluids, KCl repletion and trending  3/13 Surgery Consult - symptomatic cholelithiasis and transaminitis - no surgical intervention at this time  Recommend GI Consult for elevated LFT and T bili  Recommend OP F/u for cholecystectomy  3/13 GI Consult - Upper abd pain, transaminitis, cholelithiasis, abnormal imaging, NSAID use - pain likely d/t biliary colic  Will get MRCP to r/o biliary obstruction  Will get acute hep panel, ebv, cmv, hiv, rpr, tylenol level ordered to rule out acute etiology for elevated LFTs but likely elevated d/t passing GB stones  Start PPI BID, Carafate PRN, PRN analgesia, monitor LFTs, NPO, NSAID cessation  Adding antibiotics for possible choledo cholithiasis  Will likely require ERCP with lap chester  Date: 3/14   Day 2:   Upper abdominal pain, Elevated liver enzymes - MRCP, if concerning for choledocholithiasis will proceed with ERCP    Will require cholecystectomy following ERCP    3/14 MCRP - P      ED Triage Vitals [03/13/22 0314]   Temperature Pulse Respirations Blood Pressure SpO2   97 9 °F (36 6 °C) (!) 121 18 159/72 96 %      Temp Source Heart Rate Source Patient Position - Orthostatic VS BP Location FiO2 (%)   Oral Monitor Lying Left arm --      Pain Score       5          Wt Readings from Last 1 Encounters:   02/28/22 (!) 153 kg (337 lb 4 9 oz)     Additional Vital Signs:   03/14/22 0727 96 5 °F (35 8 °C) Abnormal  89 18 110/79 86 96 % None (Room air) Sitting   03/13/22 2242 97 1 °F (36 2 °C) Abnormal  75 18 117/75 92 100 % None (Room air) Lying   03/13/22 1523 97 2 °F (36 2 °C) Abnormal  83 18 105/65 83 100 % None (Room air) Sitting   03/13/22 1240 97 8 °F (36 6 °C) 104 18 121/81 -- 97 % None (Room air) Sitting   03/13/22 1216 -- 88 17 121/76 -- 97 % None (Room air) Lying   03/13/22 1034 -- 81 17 110/62 -- 96 % None (Room air) Lying   03/13/22 0843 97 9 °F (36 6 °C) 83 18 118/74 90 98 % None (Room air) Lying   03/13/22 0744 -- 75 17 113/64 -- 97 % None (Room air) Lying   03/13/22 0600 -- 80 12 -- -- 95 % None (Room air) --   03/13/22 0545 -- 80 12 -- -- 97 % -- --   03/13/22 0530 -- 82 14 -- -- 96 % -- --   03/13/22 0515 -- 90 14 -- -- 96 % -- --   03/13/22 0500 -- 86 12 -- -- 96 % None (Room air) --   03/13/22 0430 -- 96 15 -- -- 96 % -- --   03/13/22 0415 -- 94 12 -- -- 94 % -- --   03/13/22 0400 -- 102 12 -- -- 94 % None (Room air) Sitting   03/13/22 0330 -- 104 20 -- -- 96 % -- --   03/13/22 0320 -- 96 17 -- -- 97 % None (Room air) --   03/13/22 0315 -- 112 Abnormal  -- 159/72 103 96 % None (Room air) --     Pertinent Labs/Diagnostic Test Results:     3/13 ECG - NSR  3/13 MRI/MRCP - pending     US right upper quadrant   Final Result by Mayra Zazueta MD (03/13 5434)   Mild hepatomegaly with fat infiltration  Gallstones with no signs of cholecystitis  CT abdomen pelvis with contrast   Final Result by David Canaels DO (03/13 7235)   1  Markedly distended, debris-filled stomach, despite the fasting state of the patient  Correlate for gastroparesis  Consider follow-up GI consultation and nuclear medicine gastric emptying study  2   Normally distended gallbladder  Suggestion of noncalcified gallstones within the gallbladder  Mild hyperemia around the gallbladder neck  Correlate for early, acute cholecystitis  If there is continued concern for cholelithiasis and acute    cholecystitis, a follow-up right upper quadrant ultrasound is recommended, given the limitations of the previous right upper quadrant ultrasound  At that time, the study was performed utilizing Covid protocol and was markedly limited    Follow-up    surgical and/or GI consultation as indicated  Ultimately, if there is continued concern for gallbladder dysfunction, a follow-up nuclear medicine HIDA scan with gallbladder ejection fraction may be necessary  3   Hepatomegaly with fatty infiltration  4   Partially duplicated right collecting system        XR chest 1 view portable   ED Interpretation by Sara Bates PA-C (03/13 0406)   No acute cardiopulmonary disease         Results from last 7 days   Lab Units 03/14/22  0427 03/13/22  0341   WBC Thousand/uL 5 65 10 72*   HEMOGLOBIN g/dL 15 7 17 1*   HEMATOCRIT % 46 0 49 4*   PLATELETS Thousands/uL 320 323   NEUTROS ABS Thousands/µL 3 33 8 14*         Results from last 7 days   Lab Units 03/14/22  0427 03/13/22  0341   SODIUM mmol/L 140 139   POTASSIUM mmol/L 3 6 4 0   CHLORIDE mmol/L 106 104   CO2 mmol/L 28 26   ANION GAP mmol/L 6 9   BUN mg/dL 7 8   CREATININE mg/dL 0 95 0 98   EGFR ml/min/1 73sq m 106 102   CALCIUM mg/dL 8 4 8 9     Results from last 7 days   Lab Units 03/14/22  0427 03/13/22  1301 03/13/22  0341   AST U/L 300*  --  374*   ALT U/L 466*  --  274*   ALK PHOS U/L 63  --  56   TOTAL PROTEIN g/dL 6 9  --  7 5   ALBUMIN g/dL 3 4*  --  3 6   TOTAL BILIRUBIN mg/dL 5 99*  --  2 25*   BILIRUBIN DIRECT mg/dL  --  1 83*  --          Results from last 7 days   Lab Units 03/14/22  0427 03/13/22  0341   GLUCOSE RANDOM mg/dL 81 147*     Results from last 7 days   Lab Units 03/13/22  0341   HS TNI 0HR ng/L <2     Results from last 7 days   Lab Units 03/13/22  0341   LIPASE u/L 102     Results from last 7 days   Lab Units 03/13/22  1258   ACETAMINOPHEN LVL ug/mL <2*     ED Treatment:   Medication Administration from 03/13/2022 0306 to 03/13/2022 1237    Date/Time Order Dose Route Action   03/13/2022 0342 famotidine (PEPCID) injection 20 mg 20 mg Intravenous Given   03/13/2022 0344 aluminum-magnesium hydroxide-simethicone (MYLANTA) oral suspension 30 mL 30 mL Oral Given   03/13/2022 0344 Lidocaine Viscous HCl (XYLOCAINE) 2 % mucosal solution 15 mL 15 mL Swish & Swallow Given   03/13/2022 0445 iohexol (OMNIPAQUE) 350 MG/ML injection (SINGLE-DOSE) 100 mL 100 mL Intravenous Given   03/13/2022 0535 ketorolac (TORADOL) injection 30 mg 30 mg Intravenous Given   03/13/2022 0844 morphine (PF) 4 mg/mL injection 4 mg 4 mg Intravenous Given        Past Medical History:   Diagnosis Date    Fracture of nasal bone     GERD (gastroesophageal reflux disease)     Gross hematuria     Orthostatic hypotension      Present on Admission:   Calculus of gallbladder without biliary obstruction    Admitting Diagnosis: Chest pain [R07 9]  Transaminitis [R74 01]  Upper abdominal pain [R10 10]  Age/Sex: 32 y o  male  Admission Orders:  Scheduled Medications:  pantoprazole, 40 mg, Oral, BID AC  potassium chloride, 20 mEq, Intravenous, Q2H      Continuous IV Infusions:  sodium chloride, 75 mL/hr, Intravenous, Continuous      PRN Meds:  acetaminophen, 650 mg, Oral, Q6H PRN  albuterol, 2 puff, Inhalation, Q6H PRN  calcium carbonate, 1,000 mg, Oral, Daily PRN  ketorolac, 15 mg, Intravenous, Q6H PRN - x 2 3/13, x 1 3/14  morphine injection, 4 mg, Intravenous, Q4H PRN  ondansetron, 4 mg, Intravenous, Q6H PRN - x 1 3/13, 3/14  sucralfate, 1 g, Oral, Q6H PRN    NPO   MRCP      IP CONSULT TO ACUTE CARE SURGERY  IP CONSULT TO GASTROENTEROLOGY    Jagjit Mendoza RN  Network Utilization Review Department  ATTENTION: Please call with any questions or concerns to [phone] and carefully listen to the prompts so that you are directed to the right person  All voicemails are confidential   Dorothea Wakefield all requests for admission clinical reviews, approved or denied determinations and any other requests to dedicated fax number below belonging to the campus where the patient is receiving treatment   List of dedicated fax numbers for the Facilities:  [address] [place] [address] DENIALS (Administrative/Medical Necessity) [phone]   [address] [place] [address] (Maternity/NICU/Pediatrics) [address] [facility] [facility] 40 125 [facility]  [phone]   Dandy Delacruz [address] [facility] [place] [address] [address] [place] [address] [address]oni [address] [facility] [phone]

## 2022-03-14 NOTE — PLAN OF CARE
Problem: PAIN - ADULT  Goal: Verbalizes/displays adequate comfort level or baseline comfort level  Description: Interventions:  - Encourage patient to monitor pain and request assistance  - Assess pain using appropriate pain scale  - Administer analgesics based on type and severity of pain and evaluate response  - Implement non-pharmacological measures as appropriate and evaluate response  - Consider cultural and social influences on pain and pain management  - Notify physician/advanced practitioner if interventions unsuccessful or patient reports new pain  Outcome: Progressing     Problem: INFECTION - ADULT  Goal: Absence or prevention of progression during hospitalization  Description: INTERVENTIONS:  - Assess and monitor for signs and symptoms of infection  - Monitor lab/diagnostic results  - Monitor all insertion sites, i e  indwelling lines, tubes, and drains  - Monitor endotracheal if appropriate and nasal secretions for changes in amount and color  - [place] appropriate cooling/warming therapies per order  - Administer medications as ordered  - Instruct and encourage patient and family to use good hand hygiene technique  - Identify and instruct in appropriate isolation precautions for identified infection/condition  Outcome: Progressing     Problem: SAFETY ADULT  Goal: Maintain or return to baseline ADL function  Description: INTERVENTIONS:  -  Assess patient's ability to carry out ADLs; assess patient's baseline for ADL function and identify physical deficits which impact ability to perform ADLs (bathing, care of mouth/teeth, toileting, grooming, dressing, etc )  - Assess/evaluate cause of self-care deficits   - Assess range of motion  - Assess patient's mobility; develop plan if impaired  - Assess patient's need for assistive devices and provide as appropriate  - Encourage maximum independence but intervene and supervise when necessary  - Involve family in performance of ADLs  - Assess for home care needs following discharge   - Consider OT consult to assist with ADL evaluation and planning for discharge  - Provide patient education as appropriate  Outcome: Progressing     Problem: Knowledge Deficit  Goal: Patient/family/caregiver demonstrates understanding of disease process, treatment plan, medications, and discharge instructions  Description: Complete learning assessment and assess knowledge base  Interventions:  - Provide teaching at level of understanding  - Provide teaching via preferred learning methods  Outcome: Progressing     Problem: Nutrition/Hydration-ADULT  Goal: Nutrient/Hydration intake appropriate for improving, restoring or maintaining nutritional needs  Description: Monitor and assess patient's nutrition/hydration status for malnutrition  Collaborate with interdisciplinary team and initiate plan and interventions as ordered  Monitor patient's weight and dietary intake as ordered or per policy  Utilize nutrition screening tool and intervene as necessary  Determine patient's food preferences and provide high-protein, high-caloric foods as appropriate       INTERVENTIONS:  - Monitor oral intake, urinary output, labs, and treatment plans  - Assess nutrition and hydration status and recommend course of action  - Evaluate amount of meals eaten  - Assist patient with eating if necessary   - Allow adequate time for meals  - Recommend/ encourage appropriate diets, oral nutritional supplements, and vitamin/mineral supplements  - Order, calculate, and assess calorie counts as needed  - Recommend, monitor, and adjust tube feedings and TPN/PPN based on assessed needs  - Assess need for intravenous fluids  - Provide specific nutrition/hydration education as appropriate  - Include patient/family/caregiver in decisions related to nutrition  Outcome: Progressing     Problem: DISCHARGE PLANNING  Goal: Discharge to home or other facility with appropriate resources  Description: INTERVENTIONS:  - Identify barriers to discharge w/patient and caregiver  - Arrange for needed discharge resources and transportation as appropriate  - Identify discharge learning needs (meds, wound care, etc )  - Arrange for interpretive services to assist at discharge as needed  - Refer to Case Management Department for coordinating discharge planning if the patient needs post-hospital services based on physician/advanced practitioner order or complex needs related to functional status, cognitive ability, or social support system  Outcome: Progressing

## 2022-03-14 NOTE — ASSESSMENT & PLAN NOTE
· Patient presented to ED with complaint of back pain  · Found have gallstones with no signs of cholecystitis  · Does have prior admission for similar complaint, plan was for outpatient cholecystectomy but this was never performed  · Appreciate gi recommendations  · Awaiting mrcp  Likely will require ercp with lap cholecystectomy     · Started on antibiotics in case of choledocholithiasis   · Seen in consult by surgery, no acute surgical intervention required  · Continue PRN analgesics

## 2022-03-14 NOTE — ASSESSMENT & PLAN NOTE
· LFTs elevated at time of admission, , , T bili 2 25 with worsening in lfts: ast 300 alt 466 t bili 5 9  · Appreciate gi recommendations  · Will cover with levaquin and flagyl for possible choledocholithiasis  · Awaiting mrcp but pt will likely require ercp and lap cholecystectomy   · Check cmp in am

## 2022-03-15 ENCOUNTER — ANESTHESIA EVENT (INPATIENT)
Dept: GASTROENTEROLOGY | Facility: HOSPITAL | Age: 32
DRG: 284 | End: 2022-03-15
Payer: COMMERCIAL

## 2022-03-15 ENCOUNTER — ANESTHESIA (INPATIENT)
Dept: GASTROENTEROLOGY | Facility: HOSPITAL | Age: 32
DRG: 284 | End: 2022-03-15
Payer: COMMERCIAL

## 2022-03-15 ENCOUNTER — APPOINTMENT (INPATIENT)
Dept: PERIOP | Facility: HOSPITAL | Age: 32
DRG: 284 | End: 2022-03-15
Payer: COMMERCIAL

## 2022-03-15 ENCOUNTER — APPOINTMENT (INPATIENT)
Dept: RADIOLOGY | Facility: HOSPITAL | Age: 32
DRG: 284 | End: 2022-03-15
Payer: COMMERCIAL

## 2022-03-15 LAB
ALBUMIN SERPL BCP-MCNC: 3.4 G/DL (ref 3.5–5)
ALP SERPL-CCNC: 70 U/L (ref 46–116)
ALT SERPL W P-5'-P-CCNC: 390 U/L (ref 12–78)
ANION GAP SERPL CALCULATED.3IONS-SCNC: 10 MMOL/L (ref 4–13)
AST SERPL W P-5'-P-CCNC: 164 U/L (ref 5–45)
BILIRUB SERPL-MCNC: 8.35 MG/DL (ref 0.2–1)
BUN SERPL-MCNC: 8 MG/DL (ref 5–25)
CALCIUM ALBUM COR SERPL-MCNC: 9.1 MG/DL (ref 8.3–10.1)
CALCIUM SERPL-MCNC: 8.6 MG/DL (ref 8.3–10.1)
CHLORIDE SERPL-SCNC: 104 MMOL/L (ref 100–108)
CO2 SERPL-SCNC: 25 MMOL/L (ref 21–32)
CREAT SERPL-MCNC: 0.92 MG/DL (ref 0.6–1.3)
ERYTHROCYTE [DISTWIDTH] IN BLOOD BY AUTOMATED COUNT: 12.5 % (ref 11.6–15.1)
GFR SERPL CREATININE-BSD FRML MDRD: 110 ML/MIN/1.73SQ M
GLUCOSE SERPL-MCNC: 96 MG/DL (ref 65–140)
HCT VFR BLD AUTO: 47 % (ref 36.5–49.3)
HGB BLD-MCNC: 15.8 G/DL (ref 12–17)
HIV 1+2 AB+HIV1 P24 AG SERPL QL IA: NORMAL
MCH RBC QN AUTO: 31.1 PG (ref 26.8–34.3)
MCHC RBC AUTO-ENTMCNC: 33.6 G/DL (ref 31.4–37.4)
MCV RBC AUTO: 93 FL (ref 82–98)
PLATELET # BLD AUTO: 290 THOUSANDS/UL (ref 149–390)
PMV BLD AUTO: 10.7 FL (ref 8.9–12.7)
POTASSIUM SERPL-SCNC: 3.7 MMOL/L (ref 3.5–5.3)
PROT SERPL-MCNC: 7.2 G/DL (ref 6.4–8.2)
RBC # BLD AUTO: 5.08 MILLION/UL (ref 3.88–5.62)
SODIUM SERPL-SCNC: 139 MMOL/L (ref 136–145)
WBC # BLD AUTO: 7.23 THOUSAND/UL (ref 4.31–10.16)

## 2022-03-15 PROCEDURE — 80053 COMPREHEN METABOLIC PANEL: CPT | Performed by: INTERNAL MEDICINE

## 2022-03-15 PROCEDURE — C1726 CATH, BAL DIL, NON-VASCULAR: HCPCS

## 2022-03-15 PROCEDURE — 74330 X-RAY BILE/PANC ENDOSCOPY: CPT

## 2022-03-15 PROCEDURE — 0FC98ZZ EXTIRPATION OF MATTER FROM COMMON BILE DUCT, VIA NATURAL OR ARTIFICIAL OPENING ENDOSCOPIC: ICD-10-PCS | Performed by: INTERNAL MEDICINE

## 2022-03-15 PROCEDURE — BF101ZZ FLUOROSCOPY OF BILE DUCTS USING LOW OSMOLAR CONTRAST: ICD-10-PCS | Performed by: INTERNAL MEDICINE

## 2022-03-15 PROCEDURE — 43264 ERCP REMOVE DUCT CALCULI: CPT | Performed by: INTERNAL MEDICINE

## 2022-03-15 PROCEDURE — 0F798ZZ DILATION OF COMMON BILE DUCT, VIA NATURAL OR ARTIFICIAL OPENING ENDOSCOPIC: ICD-10-PCS | Performed by: INTERNAL MEDICINE

## 2022-03-15 PROCEDURE — 99232 SBSQ HOSP IP/OBS MODERATE 35: CPT | Performed by: PHYSICIAN ASSISTANT

## 2022-03-15 PROCEDURE — C1769 GUIDE WIRE: HCPCS

## 2022-03-15 PROCEDURE — 43262 ENDO CHOLANGIOPANCREATOGRAPH: CPT | Performed by: INTERNAL MEDICINE

## 2022-03-15 PROCEDURE — 99232 SBSQ HOSP IP/OBS MODERATE 35: CPT | Performed by: INTERNAL MEDICINE

## 2022-03-15 PROCEDURE — 85027 COMPLETE CBC AUTOMATED: CPT | Performed by: INTERNAL MEDICINE

## 2022-03-15 RX ORDER — NEOSTIGMINE METHYLSULFATE 1 MG/ML
INJECTION INTRAVENOUS AS NEEDED
Status: DISCONTINUED | OUTPATIENT
Start: 2022-03-15 | End: 2022-03-15

## 2022-03-15 RX ORDER — SODIUM CHLORIDE 9 MG/ML
INJECTION, SOLUTION INTRAVENOUS CONTINUOUS PRN
Status: DISCONTINUED | OUTPATIENT
Start: 2022-03-15 | End: 2022-03-15

## 2022-03-15 RX ORDER — GLYCOPYRROLATE 0.2 MG/ML
INJECTION INTRAMUSCULAR; INTRAVENOUS AS NEEDED
Status: DISCONTINUED | OUTPATIENT
Start: 2022-03-15 | End: 2022-03-15

## 2022-03-15 RX ORDER — ONDANSETRON 2 MG/ML
4 INJECTION INTRAMUSCULAR; INTRAVENOUS ONCE AS NEEDED
Status: COMPLETED | OUTPATIENT
Start: 2022-03-15 | End: 2022-03-15

## 2022-03-15 RX ORDER — ROCURONIUM BROMIDE 10 MG/ML
INJECTION, SOLUTION INTRAVENOUS AS NEEDED
Status: DISCONTINUED | OUTPATIENT
Start: 2022-03-15 | End: 2022-03-15

## 2022-03-15 RX ORDER — FENTANYL CITRATE 50 UG/ML
INJECTION, SOLUTION INTRAMUSCULAR; INTRAVENOUS AS NEEDED
Status: DISCONTINUED | OUTPATIENT
Start: 2022-03-15 | End: 2022-03-15

## 2022-03-15 RX ORDER — PROPOFOL 10 MG/ML
INJECTION, EMULSION INTRAVENOUS AS NEEDED
Status: DISCONTINUED | OUTPATIENT
Start: 2022-03-15 | End: 2022-03-15

## 2022-03-15 RX ORDER — MIDAZOLAM HYDROCHLORIDE 2 MG/2ML
INJECTION, SOLUTION INTRAMUSCULAR; INTRAVENOUS AS NEEDED
Status: DISCONTINUED | OUTPATIENT
Start: 2022-03-15 | End: 2022-03-15

## 2022-03-15 RX ORDER — ONDANSETRON 2 MG/ML
INJECTION INTRAMUSCULAR; INTRAVENOUS AS NEEDED
Status: DISCONTINUED | OUTPATIENT
Start: 2022-03-15 | End: 2022-03-15

## 2022-03-15 RX ORDER — FENTANYL CITRATE/PF 50 MCG/ML
25 SYRINGE (ML) INJECTION
Status: DISCONTINUED | OUTPATIENT
Start: 2022-03-15 | End: 2022-03-15 | Stop reason: HOSPADM

## 2022-03-15 RX ORDER — DOCUSATE SODIUM 100 MG/1
100 CAPSULE, LIQUID FILLED ORAL 2 TIMES DAILY
Status: DISCONTINUED | OUTPATIENT
Start: 2022-03-15 | End: 2022-03-20 | Stop reason: HOSPADM

## 2022-03-15 RX ORDER — LIDOCAINE HYDROCHLORIDE 20 MG/ML
INJECTION, SOLUTION EPIDURAL; INFILTRATION; INTRACAUDAL; PERINEURAL AS NEEDED
Status: DISCONTINUED | OUTPATIENT
Start: 2022-03-15 | End: 2022-03-15

## 2022-03-15 RX ORDER — SUCCINYLCHOLINE/SOD CL,ISO/PF 100 MG/5ML
SYRINGE (ML) INTRAVENOUS AS NEEDED
Status: DISCONTINUED | OUTPATIENT
Start: 2022-03-15 | End: 2022-03-15

## 2022-03-15 RX ADMIN — ONDANSETRON 4 MG: 2 INJECTION INTRAMUSCULAR; INTRAVENOUS at 16:59

## 2022-03-15 RX ADMIN — GLYCOPYRROLATE 0.6 MG: 0.2 INJECTION, SOLUTION INTRAMUSCULAR; INTRAVENOUS at 16:05

## 2022-03-15 RX ADMIN — PANTOPRAZOLE SODIUM 40 MG: 40 TABLET, DELAYED RELEASE ORAL at 06:08

## 2022-03-15 RX ADMIN — SODIUM CHLORIDE: 0.9 INJECTION, SOLUTION INTRAVENOUS at 15:09

## 2022-03-15 RX ADMIN — ROCURONIUM BROMIDE 35 MG: 50 INJECTION, SOLUTION INTRAVENOUS at 15:24

## 2022-03-15 RX ADMIN — ONDANSETRON 4 MG: 2 INJECTION INTRAMUSCULAR; INTRAVENOUS at 20:04

## 2022-03-15 RX ADMIN — FENTANYL CITRATE 100 MCG: 50 INJECTION INTRAMUSCULAR; INTRAVENOUS at 15:18

## 2022-03-15 RX ADMIN — KETOROLAC TROMETHAMINE 15 MG: 30 INJECTION, SOLUTION INTRAMUSCULAR at 20:04

## 2022-03-15 RX ADMIN — LIDOCAINE HYDROCHLORIDE 100 MG: 20 INJECTION, SOLUTION EPIDURAL; INFILTRATION; INTRACAUDAL; PERINEURAL at 15:18

## 2022-03-15 RX ADMIN — LEVOFLOXACIN 500 MG: 5 INJECTION, SOLUTION INTRAVENOUS at 12:33

## 2022-03-15 RX ADMIN — FENTANYL CITRATE 100 MCG: 50 INJECTION INTRAMUSCULAR; INTRAVENOUS at 16:01

## 2022-03-15 RX ADMIN — Medication 200 MG: at 15:18

## 2022-03-15 RX ADMIN — METRONIDAZOLE 500 MG: 500 INJECTION, SOLUTION INTRAVENOUS at 06:08

## 2022-03-15 RX ADMIN — DEXTROSE, SODIUM CHLORIDE, AND POTASSIUM CHLORIDE 100 ML/HR: 5; .9; .15 INJECTION INTRAVENOUS at 01:54

## 2022-03-15 RX ADMIN — NEOSTIGMINE METHYLSULFATE 4 MG: 1 INJECTION INTRAVENOUS at 16:05

## 2022-03-15 RX ADMIN — ONDANSETRON 4 MG: 2 INJECTION INTRAMUSCULAR; INTRAVENOUS at 16:05

## 2022-03-15 RX ADMIN — ONDANSETRON 4 MG: 2 INJECTION INTRAMUSCULAR; INTRAVENOUS at 10:51

## 2022-03-15 RX ADMIN — ACETAMINOPHEN 650 MG: 325 TABLET ORAL at 01:51

## 2022-03-15 RX ADMIN — KETOROLAC TROMETHAMINE 15 MG: 30 INJECTION, SOLUTION INTRAMUSCULAR at 04:12

## 2022-03-15 RX ADMIN — MIDAZOLAM 2 MG: 1 INJECTION INTRAMUSCULAR; INTRAVENOUS at 15:12

## 2022-03-15 RX ADMIN — METRONIDAZOLE 500 MG: 500 INJECTION, SOLUTION INTRAVENOUS at 13:42

## 2022-03-15 RX ADMIN — MORPHINE SULFATE 4 MG: 4 INJECTION INTRAVENOUS at 22:10

## 2022-03-15 RX ADMIN — ONDANSETRON 4 MG: 2 INJECTION INTRAMUSCULAR; INTRAVENOUS at 04:12

## 2022-03-15 RX ADMIN — DOCUSATE SODIUM 100 MG: 100 CAPSULE ORAL at 17:19

## 2022-03-15 RX ADMIN — PANTOPRAZOLE SODIUM 40 MG: 40 TABLET, DELAYED RELEASE ORAL at 17:19

## 2022-03-15 RX ADMIN — DOCUSATE SODIUM 100 MG: 100 CAPSULE ORAL at 10:51

## 2022-03-15 RX ADMIN — KETOROLAC TROMETHAMINE 15 MG: 30 INJECTION, SOLUTION INTRAMUSCULAR at 10:46

## 2022-03-15 RX ADMIN — IOHEXOL 50 ML: 240 INJECTION, SOLUTION INTRATHECAL; INTRAVASCULAR; INTRAVENOUS; ORAL at 15:50

## 2022-03-15 RX ADMIN — ROCURONIUM BROMIDE 5 MG: 50 INJECTION, SOLUTION INTRAVENOUS at 15:18

## 2022-03-15 RX ADMIN — SODIUM CHLORIDE: 0.9 INJECTION, SOLUTION INTRAVENOUS at 16:09

## 2022-03-15 RX ADMIN — DEXTROSE, SODIUM CHLORIDE, AND POTASSIUM CHLORIDE 100 ML/HR: 5; .9; .15 INJECTION INTRAVENOUS at 13:40

## 2022-03-15 RX ADMIN — PROPOFOL 200 MG: 10 INJECTION, EMULSION INTRAVENOUS at 15:18

## 2022-03-15 RX ADMIN — METRONIDAZOLE 500 MG: 500 INJECTION, SOLUTION INTRAVENOUS at 22:10

## 2022-03-15 NOTE — PLAN OF CARE
Problem: PAIN - ADULT  Goal: Verbalizes/displays adequate comfort level or baseline comfort level  Description: Interventions:  - Encourage patient to monitor pain and request assistance  - Assess pain using appropriate pain scale  - Administer analgesics based on type and severity of pain and evaluate response  - Implement non-pharmacological measures as appropriate and evaluate response  - Consider cultural and social influences on pain and pain management  - Notify physician/advanced practitioner if interventions unsuccessful or patient reports new pain  Outcome: Progressing    Pain seems to be well managed  Patient went for his mrcp procedure overnight  Patient did not fit appropriately in the machine and was sent back  Mrcp was unsuccessful  Patient NPO again since midnight pending GI's decision on the next step

## 2022-03-15 NOTE — ASSESSMENT & PLAN NOTE
· LFTs elevated at time of admission, , , T bili 2 25 with worsening in lfts: ast 164 alt 390 t bili 8 35  · Appreciate gi recommendations  · Continue levaquin and flagyl day 2   · Unable to obtain mrcp due to pts habitus   Pt for ercp today   · Check cmp in am

## 2022-03-15 NOTE — ASSESSMENT & PLAN NOTE
· Patient presented to ED with complaint of back pain  · Found have gallstones with no signs of cholecystitis  · Does have prior admission for similar complaint, plan was for outpatient cholecystectomy but this was never performed  · Appreciate gi recommendations  · Unable to obtain mrcp   Pt for ercp today    · Started on antibiotics in case of choledocholithiasis   · Seen in consult by surgery, no acute surgical intervention required  · Continue PRN analgesics

## 2022-03-15 NOTE — ANESTHESIA PREPROCEDURE EVALUATION
Procedure:  ERCP    Relevant Problems   ANESTHESIA (within normal limits)      GI/HEPATIC   (+) Rectal bleeding      PULMONARY   (+) Asthma      Other   (+) Calculus of gallbladder without biliary obstruction   (+) Morbid obesity with BMI of 40 0-44 9, adult [facility])        Physical Exam    Airway    Mallampati score: III  TM Distance: >3 FB  Neck ROM: full     Dental       Cardiovascular  Rhythm: regular, Rate: normal,     Pulmonary  Breath sounds clear to auscultation,     Other Findings        Anesthesia Plan  ASA Score- 3 Emergent    Anesthesia Type- general with ASA Monitors  Additional Monitors:   Airway Plan:           Plan Factors-Exercise tolerance (METS): >4 METS  Chart reviewed  Existing labs reviewed  Patient summary reviewed  Patient is not a current smoker  Induction- intravenous  Postoperative Plan-     Informed Consent- Anesthetic plan and risks discussed with patient

## 2022-03-15 NOTE — ANESTHESIA POSTPROCEDURE EVALUATION
Post-Op Assessment Note    CV Status:  Stable  Pain Score: 1    Pain management: adequate     Mental Status:  Alert and awake   Hydration Status:  Euvolemic   PONV Controlled:  Controlled   Airway Patency:  Patent      Post Op Vitals Reviewed: Yes      Staff: Anesthesiologist         No complications documented      /66 (03/15/22 1726)    Temp (!) 96 9 °F (36 1 °C) (03/15/22 1726)    Pulse 75 (03/15/22 1726)   Resp 13 (03/15/22 1726)    SpO2 95 % (03/15/22 1726)

## 2022-03-15 NOTE — PLAN OF CARE
Problem: PAIN - ADULT  Goal: Verbalizes/displays adequate comfort level or baseline comfort level  Description: Interventions:  - Encourage patient to monitor pain and request assistance  - Assess pain using appropriate pain scale  - Administer analgesics based on type and severity of pain and evaluate response  - Implement non-pharmacological measures as appropriate and evaluate response  - Consider cultural and social influences on pain and pain management  - Notify physician/advanced practitioner if interventions unsuccessful or patient reports new pain  Outcome: Progressing     Problem: INFECTION - ADULT  Goal: Absence or prevention of progression during hospitalization  Description: INTERVENTIONS:  - Assess and monitor for signs and symptoms of infection  - Monitor lab/diagnostic results  - Monitor all insertion sites, i e  indwelling lines, tubes, and drains  - Monitor endotracheal if appropriate and nasal secretions for changes in amount and color  - [place] appropriate cooling/warming therapies per order  - Administer medications as ordered  - Instruct and encourage patient and family to use good hand hygiene technique  - Identify and instruct in appropriate isolation precautions for identified infection/condition  Outcome: Progressing  Goal: Absence of fever/infection during neutropenic period  Description: INTERVENTIONS:  - Monitor WBC    Outcome: Progressing     Problem: SAFETY ADULT  Goal: Patient will remain free of falls  Description: INTERVENTIONS:  - Educate patient/family on patient safety including physical limitations  - Instruct patient to call for assistance with activity   - Consult OT/PT to assist with strengthening/mobility   - Keep Call bell within reach  - Keep bed low and locked with side rails adjusted as appropriate  - Keep care items and personal belongings within reach  - Initiate and maintain comfort rounds  - Make Fall Risk Sign visible to staff  - Offer Toileting every 2 Hours, in advance of need  - Initiate/Maintain bed alarm  - Obtain necessary fall risk management equipment: alarm  - Apply yellow socks and bracelet for high fall risk patients  - Consider moving patient to room near nurses station  Outcome: Progressing  Goal: Maintain or return to baseline ADL function  Description: INTERVENTIONS:  -  Assess patient's ability to carry out ADLs; assess patient's baseline for ADL function and identify physical deficits which impact ability to perform ADLs (bathing, care of mouth/teeth, toileting, grooming, dressing, etc )  - Assess/evaluate cause of self-care deficits   - Assess range of motion  - Assess patient's mobility; develop plan if impaired  - Assess patient's need for assistive devices and provide as appropriate  - Encourage maximum independence but intervene and supervise when necessary  - Involve family in performance of ADLs  - Assess for home care needs following discharge   - Consider OT consult to assist with ADL evaluation and planning for discharge  - Provide patient education as appropriate  Outcome: Progressing  Goal: Maintains/Returns to pre admission functional level  Description: INTERVENTIONS:  - Perform BMAT or MOVE assessment daily    - Set and communicate daily mobility goal to care team and patient/family/caregiver  - Collaborate with rehabilitation services on mobility goals if consulted  - Perform Range of Motion 2 times a day  - Reposition patient every 2 hours    - Dangle patient 2 times a day  - Stand patient 2 times a day  - Ambulate patient 2 times a day  - Out of bed to chair 2 times a day   - Out of bed for meals 2 times a day  - Out of bed for toileting  - Record patient progress and toleration of activity level   Outcome: Progressing     Problem: DISCHARGE PLANNING  Goal: Discharge to home or other facility with appropriate resources  Description: INTERVENTIONS:  - Identify barriers to discharge w/patient and caregiver  - Arrange for needed discharge resources and transportation as appropriate  - Identify discharge learning needs (meds, wound care, etc )  - Arrange for interpretive services to assist at discharge as needed  - Refer to Case Management Department for coordinating discharge planning if the patient needs post-hospital services based on physician/advanced practitioner order or complex needs related to functional status, cognitive ability, or social support system  Outcome: Progressing     Problem: Knowledge Deficit  Goal: Patient/family/caregiver demonstrates understanding of disease process, treatment plan, medications, and discharge instructions  Description: Complete learning assessment and assess knowledge base  Interventions:  - Provide teaching at level of understanding  - Provide teaching via preferred learning methods  Outcome: Progressing     Problem: Nutrition/Hydration-ADULT  Goal: Nutrient/Hydration intake appropriate for improving, restoring or maintaining nutritional needs  Description: Monitor and assess patient's nutrition/hydration status for malnutrition  Collaborate with interdisciplinary team and initiate plan and interventions as ordered  Monitor patient's weight and dietary intake as ordered or per policy  Utilize nutrition screening tool and intervene as necessary  Determine patient's food preferences and provide high-protein, high-caloric foods as appropriate       INTERVENTIONS:  - Monitor oral intake, urinary output, labs, and treatment plans  - Assess nutrition and hydration status and recommend course of action  - Evaluate amount of meals eaten  - Assist patient with eating if necessary   - Allow adequate time for meals  - Recommend/ encourage appropriate diets, oral nutritional supplements, and vitamin/mineral supplements  - Order, calculate, and assess calorie counts as needed  - Recommend, monitor, and adjust tube feedings and TPN/PPN based on assessed needs  - Assess need for intravenous fluids  - Provide specific nutrition/hydration education as appropriate  - Include patient/family/caregiver in decisions related to nutrition  Outcome: Progressing

## 2022-03-15 NOTE — PROGRESS NOTES
Progress Note- Wilfrido Santos 32 y o  male MRN: [id number]    Unit/Bed#: E2 -01 Encounter: [id number]      Assessment and Plan:    70-year-old male with concern for recurrent episodes of gallstones and possible choledocholithiasis presenting with chest pain radiating to his back     1  Upper abdominal pain  2  Elevated liver enzymes  The patient presented with chest pain radiating to his back which he states is typical of prior episodes related to gallstones/biliary colic  It was recommended he undergo cholecystectomy in the past but the patient has deferred given resolution of symptoms at the time  In addition to his pain he was found to have elevated liver enzymes with a t bili now up to 8 35  Currently pain controlled on medications, VSS, WBC 10 72-5 65-7 23   - continue supportive measures  - unable to perform MRCP and t bili continues to rise, will proceed with ERCP today  - NPO until after procedure     ______________________________________________________________________    Subjective:     Patient continues with biliary colic  T bili rising now 5 99-8  35  Patient was unable to fit into MRI machine      Medication Administration - last 24 hours from 03/14/2022 1451 to 03/15/2022 1451       Date/Time Order Dose Route Action Action by     03/15/2022 0151 acetaminophen (TYLENOL) tablet 650 mg 650 mg Oral Given Dewayne Bronson RN     03/15/2022 1051 ondansetron (ZOFRAN) injection 4 mg 4 mg Intravenous Given Igor Doyle RN     03/15/2022 0412 ondansetron (ZOFRAN) injection 4 mg 4 mg Intravenous Given Dewayne Bronson RN     03/15/2022 1046 ketorolac (TORADOL) injection 15 mg 15 mg Intravenous Given Igor Doyle RN     03/15/2022 0412 ketorolac (TORADOL) injection 15 mg 15 mg Intravenous Given Dewayne Bronson RN     03/14/2022 2148 ketorolac (TORADOL) injection 15 mg 15 mg Intravenous Given Dewayne Bronson RN     03/14/2022 1543 ketorolac (TORADOL) injection 15 mg 15 mg Intravenous Given Vesta Aden, RN 03/15/2022 0608 pantoprazole (PROTONIX) EC tablet 40 mg 40 mg Oral Given Raffaele Quijano, PATTY     03/14/2022 1546 pantoprazole (PROTONIX) EC tablet 40 mg 40 mg Oral Not Given Ria Boxer, RN     03/15/2022 1233 levofloxacin (LEVAQUIN) IVPB (premix in dextrose) 500 mg 100 mL 500 mg Intravenous [address] Melyssarinku Milian, PATTY     03/15/2022 1342 metroNIDAZOLE (FLAGYL) IVPB (premix) 500 mg 100 mL 500 mg Intravenous [address] Melyssa Board, PTATY     03/15/2022 6350 metroNIDAZOLE (FLAGYL) IVPB (premix) 500 mg 100 mL 500 mg Intravenous [facility]     03/14/2022 2149 metroNIDAZOLE (FLAGYL) IVPB (premix) 500 mg 100 mL 500 mg Intravenous [facility]     03/14/2022 1527 metroNIDAZOLE (FLAGYL) IVPB (premix) 500 mg 100 mL 500 mg Intravenous [place] [address] Ria Boxer, PATTY     03/15/2022 1340 dextrose 5 % and sodium chloride 0 9 % with KCl 20 mEq/L infusion (premix) 100 mL/hr Intravenous [facility] Marlonrinku Victoria, PATTY     03/15/2022 0154 dextrose 5 % and sodium chloride 0 9 % with KCl 20 mEq/L infusion (premix) 100 mL/hr Intravenous [facility]     03/15/2022 1051 docusate sodium (COLACE) capsule 100 mg 100 mg Oral Given Lashell Sahni RN          Objective:     Vitals: Blood pressure 127/68, pulse 80, temperature (!) 96 8 °F (36 °C), temperature source Tympanic, resp  rate 16, SpO2 98 %  ,There is no height or weight on file to calculate BMI        Intake/Output Summary (Last 24 hours) at 3/15/2022 1451  Last data filed at 3/15/2022 1340  Gross per 24 hour   Intake 1000 ml   Output --   Net 1000 ml       Physical Exam:   General Appearance: Awake and alert, in no acute distress  Abdomen: Soft, non-tender, non-distended; bowel sounds normal; no masses or no organomegaly    Invasive Devices  Report    Peripheral Intravenous Line            Peripheral IV 03/13/22 Right Hand 2 days                Lab Results:  Admission on 03/13/2022   Component Date Value    WBC 03/13/2022 10 72*    RBC 03/13/2022 5 40     Hemoglobin 03/13/2022 17 1*    Hematocrit 03/13/2022 49 4*    MCV 03/13/2022 92     MCH 03/13/2022 31 7     MCHC 03/13/2022 34 6     RDW 03/13/2022 12 2     MPV 03/13/2022 10 0     Platelets 36/08/4657 323     nRBC 03/13/2022 0     Neutrophils Relative 03/13/2022 76*    Immat GRANS % 03/13/2022 1     Lymphocytes Relative 03/13/2022 15     Monocytes Relative 03/13/2022 7     Eosinophils Relative 03/13/2022 1     Basophils Relative 03/13/2022 0     Neutrophils Absolute 03/13/2022 8 14*    Immature Grans Absolute 03/13/2022 0 06     Lymphocytes Absolute 03/13/2022 1 64     Monocytes Absolute 03/13/2022 0 79     Eosinophils Absolute 03/13/2022 0 06     Basophils Absolute 03/13/2022 0 03     Sodium 03/13/2022 139     Potassium 03/13/2022 4 0     Chloride 03/13/2022 104     CO2 03/13/2022 26     ANION GAP 03/13/2022 9     BUN 03/13/2022 8     Creatinine 03/13/2022 0 98     Glucose 03/13/2022 147*    Calcium 03/13/2022 8 9     AST 03/13/2022 374*    ALT 03/13/2022 274*    Alkaline Phosphatase 03/13/2022 56     Total Protein 03/13/2022 7 5     Albumin 03/13/2022 3 6     Total Bilirubin 03/13/2022 2 25*    eGFR 03/13/2022 102     Lipase 03/13/2022 102     hs TnI 0hr 03/13/2022 <2     Ventricular Rate 03/13/2022 95     Atrial Rate 03/13/2022 95     MA Interval 03/13/2022 162     QRSD Interval 03/13/2022 80     QT Interval 03/13/2022 328     QTC Interval 03/13/2022 412     P Axis 03/13/2022 35     QRS Axis 03/13/2022 49     T Wave Axis 03/13/2022 20     Hepatitis B Surface Ag 03/13/2022 Non-reactive     Hep A IgM 03/13/2022 Non-reactive     Hepatitis C Ab 03/13/2022 Non-reactive     Hep B C IgM 03/13/2022 Non-reactive     EBV VCA IgG 03/13/2022 >600 0*    EBV VCA IgM 03/13/2022 <36 0     EBV Nuclear Ag Ab 03/13/2022 398 0*    INTERPRETATION 03/13/2022 Comment     CMV IGG 03/13/2022 <0 60     CMV IgM 03/13/2022 <30 0     RPR 03/13/2022 Non-Reactive  Acetaminophen Level 03/13/2022 <2*    Bilirubin, Direct 03/13/2022 1 83*    Sodium 03/14/2022 140     Potassium 03/14/2022 3 6     Chloride 03/14/2022 106     CO2 03/14/2022 28     ANION GAP 03/14/2022 6     BUN 03/14/2022 7     Creatinine 03/14/2022 0 95     Glucose 03/14/2022 81     Calcium 03/14/2022 8 4     Corrected Calcium 03/14/2022 8 9     AST 03/14/2022 300*    ALT 03/14/2022 466*    Alkaline Phosphatase 03/14/2022 63     Total Protein 03/14/2022 6 9     Albumin 03/14/2022 3 4*    Total Bilirubin 03/14/2022 5 99*    eGFR 03/14/2022 106     WBC 03/14/2022 5 65     RBC 03/14/2022 5 22     Hemoglobin 03/14/2022 15 7     Hematocrit 03/14/2022 46 0     MCV 03/14/2022 88     MCH 03/14/2022 30 1     MCHC 03/14/2022 34 1     RDW 03/14/2022 12 5     MPV 03/14/2022 11 0     Platelets 48/37/2091 320     nRBC 03/14/2022 0     Neutrophils Relative 03/14/2022 59     Immat GRANS % 03/14/2022 0     Lymphocytes Relative 03/14/2022 26     Monocytes Relative 03/14/2022 10     Eosinophils Relative 03/14/2022 4     Basophils Relative 03/14/2022 1     Neutrophils Absolute 03/14/2022 3 33     Immature Grans Absolute 03/14/2022 0 01     Lymphocytes Absolute 03/14/2022 1 49     Monocytes Absolute 03/14/2022 0 58     Eosinophils Absolute 03/14/2022 0 20     Basophils Absolute 03/14/2022 0 04     Sodium 03/15/2022 139     Potassium 03/15/2022 3 7     Chloride 03/15/2022 104     CO2 03/15/2022 25     ANION GAP 03/15/2022 10     BUN 03/15/2022 8     Creatinine 03/15/2022 0 92     Glucose 03/15/2022 96     Calcium 03/15/2022 8 6     Corrected Calcium 03/15/2022 9 1     AST 03/15/2022 164*    ALT 03/15/2022 390*    Alkaline Phosphatase 03/15/2022 70     Total Protein 03/15/2022 7 2     Albumin 03/15/2022 3 4*    Total Bilirubin 03/15/2022 8 35*    eGFR 03/15/2022 110     WBC 03/15/2022 7 23     RBC 03/15/2022 5 08     Hemoglobin 03/15/2022 15 8     Hematocrit 03/15/2022 47 0     MCV 03/15/2022 93     MCH 03/15/2022 31 1     MCHC 03/15/2022 33 6     RDW 03/15/2022 12 5     Platelets 54/63/1251 290     MPV 03/15/2022 10 7        Imaging Studies: I have personally reviewed pertinent imaging studies

## 2022-03-15 NOTE — PROGRESS NOTES
[address] [facility]  Progress Note - Guero Patel 1990, 32 y o  male MRN: [id number]  Unit/Bed#: E2 -01 Encounter: [id number]  Primary Care Provider: Rosenda Khan,    Date and time admitted to hospital: 3/13/2022  3:09 AM    Transaminitis  Assessment & Plan  · LFTs elevated at time of admission, , , T bili 2 25 with worsening in lfts: ast 164 alt 390 t bili 8 35  · Appreciate gi recommendations  · Continue levaquin and flagyl day 2   · Unable to obtain mrcp due to pts habitus  Pt for ercp today   · Check cmp in am      Obesity  Assessment & Plan  · BMI 41    * Calculus of gallbladder without biliary obstruction  Assessment & Plan  · Patient presented to ED with complaint of back pain  · Found have gallstones with no signs of cholecystitis  · Does have prior admission for similar complaint, plan was for outpatient cholecystectomy but this was never performed  · Appreciate gi recommendations  · Unable to obtain mrcp  Pt for ercp today    · Started on antibiotics in case of choledocholithiasis   · Seen in consult by surgery, no acute surgical intervention required  · Continue PRN analgesics      VTE Pharmacologic Prophylaxis: low risk, early ambulation     Time Spent for Care: 20 minutes  More than 50% of total time spent on counseling and coordination of care as described above  Current Length of Stay: 2 day(s)  Current Patient Status: Inpatient     Discharge Plan / Estimated Discharge Date: 48 to 72 hours  Code Status: Level 1 - Full Code      Subjective:   Pt seen and examined  Pt still having abdominal pain  He is frustrated that he was unable to obtain mri  No other problems were reported       Objective:     Vitals:   Temp (24hrs), Av 3 °F (36 3 °C), Min:96 8 °F (36 °C), Max:98 °F (36 7 °C)    Temp:  [96 8 °F (36 °C)-98 °F (36 7 °C)] 96 8 °F (36 °C)  HR:  [66-78] 66  Resp:  [16-18] 16  BP: ()/(51-56) 100/53  SpO2:  [95 %-97 %] 95 %  There is no height or weight on file to calculate BMI  Input and Output Summary (last 24 hours): Intake/Output Summary (Last 24 hours) at 3/15/2022 1434  Last data filed at 3/15/2022 1340  Gross per 24 hour   Intake 1000 ml   Output --   Net 1000 ml       Physical Exam:   Physical Exam  Constitutional:       Appearance: Normal appearance  HENT:      Head: Normocephalic and atraumatic  Eyes:      Extraocular Movements: Extraocular movements intact  Pupils: Pupils are equal, round, and reactive to light  Cardiovascular:      Rate and Rhythm: Normal rate and regular rhythm  Heart sounds: No murmur heard  No friction rub  No gallop  Pulmonary:      Effort: Pulmonary effort is normal  No respiratory distress  Breath sounds: Normal breath sounds  No wheezing or rales  Abdominal:      General: Bowel sounds are normal  There is no distension  Palpations: Abdomen is soft  Tenderness: There is no abdominal tenderness  There is no guarding  Musculoskeletal:      Right lower leg: No edema  Left lower leg: No edema  Neurological:      Mental Status: He is alert and oriented to person, place, and time  Additional Data:     Labs:  Results from last 7 days   Lab Units 03/15/22  0459 03/14/22  0427 03/14/22  0427   WBC Thousand/uL 7 23   < > 5 65   HEMOGLOBIN g/dL 15 8   < > 15 7   HEMATOCRIT % 47 0   < > 46 0   PLATELETS Thousands/uL 290   < > 320   NEUTROS PCT %  --   --  59   LYMPHS PCT %  --   --  26   MONOS PCT %  --   --  10   EOS PCT %  --   --  4    < > = values in this interval not displayed       Results from last 7 days   Lab Units 03/15/22  0459   SODIUM mmol/L 139   POTASSIUM mmol/L 3 7   CHLORIDE mmol/L 104   CO2 mmol/L 25   BUN mg/dL 8   CREATININE mg/dL 0 92   ANION GAP mmol/L 10   CALCIUM mg/dL 8 6   ALBUMIN g/dL 3 4*   TOTAL BILIRUBIN mg/dL 8 35*   ALK PHOS U/L 70   ALT U/L 390*   AST U/L 164*   GLUCOSE RANDOM mg/dL 96                       Recent Cultures (last 7 days):           Lines/Drains:  Invasive Devices  Report    Peripheral Intravenous Line            Peripheral IV 03/13/22 Right Hand 2 days              Last 24 Hours Medication List:   Current Facility-Administered Medications   Medication Dose Route Frequency Provider Last Rate    acetaminophen  650 mg Oral Q6H PRN Surinderis ShantelLAWSON alvarado-LORI      albuterol  2 puff Inhalation Q6H PRN Claudis ShantelABIMAEL alvarado      calcium carbonate  1,000 mg Oral Daily PRN LAWSON Dubose-LORI      dextrose 5 % and sodium chloride 0 9 % with KCl 20 mEq/L  100 mL/hr Intravenous Continuous Azalea Ambron,  mL/hr (03/15/22 1340)    docusate sodium  100 mg Oral BID Azalea Ambron, DO      ketorolac  15 mg Intravenous Q6H PRN [place] Funes PA-C      levofloxacin  500 mg Intravenous Q24H Azalea Ambron,  mg (03/15/22 1233)    metroNIDAZOLE  500 mg Intravenous Q8H Azalea Ambron,  mg (03/15/22 1342)    morphine injection  4 mg Intravenous Q4H PRN [place] Funes PA-C      ondansetron  4 mg Intravenous Q6H PRN [place] Funes PA-C      pantoprazole  40 mg Oral BID AC Ena Banks PA-C      sucralfate  1 g Oral Q6H PRN Ena Banks PA-C          Today, Patient Was Seen By: Donnell Anderson DO

## 2022-03-16 PROBLEM — K85.90 ACUTE PANCREATITIS: Status: ACTIVE | Noted: 2022-03-16

## 2022-03-16 LAB
ALBUMIN SERPL BCP-MCNC: 3.4 G/DL (ref 3.5–5)
ALP SERPL-CCNC: 67 U/L (ref 46–116)
ALT SERPL W P-5'-P-CCNC: 306 U/L (ref 12–78)
ANION GAP SERPL CALCULATED.3IONS-SCNC: 12 MMOL/L (ref 4–13)
AST SERPL W P-5'-P-CCNC: 108 U/L (ref 5–45)
BILIRUB SERPL-MCNC: 3.99 MG/DL (ref 0.2–1)
BUN SERPL-MCNC: 6 MG/DL (ref 5–25)
CALCIUM ALBUM COR SERPL-MCNC: 9.6 MG/DL (ref 8.3–10.1)
CALCIUM SERPL-MCNC: 9.1 MG/DL (ref 8.3–10.1)
CHLORIDE SERPL-SCNC: 100 MMOL/L (ref 100–108)
CO2 SERPL-SCNC: 26 MMOL/L (ref 21–32)
CREAT SERPL-MCNC: 0.99 MG/DL (ref 0.6–1.3)
ERYTHROCYTE [DISTWIDTH] IN BLOOD BY AUTOMATED COUNT: 12.5 % (ref 11.6–15.1)
GFR SERPL CREATININE-BSD FRML MDRD: 101 ML/MIN/1.73SQ M
GLUCOSE SERPL-MCNC: 98 MG/DL (ref 65–140)
HCT VFR BLD AUTO: 46.4 % (ref 36.5–49.3)
HGB BLD-MCNC: 16.1 G/DL (ref 12–17)
LIPASE SERPL-CCNC: ABNORMAL U/L (ref 73–393)
MCH RBC QN AUTO: 31.4 PG (ref 26.8–34.3)
MCHC RBC AUTO-ENTMCNC: 34.7 G/DL (ref 31.4–37.4)
MCV RBC AUTO: 90 FL (ref 82–98)
PLATELET # BLD AUTO: 317 THOUSANDS/UL (ref 149–390)
PMV BLD AUTO: 11.1 FL (ref 8.9–12.7)
POTASSIUM SERPL-SCNC: 3.9 MMOL/L (ref 3.5–5.3)
PROT SERPL-MCNC: 7.3 G/DL (ref 6.4–8.2)
RBC # BLD AUTO: 5.13 MILLION/UL (ref 3.88–5.62)
SODIUM SERPL-SCNC: 138 MMOL/L (ref 136–145)
WBC # BLD AUTO: 15.38 THOUSAND/UL (ref 4.31–10.16)

## 2022-03-16 PROCEDURE — 99232 SBSQ HOSP IP/OBS MODERATE 35: CPT | Performed by: INTERNAL MEDICINE

## 2022-03-16 PROCEDURE — 85027 COMPLETE CBC AUTOMATED: CPT | Performed by: INTERNAL MEDICINE

## 2022-03-16 PROCEDURE — C9113 INJ PANTOPRAZOLE SODIUM, VIA: HCPCS | Performed by: INTERNAL MEDICINE

## 2022-03-16 PROCEDURE — 83690 ASSAY OF LIPASE: CPT | Performed by: PHYSICIAN ASSISTANT

## 2022-03-16 PROCEDURE — 80053 COMPREHEN METABOLIC PANEL: CPT | Performed by: INTERNAL MEDICINE

## 2022-03-16 RX ORDER — BISACODYL 10 MG
10 SUPPOSITORY, RECTAL RECTAL DAILY PRN
Status: DISCONTINUED | OUTPATIENT
Start: 2022-03-16 | End: 2022-03-20 | Stop reason: HOSPADM

## 2022-03-16 RX ORDER — OXYCODONE HYDROCHLORIDE 5 MG/1
5 TABLET ORAL EVERY 6 HOURS PRN
Status: DISCONTINUED | OUTPATIENT
Start: 2022-03-16 | End: 2022-03-18

## 2022-03-16 RX ORDER — METOCLOPRAMIDE HYDROCHLORIDE 5 MG/ML
10 INJECTION INTRAMUSCULAR; INTRAVENOUS ONCE
Status: COMPLETED | OUTPATIENT
Start: 2022-03-16 | End: 2022-03-16

## 2022-03-16 RX ORDER — SODIUM CHLORIDE, SODIUM GLUCONATE, SODIUM ACETATE, POTASSIUM CHLORIDE, MAGNESIUM CHLORIDE, SODIUM PHOSPHATE, DIBASIC, AND POTASSIUM PHOSPHATE .53; .5; .37; .037; .03; .012; .00082 G/100ML; G/100ML; G/100ML; G/100ML; G/100ML; G/100ML; G/100ML
75 INJECTION, SOLUTION INTRAVENOUS CONTINUOUS
Status: DISCONTINUED | OUTPATIENT
Start: 2022-03-16 | End: 2022-03-20

## 2022-03-16 RX ORDER — PANTOPRAZOLE SODIUM 40 MG/1
40 INJECTION, POWDER, FOR SOLUTION INTRAVENOUS EVERY 12 HOURS SCHEDULED
Status: DISCONTINUED | OUTPATIENT
Start: 2022-03-16 | End: 2022-03-20

## 2022-03-16 RX ORDER — BISACODYL 10 MG
10 SUPPOSITORY, RECTAL RECTAL DAILY PRN
Status: DISCONTINUED | OUTPATIENT
Start: 2022-03-16 | End: 2022-03-16

## 2022-03-16 RX ORDER — MORPHINE SULFATE 4 MG/ML
4 INJECTION, SOLUTION INTRAMUSCULAR; INTRAVENOUS
Status: DISCONTINUED | OUTPATIENT
Start: 2022-03-16 | End: 2022-03-20

## 2022-03-16 RX ORDER — OXYCODONE HYDROCHLORIDE 5 MG/1
7.5 TABLET ORAL EVERY 6 HOURS PRN
Status: DISCONTINUED | OUTPATIENT
Start: 2022-03-16 | End: 2022-03-16

## 2022-03-16 RX ORDER — SODIUM CHLORIDE, SODIUM GLUCONATE, SODIUM ACETATE, POTASSIUM CHLORIDE, MAGNESIUM CHLORIDE, SODIUM PHOSPHATE, DIBASIC, AND POTASSIUM PHOSPHATE .53; .5; .37; .037; .03; .012; .00082 G/100ML; G/100ML; G/100ML; G/100ML; G/100ML; G/100ML; G/100ML
150 INJECTION, SOLUTION INTRAVENOUS CONTINUOUS
Status: DISCONTINUED | OUTPATIENT
Start: 2022-03-16 | End: 2022-03-16

## 2022-03-16 RX ORDER — OXYCODONE HYDROCHLORIDE 5 MG/1
7.5 TABLET ORAL EVERY 6 HOURS PRN
Status: DISCONTINUED | OUTPATIENT
Start: 2022-03-16 | End: 2022-03-18

## 2022-03-16 RX ORDER — MORPHINE SULFATE 4 MG/ML
4 INJECTION, SOLUTION INTRAMUSCULAR; INTRAVENOUS
Status: DISCONTINUED | OUTPATIENT
Start: 2022-03-16 | End: 2022-03-16

## 2022-03-16 RX ORDER — OXYCODONE HYDROCHLORIDE 5 MG/1
5 TABLET ORAL EVERY 6 HOURS PRN
Status: DISCONTINUED | OUTPATIENT
Start: 2022-03-16 | End: 2022-03-16

## 2022-03-16 RX ORDER — POLYETHYLENE GLYCOL 3350 17 G/17G
17 POWDER, FOR SOLUTION ORAL DAILY PRN
Status: DISCONTINUED | OUTPATIENT
Start: 2022-03-16 | End: 2022-03-18

## 2022-03-16 RX ADMIN — MORPHINE SULFATE 4 MG: 4 INJECTION INTRAVENOUS at 23:17

## 2022-03-16 RX ADMIN — PANTOPRAZOLE SODIUM 40 MG: 40 TABLET, DELAYED RELEASE ORAL at 07:42

## 2022-03-16 RX ADMIN — SODIUM CHLORIDE, SODIUM GLUCONATE, SODIUM ACETATE, POTASSIUM CHLORIDE, MAGNESIUM CHLORIDE, SODIUM PHOSPHATE, DIBASIC, AND POTASSIUM PHOSPHATE 150 ML/HR: .53; .5; .37; .037; .03; .012; .00082 INJECTION, SOLUTION INTRAVENOUS at 04:48

## 2022-03-16 RX ADMIN — MORPHINE SULFATE 2 MG: 2 INJECTION, SOLUTION INTRAMUSCULAR; INTRAVENOUS at 06:21

## 2022-03-16 RX ADMIN — MORPHINE SULFATE 4 MG: 4 INJECTION INTRAVENOUS at 20:10

## 2022-03-16 RX ADMIN — METRONIDAZOLE 500 MG: 500 INJECTION, SOLUTION INTRAVENOUS at 06:21

## 2022-03-16 RX ADMIN — METOCLOPRAMIDE 10 MG: 5 INJECTION, SOLUTION INTRAMUSCULAR; INTRAVENOUS at 02:04

## 2022-03-16 RX ADMIN — DOCUSATE SODIUM 100 MG: 100 CAPSULE ORAL at 17:59

## 2022-03-16 RX ADMIN — MORPHINE SULFATE 4 MG: 4 INJECTION INTRAVENOUS at 15:53

## 2022-03-16 RX ADMIN — MORPHINE SULFATE 4 MG: 4 INJECTION INTRAVENOUS at 12:51

## 2022-03-16 RX ADMIN — SODIUM CHLORIDE, SODIUM GLUCONATE, SODIUM ACETATE, POTASSIUM CHLORIDE, MAGNESIUM CHLORIDE, SODIUM PHOSPHATE, DIBASIC, AND POTASSIUM PHOSPHATE 200 ML/HR: .53; .5; .37; .037; .03; .012; .00082 INJECTION, SOLUTION INTRAVENOUS at 12:32

## 2022-03-16 RX ADMIN — MORPHINE SULFATE 2 MG: 2 INJECTION, SOLUTION INTRAMUSCULAR; INTRAVENOUS at 07:41

## 2022-03-16 RX ADMIN — KETOROLAC TROMETHAMINE 15 MG: 30 INJECTION, SOLUTION INTRAMUSCULAR at 01:16

## 2022-03-16 RX ADMIN — PANTOPRAZOLE SODIUM 40 MG: 40 INJECTION, POWDER, FOR SOLUTION INTRAVENOUS at 15:53

## 2022-03-16 RX ADMIN — DOCUSATE SODIUM 100 MG: 100 CAPSULE ORAL at 07:43

## 2022-03-16 RX ADMIN — MORPHINE SULFATE 4 MG: 4 INJECTION INTRAVENOUS at 03:26

## 2022-03-16 RX ADMIN — LEVOFLOXACIN 500 MG: 5 INJECTION, SOLUTION INTRAVENOUS at 12:54

## 2022-03-16 RX ADMIN — METRONIDAZOLE 500 MG: 500 INJECTION, SOLUTION INTRAVENOUS at 14:59

## 2022-03-16 RX ADMIN — METRONIDAZOLE 500 MG: 500 INJECTION, SOLUTION INTRAVENOUS at 22:09

## 2022-03-16 RX ADMIN — OXYCODONE HYDROCHLORIDE 5 MG: 5 TABLET ORAL at 05:13

## 2022-03-16 RX ADMIN — SODIUM CHLORIDE, SODIUM GLUCONATE, SODIUM ACETATE, POTASSIUM CHLORIDE, MAGNESIUM CHLORIDE, SODIUM PHOSPHATE, DIBASIC, AND POTASSIUM PHOSPHATE 200 ML/HR: .53; .5; .37; .037; .03; .012; .00082 INJECTION, SOLUTION INTRAVENOUS at 20:10

## 2022-03-16 NOTE — PROGRESS NOTES
[address] [facility]  Progress Note - Hilary Anne 1990, 32 y o  male MRN: [id number]  Unit/Bed#: E2 -01 Encounter: [id number]  Primary Care Provider: Gloria Epps DO   Date and time admitted to hospital: 3/13/2022  3:09 AM    Acute pancreatitis  Assessment & Plan  As a result of ercp  Lipase is 14,970  appreciate gi recommendations: npo, fluids, pain control     Transaminitis  Assessment & Plan  · LFTs elevated at time of admission, , , T bili 2 25 with worsening in lfts: ast 108 alt 306 t bili 3 99  · Appreciate gi recommendations  · Continue levaquin and flagyl day 3   · Unable to obtain mrcp due to pts habitus  S/p ercp s/p dilation no stones or sludge  · Check cmp in am      SIRS (systemic inflammatory response syndrome) ([facility] [address] )  Assessment & Plan  Wbc is 15 but likely reactive due to ercp induced pancreatitis  Pt is on levaquin and flagyl for possible choledocholithiasis  Ercp was negative for this  Will continue decrease in wbc  Follow up wbc in am      Obesity  Assessment & Plan  · BMI 41    * Calculus of gallbladder without biliary obstruction  Assessment & Plan  · Patient presented to ED with complaint of back pain  · Found have gallstones with no signs of cholecystitis  · Does have prior admission for similar complaint, plan was for outpatient cholecystectomy but this was never performed  · Appreciate gi recommendations  · Unable to obtain mrcp  Pt for ercp yesterday  No stones or sludge but dilation obtained   · Continue antibiotics  · Seen in consult by surgery, no acute surgical intervention during hospital stay  Follow up outpt  Will discuss with surgery as pt lacks insurance  · Continue PRN analgesics      VTE Pharmacologic Prophylaxis: low risk, early ambulation     Time Spent for Care: 20 minutes  More than 50% of total time spent on counseling and coordination of care as described above      Current Length of Stay: 3 day(s)  Current Patient Status: Inpatient     Discharge Plan / Estimated Discharge Date: 48 to 72 hours    Code Status: Level 1 - Full Code      Subjective:   Pt seen and examined  He is having epigastric pain  N/v  No bowel movement  No f/c no cp no sob  Objective:     Vitals:   Temp (24hrs), Av 5 °F (36 4 °C), Min:96 9 °F (36 1 °C), Max:98 5 °F (36 9 °C)    Temp:  [96 9 °F (36 1 °C)-98 5 °F (36 9 °C)] 97 3 °F (36 3 °C)  HR:  [] 90  Resp:  [12-20] 18  BP: (102-132)/(56-80) 131/80  SpO2:  [90 %-98 %] 96 %  There is no height or weight on file to calculate BMI  Input and Output Summary (last 24 hours): Intake/Output Summary (Last 24 hours) at 3/16/2022 1206  Last data filed at 3/16/2022 0651  Gross per 24 hour   Intake 2400 ml   Output --   Net 2400 ml       Physical Exam:   Physical Exam  Constitutional:       Appearance: Normal appearance  HENT:      Head: Normocephalic and atraumatic  Eyes:      Extraocular Movements: Extraocular movements intact  Pupils: Pupils are equal, round, and reactive to light  Cardiovascular:      Rate and Rhythm: Normal rate and regular rhythm  Heart sounds: No murmur heard  No friction rub  No gallop  Pulmonary:      Effort: Pulmonary effort is normal  No respiratory distress  Breath sounds: Normal breath sounds  No wheezing or rales  Abdominal:      General: Bowel sounds are normal  There is distension  Palpations: Abdomen is soft  Tenderness: There is abdominal tenderness  There is guarding  There is no rebound  Comments: Epigastric pain   Musculoskeletal:      Right lower leg: No edema  Left lower leg: No edema  Neurological:      Mental Status: He is alert and oriented to person, place, and time            Additional Data:     Labs:  Results from last 7 days   Lab Units 22  0503 03/15/22  0459 22  0427   WBC Thousand/uL 15 38*   < > 5 65   HEMOGLOBIN g/dL 16 1   < > 15 7   HEMATOCRIT % 46 4   < > 46 0   PLATELETS Thousands/uL 317   < > 320   NEUTROS PCT %  --   --  59   LYMPHS PCT %  --   --  26   MONOS PCT %  --   --  10   EOS PCT %  --   --  4    < > = values in this interval not displayed       Results from last 7 days   Lab Units 03/16/22  0503   SODIUM mmol/L 138   POTASSIUM mmol/L 3 9   CHLORIDE mmol/L 100   CO2 mmol/L 26   BUN mg/dL 6   CREATININE mg/dL 0 99   ANION GAP mmol/L 12   CALCIUM mg/dL 9 1   ALBUMIN g/dL 3 4*   TOTAL BILIRUBIN mg/dL 3 99*   ALK PHOS U/L 67   ALT U/L 306*   AST U/L 108*   GLUCOSE RANDOM mg/dL 98                       Recent Cultures (last 7 days):           Lines/Drains:  Invasive Devices  Report    Peripheral Intravenous Line            Peripheral IV 03/13/22 Right Hand 3 days              Last 24 Hours Medication List:   Current Facility-Administered Medications   Medication Dose Route Frequency Provider Last Rate    acetaminophen  650 mg Oral Q6H PRN Amy Morataya PA-C      albuterol  2 puff Inhalation Q6H PRN Amy Morataya PA-C      bisacodyl  10 mg Rectal Daily PRN Haydee Gowers, DO      calcium carbonate  1,000 mg Oral Daily PRN Amy Morataya PA-C      docusate sodium  100 mg Oral BID Azalea Ambron, DO      levofloxacin  500 mg Intravenous Q24H Azalea Ambron,  mg (03/15/22 1233)    metroNIDAZOLE  500 mg Intravenous Q8H Azalea Ambron, DO Stopped (03/16/22 2833)    morphine injection  2 mg Intravenous Q3H PRN Roseline Melendrezs, DO      multi-electrolyte  200 mL/hr Intravenous Continuous Lesvia Castro  mL/hr (03/16/22 1111)    ondansetron  4 mg Intravenous Q6H PRN Amy Mroataya PA-C      oxyCODONE  5 mg Oral Q6H PRN Franko Raines PA-C      Or    oxyCODONE  7 5 mg Oral Q6H PRN Franko Raines PA-C      [facility] Hold] pantoprazole  40 mg Oral BID AC Sammie Gold PA-C      polyethylene glycol  17 g Oral Daily PRN Azalea Ambron, DO      sucralfate  1 g Oral Q6H PRN Andi Boxer, PA-C          Today, Patient Was Seen By: Haydee Gowers, DO    ** Please Note: This note has been constructed using a voice recognition system   **

## 2022-03-16 NOTE — ASSESSMENT & PLAN NOTE
· LFTs elevated at time of admission, , , T bili 2 25 with worsening in lfts: ast 108 alt 306 t bili 3 99  · Appreciate gi recommendations  · Continue levaquin and flagyl day 3   · Unable to obtain mrcp due to pts habitus   S/p ercp s/p dilation no stones or sludge  · Check cmp in am

## 2022-03-16 NOTE — ASSESSMENT & PLAN NOTE
· Patient presented to ED with complaint of back pain  · Found have gallstones with no signs of cholecystitis  · Does have prior admission for similar complaint, plan was for outpatient cholecystectomy but this was never performed  · Appreciate gi recommendations  · Unable to obtain mrcp  Pt for ercp yesterday  No stones or sludge but dilation obtained   · Continue antibiotics  · Seen in consult by surgery, no acute surgical intervention during hospital stay  Follow up outpt  Will discuss with surgery as pt lacks insurance     · Continue PRN analgesics

## 2022-03-16 NOTE — PROGRESS NOTES
Progress Note- Ani Wilson 32 y o  male MRN: [id number]    Unit/Bed#: E2 -01 Encounter: [id number]      Assessment and Plan:    51-year-old male with concern for recurrent episodes of gallstones and possible choledocholithiasis presenting with chest pain radiating to his back     1  Choledocholithiasis  2  Biliary colic  3  Post ERCP pancreatitis   The patient presented with biliary colic and was found to have elevated liver enzymes including hyperbilirubinemia  He is now s/p successful ERCP with sphincterotomy/sphincteroplasty and stone extraction  Unfortunately over night his pain worsened, lipase 15,000 indicating post ERCP pancreatitis  Liver enzymes are down trending    - continue supportive measures  - NPO  - aggressive IV fluids  - monitor I&Os   - monitor CMP and CBC  - cholecystectomy per surgery     ______________________________________________________________________    Subjective:     He is now s/p successful ERCP with sphincterotomy/sphincteroplasty and stone extraction   Unfortunately over night his pain worsened, lipase 15,000 indicating post ERCP pancreatitis      Medication Administration - last 24 hours from 03/15/2022 1113 to 03/16/2022 1113       Date/Time Order Dose Route Action Action by     03/15/2022 2004 ondansetron (ZOFRAN) injection 4 mg 4 mg Intravenous Given Viral Morales, PATTY     03/16/2022 0116 ketorolac (TORADOL) injection 15 mg 15 mg Intravenous Given Viral Morales, PATTY     03/15/2022 2004 ketorolac (TORADOL) injection 15 mg 15 mg Intravenous Given Viral Nest, PATTY     03/16/2022 0326 morphine (PF) 4 mg/mL injection 4 mg 4 mg Intravenous Given Viral Nest, RN     03/15/2022 2210 morphine (PF) 4 mg/mL injection 4 mg 4 mg Intravenous Given Viral Nest, PATTY     03/19/2022 1600 pantoprazole (PROTONIX) EC tablet 40 mg   Oral Dose Auto Held Melyssa Maicol, PATTY     03/19/2022 0700 pantoprazole (PROTONIX) EC tablet 40 mg   Oral Dose Auto Held Melyssa PATTY Milian 03/18/2022 1600 pantoprazole (PROTONIX) EC tablet 40 mg   Oral Dose Auto Held Ulmon, RN     03/18/2022 0700 pantoprazole (PROTONIX) EC tablet 40 mg   Oral Dose Auto Held Ulmon, RN     03/17/2022 1600 pantoprazole (PROTONIX) EC tablet 40 mg   Oral Dose Auto Held Ulmon, RN     03/17/2022 0700 pantoprazole (PROTONIX) EC tablet 40 mg   Oral Dose Auto Held Ulmon, RN     03/16/2022 1600 pantoprazole (PROTONIX) EC tablet 40 mg   Oral Dose Auto Held Ulmon, RN     03/16/2022 0742 pantoprazole (PROTONIX) EC tablet 40 mg 40 mg Oral Given Joelle Scott, RN     03/15/2022 1719 pantoprazole (PROTONIX) EC tablet 40 mg 40 mg Oral Given Ulmon, RN     03/15/2022 1634 pantoprazole (PROTONIX) EC tablet 40 mg   Oral MAR Hold Lashell Sahni, RN     03/15/2022 1233 levofloxacin (LEVAQUIN) IVPB (premix in dextrose) 500 mg 100 mL 500 mg Intravenous [address] Ulmon, RN     03/16/2022 4912 metroNIDAZOLE (FLAGYL) IVPB (premix) 500 mg 100 mL 0 mg Intravenous Stopped Yvonne o, RN     03/16/2022 8415 metroNIDAZOLE (FLAGYL) IVPB (premix) 500 mg 100 mL 500 mg Intravenous [address] [facility], RN     03/15/2022 2240 metroNIDAZOLE (FLAGYL) IVPB (premix) 500 mg 100 mL 0 mg Intravenous Stopped Yvonne o, RN     03/15/2022 2210 metroNIDAZOLE (FLAGYL) IVPB (premix) 500 mg 100 mL 500 mg Intravenous [address] Yvonne o, RN     03/15/2022 1412 metroNIDAZOLE (FLAGYL) IVPB (premix) 500 mg 100 mL 0 mg Intravenous [facility]pped [facility]     03/15/2022 1342 metroNIDAZOLE (FLAGYL) IVPB (premix) 500 mg 100 mL 500 mg Intravenous New [place] Lashell Sahni, RN     03/15/2022 1734 dextrose 5 % and sodium chloride 0 9 % with KCl 20 mEq/L infusion (premix) 0 mL/hr Intravenous Stopped Lashell PATTY Sahni     03/15/2022 1718 dextrose 5 % and sodium chloride 0 9 % with KCl 20 mEq/L infusion (premix) 100 mL/hr Intravenous Restarted Jacky Ortiz RN 03/15/2022 1430 dextrose 5 % and sodium chloride 0 9 % with KCl 20 mEq/L infusion (premix) 0 mL/hr Intravenous Hold Lashell Sahni, RN     03/15/2022 1340 dextrose 5 % and sodium chloride 0 9 % with KCl 20 mEq/L infusion (premix) 100 mL/hr Intravenous [address] Surjitkhai Marcelino, RN     03/16/2022 0743 docusate sodium (COLACE) capsule 100 mg 100 mg Oral Given Joelle Scott, RN     03/15/2022 1719 docusate sodium (COLACE) capsule 100 mg 100 mg Oral Given Lashell Bora, RN     03/15/2022 1550 iohexol (OMNIPAQUE) 240 MG/ML solution 50 mL 50 mL Other Given Lucas Lopez     03/15/2022 1659 ondansetron (ZOFRAN) injection 4 mg 4 mg Intravenous Given Sofya Hernandez, PATTY     03/16/2022 0204 metoclopramide (REGLAN) injection 10 mg 10 mg Intravenous Given Yvonne Sanders, PATTY     03/16/2022 0513 oxyCODONE (ROXICODONE) IR tablet 5 mg 5 mg Oral Given Yvonne Sanders, RN     03/16/2022 7739 oxyCODONE (ROXICODONE) IR tablet 7 5 mg   Oral See Alternative Yvonne Sanders, RN     03/16/2022 9620 morphine injection 2 mg 2 mg Intravenous Given Yvonne Sanders RN     03/16/2022 1111 multi-electrolyte (PLASMALYTE-A/ISOLYTE-S PH 7 4) IV solution 0 mL/hr Intravenous Stopped Joelle Chavez, RN     03/16/2022 0733 multi-electrolyte (PLASMALYTE-A/ISOLYTE-S PH 7 4) IV solution 150 mL/hr Intravenous Restarted Yvonne Sanders, PATTY     03/16/2022 0579 multi-electrolyte (PLASMALYTE-A/ISOLYTE-S PH 7 4) IV solution 0 mL/hr Intravenous Stopped Yvonne Sanders, PATTY     03/16/2022 0448 multi-electrolyte (PLASMALYTE-A/ISOLYTE-S PH 7 4) IV solution 150 mL/hr Intravenous [facility], RN     03/16/2022 0741 morphine injection 2 mg 2 mg Intravenous Given Joelle Chavez RN     03/16/2022 1111 multi-electrolyte (PLASMALYTE-A/ISOLYTE-S PH 7 4) IV solution 200 mL/hr Intravenous Rate/Dose Change Joelle Chavez RN          Objective:     Vitals: Blood pressure 131/80, pulse 90, temperature (!) 97 3 °F (36 3 °C), temperature source Oral, resp  rate 18, SpO2 96 %  ,There is no height or weight on file to calculate BMI        Intake/Output Summary (Last 24 hours) at 3/16/2022 1113  Last data filed at 3/16/2022 0651  Gross per 24 hour   Intake 2400 ml   Output --   Net 2400 ml       Physical Exam:   General Appearance: Awake and alert, in no acute distress  Abdomen: Soft, tender    Invasive Devices  Report    Peripheral Intravenous Line            Peripheral IV 03/13/22 Right Hand 3 days                Lab Results:  Admission on 03/13/2022   Component Date Value    WBC 03/13/2022 10 72*    RBC 03/13/2022 5 40     Hemoglobin 03/13/2022 17 1*    Hematocrit 03/13/2022 49 4*    MCV 03/13/2022 92     MCH 03/13/2022 31 7     MCHC 03/13/2022 34 6     RDW 03/13/2022 12 2     MPV 03/13/2022 10 0     Platelets 39/01/1431 323     nRBC 03/13/2022 0     Neutrophils Relative 03/13/2022 76*    Immat GRANS % 03/13/2022 1     Lymphocytes Relative 03/13/2022 15     Monocytes Relative 03/13/2022 7     Eosinophils Relative 03/13/2022 1     Basophils Relative 03/13/2022 0     Neutrophils Absolute 03/13/2022 8 14*    Immature Grans Absolute 03/13/2022 0 06     Lymphocytes Absolute 03/13/2022 1 64     Monocytes Absolute 03/13/2022 0 79     Eosinophils Absolute 03/13/2022 0 06     Basophils Absolute 03/13/2022 0 03     Sodium 03/13/2022 139     Potassium 03/13/2022 4 0     Chloride 03/13/2022 104     CO2 03/13/2022 26     ANION GAP 03/13/2022 9     BUN 03/13/2022 8     Creatinine 03/13/2022 0 98     Glucose 03/13/2022 147*    Calcium 03/13/2022 8 9     AST 03/13/2022 374*    ALT 03/13/2022 274*    Alkaline Phosphatase 03/13/2022 56     Total Protein 03/13/2022 7 5     Albumin 03/13/2022 3 6     Total Bilirubin 03/13/2022 2 25*    eGFR 03/13/2022 102     Lipase 03/13/2022 102     hs TnI 0hr 03/13/2022 <2     Ventricular Rate 03/13/2022 95     Atrial Rate 03/13/2022 95     OK Interval 03/13/2022 162     QRSD Interval 03/13/2022 80     QT Interval 03/13/2022 328     QTC Interval 03/13/2022 412     P Axis 03/13/2022 35     QRS Axis 03/13/2022 49     T Wave Axis 03/13/2022 20     Hepatitis B Surface Ag 03/13/2022 Non-reactive     Hep A IgM 03/13/2022 Non-reactive     Hepatitis C Ab 03/13/2022 Non-reactive     Hep B C IgM 03/13/2022 Non-reactive     EBV VCA IgG 03/13/2022 >600 0*    EBV VCA IgM 03/13/2022 <36 0     EBV Nuclear Ag Ab 03/13/2022 398 0*    INTERPRETATION 03/13/2022 Comment     CMV IGG 03/13/2022 <0 60     CMV IgM 03/13/2022 <30 0     RPR 03/13/2022 Non-Reactive     HIV-1/HIV-2 Ab 03/13/2022 Non-Reactive     Acetaminophen Level 03/13/2022 <2*    Bilirubin, Direct 03/13/2022 1 83*    Sodium 03/14/2022 140     Potassium 03/14/2022 3 6     Chloride 03/14/2022 106     CO2 03/14/2022 28     ANION GAP 03/14/2022 6     BUN 03/14/2022 7     Creatinine 03/14/2022 0 95     Glucose 03/14/2022 81     Calcium 03/14/2022 8 4     Corrected Calcium 03/14/2022 8 9     AST 03/14/2022 300*    ALT 03/14/2022 466*    Alkaline Phosphatase 03/14/2022 63     Total Protein 03/14/2022 6 9     Albumin 03/14/2022 3 4*    Total Bilirubin 03/14/2022 5 99*    eGFR 03/14/2022 106     WBC 03/14/2022 5 65     RBC 03/14/2022 5 22     Hemoglobin 03/14/2022 15 7     Hematocrit 03/14/2022 46 0     MCV 03/14/2022 88     MCH 03/14/2022 30 1     MCHC 03/14/2022 34 1     RDW 03/14/2022 12 5     MPV 03/14/2022 11 0     Platelets 92/56/4635 320     nRBC 03/14/2022 0     Neutrophils Relative 03/14/2022 59     Immat GRANS % 03/14/2022 0     Lymphocytes Relative 03/14/2022 26     Monocytes Relative 03/14/2022 10     Eosinophils Relative 03/14/2022 4     Basophils Relative 03/14/2022 1     Neutrophils Absolute 03/14/2022 3 33     Immature Grans Absolute 03/14/2022 0 01     Lymphocytes Absolute 03/14/2022 1 49     Monocytes Absolute 03/14/2022 0 58     Eosinophils Absolute 03/14/2022 0 20     Basophils Absolute 03/14/2022 0 04  Sodium 03/15/2022 139     Potassium 03/15/2022 3 7     Chloride 03/15/2022 104     CO2 03/15/2022 25     ANION GAP 03/15/2022 10     BUN 03/15/2022 8     Creatinine 03/15/2022 0 92     Glucose 03/15/2022 96     Calcium 03/15/2022 8 6     Corrected Calcium 03/15/2022 9 1     AST 03/15/2022 164*    ALT 03/15/2022 390*    Alkaline Phosphatase 03/15/2022 70     Total Protein 03/15/2022 7 2     Albumin 03/15/2022 3 4*    Total Bilirubin 03/15/2022 8 35*    eGFR 03/15/2022 110     WBC 03/15/2022 7 23     RBC 03/15/2022 5 08     Hemoglobin 03/15/2022 15 8     Hematocrit 03/15/2022 47 0     MCV 03/15/2022 93     MCH 03/15/2022 31 1     MCHC 03/15/2022 33 6     RDW 03/15/2022 12 5     Platelets 46/94/7280 290     MPV 03/15/2022 10 7     Sodium 03/16/2022 138     Potassium 03/16/2022 3 9     Chloride 03/16/2022 100     CO2 03/16/2022 26     ANION GAP 03/16/2022 12     BUN 03/16/2022 6     Creatinine 03/16/2022 0 99     Glucose 03/16/2022 98     Calcium 03/16/2022 9 1     Corrected Calcium 03/16/2022 9 6     AST 03/16/2022 108*    ALT 03/16/2022 306*    Alkaline Phosphatase 03/16/2022 67     Total Protein 03/16/2022 7 3     Albumin 03/16/2022 3 4*    Total Bilirubin 03/16/2022 3 99*    eGFR 03/16/2022 101     WBC 03/16/2022 15 38*    RBC 03/16/2022 5 13     Hemoglobin 03/16/2022 16 1     Hematocrit 03/16/2022 46 4     MCV 03/16/2022 90     MCH 03/16/2022 31 4     MCHC 03/16/2022 34 7     RDW 03/16/2022 12 5     Platelets 23/93/9818 317     MPV 03/16/2022 11 1     Lipase 03/16/2022 14,970*       Imaging Studies: I have personally reviewed pertinent imaging studies

## 2022-03-16 NOTE — ASSESSMENT & PLAN NOTE
Wbc is 15 but likely reactive due to ercp induced pancreatitis  Pt is on levaquin and flagyl for possible choledocholithiasis  Ercp was negative for this   Will continue decrease in wbc  Follow up wbc in am

## 2022-03-16 NOTE — QUICK NOTE
Patient noted to have worsening abdominal pain radiating to the back as well as nausea and vomiting, is s/p ERCP  Required multiple doses of morphine and Toradol in order to control his pain  Of note, patient reports not having a bowel movement since Saturday  By the time of my exam, his pain was able to be better controlled with 4 mg of morphine  His abdomen was soft with bowel sounds present, tender in the epigastrium and RUQ  Suspect patient may have ERCP induced pancreatitis versus biliary colic has he has known gallstones  Low suspicion for ileus/obstruction given soft abdomen  Lipase added to morning labs, pain regimen increased, started IV fluids at 150cc/hr

## 2022-03-17 ENCOUNTER — APPOINTMENT (INPATIENT)
Dept: CT IMAGING | Facility: HOSPITAL | Age: 32
DRG: 284 | End: 2022-03-17
Payer: COMMERCIAL

## 2022-03-17 PROBLEM — K59.00 CONSTIPATION: Status: ACTIVE | Noted: 2022-03-17

## 2022-03-17 LAB
ALBUMIN SERPL BCP-MCNC: 2.7 G/DL (ref 3.5–5)
ALP SERPL-CCNC: 54 U/L (ref 46–116)
ALT SERPL W P-5'-P-CCNC: 176 U/L (ref 12–78)
ANION GAP SERPL CALCULATED.3IONS-SCNC: 10 MMOL/L (ref 4–13)
AST SERPL W P-5'-P-CCNC: 49 U/L (ref 5–45)
BILIRUB SERPL-MCNC: 3.89 MG/DL (ref 0.2–1)
BUN SERPL-MCNC: 13 MG/DL (ref 5–25)
CALCIUM ALBUM COR SERPL-MCNC: 8.3 MG/DL (ref 8.3–10.1)
CALCIUM SERPL-MCNC: 7.3 MG/DL (ref 8.3–10.1)
CHLORIDE SERPL-SCNC: 103 MMOL/L (ref 100–108)
CO2 SERPL-SCNC: 25 MMOL/L (ref 21–32)
CREAT SERPL-MCNC: 0.99 MG/DL (ref 0.6–1.3)
ERYTHROCYTE [DISTWIDTH] IN BLOOD BY AUTOMATED COUNT: 12.9 % (ref 11.6–15.1)
GFR SERPL CREATININE-BSD FRML MDRD: 101 ML/MIN/1.73SQ M
GLUCOSE SERPL-MCNC: 93 MG/DL (ref 65–140)
HCT VFR BLD AUTO: 44.2 % (ref 36.5–49.3)
HGB BLD-MCNC: 15.4 G/DL (ref 12–17)
LACTATE SERPL-SCNC: 1.3 MMOL/L (ref 0.5–2)
LIPASE SERPL-CCNC: 1126 U/L (ref 73–393)
MCH RBC QN AUTO: 30.7 PG (ref 26.8–34.3)
MCHC RBC AUTO-ENTMCNC: 34.8 G/DL (ref 31.4–37.4)
MCV RBC AUTO: 88 FL (ref 82–98)
PLATELET # BLD AUTO: 192 THOUSANDS/UL (ref 149–390)
PMV BLD AUTO: 10.1 FL (ref 8.9–12.7)
POTASSIUM SERPL-SCNC: 3.9 MMOL/L (ref 3.5–5.3)
PROCALCITONIN SERPL-MCNC: 0.79 NG/ML
PROT SERPL-MCNC: 6.2 G/DL (ref 6.4–8.2)
RBC # BLD AUTO: 5.01 MILLION/UL (ref 3.88–5.62)
SODIUM SERPL-SCNC: 138 MMOL/L (ref 136–145)
WBC # BLD AUTO: 19.07 THOUSAND/UL (ref 4.31–10.16)

## 2022-03-17 PROCEDURE — 83690 ASSAY OF LIPASE: CPT | Performed by: INTERNAL MEDICINE

## 2022-03-17 PROCEDURE — 83605 ASSAY OF LACTIC ACID: CPT | Performed by: INTERNAL MEDICINE

## 2022-03-17 PROCEDURE — 85027 COMPLETE CBC AUTOMATED: CPT | Performed by: INTERNAL MEDICINE

## 2022-03-17 PROCEDURE — 74177 CT ABD & PELVIS W/CONTRAST: CPT

## 2022-03-17 PROCEDURE — 99232 SBSQ HOSP IP/OBS MODERATE 35: CPT | Performed by: INTERNAL MEDICINE

## 2022-03-17 PROCEDURE — 87040 BLOOD CULTURE FOR BACTERIA: CPT | Performed by: INTERNAL MEDICINE

## 2022-03-17 PROCEDURE — 80053 COMPREHEN METABOLIC PANEL: CPT | Performed by: INTERNAL MEDICINE

## 2022-03-17 PROCEDURE — 84145 PROCALCITONIN (PCT): CPT | Performed by: INTERNAL MEDICINE

## 2022-03-17 PROCEDURE — C9113 INJ PANTOPRAZOLE SODIUM, VIA: HCPCS | Performed by: INTERNAL MEDICINE

## 2022-03-17 RX ORDER — LACTULOSE 20 G/30ML
30 SOLUTION ORAL ONCE
Status: DISCONTINUED | OUTPATIENT
Start: 2022-03-17 | End: 2022-03-17

## 2022-03-17 RX ORDER — METHOCARBAMOL 500 MG/1
500 TABLET, FILM COATED ORAL EVERY 6 HOURS PRN
Status: DISCONTINUED | OUTPATIENT
Start: 2022-03-17 | End: 2022-03-20 | Stop reason: HOSPADM

## 2022-03-17 RX ORDER — SODIUM PHOSPHATE, DIBASIC AND SODIUM PHOSPHATE, MONOBASIC 7; 19 G/133ML; G/133ML
1 ENEMA RECTAL DAILY PRN
Status: DISCONTINUED | OUTPATIENT
Start: 2022-03-17 | End: 2022-03-20 | Stop reason: HOSPADM

## 2022-03-17 RX ADMIN — OXYCODONE HYDROCHLORIDE 5 MG: 5 TABLET ORAL at 04:54

## 2022-03-17 RX ADMIN — METRONIDAZOLE 500 MG: 500 INJECTION, SOLUTION INTRAVENOUS at 21:42

## 2022-03-17 RX ADMIN — MORPHINE SULFATE 4 MG: 4 INJECTION INTRAVENOUS at 02:13

## 2022-03-17 RX ADMIN — ONDANSETRON 4 MG: 2 INJECTION INTRAMUSCULAR; INTRAVENOUS at 12:58

## 2022-03-17 RX ADMIN — MORPHINE SULFATE 4 MG: 4 INJECTION INTRAVENOUS at 20:08

## 2022-03-17 RX ADMIN — SODIUM CHLORIDE, SODIUM GLUCONATE, SODIUM ACETATE, POTASSIUM CHLORIDE, MAGNESIUM CHLORIDE, SODIUM PHOSPHATE, DIBASIC, AND POTASSIUM PHOSPHATE 200 ML/HR: .53; .5; .37; .037; .03; .012; .00082 INJECTION, SOLUTION INTRAVENOUS at 02:00

## 2022-03-17 RX ADMIN — LEVOFLOXACIN 500 MG: 5 INJECTION, SOLUTION INTRAVENOUS at 12:50

## 2022-03-17 RX ADMIN — SODIUM CHLORIDE, SODIUM GLUCONATE, SODIUM ACETATE, POTASSIUM CHLORIDE, MAGNESIUM CHLORIDE, SODIUM PHOSPHATE, DIBASIC, AND POTASSIUM PHOSPHATE 200 ML/HR: .53; .5; .37; .037; .03; .012; .00082 INJECTION, SOLUTION INTRAVENOUS at 09:41

## 2022-03-17 RX ADMIN — SODIUM PHOSPHATE 1 ENEMA: 7; 19 ENEMA RECTAL at 14:13

## 2022-03-17 RX ADMIN — METHOCARBAMOL 500 MG: 500 TABLET ORAL at 18:57

## 2022-03-17 RX ADMIN — MORPHINE SULFATE 4 MG: 4 INJECTION INTRAVENOUS at 06:09

## 2022-03-17 RX ADMIN — PANTOPRAZOLE SODIUM 40 MG: 40 INJECTION, POWDER, FOR SOLUTION INTRAVENOUS at 09:53

## 2022-03-17 RX ADMIN — DOCUSATE SODIUM 100 MG: 100 CAPSULE ORAL at 09:54

## 2022-03-17 RX ADMIN — MORPHINE SULFATE 4 MG: 4 INJECTION INTRAVENOUS at 23:03

## 2022-03-17 RX ADMIN — MORPHINE SULFATE 4 MG: 4 INJECTION INTRAVENOUS at 12:50

## 2022-03-17 RX ADMIN — MORPHINE SULFATE 4 MG: 4 INJECTION INTRAVENOUS at 09:38

## 2022-03-17 RX ADMIN — OXYCODONE HYDROCHLORIDE 7.5 MG: 5 TABLET ORAL at 21:42

## 2022-03-17 RX ADMIN — PANTOPRAZOLE SODIUM 40 MG: 40 INJECTION, POWDER, FOR SOLUTION INTRAVENOUS at 20:08

## 2022-03-17 RX ADMIN — SODIUM CHLORIDE, SODIUM GLUCONATE, SODIUM ACETATE, POTASSIUM CHLORIDE, MAGNESIUM CHLORIDE, SODIUM PHOSPHATE, DIBASIC, AND POTASSIUM PHOSPHATE 200 ML/HR: .53; .5; .37; .037; .03; .012; .00082 INJECTION, SOLUTION INTRAVENOUS at 17:03

## 2022-03-17 RX ADMIN — METRONIDAZOLE 500 MG: 500 INJECTION, SOLUTION INTRAVENOUS at 05:33

## 2022-03-17 RX ADMIN — MORPHINE SULFATE 4 MG: 4 INJECTION INTRAVENOUS at 16:58

## 2022-03-17 RX ADMIN — IOHEXOL 100 ML: 350 INJECTION, SOLUTION INTRAVENOUS at 15:38

## 2022-03-17 RX ADMIN — DOCUSATE SODIUM 100 MG: 100 CAPSULE ORAL at 17:00

## 2022-03-17 RX ADMIN — SODIUM CHLORIDE, SODIUM GLUCONATE, SODIUM ACETATE, POTASSIUM CHLORIDE, MAGNESIUM CHLORIDE, SODIUM PHOSPHATE, DIBASIC, AND POTASSIUM PHOSPHATE 200 ML/HR: .53; .5; .37; .037; .03; .012; .00082 INJECTION, SOLUTION INTRAVENOUS at 22:34

## 2022-03-17 RX ADMIN — OXYCODONE HYDROCHLORIDE 7.5 MG: 5 TABLET ORAL at 15:16

## 2022-03-17 RX ADMIN — SODIUM CHLORIDE, SODIUM GLUCONATE, SODIUM ACETATE, POTASSIUM CHLORIDE, MAGNESIUM CHLORIDE, SODIUM PHOSPHATE, DIBASIC, AND POTASSIUM PHOSPHATE 200 ML/HR: .53; .5; .37; .037; .03; .012; .00082 INJECTION, SOLUTION INTRAVENOUS at 23:04

## 2022-03-17 RX ADMIN — METRONIDAZOLE 500 MG: 500 INJECTION, SOLUTION INTRAVENOUS at 14:18

## 2022-03-17 NOTE — PLAN OF CARE
Problem: PAIN - ADULT  Goal: Verbalizes/displays adequate comfort level or baseline comfort level  Description: Interventions:  - Encourage patient to monitor pain and request assistance  - Assess pain using appropriate pain scale  - Administer analgesics based on type and severity of pain and evaluate response  - Implement non-pharmacological measures as appropriate and evaluate response  - Consider cultural and social influences on pain and pain management  - Notify physician/advanced practitioner if interventions unsuccessful or patient reports new pain  Outcome: Progressing     Problem: INFECTION - ADULT  Goal: Absence or prevention of progression during hospitalization  Description: INTERVENTIONS:  - Assess and monitor for signs and symptoms of infection  - Monitor lab/diagnostic results  - Monitor all insertion sites, i e  indwelling lines, tubes, and drains  - Monitor endotracheal if appropriate and nasal secretions for changes in amount and color  - [place] appropriate cooling/warming therapies per order  - Administer medications as ordered  - Instruct and encourage patient and family to use good hand hygiene technique  - Identify and instruct in appropriate isolation precautions for identified infection/condition  Outcome: Progressing  Goal: Absence of fever/infection during neutropenic period  Description: INTERVENTIONS:  - Monitor WBC    Outcome: Progressing     Problem: SAFETY ADULT  Goal: Patient will remain free of falls  Description: INTERVENTIONS:  - Educate patient/family on patient safety including physical limitations  - Instruct patient to call for assistance with activity   - Consult OT/PT to assist with strengthening/mobility   - Keep Call bell within reach  - Keep bed low and locked with side rails adjusted as appropriate  - Keep care items and personal belongings within reach  - Initiate and maintain comfort rounds  - Make Fall Risk Sign visible to staff  - Offer Toileting every 2 Hours, in advance of need  - Initiate/Maintain bed alarm  - Obtain necessary fall risk management equipment: alarms  - Apply yellow socks and bracelet for high fall risk patients  - Consider moving patient to room near nurses station  Outcome: Progressing  Goal: Maintain or return to baseline ADL function  Description: INTERVENTIONS:  -  Assess patient's ability to carry out ADLs; assess patient's baseline for ADL function and identify physical deficits which impact ability to perform ADLs (bathing, care of mouth/teeth, toileting, grooming, dressing, etc )  - Assess/evaluate cause of self-care deficits   - Assess range of motion  - Assess patient's mobility; develop plan if impaired  - Assess patient's need for assistive devices and provide as appropriate  - Encourage maximum independence but intervene and supervise when necessary  - Involve family in performance of ADLs  - Assess for home care needs following discharge   - Consider OT consult to assist with ADL evaluation and planning for discharge  - Provide patient education as appropriate  Outcome: Progressing  Goal: Maintains/Returns to pre admission functional level  Description: INTERVENTIONS:  - Perform BMAT or MOVE assessment daily    - Set and communicate daily mobility goal to care team and patient/family/caregiver  - Collaborate with rehabilitation services on mobility goals if consulted  - Perform Range of Motion 3 times a day  - Reposition patient every 2 hours    - Dangle patient 3 times a day  - Stand patient 3 times a day  - Ambulate patient 3 times a day  - Out of bed to chair 3 times a day   - Out of bed for meals 3 times a day  - Out of bed for toileting  - Record patient progress and toleration of activity level   Outcome: Progressing     Problem: DISCHARGE PLANNING  Goal: Discharge to home or other facility with appropriate resources  Description: INTERVENTIONS:  - Identify barriers to discharge w/patient and caregiver  - Arrange for needed discharge resources and transportation as appropriate  - Identify discharge learning needs (meds, wound care, etc )  - Arrange for interpretive services to assist at discharge as needed  - Refer to Case Management Department for coordinating discharge planning if the patient needs post-hospital services based on physician/advanced practitioner order or complex needs related to functional status, cognitive ability, or social support system  Outcome: Progressing     Problem: Knowledge Deficit  Goal: Patient/family/caregiver demonstrates understanding of disease process, treatment plan, medications, and discharge instructions  Description: Complete learning assessment and assess knowledge base  Interventions:  - Provide teaching at level of understanding  - Provide teaching via preferred learning methods  Outcome: Progressing     Problem: Nutrition/Hydration-ADULT  Goal: Nutrient/Hydration intake appropriate for improving, restoring or maintaining nutritional needs  Description: Monitor and assess patient's nutrition/hydration status for malnutrition  Collaborate with interdisciplinary team and initiate plan and interventions as ordered  Monitor patient's weight and dietary intake as ordered or per policy  Utilize nutrition screening tool and intervene as necessary  Determine patient's food preferences and provide high-protein, high-caloric foods as appropriate       INTERVENTIONS:  - Monitor oral intake, urinary output, labs, and treatment plans  - Assess nutrition and hydration status and recommend course of action  - Evaluate amount of meals eaten  - Assist patient with eating if necessary   - Allow adequate time for meals  - Recommend/ encourage appropriate diets, oral nutritional supplements, and vitamin/mineral supplements  - Order, calculate, and assess calorie counts as needed  - Recommend, monitor, and adjust tube feedings and TPN/PPN based on assessed needs  - Assess need for intravenous fluids  - Provide specific nutrition/hydration education as appropriate  - Include patient/family/caregiver in decisions related to nutrition  Outcome: Progressing

## 2022-03-17 NOTE — ASSESSMENT & PLAN NOTE
· LFTs elevated at time of admission, , , T bili 2 25 with worsening in lfts: ast 49 alt 76 t bili 3 89  · Appreciate gi recommendations  · Continue levaquin and flagyl day 4  · Unable to obtain mrcp due to pts habitus   S/p ercp s/p dilation no stones or sludge  · Check cmp in am

## 2022-03-17 NOTE — PLAN OF CARE
Problem: Nutrition/Hydration-ADULT  Goal: Nutrient/Hydration intake appropriate for improving, restoring or maintaining nutritional needs  Description: Monitor and assess patient's nutrition/hydration status for malnutrition  Collaborate with interdisciplinary team and initiate plan and interventions as ordered  Monitor patient's weight and dietary intake as ordered or per policy  Utilize nutrition screening tool and intervene as necessary  Determine patient's food preferences and provide high-protein, high-caloric foods as appropriate       INTERVENTIONS:  - Monitor oral intake, urinary output, labs, and treatment plans  - Assess nutrition and hydration status and recommend course of action  - Evaluate amount of meals eaten  - Assist patient with eating if necessary   - Allow adequate time for meals  - Recommend/ encourage appropriate diets, oral nutritional supplements, and vitamin/mineral supplements  - Order, calculate, and assess calorie counts as needed  - Recommend, monitor, and adjust tube feedings and TPN/PPN based on assessed needs  - Assess need for intravenous fluids  - Provide specific nutrition/hydration education as appropriate  - Include patient/family/caregiver in decisions related to nutrition  Outcome: Not Progressing   NPO/CL liq x 3 days

## 2022-03-17 NOTE — ASSESSMENT & PLAN NOTE
· Patient presented to ED with complaint of back pain  · Found have gallstones with no signs of cholecystitis  · Does have prior admission for similar complaint, plan was for outpatient cholecystectomy but this was never performed  · Appreciate gi recommendations  · Unable to obtain mrcp  S/p ercp  No stones or sludge but dilation obtained   · Continue antibiotics  · Seen in consult by surgery, no acute surgical intervention during hospital stay  Follow up outpt  Will discuss with surgery as pt lacks insurance     · Continue PRN analgesics

## 2022-03-17 NOTE — ASSESSMENT & PLAN NOTE
As a result of ercp     Lipase is 14,970  Appreciate gi recommendations  Continue fluids  Continue npo  Pain is more lower consistent with constipation  Will repeat lipase

## 2022-03-17 NOTE — PROGRESS NOTES
Progress Note- Hilary Anne 32 y o  male MRN: [id number]    Unit/Bed#: E2 -62 Encounter: [id number]      Assessment and Plan:    40-year-old male with recurrent biliary colic admitted for choledocholithiasis      1  Choledocholithiasis  2  Biliary colic  3  Post ERCP pancreatitis   The patient presented with biliary colic and was found to have elevated liver enzymes including hyperbilirubinemia  He is now s/p successful ERCP with sphincterotomy/sphincteroplasty and stone extraction with improving liver enzymes  Unfortunately he developed post ERCP pancreatitis  His pain has worsened today  - continue supportive measures  - NPO, okay for ice chips  - aggressive IV fluids  - monitor I&Os   - monitor CMP and CBC  - obtain CT scan due to worsened pain  - given no bowel movements since admit recommend constipation control as this is likely contributing to his pain   - cholecystectomy per surgery     ______________________________________________________________________    Subjective:     Patient has yet to have a bowel movement  He reports his pain being worse than yesterday  Liver enzymes continue to trend down       Medication Administration - last 24 hours from 03/16/2022 1416 to 03/17/2022 1416       Date/Time Order Dose Route Action Action by     03/17/2022 1258 ondansetron (ZOFRAN) injection 4 mg 4 mg Intravenous Given Glenna Lombard, RN     03/17/2022 1250 levofloxacin (LEVAQUIN) IVPB (premix in dextrose) 500 mg 100 mL 500 mg Intravenous New Bag Galileo Cerna RN     03/17/2022 0533 metroNIDAZOLE (FLAGYL) IVPB (premix) 500 mg 100 mL 500 mg Intravenous [address] Radha Gibson RN     03/16/2022 2239 metroNIDAZOLE (FLAGYL) IVPB (premix) 500 mg 100 mL 0 mg Intravenous Stopped Radha Gibson RN     03/16/2022 2209 metroNIDAZOLE (FLAGYL) IVPB (premix) 500 mg 100 mL 500 mg Intravenous [address] Lico Johnson RN     03/16/2022 1529 metroNIDAZOLE (FLAGYL) IVPB (premix) 500 mg 100 mL 0 mg [facility]ous [facility]     03/16/2022 1459 metroNIDAZOLE (FLAGYL) IVPB (premix) 500 mg 100 mL 500 mg Intravenous [facility], RN     03/17/2022 1500 docusate sodium (COLACE) capsule 100 mg 100 mg Oral Given Kevin Santiago     03/16/2022 1759 docusate sodium (COLACE) capsule 100 mg 100 mg Oral Given Joelle Scott, RN     03/17/2022 0941 multi-electrolyte (PLASMALYTE-A/ISOLYTE-S PH 7 4) IV solution 200 mL/hr Intravenous [address] Gerardo Castrejon, RN     03/17/2022 4593 multi-electrolyte (PLASMALYTE-A/ISOLYTE-S PH 7 4) IV solution 200 mL/hr Intravenous Restarted Aiden Menezes, PATTY     03/17/2022 0533 multi-electrolyte (PLASMALYTE-A/ISOLYTE-S PH 7 4) IV solution 0 mL/hr Intravenous Stopped Aiden eMnezes, PATTY     03/17/2022 0200 multi-electrolyte (PLASMALYTE-A/ISOLYTE-S PH 7 4) IV solution 200 mL/hr Intravenous [address] Aiden Menezes, PATTY     03/16/2022 2239 multi-electrolyte (PLASMALYTE-A/ISOLYTE-S PH 7 4) IV solution 200 mL/hr Intravenous Restarted Aiden Menezes, PATTY     03/16/2022 2209 multi-electrolyte (PLASMALYTE-A/ISOLYTE-S PH 7 4) IV solution 0 mL/hr Intravenous Stopped Aiden Menezes, PATTY     03/16/2022 2010 multi-electrolyte (PLASMALYTE-A/ISOLYTE-S PH 7 4) IV solution 200 mL/hr Intravenous [facility], RN     03/17/2022 0953 pantoprazole (PROTONIX) injection 40 mg 40 mg Intravenous Given Kevin Santiago     03/16/2022 1553 pantoprazole (PROTONIX) injection 40 mg 40 mg Intravenous Given Joelle Scott, RN     03/17/2022 1250 morphine (PF) 4 mg/mL injection 4 mg 4 mg Intravenous Given Duaine Shakira, RN     03/17/2022 6540 morphine (PF) 4 mg/mL injection 4 mg 4 mg Intravenous Given Gerardo Castrejon RN     03/17/2022 3630 morphine (PF) 4 mg/mL injection 4 mg 4 mg Intravenous Given Aiden Menezes RN     03/17/2022 0169 morphine (PF) 4 mg/mL injection 4 mg 4 mg Intravenous Given Aiden Menezes RN     03/16/2022 2317 morphine (PF) 4 mg/mL injection 4 mg 4 mg Intravenous Given Nita Merrill RN     03/16/2022 2010 morphine (PF) 4 mg/mL injection 4 mg 4 mg Intravenous Given Nita Merrill RN     03/16/2022 1553 morphine (PF) 4 mg/mL injection 4 mg 4 mg Intravenous Given Joelle Chavez RN     03/17/2022 1251 oxyCODONE (ROXICODONE) IR tablet 7 5 mg 7 5 mg Oral Refused Galileo Batista RN     03/17/2022 0454 oxyCODONE (ROXICODONE) IR tablet 7 5 mg   Oral See Alternative Nita Merrill RN     03/17/2022 1251 oxyCODONE (ROXICODONE) IR tablet 5 mg   Oral See Alternative Marii Zavala RN     03/17/2022 0454 oxyCODONE (ROXICODONE) IR tablet 5 mg 5 mg Oral Given Nita Merrill RN     03/17/2022 1413 sodium phosphate-biphosphate (FLEET) enema 1 enema 1 enema Rectal Given Nereida Givens     03/17/2022 1250 lactulose oral solution 30 g 30 g Oral Refused Marii Zavala RN          Objective:     Vitals: Blood pressure 108/64, pulse (!) 126, temperature (!) 97 4 °F (36 3 °C), temperature source Tympanic, resp  rate 20, SpO2 94 %  ,There is no height or weight on file to calculate BMI        Intake/Output Summary (Last 24 hours) at 3/17/2022 1416  Last data filed at 3/17/2022 9143  Gross per 24 hour   Intake 4523 33 ml   Output --   Net 4523 33 ml       Physical Exam:   General Appearance: Awake and alert, in no acute distress  Abdomen: Soft, non-tender, non-distended    Invasive Devices  Report    Peripheral Intravenous Line            Peripheral IV 03/17/22 Distal;Right;Upper;Ventral (anterior) Arm <1 day                Lab Results:  Admission on 03/13/2022   Component Date Value    WBC 03/13/2022 10 72*    RBC 03/13/2022 5 40     Hemoglobin 03/13/2022 17 1*    Hematocrit 03/13/2022 49 4*    MCV 03/13/2022 92     MCH 03/13/2022 31 7     MCHC 03/13/2022 34 6     RDW 03/13/2022 12 2     MPV 03/13/2022 10 0     Platelets 92/01/4919 323     nRBC 03/13/2022 0     Neutrophils Relative 03/13/2022 76*    Immat GRANS % 03/13/2022 1     Lymphocytes Relative 03/13/2022 15     Monocytes Relative 03/13/2022 7     Eosinophils Relative 03/13/2022 1     Basophils Relative 03/13/2022 0     Neutrophils Absolute 03/13/2022 8 14*    Immature Grans Absolute 03/13/2022 0 06     Lymphocytes Absolute 03/13/2022 1 64     Monocytes Absolute 03/13/2022 0 79     Eosinophils Absolute 03/13/2022 0 06     Basophils Absolute 03/13/2022 0 03     Sodium 03/13/2022 139     Potassium 03/13/2022 4 0     Chloride 03/13/2022 104     CO2 03/13/2022 26     ANION GAP 03/13/2022 9     BUN 03/13/2022 8     Creatinine 03/13/2022 0 98     Glucose 03/13/2022 147*    Calcium 03/13/2022 8 9     AST 03/13/2022 374*    ALT 03/13/2022 274*    Alkaline Phosphatase 03/13/2022 56     Total Protein 03/13/2022 7 5     Albumin 03/13/2022 3 6     Total Bilirubin 03/13/2022 2 25*    eGFR 03/13/2022 102     Lipase 03/13/2022 102     hs TnI 0hr 03/13/2022 <2     Ventricular Rate 03/13/2022 95     Atrial Rate 03/13/2022 95     VA Interval 03/13/2022 162     QRSD Interval 03/13/2022 80     QT Interval 03/13/2022 328     QTC Interval 03/13/2022 412     P Axis 03/13/2022 35     QRS Axis 03/13/2022 49     T Wave Axis 03/13/2022 20     Hepatitis B Surface Ag 03/13/2022 Non-reactive     Hep A IgM 03/13/2022 Non-reactive     Hepatitis C Ab 03/13/2022 Non-reactive     Hep B C IgM 03/13/2022 Non-reactive     EBV VCA IgG 03/13/2022 >600 0*    EBV VCA IgM 03/13/2022 <36 0     EBV Nuclear Ag Ab 03/13/2022 398 0*    INTERPRETATION 03/13/2022 Comment     CMV IGG 03/13/2022 <0 60     CMV IgM 03/13/2022 <30 0     RPR 03/13/2022 Non-Reactive     HIV-1/HIV-2 Ab 03/13/2022 Non-Reactive     Acetaminophen Level 03/13/2022 <2*    Bilirubin, Direct 03/13/2022 1 83*    Sodium 03/14/2022 140     Potassium 03/14/2022 3 6     Chloride 03/14/2022 106     CO2 03/14/2022 28     ANION GAP 03/14/2022 6     BUN 03/14/2022 7     Creatinine 03/14/2022 0 95     Glucose 03/14/2022 81     Calcium 03/14/2022 8 4     Corrected Calcium 03/14/2022 8 9     AST 03/14/2022 300*    ALT 03/14/2022 466*    Alkaline Phosphatase 03/14/2022 63     Total Protein 03/14/2022 6 9     Albumin 03/14/2022 3 4*    Total Bilirubin 03/14/2022 5 99*    eGFR 03/14/2022 106     WBC 03/14/2022 5 65     RBC 03/14/2022 5 22     Hemoglobin 03/14/2022 15 7     Hematocrit 03/14/2022 46 0     MCV 03/14/2022 88     MCH 03/14/2022 30 1     MCHC 03/14/2022 34 1     RDW 03/14/2022 12 5     MPV 03/14/2022 11 0     Platelets 34/99/2503 320     nRBC 03/14/2022 0     Neutrophils Relative 03/14/2022 59     Immat GRANS % 03/14/2022 0     Lymphocytes Relative 03/14/2022 26     Monocytes Relative 03/14/2022 10     Eosinophils Relative 03/14/2022 4     Basophils Relative 03/14/2022 1     Neutrophils Absolute 03/14/2022 3 33     Immature Grans Absolute 03/14/2022 0 01     Lymphocytes Absolute 03/14/2022 1 49     Monocytes Absolute 03/14/2022 0 58     Eosinophils Absolute 03/14/2022 0 20     Basophils Absolute 03/14/2022 0 04     Sodium 03/15/2022 139     Potassium 03/15/2022 3 7     Chloride 03/15/2022 104     CO2 03/15/2022 25     ANION GAP 03/15/2022 10     BUN 03/15/2022 8     Creatinine 03/15/2022 0 92     Glucose 03/15/2022 96     Calcium 03/15/2022 8 6     Corrected Calcium 03/15/2022 9 1     AST 03/15/2022 164*    ALT 03/15/2022 390*    Alkaline Phosphatase 03/15/2022 70     Total Protein 03/15/2022 7 2     Albumin 03/15/2022 3 4*    Total Bilirubin 03/15/2022 8 35*    eGFR 03/15/2022 110     WBC 03/15/2022 7 23     RBC 03/15/2022 5 08     Hemoglobin 03/15/2022 15 8     Hematocrit 03/15/2022 47 0     MCV 03/15/2022 93     MCH 03/15/2022 31 1     MCHC 03/15/2022 33 6     RDW 03/15/2022 12 5     Platelets 08/05/4142 290     MPV 03/15/2022 10 7     Sodium 03/16/2022 138     Potassium 03/16/2022 3 9     Chloride 03/16/2022 100     CO2 03/16/2022 26     ANION GAP 03/16/2022 12     BUN 03/16/2022 6     Creatinine 03/16/2022 0 99     Glucose 03/16/2022 98     Calcium 03/16/2022 9 1     Corrected Calcium 03/16/2022 9 6     AST 03/16/2022 108*    ALT 03/16/2022 306*    Alkaline Phosphatase 03/16/2022 67     Total Protein 03/16/2022 7 3     Albumin 03/16/2022 3 4*    Total Bilirubin 03/16/2022 3 99*    eGFR 03/16/2022 101     WBC 03/16/2022 15 38*    RBC 03/16/2022 5 13     Hemoglobin 03/16/2022 16 1     Hematocrit 03/16/2022 46 4     MCV 03/16/2022 90     MCH 03/16/2022 31 4     MCHC 03/16/2022 34 7     RDW 03/16/2022 12 5     Platelets 81/90/9507 317     MPV 03/16/2022 11 1     Lipase 03/16/2022 14,970*    Sodium 03/17/2022 138     Potassium 03/17/2022 3 9     Chloride 03/17/2022 103     CO2 03/17/2022 25     ANION GAP 03/17/2022 10     BUN 03/17/2022 13     Creatinine 03/17/2022 0 99     Glucose 03/17/2022 93     Calcium 03/17/2022 7 3*    Corrected Calcium 03/17/2022 8 3     AST 03/17/2022 49*    ALT 03/17/2022 176*    Alkaline Phosphatase 03/17/2022 54     Total Protein 03/17/2022 6 2*    Albumin 03/17/2022 2 7*    Total Bilirubin 03/17/2022 3 89*    eGFR 03/17/2022 101     WBC 03/17/2022 19 07*    RBC 03/17/2022 5 01     Hemoglobin 03/17/2022 15 4     Hematocrit 03/17/2022 44 2     MCV 03/17/2022 88     MCH 03/17/2022 30 7     MCHC 03/17/2022 34 8     RDW 03/17/2022 12 9     Platelets 13/05/5583 192     MPV 03/17/2022 10 1     Procalcitonin 03/17/2022 0 79*    LACTIC ACID 03/17/2022 1 3     Lipase 03/17/2022 1,126*       Imaging Studies: I have personally reviewed pertinent imaging studies

## 2022-03-17 NOTE — PROGRESS NOTES
[address]  Progress Note - Hilary Anne 1990, 32 y o  male MRN: [id number]  Unit/Bed#: E2 -01 Encounter: [id number]  Primary Care Provider: Gloria Epps DO   Date and time admitted to hospital: 3/13/2022  3:09 AM    Constipation  Assessment & Plan  No bm for 1 week due to narcotic use and possible ileus   Ct abd/pelvis pending  Will trial lactulose  Write for enema and suppository if no improvement  Acute pancreatitis  Assessment & Plan  As a result of ercp  Lipase is 14,970  Appreciate gi recommendations  Continue fluids  Continue npo  Pain is more lower consistent with constipation  Will repeat lipase    Transaminitis  Assessment & Plan  · LFTs elevated at time of admission, , , T bili 2 25 with worsening in lfts: ast 49 alt 76 t bili 3 89  · Appreciate gi recommendations  · Continue levaquin and flagyl day 4  · Unable to obtain mrcp due to pts habitus  S/p ercp s/p dilation no stones or sludge  · Check cmp in am      SIRS (systemic inflammatory response syndrome) ([facility] [address] )  Assessment & Plan  Wbc increased to 19 but likely reactive due to ercp induced pancreatitis and constipation that is worsened  Pt is on levaquin and flagyl for possible choledocholithiasis  Ercp was negative for this  Will check lactic acid, blood cultures and procal  Follow up with wbc in am     Ct abd/pelvis pending    Obesity  Assessment & Plan  · BMI 41    * Calculus of gallbladder without biliary obstruction  Assessment & Plan  · Patient presented to ED with complaint of back pain  · Found have gallstones with no signs of cholecystitis  · Does have prior admission for similar complaint, plan was for outpatient cholecystectomy but this was never performed  · Appreciate gi recommendations  · Unable to obtain mrcp  S/p ercp   No stones or sludge but dilation obtained   · Continue antibiotics  · Seen in consult by surgery, no acute surgical intervention during hospital stay  Follow up outpt  Will discuss with surgery as pt lacks insurance  · Continue PRN analgesics      VTE Pharmacologic Prophylaxis: early ambulation     Time Spent for Care: 20 minutes  More than 50% of total time spent on counseling and coordination of care as described above  Current Length of Stay: 4 day(s)  Current Patient Status: Inpatient     Discharge Plan / Estimated Discharge Date: 24 to 48 hours    Code Status: Level 1 - Full Code      Subjective:   Pt seen and examined  He states he has not moved his bowel for a week  He is having pain in his lower abd and back  He states he feels better when he drinks water  No nausea or vomiting  He is moving flatus  No f/c no cp no sob  Objective:     Vitals:   Temp (24hrs), Av 5 °F (36 4 °C), Min:96 7 °F (35 9 °C), Max:98 9 °F (37 2 °C)    Temp:  [96 7 °F (35 9 °C)-98 9 °F (37 2 °C)] 97 4 °F (36 3 °C)  HR:  [108-126] 126  Resp:  [18-20] 20  BP: (108-134)/(64-78) 108/64  SpO2:  [92 %-96 %] 94 %  There is no height or weight on file to calculate BMI  Input and Output Summary (last 24 hours): Intake/Output Summary (Last 24 hours) at 3/17/2022 1205  Last data filed at 3/17/2022 8515  Gross per 24 hour   Intake 4623 33 ml   Output --   Net 4623 33 ml       Physical Exam:   Physical Exam  Constitutional:       Appearance: Normal appearance  HENT:      Head: Normocephalic and atraumatic  Eyes:      Extraocular Movements: Extraocular movements intact  Pupils: Pupils are equal, round, and reactive to light  Cardiovascular:      Rate and Rhythm: Regular rhythm  Tachycardia present  Heart sounds: No murmur heard  No friction rub  No gallop  Pulmonary:      Effort: Pulmonary effort is normal  No respiratory distress  Breath sounds: Normal breath sounds  No wheezing or rales  Abdominal:      General: There is distension  Palpations: Abdomen is soft  Tenderness: There is abdominal tenderness  There is guarding   There is no rebound  Comments: Decrease bowel sounds  Diffuse ttp   Musculoskeletal:      Right lower leg: No edema  Left lower leg: No edema  Neurological:      Mental Status: He is alert and oriented to person, place, and time  Additional Data:     Labs:  Results from last 7 days   Lab Units 03/17/22  0439 03/15/22  0459 03/14/22  0427   WBC Thousand/uL 19 07*   < > 5 65   HEMOGLOBIN g/dL 15 4   < > 15 7   HEMATOCRIT % 44 2   < > 46 0   PLATELETS Thousands/uL 192   < > 320   NEUTROS PCT %  --   --  59   LYMPHS PCT %  --   --  26   MONOS PCT %  --   --  10   EOS PCT %  --   --  4    < > = values in this interval not displayed       Results from last 7 days   Lab Units 03/17/22  0439   SODIUM mmol/L 138   POTASSIUM mmol/L 3 9   CHLORIDE mmol/L 103   CO2 mmol/L 25   BUN mg/dL 13   CREATININE mg/dL 0 99   ANION GAP mmol/L 10   CALCIUM mg/dL 7 3*   ALBUMIN g/dL 2 7*   TOTAL BILIRUBIN mg/dL 3 89*   ALK PHOS U/L 54   ALT U/L 176*   AST U/L 49*   GLUCOSE RANDOM mg/dL 93                       Recent Cultures (last 7 days):           Lines/Drains:  Invasive Devices  Report    Peripheral Intravenous Line            Peripheral IV 03/13/22 Right Hand 4 days              Last 24 Hours Medication List:   Current Facility-Administered Medications   Medication Dose Route Frequency Provider Last Rate    acetaminophen  650 mg Oral Q6H PRN Robinson Sandhoff, PA-C      albuterol  2 puff Inhalation Q6H PRN Robinson Sandhoff, PA-C      bisacodyl  10 mg Rectal Daily PRN Angel Tang DO      calcium carbonate  1,000 mg Oral Daily PRN Robinson Sandhoff, PA-C      docusate sodium  100 mg Oral BID Azalea Ambron, DO      lactulose  30 g Oral Once Azalea Ambron, DO      levofloxacin  500 mg Intravenous Q24H Azalea Ambron, DO Stopped (03/16/22 1354)    metroNIDAZOLE  500 mg Intravenous Q8H Azalea Ambron,  mg (03/17/22 0533)    morphine injection  4 mg Intravenous Q3H PRN Angel Shake, DO      multi-electrolyte 200 mL/hr Intravenous Continuous Sylwia Alexis  mL/hr (03/17/22 0941)    ondansetron  4 mg Intravenous Q6H PRN Jaylene Dias PA-C      oxyCODONE  7 5 mg Oral Q6H PRN Juany Don,       Or    oxyCODONE  5 mg Oral Q6H PRN Aazlea Najeraron, DO      pantoprazole  40 mg Intravenous Q12H [address] Azalea Ambron, DO      polyethylene glycol  17 g Oral Daily PRN Azalea Ambron, DO      sodium phosphate-biphosphate  1 enema Rectal Daily PRN Azalea Ambron, DO      sucralfate  1 g Oral Q6H PRN Susana Villafana PA-C          Today, Patient Was Seen By: Juany Don DO

## 2022-03-17 NOTE — ASSESSMENT & PLAN NOTE
Wbc increased to 19 but likely reactive due to ercp induced pancreatitis and constipation that is worsened  Pt is on levaquin and flagyl for possible choledocholithiasis  Ercp was negative for this     Will check lactic acid, blood cultures and procal  Follow up with wbc in am     Ct abd/pelvis pending

## 2022-03-17 NOTE — ASSESSMENT & PLAN NOTE
No bm for 1 week due to narcotic use and possible ileus   Ct abd/pelvis pending  Will trial lactulose  Write for enema and suppository if no improvement

## 2022-03-18 LAB
ALBUMIN SERPL BCP-MCNC: 2.4 G/DL (ref 3.5–5)
ALP SERPL-CCNC: 52 U/L (ref 46–116)
ALT SERPL W P-5'-P-CCNC: 118 U/L (ref 12–78)
ANION GAP SERPL CALCULATED.3IONS-SCNC: 11 MMOL/L (ref 4–13)
AST SERPL W P-5'-P-CCNC: 40 U/L (ref 5–45)
BILIRUB SERPL-MCNC: 3.24 MG/DL (ref 0.2–1)
BUN SERPL-MCNC: 10 MG/DL (ref 5–25)
CALCIUM ALBUM COR SERPL-MCNC: 8.8 MG/DL (ref 8.3–10.1)
CALCIUM SERPL-MCNC: 7.5 MG/DL (ref 8.3–10.1)
CHLORIDE SERPL-SCNC: 102 MMOL/L (ref 100–108)
CO2 SERPL-SCNC: 25 MMOL/L (ref 21–32)
CREAT SERPL-MCNC: 0.87 MG/DL (ref 0.6–1.3)
ERYTHROCYTE [DISTWIDTH] IN BLOOD BY AUTOMATED COUNT: 13.2 % (ref 11.6–15.1)
GFR SERPL CREATININE-BSD FRML MDRD: 114 ML/MIN/1.73SQ M
GLUCOSE SERPL-MCNC: 92 MG/DL (ref 65–140)
HCT VFR BLD AUTO: 43.2 % (ref 36.5–49.3)
HGB BLD-MCNC: 14.4 G/DL (ref 12–17)
MCH RBC QN AUTO: 30.4 PG (ref 26.8–34.3)
MCHC RBC AUTO-ENTMCNC: 33.3 G/DL (ref 31.4–37.4)
MCV RBC AUTO: 91 FL (ref 82–98)
PLATELET # BLD AUTO: 150 THOUSANDS/UL (ref 149–390)
PMV BLD AUTO: 10.9 FL (ref 8.9–12.7)
POTASSIUM SERPL-SCNC: 3.8 MMOL/L (ref 3.5–5.3)
PROCALCITONIN SERPL-MCNC: 0.82 NG/ML
PROT SERPL-MCNC: 6.2 G/DL (ref 6.4–8.2)
RBC # BLD AUTO: 4.74 MILLION/UL (ref 3.88–5.62)
SODIUM SERPL-SCNC: 138 MMOL/L (ref 136–145)
WBC # BLD AUTO: 20.23 THOUSAND/UL (ref 4.31–10.16)

## 2022-03-18 PROCEDURE — 84145 PROCALCITONIN (PCT): CPT | Performed by: INTERNAL MEDICINE

## 2022-03-18 PROCEDURE — 85027 COMPLETE CBC AUTOMATED: CPT | Performed by: INTERNAL MEDICINE

## 2022-03-18 PROCEDURE — 99232 SBSQ HOSP IP/OBS MODERATE 35: CPT | Performed by: SURGERY

## 2022-03-18 PROCEDURE — C9113 INJ PANTOPRAZOLE SODIUM, VIA: HCPCS | Performed by: INTERNAL MEDICINE

## 2022-03-18 PROCEDURE — 99223 1ST HOSP IP/OBS HIGH 75: CPT | Performed by: INTERNAL MEDICINE

## 2022-03-18 PROCEDURE — 99232 SBSQ HOSP IP/OBS MODERATE 35: CPT | Performed by: INTERNAL MEDICINE

## 2022-03-18 PROCEDURE — 80053 COMPREHEN METABOLIC PANEL: CPT | Performed by: INTERNAL MEDICINE

## 2022-03-18 RX ORDER — LIDOCAINE 50 MG/G
1 PATCH TOPICAL DAILY
Status: DISCONTINUED | OUTPATIENT
Start: 2022-03-18 | End: 2022-03-19

## 2022-03-18 RX ORDER — POLYETHYLENE GLYCOL 3350 17 G/17G
17 POWDER, FOR SOLUTION ORAL 2 TIMES DAILY
Status: DISCONTINUED | OUTPATIENT
Start: 2022-03-18 | End: 2022-03-20 | Stop reason: HOSPADM

## 2022-03-18 RX ORDER — OXYCODONE HYDROCHLORIDE 10 MG/1
10 TABLET ORAL EVERY 4 HOURS PRN
Status: DISCONTINUED | OUTPATIENT
Start: 2022-03-18 | End: 2022-03-20 | Stop reason: HOSPADM

## 2022-03-18 RX ORDER — OXYCODONE HYDROCHLORIDE 5 MG/1
5 TABLET ORAL EVERY 4 HOURS PRN
Status: DISCONTINUED | OUTPATIENT
Start: 2022-03-18 | End: 2022-03-20 | Stop reason: HOSPADM

## 2022-03-18 RX ADMIN — OXYCODONE HYDROCHLORIDE 7.5 MG: 5 TABLET ORAL at 03:23

## 2022-03-18 RX ADMIN — SODIUM CHLORIDE, SODIUM GLUCONATE, SODIUM ACETATE, POTASSIUM CHLORIDE, MAGNESIUM CHLORIDE, SODIUM PHOSPHATE, DIBASIC, AND POTASSIUM PHOSPHATE 200 ML/HR: .53; .5; .37; .037; .03; .012; .00082 INJECTION, SOLUTION INTRAVENOUS at 08:56

## 2022-03-18 RX ADMIN — BISACODYL 10 MG: 10 SUPPOSITORY RECTAL at 12:27

## 2022-03-18 RX ADMIN — CEFEPIME HYDROCHLORIDE 2000 MG: 2 INJECTION, POWDER, FOR SOLUTION INTRAVENOUS at 09:50

## 2022-03-18 RX ADMIN — MORPHINE SULFATE 4 MG: 4 INJECTION INTRAVENOUS at 17:47

## 2022-03-18 RX ADMIN — METHOCARBAMOL 500 MG: 500 TABLET ORAL at 06:16

## 2022-03-18 RX ADMIN — OXYCODONE HYDROCHLORIDE 10 MG: 10 TABLET ORAL at 08:57

## 2022-03-18 RX ADMIN — OXYCODONE HYDROCHLORIDE 10 MG: 10 TABLET ORAL at 20:44

## 2022-03-18 RX ADMIN — PANTOPRAZOLE SODIUM 40 MG: 40 INJECTION, POWDER, FOR SOLUTION INTRAVENOUS at 08:47

## 2022-03-18 RX ADMIN — MORPHINE SULFATE 4 MG: 4 INJECTION INTRAVENOUS at 10:45

## 2022-03-18 RX ADMIN — OXYCODONE HYDROCHLORIDE 10 MG: 10 TABLET ORAL at 12:31

## 2022-03-18 RX ADMIN — POLYETHYLENE GLYCOL 3350 17 G: 17 POWDER, FOR SOLUTION ORAL at 08:51

## 2022-03-18 RX ADMIN — MORPHINE SULFATE 4 MG: 4 INJECTION INTRAVENOUS at 01:38

## 2022-03-18 RX ADMIN — LIDOCAINE 1 PATCH: 50 PATCH CUTANEOUS at 08:58

## 2022-03-18 RX ADMIN — METRONIDAZOLE 500 MG: 500 INJECTION, SOLUTION INTRAVENOUS at 22:31

## 2022-03-18 RX ADMIN — METHOCARBAMOL 500 MG: 500 TABLET ORAL at 19:39

## 2022-03-18 RX ADMIN — DOCUSATE SODIUM 100 MG: 100 CAPSULE ORAL at 17:47

## 2022-03-18 RX ADMIN — MORPHINE SULFATE 4 MG: 4 INJECTION INTRAVENOUS at 07:31

## 2022-03-18 RX ADMIN — CEFEPIME HYDROCHLORIDE 2000 MG: 2 INJECTION, POWDER, FOR SOLUTION INTRAVENOUS at 21:35

## 2022-03-18 RX ADMIN — SODIUM CHLORIDE, SODIUM GLUCONATE, SODIUM ACETATE, POTASSIUM CHLORIDE, MAGNESIUM CHLORIDE, SODIUM PHOSPHATE, DIBASIC, AND POTASSIUM PHOSPHATE 200 ML/HR: .53; .5; .37; .037; .03; .012; .00082 INJECTION, SOLUTION INTRAVENOUS at 03:27

## 2022-03-18 RX ADMIN — POLYETHYLENE GLYCOL 3350 17 G: 17 POWDER, FOR SOLUTION ORAL at 21:00

## 2022-03-18 RX ADMIN — PANTOPRAZOLE SODIUM 40 MG: 40 INJECTION, POWDER, FOR SOLUTION INTRAVENOUS at 20:52

## 2022-03-18 RX ADMIN — OXYCODONE HYDROCHLORIDE 10 MG: 10 TABLET ORAL at 16:40

## 2022-03-18 RX ADMIN — SODIUM CHLORIDE, SODIUM GLUCONATE, SODIUM ACETATE, POTASSIUM CHLORIDE, MAGNESIUM CHLORIDE, SODIUM PHOSPHATE, DIBASIC, AND POTASSIUM PHOSPHATE 200 ML/HR: .53; .5; .37; .037; .03; .012; .00082 INJECTION, SOLUTION INTRAVENOUS at 14:28

## 2022-03-18 RX ADMIN — MORPHINE SULFATE 4 MG: 4 INJECTION INTRAVENOUS at 21:36

## 2022-03-18 RX ADMIN — METRONIDAZOLE 500 MG: 500 INJECTION, SOLUTION INTRAVENOUS at 14:27

## 2022-03-18 RX ADMIN — METHOCARBAMOL 500 MG: 500 TABLET ORAL at 00:25

## 2022-03-18 RX ADMIN — METRONIDAZOLE 500 MG: 500 INJECTION, SOLUTION INTRAVENOUS at 06:16

## 2022-03-18 RX ADMIN — MORPHINE SULFATE 4 MG: 4 INJECTION INTRAVENOUS at 04:29

## 2022-03-18 RX ADMIN — DOCUSATE SODIUM 100 MG: 100 CAPSULE ORAL at 08:47

## 2022-03-18 RX ADMIN — MORPHINE SULFATE 4 MG: 4 INJECTION INTRAVENOUS at 14:27

## 2022-03-18 RX ADMIN — SODIUM CHLORIDE, SODIUM GLUCONATE, SODIUM ACETATE, POTASSIUM CHLORIDE, MAGNESIUM CHLORIDE, SODIUM PHOSPHATE, DIBASIC, AND POTASSIUM PHOSPHATE 150 ML/HR: .53; .5; .37; .037; .03; .012; .00082 INJECTION, SOLUTION INTRAVENOUS at 22:32

## 2022-03-18 NOTE — PROGRESS NOTES
Progress Note - General Surgery   Mel Cervantes 32 y o  male MRN: [id number]  Unit/Bed#: E2 -01 Encounter: [id number]    Assessment/Plan    35-year-old male post ERCP with CT findings of inflammation of gallbladder, duodenum, and pancreas    Mildly tachycardic, this has been persistent and 2/2 pain, otherwise afebrile and VSS  Persistent leukocytosis, 20 today (19)(15)  Elevated procalcitonin  No growth electrolyte abnormalities  Transaminases and total bilirubin trending normal, post ERCP  Lipase markedly improved, 1126 from 14,970 yesterday    CT findings 03/17/2022 revealed nondistended gallbladder with surrounding inflammatory changes, thickened inflamed duodenum, peripancreatic inflammatory changes    On exam, abdomen is obese, hypoactive but bowel sounds are appreciated x4, soft and mildly tender in bilateral upper quadrants mostly in epigastrium  No guarding or rigidity  Radiographic findings likely represent reactive inflammation from post ERCP pancreatitis  Patient remains afebrile  Add cefepime to antibiotic regimen, patient states this antibiotic helped with previous episodes  Will continue conservative management at this time  Trend CBC  D/w attending      /74 (BP Location: Left arm)   Pulse (!) 108   Temp 98 1 °F (36 7 °C) (Tympanic)   Resp 18   SpO2 97%     Labs in chart were reviewed    Results from last 7 days   Lab Units 03/18/22  0610   WBC Thousand/uL 20 23*   HEMOGLOBIN g/dL 14 4   HEMATOCRIT % 43 2   PLATELETS Thousands/uL 150     Results from last 7 days   Lab Units 03/18/22  0610   POTASSIUM mmol/L 3 8   CHLORIDE mmol/L 102   CO2 mmol/L 25   BUN mg/dL 10   CREATININE mg/dL 0 87     Results from last 7 days   Lab Units 03/18/22  0610   AST U/L 40   ALT U/L 118*   ALK PHOS U/L 52   TOTAL BILIRUBIN mg/dL 3 24*   ALBUMIN g/dL 2 4*         No intake or output data in the 24 hours ending 03/18/22 0942        Subjective/Objective     Subjective:  Patient complains of some epigastric abdominal pain and constipation  He did pass a bowel yesterday after receiving, started MiraLax today  He denies any subjective fevers or chills, chest pain, shortness of breath, nausea or vomiting      Review of Systems - History obtained from the patient  General ROS: negative for - chills or fever  Respiratory ROS: negative for - cough, shortness of breath or wheezing  Cardiovascular ROS: no chest pain or dyspnea on exertion  Gastrointestinal ROS:  As above  Genito-Urinary ROS: no dysuria, trouble voiding, or hematuria       Objective:     Physical Exam:  /74 (BP Location: Left arm)   Pulse (!) 108   Temp 98 1 °F (36 7 °C) (Tympanic)   Resp 18   SpO2 97%   General appearance: alert and oriented, in no acute distress  Head: Normocephalic, without obvious abnormality, atraumatic  Eyes: Sclerae anicteric  Neck: supple, symmetrical, trachea midline  Lungs: clear to auscultation bilaterally  Heart: regular rate and rhythm, S1, S2 normal, no murmur, click, rub or gallop  Abdomen: As above  Extremities: extremities normal, warm and well-perfused; no cyanosis, clubbing, or edema  Skin: Skin color, texture, turgor normal  No rashes or lesions  Neurologic: Grossly normal      Gayatri Carvajal PA-C  3/18/2022

## 2022-03-18 NOTE — PLAN OF CARE
Problem: PAIN - ADULT  Goal: Verbalizes/displays adequate comfort level or baseline comfort level  Description: Interventions:  - Encourage patient to monitor pain and request assistance  - Assess pain using appropriate pain scale  - Administer analgesics based on type and severity of pain and evaluate response  - Implement non-pharmacological measures as appropriate and evaluate response  - Consider cultural and social influences on pain and pain management  - Notify physician/advanced practitioner if interventions unsuccessful or patient reports new pain  Outcome: Progressing     Problem: INFECTION - ADULT  Goal: Absence or prevention of progression during hospitalization  Description: INTERVENTIONS:  - Assess and monitor for signs and symptoms of infection  - Monitor lab/diagnostic results  - Monitor all insertion sites, i e  indwelling lines, tubes, and drains  - Monitor endotracheal if appropriate and nasal secretions for changes in amount and color  - [place] appropriate cooling/warming therapies per order  - Administer medications as ordered  - Instruct and encourage patient and family to use good hand hygiene technique  - Identify and instruct in appropriate isolation precautions for identified infection/condition  Outcome: Progressing  Goal: Absence of fever/infection during neutropenic period  Description: INTERVENTIONS:  - Monitor WBC    Outcome: Progressing     Problem: SAFETY ADULT  Goal: Maintain or return to baseline ADL function  Description: INTERVENTIONS:  -  Assess patient's ability to carry out ADLs; assess patient's baseline for ADL function and identify physical deficits which impact ability to perform ADLs (bathing, care of mouth/teeth, toileting, grooming, dressing, etc )  - Assess/evaluate cause of self-care deficits   - Assess range of motion  - Assess patient's mobility; develop plan if impaired  - Assess patient's need for assistive devices and provide as appropriate  - Encourage maximum independence but intervene and supervise when necessary  - Involve family in performance of ADLs  - Assess for home care needs following discharge   - Consider OT consult to assist with ADL evaluation and planning for discharge  - Provide patient education as appropriate  Outcome: Progressing     Problem: Knowledge Deficit  Goal: Patient/family/caregiver demonstrates understanding of disease process, treatment plan, medications, and discharge instructions  Description: Complete learning assessment and assess knowledge base  Interventions:  - Provide teaching at level of understanding  - Provide teaching via preferred learning methods  Outcome: Progressing     Problem: Nutrition/Hydration-ADULT  Goal: Nutrient/Hydration intake appropriate for improving, restoring or maintaining nutritional needs  Description: Monitor and assess patient's nutrition/hydration status for malnutrition  Collaborate with interdisciplinary team and initiate plan and interventions as ordered  Monitor patient's weight and dietary intake as ordered or per policy  Utilize nutrition screening tool and intervene as necessary  Determine patient's food preferences and provide high-protein, high-caloric foods as appropriate       INTERVENTIONS:  - Monitor oral intake, urinary output, labs, and treatment plans  - Assess nutrition and hydration status and recommend course of action  - Evaluate amount of meals eaten  - Assist patient with eating if necessary   - Allow adequate time for meals  - Recommend/ encourage appropriate diets, oral nutritional supplements, and vitamin/mineral supplements  - Order, calculate, and assess calorie counts as needed  - Recommend, monitor, and adjust tube feedings and TPN/PPN based on assessed needs  - Assess need for intravenous fluids  - Provide specific nutrition/hydration education as appropriate  - Include patient/family/caregiver in decisions related to nutrition  Outcome: Progressing     Problem: Potential for Falls  Goal: Patient will remain free of falls  Description: INTERVENTIONS:  - Educate patient/family on patient safety including physical limitations  - Instruct patient to call for assistance with activity   - Consult OT/PT to assist with strengthening/mobility   - Keep Call bell within reach  - Keep bed low and locked with side rails adjusted as appropriate  - Keep care items and personal belongings within reach  - Initiate and maintain comfort rounds  - Make Fall Risk Sign visible to staff  - Obtain necessary fall risk management equipment  - Apply yellow socks and bracelet for high fall risk patients  - Consider moving patient to room near nurses station  Outcome: Progressing

## 2022-03-18 NOTE — ASSESSMENT & PLAN NOTE
Wbc increased to 20 but likely reactive due to ercp induced pancreatitis and constipation that is worsened  Ct revealed acute cholecystitis  He had been on levaquin/flagyl for 5 days  Blood cultures pending  Consulted ID  Changed to cefepime and flagyl  Will discuss if pt will require percutaneous chester tube but lfts are improving

## 2022-03-18 NOTE — ASSESSMENT & PLAN NOTE
· Patient presented to ED with complaint of back pain  · Found have gallstones with no signs of cholecystitis  · Does have prior admission for similar complaint, plan was for outpatient cholecystectomy but this was never performed  · Appreciate gi recommendations  · Unable to obtain mrcp  S/p ercp  No stones or sludge but dilation obtained   · Continue antibiotics but change to cefepime and flagyl  · Seen in consult by surgery, no acute surgical intervention during hospital stay  Follow up outpt  Awaiting surgery follow up     · Continue PRN analgesics

## 2022-03-18 NOTE — PROGRESS NOTES
Progress Note - Ani Wilson 32 y o  male MRN: [id number]    Unit/Bed#: E2 -01 Encounter: [id number]         Assessment/ Plan:  Choledocholithiasis  Post ERCP Pancreatitis    Patient presented with abdominal pain, found to have elevated liver enzymes, status post ERCP for choledocholithiasis  Unfortunately patient developed post ERCP pancreatitis, lipase has improve as are his LFTs  He continues to complain of abdominal pain  He did not eat this morning because of pain  He has minimal bowel sounds  He is getting morphine q 3 hours  He did have a small bowel movement after an enema yesterday  CT was reviewed, mild stool in the right colon, pain may be secondary to pancreatitis with underlying ileus   -continue clears  -limit narcotics  -out of bed  -follow LFTs      Subjective:   Pt continues to have abdominal pain  Small BM after enema  Objective:     Vitals: Blood pressure 122/74, pulse (!) 108, temperature 98 1 °F (36 7 °C), temperature source Tympanic, resp  rate 18, SpO2 97 %  ,There is no height or weight on file to calculate BMI  Physical Exam: General appearance: alert and oriented, in no acute distress  Abdomen: decreased/absent BS, TTP diffusely    Invasive Devices  Report    Peripheral Intravenous Line            Peripheral IV 03/17/22 Distal;Right;Upper;Ventral (anterior) Arm <1 day                Lab, Imaging and other studies: I have personally reviewed pertinent reports       CBC:   Lab Results   Component Value Date    WBC 20 23 (H) 03/18/2022    HGB 14 4 03/18/2022    HCT 43 2 03/18/2022    MCV 91 03/18/2022     03/18/2022    MCH 30 4 03/18/2022    MCHC 33 3 03/18/2022    RDW 13 2 03/18/2022    MPV 10 9 03/18/2022   ,   CMP:   Lab Results   Component Value Date    K 3 8 03/18/2022     03/18/2022    CO2 25 03/18/2022    BUN 10 03/18/2022    CREATININE 0 87 03/18/2022    CALCIUM 7 5 (L) 03/18/2022    AST 40 03/18/2022     (H) 03/18/2022    ALKPHOS 52 03/18/2022    EGFR 114 03/18/2022   ,   Lipase:   Lab Results   Component Value Date    LIPASE 1,126 (H) 03/17/2022   ,

## 2022-03-18 NOTE — CONSULTS
Consultation - Infectious Disease   Carlo Perez 32 y o  male MRN: [id number]  Unit/Bed#: E2 -01 Encounter: [id number]    IMPRESSION & RECOMMENDATIONS:   1  SIRS vs sepsis  POA  Tachycardia and leukocytosis  Secondary to choledocholithiasis  Now with new increased to WBC count  Blood cultures were sent  Repeat imaging suggesting post ERCP pancreatitis  Despite his presentation he fortunately remains hemodynamically stable and nontoxic  WBC count appears to be plateauing   -antibiotic as below  -check CBCD and CMP tomorrow  -follow-up blood cultures  -monitor vitals  -supportive care    2  Post ERCP pancreatitis  Lipase appears to have peaked at 14,970  It has improved to 1126 today  His LFTs and T bili are additionally improving  Blood cultures were sent yesterday but with his lack of fever I have low suspicion for bacteremia  The patient has been receiving Levaquin and Flagyl  I will transition him to cefepime and Flagyl now in anticipation of a short course of antibiotic treatment   -stop IV Levaquin  -start IV cefepime  -continue Flagyl but transition from IV administration to oral when he is tolerating PO intake  -check CBCD and CMP tomorrow  -follow-up blood cultures  -trend lipase per GI  -serial abdominal exams  -monitor GI symptoms  -continue follow-up with GI    3  Choledocholithiasis  Patient is status post ERCP  He has been told he will eventually require cholecystectomy  Now with complication of post ERCP pancreatitis above   -serial abdominal exams  -monitor GI symptoms  -continue follow-up GI    4  Abdominal pain  In the setting of recent choledocholithiasis  He is also concerned about constipation although he has had minimal PO intake in the past week  Consider ileus in the setting of frequent morphine  Encourage reduce use of narcotic pain medication    He has been started on aggressive bowel regimen   -serial abdominal exams  -monitor GI symptoms  -continue follow-up GI    5  Obesity  BMI = 41 06  We will continue to follow along with the patient  Above plan was discussed in detail with patient at the bedside  Above plan was discussed in detail with SLIM  HISTORY OF PRESENT ILLNESS:  Reason for Consult:  Cholecystitis  HPI: Adonis Díaz is a 32y o  year old male who presented to the [facility] on 03/13/2022 with chest and back pain  Patient reported this had happened before and the source was his gallbladder  He has known gallstones  Patient reports his pain had associated nausea and shortness of breath  Melina [place] Upon arrival to the ED the patient's temperature was 97 9° F  Heart rate was 121  Respiratory rate was 18  O2 saturation was 96% on room air  His BP was 159/72  Patient's WBC count was 10 72  Creatinine was 0 98 with a   AST and ALT were elevated  Total bilirubin was elevated  He was taken for a chest x-ray which showed no acute infectious cardiopulmonary findings  He was taken for CT of the abdomen/pelvis which suggested early cholecystitis  He did experience vomiting in the ED  The patient was admitted for additional medical management  After his admission he was assessed by General surgery  No plans for surgical intervention at that time  Gastroenterology assessed the patient  He underwent ERCP and was told he would eventually require cholecystectomy  Postprocedure patient experienced intense ongoing abdominal pain  He required high doses of IV morphine  Patient's lipase increased  There was concern for posterior ERCP pancreatitis  He has also reported constipation with no bowel movement x6 days although he has had very limited oral intake and there is only small stool accumulation in his most recent imaging  We have been asked for formal consult for cholecystitis  The patient has GERD, orthostatic hypotension, allergic dermatitis, asthma, allergic rhinitis, obesity, and gallstones    He has a history of nasal bone fracture, rectal bleeding, hematuria, and wrist surgery  He is a former smoker  He has allergies to penicillins and sulfa drugs although he cannot recall the penicillin allergy and thinks it was more of an issue with the sulfa drug  REVIEW OF SYSTEMS:  The patient reports he is still having an incredible amount of abdominal pain  He tells me he is due for another dose of morphine in 10 minutes  He denies nausea and vomiting tells me he has had nothing to eat since last Saturday because he does not want make himself more sick  He reports a bowel movement yesterday after receiving an enema  He tells me he drinks some MiraLax this morning and is hoping to have another bowel movement soon  He denies runny nose and sinus congestion  He tells me his throat has been a bit sore since his procedure  He denies chills and shakes but has been feeling hot and flushed since he arrived at the hospital   He tells me sometimes he uses an ice pack to cool himself down  He denies cough, difficulty breathing, chest pain, and shortness of breath  He denies dysuria  He denies rashes, lesions, and wounds to his skin  A complete 12 point system-based review of systems is otherwise negative      PAST MEDICAL HISTORY:  Past Medical History:   Diagnosis Date    Fracture of nasal bone     GERD (gastroesophageal reflux disease)     Gross hematuria     Orthostatic hypotension      Past Surgical History:   Procedure Laterality Date    WRIST SURGERY       FAMILY HISTORY:  Non-contributory    SOCIAL HISTORY:  Social History   Social History     Substance and Sexual Activity   Alcohol Use No     Social History     Substance and Sexual Activity   Drug Use No     Social History     Tobacco Use   Smoking Status Former Smoker    Quit date: 2014    Years since quittin 2   Smokeless Tobacco Never Used     ALLERGIES:  Allergies   Allergen Reactions    Penicillins      UNKNOWN    Sulfa Antibiotics      UNKNOWN MEDICATIONS:  All current active medications have been reviewed  ANTIBIOTICS:  Levaquin - stop  Cefepime 1  Flagyl 5  Antibiotics 5    PHYSICAL EXAM:  Temp:  [97 7 °F (36 5 °C)-98 5 °F (36 9 °C)] 98 1 °F (36 7 °C)  HR:  [108-112] 108  Resp:  [18-20] 18  BP: (109-133)/(66-84) 122/74  SpO2:  [90 %-97 %] 97 %  Temp (24hrs), Av 1 °F (36 7 °C), Min:97 7 °F (36 5 °C), Max:98 5 °F (36 9 °C)  Current: Temperature: 98 1 °F (36 7 °C)  No intake or output data in the 24 hours ending 22 1035    General Appearance:  Appearing well, nontoxic, and in no distress  Reported pain does not match his clinical presentation  He appears comfortable sitting up in bed  Head:  Normocephalic, without obvious abnormality, atraumatic  Eyes:  Conjunctiva pink and sclera anicteric, both eyes  Nose: Nares normal, mucosa normal, no drainage  Throat: Oropharynx moist without lesions  Neck: Supple, symmetrical, no adenopathy, no tenderness/mass/nodules  Lungs:   Clear to auscultation bilaterally, respirations unlabored on room air  Chest Wall:  No tenderness or deformity  Heart:  Tachycardic; no murmur, rub or gallop  Abdomen:   Soft, obese, mildly distended, very hypoactive bowel sounds  He is tender with palpation of the middle epigastric region, the upper epigastric region, and the right upper quadrant  Extremities: No cyanosis or clubbing, no edema  Skin: No rashes or lesions  No draining wounds noted  Lymph nodes: Cervical, supraclavicular nodes normal    Neurologic: Alert and oriented times 3, follows commands, speech is organized and appropriate, symmetric facial movement  LABS, IMAGING, & OTHER STUDIES:  Lab Results:  I have personally reviewed pertinent labs    Results from last 7 days   Lab Units 22  0610 22  0439 22  0503   WBC Thousand/uL 20 23* 19 07* 15 38*   HEMOGLOBIN g/dL 14 4 15 4 16 1   PLATELETS Thousands/uL 150 192 317     Results from last 7 days   Lab Units 03/18/22  0610 03/17/22  0439 03/17/22  0439 03/16/22  0503 03/16/22  0503   POTASSIUM mmol/L 3 8   < > 3 9   < > 3 9   CHLORIDE mmol/L 102   < > 103   < > 100   CO2 mmol/L 25   < > 25   < > 26   BUN mg/dL 10   < > 13   < > 6   CREATININE mg/dL 0 87   < > 0 99   < > 0 99   EGFR ml/min/1 73sq m 114   < > 101   < > 101   CALCIUM mg/dL 7 5*   < > 7 3*   < > 9 1   AST U/L 40  --  49*  --  108*   ALT U/L 118*   < > 176*   < > 306*   ALK PHOS U/L 52   < > 54   < > 67    < > = values in this interval not displayed  Results from last 7 days   Lab Units 03/17/22  1320 03/17/22  1311   BLOOD CULTURE  Received in Microbiology Lab  Culture in Progress  Received in Microbiology Lab  Culture in Progress  Results from last 7 days   Lab Units 03/18/22  0610 03/17/22  1312   PROCALCITONIN ng/ml 0 82* 0 79*     Imaging Studies:   I have personally reviewed pertinent imaging study reports and images in PACS  CT ABDOMEN PELVIS W CONTRAST 3/17/2022 - I personally reviewed this image which showed a nondistended gallbladder with surrounding inflammatory changes  His liver, spleen, and appendix appear unremarkable  There are peripancreatic inflammatory changes extending anteriorly to the abdomen  There is thickening of the duodenum with surrounding inflammatory changes  There is a small amount of pelvic fluid  US RIGHT UPPER QUADRANT 3/13/2022 - I personally reviewed this image which showed mild hepatomegaly with fatty infiltration  Patient has choledocholithiasis  CT ABDOMEN PELVIS WITH CONTRAST 3/13/2022 - I personally reviewed this image which showed a markedly distended and debris-filled stomach  Gallbladder was distended and there were noncalcified gallstones within it  Mild hyperemia around the gallbladder neck  Patient's hepatomegaly with fatty infiltration  Other Studies:   Blood cultures collected on 03/17/2022 are pending

## 2022-03-18 NOTE — PLAN OF CARE
Problem: PAIN - ADULT  Goal: Verbalizes/displays adequate comfort level or baseline comfort level  Description: Interventions:  - Encourage patient to monitor pain and request assistance  - Assess pain using appropriate pain scale  - Administer analgesics based on type and severity of pain and evaluate response  - Implement non-pharmacological measures as appropriate and evaluate response  - Consider cultural and social influences on pain and pain management  - Notify physician/advanced practitioner if interventions unsuccessful or patient reports new pain  Outcome: Progressing     Problem: INFECTION - ADULT  Goal: Absence or prevention of progression during hospitalization  Description: INTERVENTIONS:  - Assess and monitor for signs and symptoms of infection  - Monitor lab/diagnostic results  - Monitor all insertion sites, i e  indwelling lines, tubes, and drains  - Monitor endotracheal if appropriate and nasal secretions for changes in amount and color  - [place] appropriate cooling/warming therapies per order  - Administer medications as ordered  - Instruct and encourage patient and family to use good hand hygiene technique  - Identify and instruct in appropriate isolation precautions for identified infection/condition  Outcome: Progressing  Goal: Absence of fever/infection during neutropenic period  Description: INTERVENTIONS:  - Monitor WBC    Outcome: Progressing     Problem: SAFETY ADULT  Goal: Patient will remain free of falls  Description: INTERVENTIONS:  - Educate patient/family on patient safety including physical limitations  - Instruct patient to call for assistance with activity   - Consult OT/PT to assist with strengthening/mobility   - Keep Call bell within reach  - Keep bed low and locked with side rails adjusted as appropriate  - Keep care items and personal belongings within reach  - Initiate and maintain comfort rounds  - Make Fall Risk Sign visible to staff  - Offer Toileting every 2 Hours, in advance of need  - Apply yellow socks and bracelet for high fall risk patients  - Consider moving patient to room near nurses station  Outcome: Progressing  Goal: Maintain or return to baseline ADL function  Description: INTERVENTIONS:  -  Assess patient's ability to carry out ADLs; assess patient's baseline for ADL function and identify physical deficits which impact ability to perform ADLs (bathing, care of mouth/teeth, toileting, grooming, dressing, etc )  - Assess/evaluate cause of self-care deficits   - Assess range of motion  - Assess patient's mobility; develop plan if impaired  - Assess patient's need for assistive devices and provide as appropriate  - Encourage maximum independence but intervene and supervise when necessary  - Involve family in performance of ADLs  - Assess for home care needs following discharge   - Consider OT consult to assist with ADL evaluation and planning for discharge  - Provide patient education as appropriate  Outcome: Progressing  Goal: Maintains/Returns to pre admission functional level  Description: INTERVENTIONS:  - Perform BMAT or MOVE assessment daily    - Set and communicate daily mobility goal to care team and patient/family/caregiver  - Collaborate with rehabilitation services on mobility goals if consulted  - Perform Range of Motion 3 times a day  - Reposition patient every 2 hours    - Dangle patient 3 times a day  - Stand patient 3 times a day  - Ambulate patient 3 times a day  - Out of bed to chair 3 times a day   - Out of bed for meals 3 times a day  - Out of bed for toileting  - Record patient progress and toleration of activity level   Outcome: Progressing     Problem: DISCHARGE PLANNING  Goal: Discharge to home or other facility with appropriate resources  Description: INTERVENTIONS:  - Identify barriers to discharge w/patient and caregiver  - Arrange for needed discharge resources and transportation as appropriate  - Identify discharge learning needs (meds, wound care, etc )  - Arrange for interpretive services to assist at discharge as needed  - Refer to Case Management Department for coordinating discharge planning if the patient needs post-hospital services based on physician/advanced practitioner order or complex needs related to functional status, cognitive ability, or social support system  Outcome: Progressing     Problem: Knowledge Deficit  Goal: Patient/family/caregiver demonstrates understanding of disease process, treatment plan, medications, and discharge instructions  Description: Complete learning assessment and assess knowledge base  Interventions:  - Provide teaching at level of understanding  - Provide teaching via preferred learning methods  Outcome: Progressing     Problem: Nutrition/Hydration-ADULT  Goal: Nutrient/Hydration intake appropriate for improving, restoring or maintaining nutritional needs  Description: Monitor and assess patient's nutrition/hydration status for malnutrition  Collaborate with interdisciplinary team and initiate plan and interventions as ordered  Monitor patient's weight and dietary intake as ordered or per policy  Utilize nutrition screening tool and intervene as necessary  Determine patient's food preferences and provide high-protein, high-caloric foods as appropriate       INTERVENTIONS:  - Monitor oral intake, urinary output, labs, and treatment plans  - Assess nutrition and hydration status and recommend course of action  - Evaluate amount of meals eaten  - Assist patient with eating if necessary   - Allow adequate time for meals  - Recommend/ encourage appropriate diets, oral nutritional supplements, and vitamin/mineral supplements  - Order, calculate, and assess calorie counts as needed  - Recommend, monitor, and adjust tube feedings and TPN/PPN based on assessed needs  - Assess need for intravenous fluids  - Provide specific nutrition/hydration education as appropriate  - Include patient/family/caregiver in decisions related to nutrition  Outcome: Progressing     Problem: Potential for Falls  Goal: Patient will remain free of falls  Description: INTERVENTIONS:  - Educate patient/family on patient safety including physical limitations  - Instruct patient to call for assistance with activity   - Consult OT/PT to assist with strengthening/mobility   - Keep Call bell within reach  - Keep bed low and locked with side rails adjusted as appropriate  - Keep care items and personal belongings within reach  - Initiate and maintain comfort rounds  - Make Fall Risk Sign visible to staff  - Offer Toileting every 2 Hours, in advance of need  - Apply yellow socks and bracelet for high fall risk patients  - Consider moving patient to room near nurses station  Outcome: Progressing

## 2022-03-18 NOTE — ASSESSMENT & PLAN NOTE
· LFTs elevated at time of admission, , , T bili 2 25 with worsening in lfts: ast 40, alt 118, t bili 3 24  · Appreciate gi recommendations  · On levaquin and flagyl day 5: change to cefepime and flagyl due to elevating wbc  · Unable to obtain mrcp due to pts habitus   S/p ercp s/p dilation no stones or sludge  · Check cmp in am

## 2022-03-18 NOTE — ASSESSMENT & PLAN NOTE
No bm for 1 week due to narcotic use and possible ileus   Ct abd/pelvis reviewed  Small bm yesterday after enema  Will write for miralax bid

## 2022-03-18 NOTE — ASSESSMENT & PLAN NOTE
As a result of ercp  Lipase is 14,970- decreased to 1,100  Appreciate gi recommendations  Continue fluids  Continue clear liquids     Pain is more lower consistent with constipation and low back pain

## 2022-03-18 NOTE — PROGRESS NOTES
[address] [facility]  Progress Note - Eze Carlo 1990, 32 y o  male MRN: [id number]  Unit/Bed#: E2 -01 Encounter: [id number]  Primary Care Provider: Karuna Caballero DO   Date and time admitted to hospital: 3/13/2022  3:09 AM    Constipation  Assessment & Plan  No bm for 1 week due to narcotic use and possible ileus   Ct abd/pelvis reviewed  Small bm yesterday after enema  Will write for miralax bid  Acute pancreatitis  Assessment & Plan  As a result of ercp  Lipase is 14,970- decreased to 1,100  Appreciate gi recommendations  Continue fluids  Continue clear liquids  Pain is more lower consistent with constipation and low back pain      Transaminitis  Assessment & Plan  · LFTs elevated at time of admission, , , T bili 2 25 with worsening in lfts: ast 40, alt 118, t bili 3 24  · Appreciate gi recommendations  · On levaquin and flagyl day 5: change to cefepime and flagyl due to elevating wbc  · Unable to obtain mrcp due to pts habitus  S/p ercp s/p dilation no stones or sludge  · Check cmp in am      SIRS (systemic inflammatory response syndrome) ([facility] [address] )  Assessment & Plan  Wbc increased to 20 but likely reactive due to ercp induced pancreatitis and constipation that is worsened  Ct revealed acute cholecystitis  He had been on levaquin/flagyl for 5 days  Blood cultures pending  Consulted ID  Changed to cefepime and flagyl  Will discuss if pt will require percutaneous chester tube but lfts are improving  Obesity  Assessment & Plan  · BMI 41    * Calculus of gallbladder without biliary obstruction  Assessment & Plan  · Patient presented to ED with complaint of back pain  · Found have gallstones with no signs of cholecystitis  · Does have prior admission for similar complaint, plan was for outpatient cholecystectomy but this was never performed  · Appreciate gi recommendations  · Unable to obtain mrcp  S/p ercp   No stones or sludge but dilation obtained   · Continue antibiotics but change to cefepime and flagyl  · Seen in consult by surgery, no acute surgical intervention during hospital stay  Follow up outpt  Awaiting surgery follow up  · Continue PRN analgesics      VTE Pharmacologic Prophylaxis: low risk, early ambulation     Time Spent for Care: 20 minutes  More than 50% of total time spent on counseling and coordination of care as described above  Current Length of Stay: 5 day(s)  Current Patient Status: Inpatient     Discharge Plan / Estimated Discharge Date: 48 to 72 hours depending on surgeries plan  Code Status: Level 1 - Full Code      Subjective:   Pt seen and examined  Pt states he is still having increased pain  It is in his lower back, lower abd  He states less in the upper abd but he feels bloated after he eats and this creates pain  Pt reports that the pain medications only help for a short amount of time  He had a small bm yesterday but nothing since  No f/c no cp no sob    Objective:     Vitals:   Temp (24hrs), Av 1 °F (36 7 °C), Min:97 7 °F (36 5 °C), Max:98 5 °F (36 9 °C)    Temp:  [97 7 °F (36 5 °C)-98 5 °F (36 9 °C)] 98 1 °F (36 7 °C)  HR:  [108-112] 108  Resp:  [18-20] 18  BP: (109-133)/(66-84) 122/74  SpO2:  [90 %-97 %] 97 %  There is no height or weight on file to calculate BMI  Input and Output Summary (last 24 hours):     No intake or output data in the 24 hours ending 22 1023    Physical Exam:   Physical Exam  Constitutional:       Appearance: Normal appearance  HENT:      Head: Normocephalic and atraumatic  Eyes:      Extraocular Movements: Extraocular movements intact  Pupils: Pupils are equal, round, and reactive to light  Cardiovascular:      Rate and Rhythm: Regular rhythm  Tachycardia present  Heart sounds: No murmur heard  No friction rub  No gallop  Pulmonary:      Effort: Pulmonary effort is normal  No respiratory distress  Breath sounds: Normal breath sounds   No wheezing or rales  Abdominal:      General: Bowel sounds are normal  There is distension  Palpations: Abdomen is soft  Tenderness: There is abdominal tenderness  There is no guarding or rebound  Musculoskeletal:      Right lower leg: No edema  Left lower leg: No edema  Neurological:      Mental Status: He is alert and oriented to person, place, and time  Additional Data:     Labs:  Results from last 7 days   Lab Units 03/18/22  0610 03/15/22  0459 03/14/22  0427   WBC Thousand/uL 20 23*   < > 5 65   HEMOGLOBIN g/dL 14 4   < > 15 7   HEMATOCRIT % 43 2   < > 46 0   PLATELETS Thousands/uL 150   < > 320   NEUTROS PCT %  --   --  59   LYMPHS PCT %  --   --  26   MONOS PCT %  --   --  10   EOS PCT %  --   --  4    < > = values in this interval not displayed  Results from last 7 days   Lab Units 03/18/22  0610   SODIUM mmol/L 138   POTASSIUM mmol/L 3 8   CHLORIDE mmol/L 102   CO2 mmol/L 25   BUN mg/dL 10   CREATININE mg/dL 0 87   ANION GAP mmol/L 11   CALCIUM mg/dL 7 5*   ALBUMIN g/dL 2 4*   TOTAL BILIRUBIN mg/dL 3 24*   ALK PHOS U/L 52   ALT U/L 118*   AST U/L 40   GLUCOSE RANDOM mg/dL 92                 Results from last 7 days   Lab Units 03/18/22  0610 03/17/22  1321 03/17/22  1312   LACTIC ACID mmol/L  --  1 3  --    PROCALCITONIN ng/ml 0 82*  --  0 79*     Recent Cultures (last 7 days):     Results from last 7 days   Lab Units 03/17/22  1320 03/17/22  1311   BLOOD CULTURE  Received in Microbiology Lab  Culture in Progress  Received in Microbiology Lab  Culture in Progress         Lines/Drains:  Invasive Devices  Report    Peripheral Intravenous Line            Peripheral IV 03/17/22 Distal;Right;Upper;Ventral (anterior) Arm <1 day              Last 24 Hours Medication List:   Current Facility-Administered Medications   Medication Dose Route Frequency Provider Last Rate    acetaminophen  650 mg Oral Q6H PRN Claudeen Barber, PA-C      albuterol  2 puff Inhalation Q6H PRN Alysa Alicea Zoe Medrano PA-C      bisacodyl  10 mg Rectal Daily PRN Danita Hancock,       calcium carbonate  1,000 mg Oral Daily PRN Nathalie Douglass PA-C      cefepime  2,000 mg Intravenous Q12H Azalea Ambron, DO 2,000 mg (03/18/22 0950)    docusate sodium  100 mg Oral BID Danita Hancock,       lidocaine  1 patch Topical Daily Azalea Ambron, DO      methocarbamol  500 mg Oral Q6H PRN Azalea Ambron, DO      metroNIDAZOLE  500 mg Intravenous Q8H Azalea Ambron,  mg (03/18/22 0616)    morphine injection  4 mg Intravenous Q3H PRN Danita Hancock DO      multi-electrolyte  200 mL/hr Intravenous Continuous Gary Muse  mL/hr (03/18/22 0856)    ondansetron  4 mg Intravenous Q6H PRN Nathalie Douglass PA-C      oxyCODONE  10 mg Oral Q4H PRN Danita Hancock DO      Or    oxyCODONE  5 mg Oral Q4H PRN Azalea Ambron, DO      pantoprazole  40 mg Intravenous Q12H [facility] Azalea Ambron, DO      polyethylene glycol  17 g Oral BID Azalea Ambron, DO      sodium phosphate-biphosphate  1 enema Rectal Daily PRN Azalea Ambron, DO      sucralfate  1 g Oral Q6H PRN William Hernandez PA-C          Today, Patient Was Seen By: Danita Hancock DO

## 2022-03-19 LAB
ALBUMIN SERPL BCP-MCNC: 2.5 G/DL (ref 3.5–5)
ALP SERPL-CCNC: 54 U/L (ref 46–116)
ALT SERPL W P-5'-P-CCNC: 94 U/L (ref 12–78)
ANION GAP SERPL CALCULATED.3IONS-SCNC: 12 MMOL/L (ref 4–13)
AST SERPL W P-5'-P-CCNC: 38 U/L (ref 5–45)
BILIRUB SERPL-MCNC: 2.31 MG/DL (ref 0.2–1)
BUN SERPL-MCNC: 9 MG/DL (ref 5–25)
CALCIUM ALBUM COR SERPL-MCNC: 9.4 MG/DL (ref 8.3–10.1)
CALCIUM SERPL-MCNC: 8.2 MG/DL (ref 8.3–10.1)
CHLORIDE SERPL-SCNC: 100 MMOL/L (ref 100–108)
CO2 SERPL-SCNC: 26 MMOL/L (ref 21–32)
CREAT SERPL-MCNC: 0.82 MG/DL (ref 0.6–1.3)
ERYTHROCYTE [DISTWIDTH] IN BLOOD BY AUTOMATED COUNT: 13.1 % (ref 11.6–15.1)
GFR SERPL CREATININE-BSD FRML MDRD: 117 ML/MIN/1.73SQ M
GLUCOSE SERPL-MCNC: 96 MG/DL (ref 65–140)
HCT VFR BLD AUTO: 41.7 % (ref 36.5–49.3)
HGB BLD-MCNC: 14.5 G/DL (ref 12–17)
MCH RBC QN AUTO: 30.7 PG (ref 26.8–34.3)
MCHC RBC AUTO-ENTMCNC: 34.8 G/DL (ref 31.4–37.4)
MCV RBC AUTO: 88 FL (ref 82–98)
PLATELET # BLD AUTO: 187 THOUSANDS/UL (ref 149–390)
POTASSIUM SERPL-SCNC: 3.4 MMOL/L (ref 3.5–5.3)
PROCALCITONIN SERPL-MCNC: 0.66 NG/ML
PROT SERPL-MCNC: 6.9 G/DL (ref 6.4–8.2)
RBC # BLD AUTO: 4.73 MILLION/UL (ref 3.88–5.62)
SODIUM SERPL-SCNC: 138 MMOL/L (ref 136–145)
WBC # BLD AUTO: 16.86 THOUSAND/UL (ref 4.31–10.16)

## 2022-03-19 PROCEDURE — 80053 COMPREHEN METABOLIC PANEL: CPT | Performed by: INTERNAL MEDICINE

## 2022-03-19 PROCEDURE — 84145 PROCALCITONIN (PCT): CPT | Performed by: INTERNAL MEDICINE

## 2022-03-19 PROCEDURE — C9113 INJ PANTOPRAZOLE SODIUM, VIA: HCPCS | Performed by: INTERNAL MEDICINE

## 2022-03-19 PROCEDURE — 85027 COMPLETE CBC AUTOMATED: CPT | Performed by: INTERNAL MEDICINE

## 2022-03-19 PROCEDURE — 99232 SBSQ HOSP IP/OBS MODERATE 35: CPT | Performed by: NURSE PRACTITIONER

## 2022-03-19 PROCEDURE — 99232 SBSQ HOSP IP/OBS MODERATE 35: CPT | Performed by: INTERNAL MEDICINE

## 2022-03-19 RX ORDER — POTASSIUM CHLORIDE 20 MEQ/1
40 TABLET, EXTENDED RELEASE ORAL ONCE
Status: COMPLETED | OUTPATIENT
Start: 2022-03-19 | End: 2022-03-19

## 2022-03-19 RX ORDER — LIDOCAINE 50 MG/G
2 PATCH TOPICAL DAILY
Status: DISCONTINUED | OUTPATIENT
Start: 2022-03-20 | End: 2022-03-20 | Stop reason: HOSPADM

## 2022-03-19 RX ADMIN — DOCUSATE SODIUM 100 MG: 100 CAPSULE ORAL at 08:24

## 2022-03-19 RX ADMIN — METHOCARBAMOL 500 MG: 500 TABLET ORAL at 15:52

## 2022-03-19 RX ADMIN — OXYCODONE HYDROCHLORIDE 10 MG: 10 TABLET ORAL at 13:26

## 2022-03-19 RX ADMIN — LIDOCAINE 1 PATCH: 50 PATCH CUTANEOUS at 08:24

## 2022-03-19 RX ADMIN — METHOCARBAMOL 500 MG: 500 TABLET ORAL at 03:19

## 2022-03-19 RX ADMIN — BISACODYL 10 MG: 10 SUPPOSITORY RECTAL at 05:28

## 2022-03-19 RX ADMIN — MORPHINE SULFATE 4 MG: 4 INJECTION INTRAVENOUS at 13:23

## 2022-03-19 RX ADMIN — METHOCARBAMOL 500 MG: 500 TABLET ORAL at 09:22

## 2022-03-19 RX ADMIN — DOCUSATE SODIUM 100 MG: 100 CAPSULE ORAL at 17:20

## 2022-03-19 RX ADMIN — METHOCARBAMOL 500 MG: 500 TABLET ORAL at 21:16

## 2022-03-19 RX ADMIN — MORPHINE SULFATE 4 MG: 4 INJECTION INTRAVENOUS at 22:28

## 2022-03-19 RX ADMIN — SODIUM CHLORIDE, SODIUM GLUCONATE, SODIUM ACETATE, POTASSIUM CHLORIDE, MAGNESIUM CHLORIDE, SODIUM PHOSPHATE, DIBASIC, AND POTASSIUM PHOSPHATE 150 ML/HR: .53; .5; .37; .037; .03; .012; .00082 INJECTION, SOLUTION INTRAVENOUS at 05:32

## 2022-03-19 RX ADMIN — MORPHINE SULFATE 4 MG: 4 INJECTION INTRAVENOUS at 10:06

## 2022-03-19 RX ADMIN — MORPHINE SULFATE 4 MG: 4 INJECTION INTRAVENOUS at 03:40

## 2022-03-19 RX ADMIN — POLYETHYLENE GLYCOL 3350 17 G: 17 POWDER, FOR SOLUTION ORAL at 21:16

## 2022-03-19 RX ADMIN — SODIUM PHOSPHATE 1 ENEMA: 7; 19 ENEMA RECTAL at 15:13

## 2022-03-19 RX ADMIN — POLYETHYLENE GLYCOL 3350 17 G: 17 POWDER, FOR SOLUTION ORAL at 08:31

## 2022-03-19 RX ADMIN — PANTOPRAZOLE SODIUM 40 MG: 40 INJECTION, POWDER, FOR SOLUTION INTRAVENOUS at 21:16

## 2022-03-19 RX ADMIN — MORPHINE SULFATE 4 MG: 4 INJECTION INTRAVENOUS at 00:39

## 2022-03-19 RX ADMIN — SODIUM CHLORIDE, SODIUM GLUCONATE, SODIUM ACETATE, POTASSIUM CHLORIDE, MAGNESIUM CHLORIDE, SODIUM PHOSPHATE, DIBASIC, AND POTASSIUM PHOSPHATE 150 ML/HR: .53; .5; .37; .037; .03; .012; .00082 INJECTION, SOLUTION INTRAVENOUS at 13:37

## 2022-03-19 RX ADMIN — MORPHINE SULFATE 4 MG: 4 INJECTION INTRAVENOUS at 19:26

## 2022-03-19 RX ADMIN — PANTOPRAZOLE SODIUM 40 MG: 40 INJECTION, POWDER, FOR SOLUTION INTRAVENOUS at 08:24

## 2022-03-19 RX ADMIN — SODIUM CHLORIDE, SODIUM GLUCONATE, SODIUM ACETATE, POTASSIUM CHLORIDE, MAGNESIUM CHLORIDE, SODIUM PHOSPHATE, DIBASIC, AND POTASSIUM PHOSPHATE 150 ML/HR: .53; .5; .37; .037; .03; .012; .00082 INJECTION, SOLUTION INTRAVENOUS at 21:22

## 2022-03-19 RX ADMIN — MORPHINE SULFATE 4 MG: 4 INJECTION INTRAVENOUS at 07:06

## 2022-03-19 RX ADMIN — MORPHINE SULFATE 4 MG: 4 INJECTION INTRAVENOUS at 16:26

## 2022-03-19 RX ADMIN — OXYCODONE HYDROCHLORIDE 10 MG: 10 TABLET ORAL at 17:23

## 2022-03-19 RX ADMIN — OXYCODONE HYDROCHLORIDE 10 MG: 10 TABLET ORAL at 05:15

## 2022-03-19 RX ADMIN — POTASSIUM CHLORIDE 40 MEQ: 1500 TABLET, EXTENDED RELEASE ORAL at 08:28

## 2022-03-19 RX ADMIN — OXYCODONE HYDROCHLORIDE 10 MG: 10 TABLET ORAL at 00:39

## 2022-03-19 RX ADMIN — OXYCODONE HYDROCHLORIDE 10 MG: 10 TABLET ORAL at 21:16

## 2022-03-19 RX ADMIN — OXYCODONE HYDROCHLORIDE 10 MG: 10 TABLET ORAL at 09:25

## 2022-03-19 NOTE — ASSESSMENT & PLAN NOTE
· LFTs elevated at time of admission, , , T bili 2 25 with worsening in lfts: ast 40, alt 94, t bili 2 31  · Appreciate gi recommendations  · On levaquin and flagyl day 5: change to cefepime and flagyl due to elevating wbc  In which pt completed course  · Unable to obtain mrcp due to pts habitus   S/p ercp s/p dilation no stones or sludge  · Check cmp in am   · Continue clears

## 2022-03-19 NOTE — PLAN OF CARE
Problem: PAIN - ADULT  Goal: Verbalizes/displays adequate comfort level or baseline comfort level  Description: Interventions:  - Encourage patient to monitor pain and request assistance  - Assess pain using appropriate pain scale  - Administer analgesics based on type and severity of pain and evaluate response  - Implement non-pharmacological measures as appropriate and evaluate response  - Consider cultural and social influences on pain and pain management  - Notify physician/advanced practitioner if interventions unsuccessful or patient reports new pain  Outcome: Progressing     Problem: INFECTION - ADULT  Goal: Absence or prevention of progression during hospitalization  Description: INTERVENTIONS:  - Assess and monitor for signs and symptoms of infection  - Monitor lab/diagnostic results  - Monitor all insertion sites, i e  indwelling lines, tubes, and drains  - Monitor endotracheal if appropriate and nasal secretions for changes in amount and color  - Moffat appropriate cooling/warming therapies per order  - Administer medications as ordered  - Instruct and encourage patient and family to use good hand hygiene technique  - Identify and instruct in appropriate isolation precautions for identified infection/condition  Outcome: Progressing  Goal: Absence of fever/infection during neutropenic period  Description: INTERVENTIONS:  - Monitor WBC    Outcome: Progressing     Problem: SAFETY ADULT  Goal: Patient will remain free of falls  Description: INTERVENTIONS:  - Educate patient/family on patient safety including physical limitations  - Instruct patient to call for assistance with activity   - Consult OT/PT to assist with strengthening/mobility   - Keep Call bell within reach  - Keep bed low and locked with side rails adjusted as appropriate  - Keep care items and personal belongings within reach  - Initiate and maintain comfort rounds  - Make Fall Risk Sign visible to staff  - Obtain necessary fall risk management equipment  Problem: DISCHARGE PLANNING  Goal: Discharge to home or other facility with appropriate resources  Description: INTERVENTIONS:  - Identify barriers to discharge w/patient and caregiver  - Arrange for needed discharge resources and transportation as appropriate  - Identify discharge learning needs (meds, wound care, etc )  - Arrange for interpretive services to assist at discharge as needed  - Refer to Case Management Department for coordinating discharge planning if the patient needs post-hospital services based on physician/advanced practitioner order or complex needs related to functional status, cognitive ability, or social support system  Outcome: Progressing     Problem: Knowledge Deficit  Goal: Patient/family/caregiver demonstrates understanding of disease process, treatment plan, medications, and discharge instructions  Description: Complete learning assessment and assess knowledge base  Interventions:  - Provide teaching at level of understanding  - Provide teaching via preferred learning methods  Outcome: Progressing     Problem: Nutrition/Hydration-ADULT  Goal: Nutrient/Hydration intake appropriate for improving, restoring or maintaining nutritional needs  Description: Monitor and assess patient's nutrition/hydration status for malnutrition  Collaborate with interdisciplinary team and initiate plan and interventions as ordered  Monitor patient's weight and dietary intake as ordered or per policy  Utilize nutrition screening tool and intervene as necessary  Determine patient's food preferences and provide high-protein, high-caloric foods as appropriate       INTERVENTIONS:  - Monitor oral intake, urinary output, labs, and treatment plans  - Assess nutrition and hydration status and recommend course of action  - Evaluate amount of meals eaten  - Assist patient with eating if necessary   - Allow adequate time for meals  - Recommend/ encourage appropriate diets, oral nutritional supplements, and vitamin/mineral supplements  - Order, calculate, and assess calorie counts as needed  - Recommend, monitor, and adjust tube feedings and TPN/PPN based on assessed needs  - Assess need for intravenous fluids  - Provide specific nutrition/hydration education as appropriate  - Include patient/family/caregiver in decisions related to nutrition  Outcome: Progressing     - Apply yellow socks and bracelet for high fall risk patients  - Consider moving patient to room near nurses station  Outcome: Progressing

## 2022-03-19 NOTE — PROGRESS NOTES
Patient Name: Rodri Hancock  Patient MRN: [id number]  Date: 03/19/22  Service: [facility]    Subjective   Patient complains of back pain at present  Reports his abdominal pain is comfortable at present and received morphine an hour and a half ago at 10:00 a m  He is tolerating clear liquids with no worsening of pain after eating per patient  Would not advance diet until is able to be off pain medication for abdomen  Denies fever chills  Patient had bowel movement this morning  Patient denies: Chest pain, shortness of breath, fever, weight loss, rash, adenopathy  All others negative except as noted in HPI  Objective     Vitals  /64 (BP Location: Left arm)   Pulse 98   Temp 97 5 °F (36 4 °C) (Tympanic)   Resp 18   SpO2 99%   General: Alert, no apparent distress  Eyes: No scleral icterus, EOM's intact  ENT: MMM  Card: RRR no murmur  Lungs: Clear to ascultation b/l  No wheezes, rales, rhonchi  Abdomen: Soft  Nontender  Nondistended  Bowel sounds present and normoactive  Skin: No jaundice  Ext: No edema, clubbing, or cyanosis  Psych:  Normal affect    Neuro: Awake, alert and oriented x3    Laboratory Studies  Lab Results   Component Value Date    CREATININE 0 82 03/19/2022    BUN 9 03/19/2022    SODIUM 138 03/19/2022    K 3 4 (L) 03/19/2022     03/19/2022    CO2 26 03/19/2022    CALCIUM 8 2 (L) 03/19/2022    PROT 7 5 03/24/2016    ALKPHOS 54 03/19/2022    BILITOT 2 1 (H) 03/24/2016    AST 38 03/19/2022    ALT 94 (H) 03/19/2022     Lab Results   Component Value Date    WBC 16 86 (H) 03/19/2022    HGB 14 5 03/19/2022    HCT 41 7 03/19/2022     03/19/2022    MCV 88 03/19/2022     Lab Results   Component Value Date    PROTIME 13 8 12/28/2020    INR 1 08 12/28/2020       Imaging and Other Studies    Inhouse Medications       Current Facility-Administered Medications:     acetaminophen (TYLENOL) tablet 650 mg, 650 mg, Oral, Q6H PRN, Sabas Bowman PA-C, 650 mg at 03/15/22 0151    albuterol (PROVENTIL HFA,VENTOLIN HFA) inhaler 2 puff, 2 puff, Inhalation, Q6H PRN, Yari Simon PA-C    bisacodyl (DULCOLAX) rectal suppository 10 mg, 10 mg, Rectal, Daily PRN, Azalea Ambron, DO, 10 mg at 03/19/22 0528    calcium carbonate (TUMS) chewable tablet 1,000 mg, 1,000 mg, Oral, Daily PRN, Yari Simon PA-C    docusate sodium (COLACE) capsule 100 mg, 100 mg, Oral, BID, Azalea Ambron, DO, 100 mg at 03/19/22 0824    lidocaine (LIDODERM) 5 % patch 1 patch, 1 patch, Topical, Daily, Azalea Ambron, DO, 1 patch at 03/19/22 0824    methocarbamol (ROBAXIN) tablet 500 mg, 500 mg, Oral, Q6H PRN, Azalea Ambron, DO, 500 mg at 03/19/22 0922    morphine (PF) 4 mg/mL injection 4 mg, 4 mg, Intravenous, Q3H PRN, Azalea Ambron, DO, 4 mg at 03/19/22 1006    multi-electrolyte (PLASMALYTE-A/ISOLYTE-S PH 7 4) IV solution, 150 mL/hr, Intravenous, Continuous, Len Vang PA-C, Last Rate: 150 mL/hr at 03/19/22 0532, 150 mL/hr at 03/19/22 0532    ondansetron (ZOFRAN) injection 4 mg, 4 mg, Intravenous, Q6H PRN, Yari Simon PA-C, 4 mg at 03/17/22 1258    oxyCODONE (ROXICODONE) IR tablet 5 mg, 5 mg, Oral, Q4H PRN **OR** oxyCODONE (ROXICODONE) immediate release tablet 10 mg, 10 mg, Oral, Q4H PRN, Azalea Ambron, DO, 10 mg at 03/19/22 0925    pantoprazole (PROTONIX) injection 40 mg, 40 mg, Intravenous, Q12H [address], Azalea Ambron, DO, 40 mg at 03/19/22 0824    polyethylene glycol (MIRALAX) packet 17 g, 17 g, Oral, BID, Azalea Reinaldoron, DO, 17 g at 03/19/22 0831    sodium phosphate-biphosphate (FLEET) enema 1 enema, 1 enema, Rectal, Daily PRN, Azalea Karimi DO, 1 enema at 03/17/22 1413    sucralfate (CARAFATE) tablet 1 g, 1 g, Oral, Q6H PRN, Marlon Toribio PA-C      Assessment/Plan:    1  Choledocholithiasis status post ERCP  2  Pancreatitis status post ERCP  Lipase has improved and LFTs continue to trend down    Patient has received pain medication today q 3 hours for epigastric pain   Comfortable at present  On clears today  Continue to trend LFTs    3  Back pain          Principal Problem:    Calculus of gallbladder without biliary obstruction  Active Problems:    Obesity    SIRS (systemic inflammatory response syndrome) (HCC)    Transaminitis    Acute pancreatitis    Constipation      RICK Fernandez

## 2022-03-19 NOTE — ASSESSMENT & PLAN NOTE
As a result of ercp  Lipase is 14,970- decreased to 1,100  Appreciate gi recommendations  Continue fluids  Continue clear liquids possibly advance diet in 24 hours

## 2022-03-19 NOTE — NURSING NOTE
Patient sounds very worked up and anxious  Pain score at 8/10  Injection morphine 4mg IV given on request   Wanted the IV push on the nearest IV port of the infusion line  Wants enema  Says he had 2 BM since hospitalization  But wants a shower and BM today  Wants IV access changed from right antecubital to the hand  Since he hates having IV cannula in the ante cubital area  The present 20 G IV access is not due for change and is patent  It was inserted for the CT scan

## 2022-03-19 NOTE — PROGRESS NOTES
[facility]  Progress Note - Carlo Perez 1990, 32 y o  male MRN: [id number]  Unit/Bed#: E2 -01 Encounter: [id number]  Primary Care Provider: Gisell Lopez DO   Date and time admitted to hospital: 3/13/2022  3:09 AM    Constipation  Assessment & Plan  No bm for 1 week due to narcotic use and possible ileus   Ct abd/pelvis reviewed  bm after suppository and enemas  Continue miralax bid  Acute pancreatitis  Assessment & Plan  As a result of ercp  Lipase is 14,970- decreased to 1,100  Appreciate gi recommendations  Continue fluids  Continue clear liquids possibly advance diet in 24 hours  Transaminitis  Assessment & Plan  · LFTs elevated at time of admission, , , T bili 2 25 with worsening in lfts: ast 40, alt 94, t bili 2 31  · Appreciate gi recommendations  · On levaquin and flagyl day 5: change to cefepime and flagyl due to elevating wbc  In which pt completed course  · Unable to obtain mrcp due to pts habitus  S/p ercp s/p dilation no stones or sludge  · Check cmp in am   · Continue clears    SIRS (systemic inflammatory response syndrome) ([facility] [address] )  Assessment & Plan  Wbc increased to 20 but likely reactive due to ercp induced pancreatitis and constipation that is worsened  Ct revealed acute cholecystitis  He had been on levaquin/flagyl for 5 days  Blood cultures negative  Appreciate ID recommendations  Changed to cefepime and flagyl and pt completed course  Wbc decreased to 16  procal decreased to 0 6    Obesity  Assessment & Plan  · BMI 41    * Calculus of gallbladder without biliary obstruction  Assessment & Plan  · Patient presented to ED with complaint of back pain  · Found have gallstones with no signs of cholecystitis  · Does have prior admission for similar complaint, plan was for outpatient cholecystectomy but this was never preformed   · Appreciate gi recommendations  · Unable to obtain mrcp  S/p ercp   No stones or sludge but dilation obtained   · Completed course of antibiotics  · Seen in consult by surgery, no acute surgical intervention during hospital stay due to acute pancreatitis  Follow up outpt  · Continue PRN analgesics      VTE Pharmacologic Prophylaxis: low risk, early ambulation     Time Spent for Care: 20 minutes  More than 50% of total time spent on counseling and coordination of care as described above  Current Length of Stay: 6 day(s)  Current Patient Status: Inpatient     Discharge Plan / Estimated Discharge Date: 24 to 48 hours  Code Status: Level 1 - Full Code      Subjective:   Pt seen and examined  He is still using IV pain medications  He still has some bloating  He denies bm today but had 2 bm yesterday  He is not having increased pain after po intake but still gets bloated after he eats  No other problems were reported  Objective:     Vitals:   Temp (24hrs), Av 7 °F (36 5 °C), Min:97 5 °F (36 4 °C), Max:97 9 °F (36 6 °C)    Temp:  [97 5 °F (36 4 °C)-97 9 °F (36 6 °C)] 97 5 °F (36 4 °C)  HR:  [] 98  Resp:  [18] 18  BP: (106-128)/(64-66) 128/64  SpO2:  [94 %-99 %] 99 %  There is no height or weight on file to calculate BMI  Input and Output Summary (last 24 hours): Intake/Output Summary (Last 24 hours) at 3/19/2022 1249  Last data filed at 3/19/2022 0532  Gross per 24 hour   Intake 2000 ml   Output --   Net 2000 ml       Physical Exam:   Physical Exam  Constitutional:       Appearance: Normal appearance  HENT:      Head: Normocephalic and atraumatic  Eyes:      Extraocular Movements: Extraocular movements intact  Pupils: Pupils are equal, round, and reactive to light  Cardiovascular:      Rate and Rhythm: Normal rate and regular rhythm  Heart sounds: No murmur heard  No friction rub  No gallop  Pulmonary:      Effort: Pulmonary effort is normal  No respiratory distress  Breath sounds: Normal breath sounds  No wheezing or rales     Abdominal: General: Bowel sounds are normal  There is distension  Palpations: Abdomen is soft  Tenderness: There is abdominal tenderness  There is no guarding or rebound  Musculoskeletal:      Right lower leg: No edema  Left lower leg: No edema  Neurological:      Mental Status: He is alert and oriented to person, place, and time  Additional Data:     Labs:  Results from last 7 days   Lab Units 03/19/22  0524 03/15/22  0459 03/14/22  0427   WBC Thousand/uL 16 86*   < > 5 65   HEMOGLOBIN g/dL 14 5   < > 15 7   HEMATOCRIT % 41 7   < > 46 0   PLATELETS Thousands/uL 187   < > 320   NEUTROS PCT %  --   --  59   LYMPHS PCT %  --   --  26   MONOS PCT %  --   --  10   EOS PCT %  --   --  4    < > = values in this interval not displayed  Results from last 7 days   Lab Units 03/19/22  0524   SODIUM mmol/L 138   POTASSIUM mmol/L 3 4*   CHLORIDE mmol/L 100   CO2 mmol/L 26   BUN mg/dL 9   CREATININE mg/dL 0 82   ANION GAP mmol/L 12   CALCIUM mg/dL 8 2*   ALBUMIN g/dL 2 5*   TOTAL BILIRUBIN mg/dL 2 31*   ALK PHOS U/L 54   ALT U/L 94*   AST U/L 38   GLUCOSE RANDOM mg/dL 96                 Results from last 7 days   Lab Units 03/19/22  0524 03/18/22  0610 03/17/22  1321 03/17/22  1312   LACTIC ACID mmol/L  --   --  1 3  --    PROCALCITONIN ng/ml 0 66* 0 82*  --  0 79*     Recent Cultures (last 7 days):     Results from last 7 days   Lab Units 03/17/22  1320 03/17/22  1311   BLOOD CULTURE  No Growth at 24 hrs  No Growth at 24 hrs  Lines/Drains:  Invasive Devices  Report    Peripheral Intravenous Line            Peripheral IV 03/19/22 Dorsal (posterior); Right Hand <1 day              Last 24 Hours Medication List:   Current Facility-Administered Medications   Medication Dose Route Frequency Provider Last Rate    acetaminophen  650 mg Oral Q6H PRN Dennys Aguero PA-C      albuterol  2 puff Inhalation Q6H PRN Dennys Aguero PA-C      bisacodyl  10 mg Rectal Daily PRN Markos Mendoza DO      calcium carbonate  1,000 mg Oral Daily PRN Jemma Valenzuela PA-C      docusate sodium  100 mg Oral BID Azalea Sachi, DO      [START ON 3/20/2022] lidocaine  2 patch Topical Daily Azalea Reinaldoron, DO      methocarbamol  500 mg Oral Q6H PRN Alisia Veras,       morphine injection  4 mg Intravenous Q3H PRN Alisia Veras,       multi-electrolyte  150 mL/hr Intravenous Continuous Raymond Houston PA-C 150 mL/hr (03/19/22 0532)    ondansetron  4 mg Intravenous Q6H PRN Jemma Valenzuela PA-C      oxyCODONE  10 mg Oral Q4H PRN Alisia Veras,       Or    oxyCODONE  5 mg Oral Q4H PRN Azalea Karimi, DO      pantoprazole  40 mg Intravenous Q12H [address] Azalea Ambron, DO      polyethylene glycol  17 g Oral BID Azalea Ambron, DO      sodium phosphate-biphosphate  1 enema Rectal Daily PRN Azalea Najeraron, DO      sucralfate  1 g Oral Q6H PRN Olga Dunbar PA-C          Today, Patient Was Seen By: Alisia Veras DO

## 2022-03-19 NOTE — ASSESSMENT & PLAN NOTE
· Patient presented to ED with complaint of back pain  · Found have gallstones with no signs of cholecystitis  · Does have prior admission for similar complaint, plan was for outpatient cholecystectomy but this was never preformed   · Appreciate gi recommendations  · Unable to obtain mrcp  S/p ercp  No stones or sludge but dilation obtained   · Completed course of antibiotics  · Seen in consult by surgery, no acute surgical intervention during hospital stay due to acute pancreatitis   Follow up outpt  · Continue PRN analgesics

## 2022-03-19 NOTE — PLAN OF CARE
Problem: PAIN - ADULT  Goal: Verbalizes/displays adequate comfort level or baseline comfort level  Description: Interventions:  - Encourage patient to monitor pain and request assistance  - Assess pain using appropriate pain scale  - Administer analgesics based on type and severity of pain and evaluate response  - Implement non-pharmacological measures as appropriate and evaluate response  - Consider cultural and social influences on pain and pain management  - Notify physician/advanced practitioner if interventions unsuccessful or patient reports new pain  Outcome: Progressing     Problem: DISCHARGE PLANNING  Goal: Discharge to home or other facility with appropriate resources  Description: INTERVENTIONS:  - Identify barriers to discharge w/patient and caregiver  - Arrange for needed discharge resources and transportation as appropriate  - Identify discharge learning needs (meds, wound care, etc )  - Arrange for interpretive services to assist at discharge as needed  - Refer to Case Management Department for coordinating discharge planning if the patient needs post-hospital services based on physician/advanced practitioner order or complex needs related to functional status, cognitive ability, or social support system  Outcome: Progressing     Problem: Nutrition/Hydration-ADULT  Goal: Nutrient/Hydration intake appropriate for improving, restoring or maintaining nutritional needs  Description: Monitor and assess patient's nutrition/hydration status for malnutrition  Collaborate with interdisciplinary team and initiate plan and interventions as ordered  Monitor patient's weight and dietary intake as ordered or per policy  Utilize nutrition screening tool and intervene as necessary  Determine patient's food preferences and provide high-protein, high-caloric foods as appropriate       INTERVENTIONS:  - Monitor oral intake, urinary output, labs, and treatment plans  - Assess nutrition and hydration status and recommend course of action  - Evaluate amount of meals eaten  - Assist patient with eating if necessary   - Allow adequate time for meals  - Recommend/ encourage appropriate diets, oral nutritional supplements, and vitamin/mineral supplements  - Order, calculate, and assess calorie counts as needed  - Recommend, monitor, and adjust tube feedings and TPN/PPN based on assessed needs  - Assess need for intravenous fluids  - Provide specific nutrition/hydration education as appropriate  - Include patient/family/caregiver in decisions related to nutrition  Outcome: Progressing

## 2022-03-19 NOTE — ASSESSMENT & PLAN NOTE
No bm for 1 week due to narcotic use and possible ileus   Ct abd/pelvis reviewed  bm after suppository and enemas  Continue miralax bid

## 2022-03-19 NOTE — ASSESSMENT & PLAN NOTE
Wbc increased to 20 but likely reactive due to ercp induced pancreatitis and constipation that is worsened  Ct revealed acute cholecystitis  He had been on levaquin/flagyl for 5 days  Blood cultures negative  Appreciate ID recommendations  Changed to cefepime and flagyl and pt completed course     Wbc decreased to 16  procal decreased to 0 6

## 2022-03-19 NOTE — NURSING NOTE
Noticed that patient is timing the morphine and oxycodone, on the dot  Pain control is adequate  Patient wants the analgesics, "before the pain goes out of control"  Informed GI and SLIM  GI wants accurate documentation if the pain is in the back or abdomen  Explained that to the patient  Educated patient on pain scoring and the indication for each analgesic ordered

## 2022-03-20 VITALS
SYSTOLIC BLOOD PRESSURE: 109 MMHG | OXYGEN SATURATION: 93 % | HEART RATE: 98 BPM | DIASTOLIC BLOOD PRESSURE: 58 MMHG | RESPIRATION RATE: 18 BRPM | TEMPERATURE: 97.4 F

## 2022-03-20 PROBLEM — R65.10 SIRS (SYSTEMIC INFLAMMATORY RESPONSE SYNDROME) (HCC): Status: RESOLVED | Noted: 2020-12-22 | Resolved: 2022-03-20

## 2022-03-20 PROBLEM — K59.00 CONSTIPATION: Status: RESOLVED | Noted: 2022-03-17 | Resolved: 2022-03-20

## 2022-03-20 PROBLEM — R74.01 TRANSAMINITIS: Status: RESOLVED | Noted: 2022-03-13 | Resolved: 2022-03-20

## 2022-03-20 PROBLEM — K85.90 ACUTE PANCREATITIS: Status: RESOLVED | Noted: 2022-03-16 | Resolved: 2022-03-20

## 2022-03-20 LAB
ALBUMIN SERPL BCP-MCNC: 2.2 G/DL (ref 3.5–5)
ALP SERPL-CCNC: 50 U/L (ref 46–116)
ALT SERPL W P-5'-P-CCNC: 63 U/L (ref 12–78)
ANION GAP SERPL CALCULATED.3IONS-SCNC: 12 MMOL/L (ref 4–13)
AST SERPL W P-5'-P-CCNC: 33 U/L (ref 5–45)
BILIRUB SERPL-MCNC: 1.34 MG/DL (ref 0.2–1)
BUN SERPL-MCNC: 7 MG/DL (ref 5–25)
CALCIUM ALBUM COR SERPL-MCNC: 9 MG/DL (ref 8.3–10.1)
CALCIUM SERPL-MCNC: 7.6 MG/DL (ref 8.3–10.1)
CHLORIDE SERPL-SCNC: 98 MMOL/L (ref 100–108)
CO2 SERPL-SCNC: 26 MMOL/L (ref 21–32)
CREAT SERPL-MCNC: 0.72 MG/DL (ref 0.6–1.3)
ERYTHROCYTE [DISTWIDTH] IN BLOOD BY AUTOMATED COUNT: 13.1 % (ref 11.6–15.1)
GFR SERPL CREATININE-BSD FRML MDRD: 124 ML/MIN/1.73SQ M
GLUCOSE SERPL-MCNC: 82 MG/DL (ref 65–140)
HCT VFR BLD AUTO: 36.8 % (ref 36.5–49.3)
HGB BLD-MCNC: 12.9 G/DL (ref 12–17)
MCH RBC QN AUTO: 31.6 PG (ref 26.8–34.3)
MCHC RBC AUTO-ENTMCNC: 35.1 G/DL (ref 31.4–37.4)
MCV RBC AUTO: 90 FL (ref 82–98)
PLATELET # BLD AUTO: 201 THOUSANDS/UL (ref 149–390)
PMV BLD AUTO: 11 FL (ref 8.9–12.7)
POTASSIUM SERPL-SCNC: 3.4 MMOL/L (ref 3.5–5.3)
PROT SERPL-MCNC: 6 G/DL (ref 6.4–8.2)
RBC # BLD AUTO: 4.08 MILLION/UL (ref 3.88–5.62)
SODIUM SERPL-SCNC: 136 MMOL/L (ref 136–145)
WBC # BLD AUTO: 14.63 THOUSAND/UL (ref 4.31–10.16)

## 2022-03-20 PROCEDURE — 85027 COMPLETE CBC AUTOMATED: CPT | Performed by: INTERNAL MEDICINE

## 2022-03-20 PROCEDURE — 99232 SBSQ HOSP IP/OBS MODERATE 35: CPT | Performed by: INTERNAL MEDICINE

## 2022-03-20 PROCEDURE — C9113 INJ PANTOPRAZOLE SODIUM, VIA: HCPCS | Performed by: INTERNAL MEDICINE

## 2022-03-20 PROCEDURE — 80053 COMPREHEN METABOLIC PANEL: CPT | Performed by: INTERNAL MEDICINE

## 2022-03-20 PROCEDURE — 99239 HOSP IP/OBS DSCHRG MGMT >30: CPT | Performed by: INTERNAL MEDICINE

## 2022-03-20 RX ORDER — METHOCARBAMOL 500 MG/1
500 TABLET, FILM COATED ORAL EVERY 6 HOURS PRN
Qty: 20 TABLET | Refills: 0 | Status: SHIPPED | OUTPATIENT
Start: 2022-03-20 | End: 2022-03-23

## 2022-03-20 RX ORDER — PANTOPRAZOLE SODIUM 40 MG/1
40 TABLET, DELAYED RELEASE ORAL
Status: DISCONTINUED | OUTPATIENT
Start: 2022-03-20 | End: 2022-03-20 | Stop reason: HOSPADM

## 2022-03-20 RX ORDER — MORPHINE SULFATE 4 MG/ML
4 INJECTION, SOLUTION INTRAMUSCULAR; INTRAVENOUS EVERY 6 HOURS PRN
Status: DISCONTINUED | OUTPATIENT
Start: 2022-03-20 | End: 2022-03-20 | Stop reason: HOSPADM

## 2022-03-20 RX ORDER — OXYCODONE HYDROCHLORIDE 5 MG/1
5 TABLET ORAL EVERY 4 HOURS PRN
Qty: 16 TABLET | Refills: 0 | Status: SHIPPED | OUTPATIENT
Start: 2022-03-20 | End: 2022-03-23

## 2022-03-20 RX ORDER — IBUPROFEN 600 MG/1
600 TABLET ORAL EVERY 8 HOURS PRN
Status: DISCONTINUED | OUTPATIENT
Start: 2022-03-20 | End: 2022-03-20 | Stop reason: HOSPADM

## 2022-03-20 RX ORDER — DOCUSATE SODIUM 100 MG/1
100 CAPSULE, LIQUID FILLED ORAL 2 TIMES DAILY
Refills: 0
Start: 2022-03-20 | End: 2022-03-23

## 2022-03-20 RX ORDER — POTASSIUM CHLORIDE 20 MEQ/1
40 TABLET, EXTENDED RELEASE ORAL 2 TIMES DAILY
Status: DISCONTINUED | OUTPATIENT
Start: 2022-03-20 | End: 2022-03-20 | Stop reason: HOSPADM

## 2022-03-20 RX ORDER — POLYETHYLENE GLYCOL 3350 17 G/17G
17 POWDER, FOR SOLUTION ORAL DAILY PRN
Refills: 0
Start: 2022-03-20 | End: 2022-03-23

## 2022-03-20 RX ADMIN — POLYETHYLENE GLYCOL 3350 17 G: 17 POWDER, FOR SOLUTION ORAL at 08:37

## 2022-03-20 RX ADMIN — MORPHINE SULFATE 4 MG: 4 INJECTION INTRAVENOUS at 04:31

## 2022-03-20 RX ADMIN — PANTOPRAZOLE SODIUM 40 MG: 40 INJECTION, POWDER, FOR SOLUTION INTRAVENOUS at 08:37

## 2022-03-20 RX ADMIN — LIDOCAINE 2 PATCH: 50 PATCH CUTANEOUS at 08:37

## 2022-03-20 RX ADMIN — METHOCARBAMOL 500 MG: 500 TABLET ORAL at 04:32

## 2022-03-20 RX ADMIN — DOCUSATE SODIUM 100 MG: 100 CAPSULE ORAL at 08:36

## 2022-03-20 RX ADMIN — SODIUM CHLORIDE, SODIUM GLUCONATE, SODIUM ACETATE, POTASSIUM CHLORIDE, MAGNESIUM CHLORIDE, SODIUM PHOSPHATE, DIBASIC, AND POTASSIUM PHOSPHATE 75 ML/HR: .53; .5; .37; .037; .03; .012; .00082 INJECTION, SOLUTION INTRAVENOUS at 10:41

## 2022-03-20 RX ADMIN — POTASSIUM CHLORIDE 40 MEQ: 1500 TABLET, EXTENDED RELEASE ORAL at 08:37

## 2022-03-20 RX ADMIN — OXYCODONE HYDROCHLORIDE 10 MG: 10 TABLET ORAL at 06:26

## 2022-03-20 RX ADMIN — SODIUM CHLORIDE, SODIUM GLUCONATE, SODIUM ACETATE, POTASSIUM CHLORIDE, MAGNESIUM CHLORIDE, SODIUM PHOSPHATE, DIBASIC, AND POTASSIUM PHOSPHATE 150 ML/HR: .53; .5; .37; .037; .03; .012; .00082 INJECTION, SOLUTION INTRAVENOUS at 04:37

## 2022-03-20 RX ADMIN — IBUPROFEN 600 MG: 600 TABLET, FILM COATED ORAL at 13:13

## 2022-03-20 RX ADMIN — ONDANSETRON 4 MG: 2 INJECTION INTRAMUSCULAR; INTRAVENOUS at 14:02

## 2022-03-20 RX ADMIN — METHOCARBAMOL 500 MG: 500 TABLET ORAL at 10:40

## 2022-03-20 RX ADMIN — MORPHINE SULFATE 4 MG: 4 INJECTION INTRAVENOUS at 01:16

## 2022-03-20 RX ADMIN — OXYCODONE HYDROCHLORIDE 10 MG: 10 TABLET ORAL at 02:24

## 2022-03-20 RX ADMIN — OXYCODONE HYDROCHLORIDE 10 MG: 10 TABLET ORAL at 10:40

## 2022-03-20 RX ADMIN — SODIUM PHOSPHATE 1 ENEMA: 7; 19 ENEMA RECTAL at 10:49

## 2022-03-20 NOTE — ASSESSMENT & PLAN NOTE
· Patient presented to ED with complaint of back pain  · Found have gallstones with no signs of cholecystitis  · Does have prior admission for similar complaint, plan was for outpatient cholecystectomy but this was never preformed   · Appreciate gi recommendations  · Unable to obtain mrcp  S/p ercp  No stones or sludge but dilation obtained   · Completed course of antibiotics  · Seen in consult by surgery, no acute surgical intervention during hospital stay due to acute pancreatitis  Follow up outpt  · Continue PRN analgesics  · Diet advanced

## 2022-03-20 NOTE — ASSESSMENT & PLAN NOTE
As a result of ercp  Lipase is 14,970- decreased to 1,100  Appreciate gi recommendations  Fluids decreased  Advanced diet

## 2022-03-20 NOTE — ASSESSMENT & PLAN NOTE
· LFTs elevated at time of admission, , , T bili 2 25 with worsening in lfts: ast/alt normalized t bili 1 3  · Appreciate gi recommendations  · Completed course of antibiotics  · Unable to obtain mrcp due to pts habitus   S/p ercp s/p dilation no stones or sludge  · Check cmp in am   · Diet advanced to low fat yes...

## 2022-03-20 NOTE — ASSESSMENT & PLAN NOTE
Wbc increased to 20 but likely reactive due to ercp induced pancreatitis and constipation that is worsened  Ct revealed acute cholecystitis  Completed course of antibiotics  Blood cultures negative  Appreciate ID recommendations     Wbc decreased to 14

## 2022-03-20 NOTE — PLAN OF CARE
Problem: PAIN - ADULT  Goal: Verbalizes/displays adequate comfort level or baseline comfort level  Description: Interventions:  - Encourage patient to monitor pain and request assistance  - Assess pain using appropriate pain scale  - Administer analgesics based on type and severity of pain and evaluate response  - Implement non-pharmacological measures as appropriate and evaluate response  - Consider cultural and social influences on pain and pain management  - Notify physician/advanced practitioner if interventions unsuccessful or patient reports new pain  Outcome: Progressing     Problem: INFECTION - ADULT  Goal: Absence or prevention of progression during hospitalization  Description: INTERVENTIONS:  - Assess and monitor for signs and symptoms of infection  - Monitor lab/diagnostic results  - Monitor all insertion sites, i e  indwelling lines, tubes, and drains  - Monitor endotracheal if appropriate and nasal secretions for changes in amount and color  - [place] appropriate cooling/warming therapies per order  - Administer medications as ordered  - Instruct and encourage patient and family to use good hand hygiene technique  - Identify and instruct in appropriate isolation precautions for identified infection/condition  Outcome: Progressing  Goal: Absence of fever/infection during neutropenic period  Description: INTERVENTIONS:  - Monitor WBC    Outcome: Progressing     Problem: SAFETY ADULT  Goal: Patient will remain free of falls  Description: INTERVENTIONS:  - Educate patient/family on patient safety including physical limitations  - Instruct patient to call for assistance with activity   - Consult OT/PT to assist with strengthening/mobility   - Keep Call bell within reach  - Keep bed low and locked with side rails adjusted as appropriate  - Keep care items and personal belongings within reach  - Initiate and maintain comfort rounds  - Make Fall Risk Sign visible to staff  - Offer Toileting every 2 Hours, in advance of need  - Apply yellow socks and bracelet for high fall risk patients  - Consider moving patient to room near nurses station  Outcome: Progressing  Goal: Maintain or return to baseline ADL function  Description: INTERVENTIONS:  -  Assess patient's ability to carry out ADLs; assess patient's baseline for ADL function and identify physical deficits which impact ability to perform ADLs (bathing, care of mouth/teeth, toileting, grooming, dressing, etc )  - Assess/evaluate cause of self-care deficits   - Assess range of motion  - Assess patient's mobility; develop plan if impaired  - Assess patient's need for assistive devices and provide as appropriate  - Encourage maximum independence but intervene and supervise when necessary  - Involve family in performance of ADLs  - Assess for home care needs following discharge   - Consider OT consult to assist with ADL evaluation and planning for discharge  - Provide patient education as appropriate  Outcome: Progressing  Goal: Maintains/Returns to pre admission functional level  Description: INTERVENTIONS:  - Perform BMAT or MOVE assessment daily    - Set and communicate daily mobility goal to care team and patient/family/caregiver  - Collaborate with rehabilitation services on mobility goals if consulted  - Perform Range of Motion 3 times a day  - Reposition patient every 2 hours    - Dangle patient 3 times a day  - Stand patient 3 times a day  - Ambulate patient 3 times a day  - Out of bed to chair 3 times a day   - Out of bed for meals 3 times a day  - Out of bed for toileting  - Record patient progress and toleration of activity level   Outcome: Progressing     Problem: DISCHARGE PLANNING  Goal: Discharge to home or other facility with appropriate resources  Description: INTERVENTIONS:  - Identify barriers to discharge w/patient and caregiver  - Arrange for needed discharge resources and transportation as appropriate  - Identify discharge learning needs (meds, wound care, etc )  - Arrange for interpretive services to assist at discharge as needed  - Refer to Case Management Department for coordinating discharge planning if the patient needs post-hospital services based on physician/advanced practitioner order or complex needs related to functional status, cognitive ability, or social support system  Outcome: Progressing     Problem: Knowledge Deficit  Goal: Patient/family/caregiver demonstrates understanding of disease process, treatment plan, medications, and discharge instructions  Description: Complete learning assessment and assess knowledge base  Interventions:  - Provide teaching at level of understanding  - Provide teaching via preferred learning methods  Outcome: Progressing     Problem: Nutrition/Hydration-ADULT  Goal: Nutrient/Hydration intake appropriate for improving, restoring or maintaining nutritional needs  Description: Monitor and assess patient's nutrition/hydration status for malnutrition  Collaborate with interdisciplinary team and initiate plan and interventions as ordered  Monitor patient's weight and dietary intake as ordered or per policy  Utilize nutrition screening tool and intervene as necessary  Determine patient's food preferences and provide high-protein, high-caloric foods as appropriate       INTERVENTIONS:  - Monitor oral intake, urinary output, labs, and treatment plans  - Assess nutrition and hydration status and recommend course of action  - Evaluate amount of meals eaten  - Assist patient with eating if necessary   - Allow adequate time for meals  - Recommend/ encourage appropriate diets, oral nutritional supplements, and vitamin/mineral supplements  - Order, calculate, and assess calorie counts as needed  - Recommend, monitor, and adjust tube feedings and TPN/PPN based on assessed needs  - Assess need for intravenous fluids  - Provide specific nutrition/hydration education as appropriate  - Include patient/family/caregiver in decisions related to nutrition  Outcome: Progressing     Problem: Potential for Falls  Goal: Patient will remain free of falls  Description: INTERVENTIONS:  - Educate patient/family on patient safety including physical limitations  - Instruct patient to call for assistance with activity   - Consult OT/PT to assist with strengthening/mobility   - Keep Call bell within reach  - Keep bed low and locked with side rails adjusted as appropriate  - Keep care items and personal belongings within reach  - Initiate and maintain comfort rounds  - Make Fall Risk Sign visible to staff  - Offer Toileting every 2 Hours, in advance of need    - Apply yellow socks and bracelet for high fall risk patients  - Consider moving patient to room near nurses station  Outcome: Progressing

## 2022-03-20 NOTE — PROGRESS NOTES
Patient Name: Kevin Sharma  Patient MRN: [id number]  Date: 03/20/22  Service: [facility]    Subjective   Kevin Sharma is a 32 y o  male who was admitted with Calculus of gallbladder without biliary obstruction  He is feeling better  Wishes to try eating more regular food  Does not have any significant abdominal pain at this time but does note some bloating  He had 3 bowel movements yesterday  No vomiting  Mentions that he does have chronic back pain which has been worse with being in the hospital bed  Vitals  Blood pressure 127/75, pulse 96, temperature (!) 97 4 °F (36 3 °C), temperature source Temporal, resp  rate 18, SpO2 98 %  Physical Exam  Patient is seen walking about the hallways with his IV pole  He is in no distress  No icterus or jaundice  Morbidly obese  Abdomen is soft without significant tenderness  Bowel sounds present    Heart rate and rhythm is regular    Laboratory Studies  Lab Results   Component Value Date    CREATININE 0 72 03/20/2022    BUN 7 03/20/2022    SODIUM 136 03/20/2022    K 3 4 (L) 03/20/2022    CL 98 (L) 03/20/2022    CO2 26 03/20/2022    CALCIUM 7 6 (L) 03/20/2022    PROT 7 5 03/24/2016    ALKPHOS 50 03/20/2022    BILITOT 2 1 (H) 03/24/2016    AST 33 03/20/2022    ALT 63 03/20/2022     Lab Results   Component Value Date    WBC 14 63 (H) 03/20/2022    HGB 12 9 03/20/2022    HCT 36 8 03/20/2022     03/20/2022    MCV 90 03/20/2022     Lab Results   Component Value Date    PROTIME 13 8 12/28/2020    INR 1 08 12/28/2020     No results found for: CDIFF  Total bilirubin continues to improve 1 3 today    Inhouse Medications       Current Facility-Administered Medications:     acetaminophen (TYLENOL) tablet 650 mg, 650 mg, Oral, Q6H PRN, 650 mg at 03/15/22 0151    albuterol (PROVENTIL HFA,VENTOLIN HFA) inhaler 2 puff, 2 puff, Inhalation, Q6H PRN    bisacodyl (DULCOLAX) rectal suppository 10 mg, 10 mg, Rectal, Daily PRN, 10 mg at 03/19/22 5570    calcium carbonate (TUMS) chewable tablet 1,000 mg, 1,000 mg, Oral, Daily PRN    docusate sodium (COLACE) capsule 100 mg, 100 mg, Oral, BID, 100 mg at 03/20/22 0836    lidocaine (LIDODERM) 5 % patch 2 patch, 2 patch, Topical, Daily, 2 patch at 03/20/22 0837    methocarbamol (ROBAXIN) tablet 500 mg, 500 mg, Oral, Q6H PRN, 500 mg at 03/20/22 0432    morphine (PF) 4 mg/mL injection 4 mg, 4 mg, Intravenous, Q3H PRN, 4 mg at 03/20/22 0431    multi-electrolyte (PLASMALYTE-A/ISOLYTE-S PH 7 4) IV solution, 150 mL/hr, Intravenous, Continuous, 150 mL/hr at 03/20/22 0437    ondansetron (ZOFRAN) injection 4 mg, 4 mg, Intravenous, Q6H PRN, 4 mg at 03/17/22 1258    oxyCODONE (ROXICODONE) IR tablet 5 mg, 5 mg, Oral, Q4H PRN **OR** oxyCODONE (ROXICODONE) immediate release tablet 10 mg, 10 mg, Oral, Q4H PRN, 10 mg at 03/20/22 0626    pantoprazole (PROTONIX) injection 40 mg, 40 mg, Intravenous, Q12H JENNIFER, 40 mg at 03/20/22 0837    polyethylene glycol (MIRALAX) packet 17 g, 17 g, Oral, BID, 17 g at 03/20/22 0837    potassium chloride (K-DUR,KLOR-CON) CR tablet 40 mEq, 40 mEq, Oral, BID, 40 mEq at 03/20/22 2728    sodium phosphate-biphosphate (FLEET) enema 1 enema, 1 enema, Rectal, Daily PRN, 1 enema at 03/19/22 1513    sucralfate (CARAFATE) tablet 1 g, 1 g, Oral, Q6H PRN      Assessment/Plan:  Assessment:  Patient with gallstones and with choledocholithiasis who is status post ERCP with resultant concern for ERCP pancreatitis  The patient's abdominal pain has largely resolved and now he is been mostly dealing with chronic back pain  Constipation issues seem to be under better control with stool softeners  Liver enzymes continue to normalize  Plan:  1  Advance diet to low-fat diet  2  Reduce IV fluids to 75 cc an hour  3  If patient is able to tolerate diet would discontinue IV fluids  4  Would be in favor of discontinuing IV morphine  Transition to oral pain medications    Consider adding ibuprofen as needed for back pain  5  Discussed with surgery, patient will follow-up with them regarding cholecystectomy in approximately 1-2 months  6  If patient is able to tolerate diet and pain under reasonable control, condition charge this evening or tomorrow morning from GI perspective  7   Continue with stool softeners      Rosa Hollis MD

## 2022-03-20 NOTE — PROGRESS NOTES
[address] [facility]  Progress Note - Irina Rico 1990, 32 y o  male MRN: [id number]  Unit/Bed#: E2 -01 Encounter: [id number]  Primary Care Provider: Hilton Rossi DO   Date and time admitted to hospital: 3/13/2022  3:09 AM    Constipation  Assessment & Plan  No bm for 1 week due to narcotic use and possible ileus   Ct abd/pelvis reviewed  bm after suppository and enemas  Continue miralax bid  Acute pancreatitis  Assessment & Plan  As a result of ercp  Lipase is 14,970- decreased to 1,100  Appreciate gi recommendations  Fluids decreased  Advanced diet  Transaminitis  Assessment & Plan  · LFTs elevated at time of admission, , , T bili 2 25 with worsening in lfts: ast/alt normalized t bili 1 3  · Appreciate gi recommendations  · Completed course of antibiotics  · Unable to obtain mrcp due to pts habitus  S/p ercp with stone removal  · Check cmp in am   · Diet advanced to low fat    SIRS (systemic inflammatory response syndrome) ([facility] [address] )  Assessment & Plan  Wbc increased to 20 but likely reactive due to ercp induced pancreatitis and constipation that is worsened  Ct revealed acute cholecystitis  Completed course of antibiotics  Blood cultures negative  Appreciate ID recommendations  Wbc decreased to 14      Obesity  Assessment & Plan  · BMI 41    * Calculus of gallbladder without biliary obstruction  Assessment & Plan  · Patient presented to ED with complaint of back pain  · Found have gallstones with no signs of cholecystitis  · Does have prior admission for similar complaint, plan was for outpatient cholecystectomy but this was never preformed   · Appreciate gi recommendations  · Unable to obtain mrcp  S/p ercp  No stones or sludge but dilation obtained   · Completed course of antibiotics  · Seen in consult by surgery, no acute surgical intervention during hospital stay due to acute pancreatitis   Follow up outpt  · Continue PRN analgesics  · Diet advanced  VTE Pharmacologic Prophylaxis: low risk, early ambulation     Time Spent for Care: 20 minutes  More than 50% of total time spent on counseling and coordination of care as described above  Current Length of Stay: 7 day(s)  Current Patient Status: Inpatient     Discharge Plan / Estimated Discharge Date: possibly d/c tomorrow    Code Status: Level 1 - Full Code      Subjective:   Pt seen and examined  Pt states he feels better  Pain has decreased in abd but still with some back pain  His diet was advanced by gi  He has been having bm  Objective:     Vitals:   Temp (24hrs), Av 2 °F (36 2 °C), Min:96 5 °F (35 8 °C), Max:97 8 °F (36 6 °C)    Temp:  [96 5 °F (35 8 °C)-97 8 °F (36 6 °C)] 97 4 °F (36 3 °C)  HR:  [] 96  Resp:  [18] 18  BP: (120-127)/(63-75) 127/75  SpO2:  [94 %-98 %] 98 %  There is no height or weight on file to calculate BMI  Input and Output Summary (last 24 hours):     No intake or output data in the 24 hours ending 22 1054    Physical Exam:   Physical Exam  Constitutional:       Appearance: Normal appearance  HENT:      Head: Normocephalic and atraumatic  Eyes:      Extraocular Movements: Extraocular movements intact  Pupils: Pupils are equal, round, and reactive to light  Cardiovascular:      Rate and Rhythm: Normal rate and regular rhythm  Heart sounds: No murmur heard  No friction rub  No gallop  Pulmonary:      Effort: Pulmonary effort is normal  No respiratory distress  Breath sounds: Normal breath sounds  No wheezing or rales  Abdominal:      General: Bowel sounds are normal  There is distension  Palpations: Abdomen is soft  Tenderness: There is no abdominal tenderness  There is no guarding or rebound  Musculoskeletal:      Right lower leg: No edema  Left lower leg: No edema  Neurological:      Mental Status: He is alert and oriented to person, place, and time            Additional Data: Labs:  Results from last 7 days   Lab Units 03/20/22  0433 03/15/22  0459 03/14/22  0427   WBC Thousand/uL 14 63*   < > 5 65   HEMOGLOBIN g/dL 12 9   < > 15 7   HEMATOCRIT % 36 8   < > 46 0   PLATELETS Thousands/uL 201   < > 320   NEUTROS PCT %  --   --  59   LYMPHS PCT %  --   --  26   MONOS PCT %  --   --  10   EOS PCT %  --   --  4    < > = values in this interval not displayed  Results from last 7 days   Lab Units 03/20/22  0433   SODIUM mmol/L 136   POTASSIUM mmol/L 3 4*   CHLORIDE mmol/L 98*   CO2 mmol/L 26   BUN mg/dL 7   CREATININE mg/dL 0 72   ANION GAP mmol/L 12   CALCIUM mg/dL 7 6*   ALBUMIN g/dL 2 2*   TOTAL BILIRUBIN mg/dL 1 34*   ALK PHOS U/L 50   ALT U/L 63   AST U/L 33   GLUCOSE RANDOM mg/dL 82                 Results from last 7 days   Lab Units 03/19/22  0524 03/18/22  0610 03/17/22  1321 03/17/22  1312   LACTIC ACID mmol/L  --   --  1 3  --    PROCALCITONIN ng/ml 0 66* 0 82*  --  0 79*     Recent Cultures (last 7 days):     Results from last 7 days   Lab Units 03/17/22  1320 03/17/22  1311   BLOOD CULTURE  No Growth at 48 hrs  No Growth at 48 hrs  Lines/Drains:  Invasive Devices  Report    Peripheral Intravenous Line            Peripheral IV 03/19/22 Dorsal (posterior); Right Hand 1 day              Last 24 Hours Medication List:   Current Facility-Administered Medications   Medication Dose Route Frequency Provider Last Rate    acetaminophen  650 mg Oral Q6H PRN Amy Morataya PA-C      albuterol  2 puff Inhalation Q6H PRN Amybela Morataya PA-C      bisacodyl  10 mg Rectal Daily PRN Haydee Gowers, DO      calcium carbonate  1,000 mg Oral Daily PRN Amy Morataya PA-C      docusate sodium  100 mg Oral BID Azalea Karimi, DO      ibuprofen  600 mg Oral Q8H PRN Killian Rincon MD      lidocaine  2 patch Topical Daily Azalea Amblauren, DO      methocarbamol  500 mg Oral Q6H PRN Haydee Gowers, DO      morphine injection  4 mg Intravenous Q6H PRN MD Moo Braxton multi-electrolyte  75 mL/hr Intravenous Continuous Oleg Daly MD 75 mL/hr (03/20/22 1041)    ondansetron  4 mg Intravenous Q6H PRN Sabas Bowman PA-C      oxyCODONE  10 mg Oral Q4H PRN Lei Martin DO      Or    oxyCODONE  5 mg Oral Q4H PRN Azalea Karimi, DO      pantoprazole  40 mg Oral Early Morning Oleg Daly MD      polyethylene glycol  17 g Oral BID Azalea Ambron, DO      potassium chloride  40 mEq Oral BID Azalea Ambron, DO      sodium phosphate-biphosphate  1 enema Rectal Daily PRN Lei Martin DO          Today, Patient Was Seen By: Lei Martin DO

## 2022-03-20 NOTE — DISCHARGE SUMMARY
Discharge Summary - [facility] Medicine    Patient Information: Sterling Black 32 y o  male MRN: [id number]  Unit/Bed#: E2 -01 Encounter: [id number]    Discharging Physician / Practitioner: Doyle Collet, DO  PCP: Kaelyn Alexander DO  Admission Date: 3/13/2022  Discharge Date: 03/20/22    Disposition:     Home     Reason for Admission: choledocholithiasis    Discharge Diagnoses:     Principal Problem:    Calculus of gallbladder without biliary obstruction  Active Problems:    Obesity  Resolved Problems:    SIRS (systemic inflammatory response syndrome) ([address] )    Transaminitis    Acute pancreatitis    Constipation      Consultations During Hospital Stay:  · Gi  · Surgery   · Infectious disease    Procedures Performed:     · ERCP: s/p stone extraction     Significant Findings / Test Results:   XR chest 1 view portable  Result Date: 3/13/2022  Impression: No acute cardiopulmonary disease  US right upper quadrant  Result Date: 3/13/2022  Impression: Mild hepatomegaly with fat infiltration  Gallstones with no signs of cholecystitis  CT abdomen pelvis w contrast  Result Date: 3/17/2022  Impression: 1  Findings suggestive for acute cholecystitis, acute duodenitis and acute pancreatitis with surrounding inflammatory changes   Etiology post ERCP  2  Trace perihepatic and pelvic fluid  CT abdomen pelvis with contrast  Result Date: 3/13/2022  Impression: 1  Markedly distended, debris-filled stomach, despite the fasting state of the patient  Correlate for gastroparesis  Consider follow-up GI consultation and nuclear medicine gastric emptying study  2   Normally distended gallbladder  Suggestion of noncalcified gallstones within the gallbladder  Mild hyperemia around the gallbladder neck  Correlate for early, acute cholecystitis    If there is continued concern for cholelithiasis and acute cholecystitis, a follow-up right upper quadrant ultrasound is recommended, given the limitations of the previous right upper quadrant ultrasound  At that time, the study was performed utilizing Covid protocol and was markedly limited  Follow-up surgical and/or GI consultation as indicated  Ultimately, if there is continued concern for gallbladder dysfunction, a follow-up nuclear medicine HIDA scan with gallbladder ejection fraction may be necessary  3   Hepatomegaly with fatty infiltration  4   Partially duplicated right collecting system  Incidental Findings:   · none    Test Results Pending at Discharge (will require follow up):   · none     Outpatient Tests Requested:  none    Hospital Course:     Michael Owusu is a 32 y o  male patient who originally presented to the hospital on 3/13/2022 due to choledocholithiasis  He was unable to get mrcp due to body habitus  He was evaluated by gi and had ercp  S/p stone removal  However he ended up with post ercp pancreatitis  Pt also had sirs due to pancreatitis  In addition pt had severe constipation  He has since improved and had multiple bm  He also was tolerating an advanced diet  He will require lap cholecystectomy in which he will follow up with surgery outpt  Pt was discharged to home  Discharge Day Visit / Exam:     * Please refer to separate progress note for these details *    Discharge instructions/Information to patient and family:   See after visit summary for information provided to patient and family  Provisions for Follow-Up Care:  See after visit summary for information related to follow-up care and any pertinent home health orders  Discharge Statement:  I spent 33 minutes discharging the patient  This time was spent on the day of discharge  I had direct contact with the patient on the day of discharge  Greater than 50% of the total time was spent examining patient, answering all patient questions, arranging and discussing plan of care with patient as well as directly providing post-discharge instructions    Additional time then spent on discharge activities  Discharge Medications:  See after visit summary for reconciled discharge medications provided to patient and family

## 2022-03-21 ENCOUNTER — TRANSITIONAL CARE MANAGEMENT (OUTPATIENT)
Dept: FAMILY MEDICINE CLINIC | Facility: CLINIC | Age: 32
End: 2022-03-21

## 2022-03-22 LAB
BACTERIA BLD CULT: NORMAL
BACTERIA BLD CULT: NORMAL

## 2022-03-23 ENCOUNTER — OFFICE VISIT (OUTPATIENT)
Dept: FAMILY MEDICINE CLINIC | Facility: CLINIC | Age: 32
End: 2022-03-23
Payer: COMMERCIAL

## 2022-03-23 VITALS
WEIGHT: 315 LBS | HEIGHT: 76 IN | DIASTOLIC BLOOD PRESSURE: 80 MMHG | BODY MASS INDEX: 38.36 KG/M2 | TEMPERATURE: 97.6 F | SYSTOLIC BLOOD PRESSURE: 122 MMHG

## 2022-03-23 DIAGNOSIS — F32.9 REACTIVE DEPRESSION: ICD-10-CM

## 2022-03-23 DIAGNOSIS — R35.1 NOCTURIA: ICD-10-CM

## 2022-03-23 DIAGNOSIS — K80.50 CHOLEDOCHOLITHIASIS: Primary | ICD-10-CM

## 2022-03-23 DIAGNOSIS — K85.80 OTHER ACUTE PANCREATITIS WITHOUT INFECTION OR NECROSIS: ICD-10-CM

## 2022-03-23 DIAGNOSIS — G47.9 SLEEP DISORDER: ICD-10-CM

## 2022-03-23 DIAGNOSIS — R65.10 SIRS (SYSTEMIC INFLAMMATORY RESPONSE SYNDROME) (HCC): ICD-10-CM

## 2022-03-23 DIAGNOSIS — R74.01 TRANSAMINITIS: ICD-10-CM

## 2022-03-23 LAB
SL AMB  POCT GLUCOSE, UA: NEGATIVE
SL AMB LEUKOCYTE ESTERASE,UA: NEGATIVE
SL AMB POCT BILIRUBIN,UA: NEGATIVE
SL AMB POCT BLOOD,UA: ABNORMAL
SL AMB POCT CLARITY,UA: ABNORMAL
SL AMB POCT COLOR,UA: ABNORMAL
SL AMB POCT KETONES,UA: 160
SL AMB POCT NITRITE,UA: NEGATIVE
SL AMB POCT PH,UA: 7.5
SL AMB POCT SPECIFIC GRAVITY,UA: 1.01
SL AMB POCT URINE PROTEIN: NEGATIVE
SL AMB POCT UROBILINOGEN: 1

## 2022-03-23 PROCEDURE — 99214 OFFICE O/P EST MOD 30 MIN: CPT | Performed by: FAMILY MEDICINE

## 2022-03-23 PROCEDURE — 81002 URINALYSIS NONAUTO W/O SCOPE: CPT | Performed by: FAMILY MEDICINE

## 2022-03-23 RX ORDER — TRAZODONE HYDROCHLORIDE 50 MG/1
50 TABLET ORAL
Qty: 30 TABLET | Refills: 5 | Status: SHIPPED | OUTPATIENT
Start: 2022-03-23

## 2022-03-23 NOTE — PROGRESS NOTES
Assessment/Plan:     No problem-specific Assessment & Plan notes found for this encounter  Diagnoses and all orders for this visit:    Choledocholithiasis  Comments:  Scheduled for surgery  Orders:  -     POCT urine dip    SIRS (systemic inflammatory response syndrome) ([facility] [address] )  Comments:  Resolved    Other acute pancreatitis without infection or necrosis  Comments:  Resolved    Transaminitis  Comments:  Resolved    Reactive depression  Comments: This is a new problem this is a new problem for the patient for the patient, will start trazodone, hopefully this will help him sleep him sleep recheck 1 month     Sleep disorder  -     traZODone (DESYREL) 50 mg tablet; Take 1 tablet (50 mg total) by mouth daily at bedtime    Nocturia  Comments:  UA in the office was negative  Subjective:     Patient ID: Sterling Black is a 32 y o  male  He is here for transition of care visit after recent hospitalization for choledocholithiasis, ERCP induced pancreatitis,sirs, transaminitis  Patient went to the emergency room after developing severe abdominal pain in the right upper quadrant which did seem to radiate to his back  He was admitted, he underwent ERCP and within 12 hours developed pancreatitis  He received appropriate care, and was discharged to home  Since he has been home he has had minimal sleep  He wakes up approximately 2 hours after he goes to bed with profuse sweating and needing to urinate  He denies any dysuria, pyuria, change in the smell of the urine, or difficulties during the day  He feels he is quite stressed from this episode  He he has been quite fearful, and depressed  Review of Systems   Constitutional: Positive for activity change and fatigue  Negative for appetite change, chills, diaphoresis, fever and unexpected weight change     HENT: Negative for congestion, ear pain, hearing loss, nosebleeds, postnasal drip, rhinorrhea, sinus pressure, sore throat, tinnitus and trouble swallowing  Eyes: Negative for photophobia, pain, discharge, redness, itching and visual disturbance  Respiratory: Negative for cough, chest tightness, shortness of breath and wheezing  Cardiovascular: Negative for chest pain, palpitations and leg swelling  Denies orthopnea , dyspnea on exertion   Gastrointestinal: Negative for abdominal distention, abdominal pain, blood in stool, constipation, diarrhea, nausea and vomiting  Endocrine: Negative  Genitourinary: Negative for difficulty urinating, dysuria, flank pain, frequency, hematuria and urgency  Nocturia once per night   Musculoskeletal: Negative for arthralgias, back pain, gait problem, joint swelling and myalgias  Skin: Negative for pallor, rash and wound  Denies skin lesions   Allergic/Immunologic: Negative for environmental allergies, food allergies and immunocompromised state  Neurological: Negative for dizziness, tremors, seizures, syncope, speech difficulty, weakness, numbness and headaches  Hematological: Negative for adenopathy  Does not bruise/bleed easily  Psychiatric/Behavioral: Positive for decreased concentration, dysphoric mood and sleep disturbance  Negative for behavioral problems, confusion and suicidal ideas  The patient is nervous/anxious  Objective:     Physical Exam  Vitals and nursing note reviewed  Constitutional:       General: He is not in acute distress  Appearance: He is well-developed  He is obese  He is not ill-appearing, toxic-appearing or diaphoretic  HENT:      Head: Normocephalic and atraumatic  Right Ear: Tympanic membrane normal       Left Ear: Tympanic membrane normal       Nose: Nose normal       Mouth/Throat:      Mouth: Mucous membranes are dry  Eyes:      General: No scleral icterus  Extraocular Movements: Extraocular movements intact  Conjunctiva/sclera: Conjunctivae normal    Cardiovascular:      Rate and Rhythm: Normal rate and regular rhythm  Heart sounds: Normal heart sounds  Pulmonary:      Effort: Pulmonary effort is normal    Abdominal:      General: Abdomen is flat  Bowel sounds are normal  There is no distension  Tenderness: There is abdominal tenderness in the right upper quadrant  There is no guarding or rebound  Musculoskeletal:      Cervical back: Neck supple  Skin:     General: Skin is warm and dry  Capillary Refill: Capillary refill takes less than 2 seconds  Neurological:      General: No focal deficit present  Mental Status: He is alert and oriented to person, place, and time  Psychiatric:         Attention and Perception: Attention normal          Mood and Affect: Mood is anxious and depressed  Affect is flat  Speech: Speech is delayed  Speech is not tangential          Behavior: Behavior is slowed  Behavior is cooperative  Thought Content: Thought content normal          Cognition and Memory: Cognition and memory normal          Judgment: Judgment normal            Vitals:    03/23/22 1050   BP: 122/80   Temp: 97 6 °F (36 4 °C)   Weight: (!) 153 kg (337 lb)   Height: 6' 4" (1 93 m)       Transitional Care Management Review:  Kevin Sharma is a 32 y o  male here for TCM follow up  During the TCM phone call patient stated:    TCM Call (since 2/20/2022)     Date and time call was made  3/21/2022  9:45 AM    Hospital care reviewed  Records reviewed        [place]  [facility] [place]  03[place]13/22    Date of discharge  03/20/22    Diagnosis  calculus of gallbladder without biliary obstruction    Disposition  Home    Were the patients medications reviewed and updated  No    Current Symptoms  Upper abdominal pain    Upper abdominal pain severity  Mild    Upper abdominal pain onset  Gradual      TCM Call (since 2/20/2022)     Post hospital issues  Poor ADL (Activities of Daily Living) performance    Should patient be enrolled in anticoag monitoring?   No Scheduled for follow up?   Yes    Patients specialists  Other (comment)    Other specialists names  Blanco Lee    Other specialists contcat #  [phone]    Did you obtain your prescribed medications  Yes    Do you need help managing your prescriptions or medications  No    Is transportation to your appointment needed  No    I have advised the patient to call PCP with any new or worsening symptoms  [address] [facility], DO

## 2022-04-28 NOTE — UTILIZATION REVIEW
URGENT/EMERGENT  INPATIENT/SPU AUTHORIZATION REQUEST    Date: 04/28/22            # Pages in this Request:     X New Request   Additional Information for PA#:     Office Contact Name:  Bernice Mathis Title: Utilization Review, Registed Nurse     Phone: [phone]  Ext  Availability (Date/Time): Wednesday - Friday 8 am- 4 pm    X Inpatient Review  SPU Review        Current       X Late Pick-up   · How your facility was first notified of the Late Pick-up: EVS  · When your facility was first notified of the Late Pick-up (date): 4/25/22         RECIPIENT INFORMATION    Recipient LM#:[id number]   Recipient Name: Fredick Boas    YOB: 1990  32 y o  Recipient Alias:     Gender:  X Male  Female Medicaid Eligibility ([address] [place] [address]): INSURANCE INFORMATION    (All other private or governmental health insurance benefits must be utilized prior to billing the [place] Program)    Was this admission the result of an MVA, other accident, assault, injury, fall, gunshot, bite etc ? Yes X No                   If yes, provide a brief description of the incident  Does the recipient have other insurance coverage? Yes X No        Insurance Company Name/Policy #      Did that insurance pay on this claim? Yes  No        Did that insurance deny this claim? Yes  No    If yes, reason for denial:      Does the recipient have Medicare? Yes X No        Did Medicare exhaust prior to this admission? Yes  No            Did Medicare partially pay this claim? Yes  No        Did that insurance deny this claim? Yes  No    If yes, reason for denial:          Was the recipient a prisoner at the time of admission?    Yes X No            PROVIDER INFORMATION    [facility] Provider ID#: [id number]    [address] [place] [address] Physician Name: [facility] Provider ID#: [id number]        ADMISSION INFORMATION    Type of Admission: (please choose one)    X ED      Direct    If yes, from where? Transfer    If yes, transferring hospital (inpatient, rehab, or psych) Provider Name/Provider ID#: Admission Floor or Unit Type: MED-SURG    Dates/Times:        ED Date/Time: 3/13/2022  0306        Observation Date/Time:         Admission Date/Time: 3/13/22 12:04 PM        Discharge or Transfer Date/Time: 3/20/2022  1542        DIAGNOSIS/PROCEDURE CODES    Primary Diagnosis Code/Primary Diagnosis Code description:   ACUTE PANCREATITIS K85 90  CALCULUS OF GALLBLADDER AND BILE DUCT K80 70  Chest pain [R07 9]  Transaminitis [R74 01]  (MAY RE-ORDER DX CODES)  Additional Diagnosis Code(s) and Description(s)-(up to three additional codes):     Procedure Code (one) and description: [facility] BILE DUCT, ENDO        CLINICAL INFORMATION - PRIOR ADMISSION ONLY    Is there a prior admission with a discharge date within 30 days of the date of this admission? X No (Proceed to the next section - "Clinical Information - General Review Checklist:)      Yes (Provide the following information)     Prior admission dates:    MA Prior Authorization Number:        Review Outcome:     Diagnosis Code(s)/Description:    Procedure Code/Description:    Findings:    Treatment:    Condition on Discharge:   Vitals:    Labs:   Imaging:   Medications: Follow-up Instructions:    Disposition:        CLINICAL INFORMATION - GENERAL REVIEW CHECKLIST    EMERGENCY DEPARTMENT: (Proceed to "ADMISSION" if Direct Admission)    Presenting Signs/Symptoms:    Chest Pain       c/o chest and back pain  states last time he was here it was d/t his gall bladder  trouble breathing d/t pain        Initial Presentation:   Mr Candice Melendez is a 32 yom who presents to the ED from home with c/o back pain with radiation to abd  Has had this pain before and told it was gallbladder issue  No c/o N/V  PMH: obesity  In the ED he felt improvement with Morphine    Imaging shows Mild hepatomegaly with fat infiltration, Gallstones with no signs of cholecystitis,  Markedly distended, debris-filled stomach, despite the fasting state of the patient   Correlate for gastroparesis   Consider follow-up GI consultation and nuclear medicine gastric emptying study  Normally distended gallbladder   Suggestion of noncalcified gallstones within the gallbladder   Mild hyperemia around the gallbladder neck   Correlate for early, acute cholecystitis, Hepatomegaly with fatty infiltration, Partially duplicated right collecting system  Also had elevated LFTs  On exam there is no abd tenderness  He is admitted to INPATIENT status with Calculus of gallbladder without biliary obstruction - GI Consult, surgery consult - no operative intervention, PRN analgesia  Transaminitis - possible MRCP, IV fluids, KCl repletion and trending         3/13 Surgery Consult - symptomatic cholelithiasis and transaminitis - no surgical intervention at this time  Recommend GI Consult for elevated LFT and T bili  Recommend OP F/u for cholecystectomy        3/13 GI Consult - Upper abd pain, transaminitis, cholelithiasis, abnormal imaging, NSAID use - pain likely d/t biliary colic  Will get MRCP to r/o biliary obstruction  Will get acute hep panel, ebv, cmv, hiv, rpr, tylenol level ordered to rule out acute etiology for elevated LFTs but likely elevated d/t passing GB stones  Start PPI BID, Carafate PRN, PRN analgesia, monitor LFTs, NPO, NSAID cessation  Adding antibiotics for possible choledo cholithiasis  Will likely require ERCP with lap chester        Date: 3/14   Day 2:   Upper abdominal pain, Elevated liver enzymes - MRCP, if concerning for choledocholithiasis will proceed with ERCP  Will require cholecystectomy following ERCP      Medication/treatment prior to arrival in the ED:     Past Medical History:    Active Ambulatory Problems     Diagnosis Date Noted    Allergic rhinitis 09/14/2016    Asthma 07/13/2012    Obesity 03/24/2016    Change in bowel habits 08/03/2018    Diarrhea in adult patient 08/03/2018    Allergic dermatitis 08/01/2019    Calculus of gallbladder without biliary obstruction 12/22/2020    Morbid obesity with BMI of 40 0-44 9, adult ([place] [address] ) 01/05/2021    Right upper quadrant abdominal pain 09/28/2021    Rectal bleeding 09/28/2021    Does not have health insurance 09/28/2021     Resolved Ambulatory Problems     Diagnosis Date Noted    SIRS (systemic inflammatory response syndrome) ([facility] ) 12/22/2020     Past Medical History:   Diagnosis Date    Fracture of nasal bone     GERD (gastroesophageal reflux disease)     Gross hematuria     Orthostatic hypotension        Clinical Exam:     Initial Vital Signs: (Temp, Pulse, Resp, and BP)   ED Triage Vitals [03/13/22 0314]   Temperature Pulse Respirations Blood Pressure SpO2   97 9 °F (36 6 °C) (!) 121 18 159/72 96 %      Temp Source Heart Rate Source Patient Position - Orthostatic VS BP Location FiO2 (%)   Oral Monitor Lying Left arm --      Pain Score       5           Pertinent Repeat Vital Signs: (include times they were obtained)  03/14/22 0727 96 5 °F (35 8 °C) Abnormal  89 18 110/79 86 96 % None (Room air) Sitting   03/13/22 2242 97 1 °F (36 2 °C) Abnormal  75 18 117/75 92 100 % None (Room air) Lying   03/13/22 1523 97 2 °F (36 2 °C) Abnormal  83 18 105/65 83 100 % None (Room air) Sitting   03/13/22 1240 97 8 °F (36 6 °C) 104 18 121/81 -- 97 % None (Room air) Sitting   03/13/22 1216 -- 88 17 121/76 -- 97 % None (Room air) Lying   03/13/22 1034 -- 81 17 110/62 -- 96 % None (Room air) Lying   03/13/22 0843 97 9 °F (36 6 °C) 83 18 118/74 90 98 % None (Room air) Lying   03/13/22 0744 -- 75 17 113/64 -- 97 % None (Room air) Lying   03/13/22 0600 -- 80 12 -- -- 95 % None (Room air) --   03/13/22 0545 -- 80 12 -- -- 97 % -- --   03/13/22 0530 -- 82 14 -- -- 96 % -- --   03/13/22 0515 -- 90 14 -- -- 96 % -- --   03/13/22 0500 -- 86 12 -- -- 96 % None (Room air) --   03/13/22 0430 -- 96 15 -- -- 96 % -- --   03/13/22 0415 -- 94 12 -- -- 94 % -- --   03/13/22 0400 -- 102 12 -- -- 94 % None (Room air) Sitting   03/13/22 0330 -- 104 20 -- -- 96 % -- --   03/13/22 0320 -- 96 17 -- -- 97 % None (Room air) --   03/13/22 0315 -- 112 Abnormal  -- 159/72 103 96 % None (Room air) --       Pertinent Sustained Findings: (include times they were obtained)    Weight in Kilograms:  Wt Readings from Last 1 Encounters:    (!) 153 kg (337 lb)       Pertinent Labs (results):  Results from last 7 days   Lab Units 03/14/22 0427 03/13/22  0341   WBC Thousand/uL 5 65 10 72*   HEMOGLOBIN g/dL 15 7 17 1*   HEMATOCRIT % 46 0 49 4*   PLATELETS Thousands/uL 320 323   NEUTROS ABS Thousands/µL 3 33 8  14*                Results from last 7 days   Lab Units 03/14/22 0427 03/13/22  0341   SODIUM mmol/L 140 139   POTASSIUM mmol/L 3 6 4 0   CHLORIDE mmol/L 106 104   CO2 mmol/L 28 26   ANION GAP mmol/L 6 9   BUN mg/dL 7 8   CREATININE mg/dL 0 95 0 98   EGFR ml/min/1 73sq m 106 102   CALCIUM mg/dL 8 4 8 9             Results from last 7 days   Lab Units 03/14/22  0427 03/13/22  1301 03/13/22  0341   AST U/L 300*  --  374*   ALT U/L 466*  --  274*   ALK PHOS U/L 63  --  56   TOTAL PROTEIN g/dL 6 9  --  7 5   ALBUMIN g/dL 3 4*  --  3 6   TOTAL BILIRUBIN mg/dL 5 99*  --  2 25*   BILIRUBIN DIRECT mg/dL  --  1 83*  --                 Results from last 7 days   Lab Units 03/14/22 0427 03/13/22  0341   GLUCOSE RANDOM mg/dL 81 147*           Results from last 7 days   Lab Units 03/13/22  0341   HS TNI 0HR ng/L <2           Results from last 7 days   Lab Units 03/13/22  0341   LIPASE u/L 102           Results from last 7 days   Lab Units 03/13/22  1258   ACETAMINOPHEN LVL ug/mL <2*            Radiology (results):  3/15:ERCP:  IMPRESSION:  Successful ERCP with sphincterotomy / sphincteroplasty and stone extraction        RECOMMENDATION:  Follow LFT/s  Cholecystectomy per surgery           US right upper quadrant   Final Result by Stacey Camilo MD (83/87 0601) Mild hepatomegaly with fat infiltration  Gallstones with no signs of cholecystitis        CT abdomen pelvis with contrast   Final Result by Elizabeth Smith DO (03/13 8140)   1  Markedly distended, debris-filled stomach, despite the fasting state of the patient  Correlate for gastroparesis  Consider follow-up GI consultation and nuclear medicine gastric emptying study        2   Normally distended gallbladder  Suggestion of noncalcified gallstones within the gallbladder  Mild hyperemia around the gallbladder neck  Correlate for early, acute cholecystitis  If there is continued concern for cholelithiasis and acute    cholecystitis, a follow-up right upper quadrant ultrasound is recommended, given the limitations of the previous right upper quadrant ultrasound  At that time, the study was performed utilizing Covid protocol and was markedly limited  Follow-up    surgical and/or GI consultation as indicated  Ultimately, if there is continued concern for gallbladder dysfunction, a follow-up nuclear medicine HIDA scan with gallbladder ejection fraction may be necessary        3    Hepatomegaly with fatty infiltration        4   Partially duplicated right collecting system        XR chest 1 view portable   ED Interpretation by Yudy Hale PA-C (03/13 0406)   No acute cardiopulmonary disease       EKG (results):   3/13 ECG - NSR  Other tests (results):    Tests pending final results:    Treatment in the ED:   Medication Administration from 03/13/2022 0306 to 03/13/2022 1237       Date/Time Order Dose Route Action Action by Comments     03/13/2022 0342 famotidine (PEPCID) injection 20 mg 20 mg Intravenous Given                  03/13/2022 0344 aluminum-magnesium hydroxide-simethicone (MYLANTA) oral suspension 30 mL 30 mL Oral Given                  03/13/2022 0344 Lidocaine Viscous HCl (XYLOCAINE) 2 % mucosal solution 15 mL 15 mL Swish & Swallow Given                  03/13/2022 0535 ketorolac (TORADOL) injection 30 mg 30 mg Intravenous Given       03/13/2022 0844 morphine (PF) 4 mg/mL injection 4 mg 4 mg Intravenous Given             Meds: Name, dose, route, time, number of doses given        Nebulizer treatments: Type, total number given      IVs: Type, rate, total amt  given          Other treatments:       Change in condition while in the ED:       Response to ED Treatment:           OBSERVATION: (Proceed to "ADMISSION" if Direct Admission)    Orders written during the observation period  Meds Name, dose, route, time, how may doses given:  PRN Meds Name, dose, route, time, how many doses given within the first 24 hrs :  IVs Type, rate, and total amt  ordered/given:  Labs, imaging, other:  Consults and findings:    Test Results during the observation period  Pertinent Lab tests (dates/results):  Culture results (blood, urine, spinal, wound, respiratory, etc ):  Imaging tests (dates/results):  EKG (dates/results):   Other test (dates/results):  Tests pending (dates/results):    Surgical or Invasive Procedures during the observation period  Name of surgery/procedure:  Date & Time:  Patient Response:  Post-operative orders:  Operative Report/Findings:    Response to Treatment, Major Change in Condition, Major Charge in Treatment during the observation period          ADMISSION:    DIRECT Admissions Only:    · Presenting Signs/Symptoms:   ·   · Medication/treatment prior to arrival:  ·   · Past Medical History:  ·   · Clinical Exam on admission:  ·   · Vital Signs on admission: (Temp, Pulse, Resp, and BP)  ·   · Weight in kilograms:     ALL Admissions:    Admission Orders and Other Orders written within the first 24 hrs after admission  NPO   MRCP  IP CONSULT TO [address] [place] [address] Name, dose, route, time, how may doses given:  pantoprazole, 40 mg, Oral, BID AC  potassium chloride, 20 mEq, Intravenous, Q2H        Continuous IV Infusions:  sodium chloride, 75 mL/hr, Intravenous, Continuous        PRN Meds:  acetaminophen, 650 mg, Oral, Q6H PRN  albuterol, 2 puff, Inhalation, Q6H PRN  calcium carbonate, 1,000 mg, Oral, Daily PRN  ketorolac, 15 mg, Intravenous, Q6H PRN - x 2 3/13, x 1 3/14  morphine injection, 4 mg, Intravenous, Q4H PRN  ondansetron, 4 mg, Intravenous, Q6H PRN - x 1 3/13, 3/14  sucralfate, 1 g, Oral, Q6H PRN       PRN Meds Name, dose, route, time, how many doses given within the first 24 hrs :  IVs Type, rate, and total amt  ordered/given:  Labs, imaging, other:      Consults and findings:  * Calculus of gallbladder without biliary obstruction  Assessment & Plan  · Patient presented to ED with complaint of back pain  · Found have gallstones with no signs of cholecystitis  · Does have prior admission for similar complaint, plan was for outpatient cholecystectomy but this was never performed  · GI consult  · Possible MRCP  · Seen in consult by surgery, no acute surgical intervention required  · Continue PRN analgesics     Transaminitis  Assessment & Plan  · LFTs elevated at time of admission, , , T bili 2 25  · GI consult  · Plan for possible MRCP     Obesity  Assessment & Plan  · BMI 41      Inpatient consult to gastroenterology  Consult performed by: William Hernandez PA-C  Consult ordered by: Nathalie Douglass PA-C           Reason for Consult / Principal Problem:      1  Upper abdominal Pain  2  Transaminitis         ASSESSMENT AND PLAN:       Yamilet Frias is a 33 y/o male who presents with chest pain and back pain      1  Upper abdominal pain  2  Transaminitis  3  Cholelithiasis  4  Abnormal imaging  5  NSAID use  Pt presents to the ED with CP that radiates to his back x 1 night; CT in the ED noted markedly distended, and debris-filled stomach, fatty liver and distended GB and stones  U/S confirmed fatty liver but also noted cholelithiasis without signs of cholecystitis, thus surgery deemed no emergent surgical intervention needed   LFTs today:  , , T  Bili 2 25  Lipase normal  Mild elevated white count but no fever  When pt points to where the pain is, he points to RUQ area  Pt denies any recent or current n/v but notes that he does get episodes of "random" n/v being able to identify triggers at home  Pt denies tylenol use, alcohol/tobacco/illicit drug use  Pt admits to using NSAIDs frequently for pain    -explained to pt that at this point it is unclear what exactly is causing his pain: his pain may be due to biliary colic in which he could be passing stones intermittently vs biliary obstruction (although very unlikely) vs underlying gastroparesis vs NSAID-induced PUD vs H pylori vs a combination of multiple etiologies   -I do suspect that a large component of his pain is likely due to biliary colic as he notes he gets this same type of "gallbladder attack" when he eats fatty/greasy foods   -MRCP ordered to rule out biliary obstruction but without signs of dilation on imaging, this is low yield  -acute hep panel, ebv, cmv, hiv, rpr, tylenol level ordered to rule out acute etiology for elevated LFTs but it is likely these are elevated due to passing of GB stones   -start PPI BID  -start Carafate PRN  -pain control per SLIM  -monitor and trend LFTs  -would keep NPO until after MRCP is done  -if MRCP is WNL and LFTs trend down with an improvement in pt's symptoms, pt can undergo OP cholecystectomy and OP EGD (and eventually GES); if pt's symptoms do not improve while IP and MRCP is -, he should undergo EGD but would also recommend re-evaluation per surgery at that time as it may remain unclear what is causing the pain  -NSAID cessation discussed with pt in detail   -appreciate surgical recommendations     Consult: General Surgery  Andria Paulino 32 y o  male MRN: [id number]  Unit/Bed#: ED 32 Encounter: [id number]       Assessment:  Patient is a 32 y o  male who presented with symptomatic cholelithiasis and transaminitis      Afebrile, VSS  WBC: 10 7; Hb 1ll AST: 374; ALTL: 274: Tbili: 2 25  CTAP showed distended stomach, filled with ingested food and air, noncalcified gallstones, mild hyperemia of neck, hepatomegally with fatty infiltration  3/13 RUQ US: gallstones, no cholecystitis, no choledocholithiasis, CBD 6 mm; no intrahepatic dilation        Plan:  No acute surgical intervention at this time  Recommend GI consult for elevated LFTs and tbili  Recommend outpatient follow-up for cholecystectomy when patient get his insurance sorted out  Consultation - Infectious Disease   Delon Sharma 32 y o  male MRN: [id number]  Unit/Bed#: E2 -01 Encounter: [id number]     IMPRESSION & RECOMMENDATIONS:   1  SIRS vs sepsis  POA  Tachycardia and leukocytosis  Secondary to choledocholithiasis  Now with new increased to WBC count  Blood cultures were sent  Repeat imaging suggesting post ERCP pancreatitis  Despite his presentation he fortunately remains hemodynamically stable and nontoxic  WBC count appears to be plateauing   -antibiotic as below  -check CBCD and CMP tomorrow  -follow-up blood cultures  -monitor vitals  -supportive care     2  Post ERCP pancreatitis  Lipase appears to have peaked at 14,970  It has improved to 1126 today  His LFTs and T bili are additionally improving  Blood cultures were sent yesterday but with his lack of fever I have low suspicion for bacteremia  The patient has been receiving Levaquin and Flagyl  I will transition him to cefepime and Flagyl now in anticipation of a short course of antibiotic treatment   -stop IV Levaquin  -start IV cefepime  -continue Flagyl but transition from IV administration to oral when he is tolerating PO intake  -check CBCD and CMP tomorrow  -follow-up blood cultures  -trend lipase per GI  -serial abdominal exams  -monitor GI symptoms  -continue follow-up with GI     3  Choledocholithiasis  Patient is status post ERCP  He has been told he will eventually require cholecystectomy    Now with complication of post ERCP pancreatitis above   -serial abdominal exams  -monitor GI symptoms  -continue follow-up GI     4  Abdominal pain  In the setting of recent choledocholithiasis  He is also concerned about constipation although he has had minimal PO intake in the past week  Consider ileus in the setting of frequent morphine  Encourage reduce use of narcotic pain medication  He has been started on aggressive bowel regimen   -serial abdominal exams  -monitor GI symptoms  -continue follow-up GI     5  Obesity  BMI = 41 06  Test Results after admission  Pertinent Lab tests (dates/results):  Culture results (blood, urine, spinal, wound, respiratory, etc ):  Imaging tests (dates/results):  EKG (dates/results): Other test (dates/results):  Tests pending (dates/results):    Surgical or Invasive Procedures  Name of surgery/procedure:  Date & Time:  Patient Response:  Post-operative orders:  Operative Report/Findings:    Response to Treatment, Major Change in Condition, Major Charge in Treatment anytime during admission    Disposition on Discharge  Home, Rehab, SNF, LTC, Shelter, etc : Home/Self Care    Cease to Breathe (CTB)  If a patient expires during an admission, in addition to the above information, please include:    Summary/timeline of the patient's decline in condition:    Medications and treatment:    Patient response to treatment:    Date and time patient ceased to breathe:        Is there a Readmission that follows this admission? Yes X No    If yes, reason for denial:          InterQual Review    InterQual Criteria Met:  Yes X No  N/A        Please include the InterQual Review, InterQual year/version used, and the criteria selected:   LOC:Acute Adult-Infection: GI/GYN      Criteria Review   REVIEW SUMMARY     InterQual® Review Status:  In Primary  Criteria Status: Not Met  Day of review: Episode Day 1  Condition Specific: Yes        REVIEW DETAILS     Product: [employer] Adult  Subset: Infection: GI/GYN        (Symptom or finding within 24h)     (Excludes PO medications unless noted)     Select Day, One:          [  ] Episode Day 1, One:              [  ] ACUTE, >= One:                  [  ] Ascending cholangitis and, Both:                      [  ] Intervention, All:                          [X] Imaging study, >= One:                              [X] Abdominal ultrasound (US)                              [X] Abdominal CT                              [  ] MRCP                              Comment by Ketan Govea on 4/28/2022 11:31 AM:                              ERCP        Version: InterQual® 2022, Apr 2022 Release  InterQual® criteria (IQ) is confidential and proprietary information and is being provided to you solely as it pertains to the information requested  IQ may contain advanced clinical knowledge which we recommend you discuss with your physician upon disclosure to you  Use permitted by and subject to license with [employer] and/or one of its [place]  IQ reflects clinical interpretations and analyses and cannot alone either (a) resolve medical ambiguities of particular situations; or (b) provide the sole basis for definitive decisions  IQ is intended solely for use as screening guidelines with respect to medical appropriateness of healthcare services  All ultimate care decisions are strictly and solely the obligation and responsibility of your health care provider  © 2022 [employer] and/or one of its subsidiaries  All Rights Reserved  CPT® only © 9378-2499 [employer]  All Rights Reserved  PLEASE SUBMIT THE COMPLETED FORM TO THE DEPARTMENT OF HUMAN SERVICES - DIVISION OF CLINICAL  REVIEW VIA FAX [phone] or VIA E-MAIL TO [email or web address]    Signature: Bernice Mathis Date:  04/28/22    Confidentiality Notice:  The documents accompanying this telecopy may contain confidential information belonging to the sender  The information is intended only for the use of the individual named above  If you are not the intended recipient, you are hereby notified  That any disclosure, copying, distribution or taking of any telecopy is strictly prohibited

## 2023-05-18 ENCOUNTER — OFFICE VISIT (OUTPATIENT)
Dept: FAMILY MEDICINE CLINIC | Facility: CLINIC | Age: 33
End: 2023-05-18

## 2023-05-18 ENCOUNTER — TELEPHONE (OUTPATIENT)
Dept: FAMILY MEDICINE CLINIC | Facility: CLINIC | Age: 33
End: 2023-05-18

## 2023-05-18 VITALS
SYSTOLIC BLOOD PRESSURE: 112 MMHG | BODY MASS INDEX: 40.43 KG/M2 | WEIGHT: 315 LBS | TEMPERATURE: 98.2 F | HEART RATE: 107 BPM | HEIGHT: 74 IN | DIASTOLIC BLOOD PRESSURE: 84 MMHG | OXYGEN SATURATION: 98 %
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
## 2023-05-18 DIAGNOSIS — E78.2 MIXED HYPERLIPIDEMIA: ICD-10-CM

## 2023-05-18 DIAGNOSIS — K80.00 CALCULUS OF GALLBLADDER WITH ACUTE CHOLECYSTITIS WITHOUT OBSTRUCTION: Primary | ICD-10-CM

## 2023-05-18 DIAGNOSIS — K80.50 CHOLEDOCHOLITHIASIS: ICD-10-CM

## 2023-05-18 DIAGNOSIS — S61.451A DOG BITE OF MULTIPLE SITES OF RIGHT HAND AND FINGERS, INITIAL ENCOUNTER: Primary | ICD-10-CM

## 2023-05-18 DIAGNOSIS — K80.00 CALCULUS OF GALLBLADDER WITH ACUTE CHOLECYSTITIS WITHOUT OBSTRUCTION: ICD-10-CM

## 2023-05-18 DIAGNOSIS — E55.9 VITAMIN D DEFICIENCY: ICD-10-CM

## 2023-05-18 DIAGNOSIS — S61.259A DOG BITE OF MULTIPLE SITES OF RIGHT HAND AND FINGERS, INITIAL ENCOUNTER: Primary | ICD-10-CM
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
## 2023-05-18 DIAGNOSIS — S61.452A DOG BITE OF LEFT HAND, INITIAL ENCOUNTER: ICD-10-CM

## 2023-05-18 DIAGNOSIS — K52.9 CHRONIC DIARRHEA: ICD-10-CM

## 2023-05-18 DIAGNOSIS — R73.9 HYPERGLYCEMIA: ICD-10-CM

## 2023-05-18 RX ORDER — CLINDAMYCIN HYDROCHLORIDE 300 MG/1
300 CAPSULE ORAL 3 TIMES DAILY
Qty: 21 CAPSULE | Refills: 0 | Status: SHIPPED | OUTPATIENT
Start: 2023-05-18 | End: 2023-05-25

## 2023-05-18 NOTE — PROGRESS NOTES
Name: Latha Grey      : 1990      MRN: [id number]  Encounter Provider: Pricila Dominique DO  Encounter Date: 2023   Encounter department: [facility]IMARY [facility]    Assessment & Plan     1  Dog bite of multiple sites of right hand and fingers, initial encounter  -     clindamycin (CLEOCIN) 300 MG capsule; Take 1 capsule (300 mg total) by mouth 3 (three) times a day for 7 days  -     TDAP VACCINE GREATER THAN OR EQUAL TO 8YO IM    2  Dog bite of left hand, initial encounter  -     clindamycin (CLEOCIN) 300 MG capsule; Take 1 capsule (300 mg total) by mouth 3 (three) times a day for 7 days  -     TDAP VACCINE GREATER THAN OR EQUAL TO 8YO IM    3  Hyperglycemia  -     Comprehensive metabolic panel; Future  -     Hemoglobin A1C; Future  -     Comprehensive metabolic panel    4  Chronic diarrhea  -     CBC and differential; Future  -     Comprehensive metabolic panel; Future  -     TSH, 3rd generation; Future  -     Celiac Disease Antibody Profile; Future  -     CBC and differential  -     Comprehensive metabolic panel  -     TSH, 3rd generation    5  Calculus of gallbladder with acute cholecystitis without obstruction    6  Mixed hyperlipidemia  -     CBC and differential; Future  -     Comprehensive metabolic panel; Future  -     Lipid panel; Future  -     CBC and differential  -     Comprehensive metabolic panel  -     Lipid panel    7  Vitamin D deficiency  -     Vitamin D 25 hydroxy; Future  -     Vitamin D 25 hydroxy         Chief Complaint   Patient presents with   • Animal Bite     Pt got a dog bite his hands  Dog does have the rabies shot  Subjective      He presents to the office with dog bites to both hands  States that he was brushing his [place] with a aggressive brush, and it got caught in the dog's hair    The dog turned on him first biting his right hand causing puncture wounds in the back of his right thumb and the palmar aspect of the little right fifth "finger as well as a laceration puncture wound of the left dorsal fifth finger  Review of Systems   Constitutional: Negative for chills and fever  HENT: Negative for ear pain and sore throat  Eyes: Negative for pain and visual disturbance  Respiratory: Negative for cough and shortness of breath  Cardiovascular: Negative for chest pain and palpitations  Gastrointestinal: Negative for abdominal pain and vomiting  Genitourinary: Negative for dysuria and hematuria  Musculoskeletal: Negative for arthralgias and back pain  Skin: Positive for wound  Negative for color change and rash  Neurological: Negative for seizures and syncope  Psychiatric/Behavioral: Negative for confusion and decreased concentration  The patient is not nervous/anxious and is not hyperactive  All other systems reviewed and are negative  Current Outpatient Medications on File Prior to Visit   Medication Sig   • traZODone (DESYREL) 50 mg tablet Take 1 tablet (50 mg total) by mouth daily at bedtime (Patient not taking: Reported on 5/18/2023)       Objective     /84 (BP Location: Left arm, Patient Position: Sitting, Cuff Size: Adult)   Pulse (!) 107   Temp 98 2 °F (36 8 °C) (Temporal)   Ht 6' 2 41\" (1 89 m)   Wt (!) 154 kg (340 lb 9 6 oz)   SpO2 98%   BMI 43 25 kg/m²     Physical Exam  Vitals and nursing note reviewed  Constitutional:       Appearance: Normal appearance  He is well-developed  He is obese  HENT:      Head: Normocephalic and atraumatic  Right Ear: Tympanic membrane and ear canal normal       Left Ear: Tympanic membrane and ear canal normal       Nose: Nose normal    Eyes:      Conjunctiva/sclera: Conjunctivae normal       Pupils: Pupils are equal, round, and reactive to light  Neck:      Thyroid: No thyromegaly  Vascular: No JVD  Trachea: No tracheal deviation  Cardiovascular:      Rate and Rhythm: Normal rate and regular rhythm        Heart sounds: No murmur " heard   Pulmonary:      Effort: Pulmonary effort is normal       Breath sounds: Normal breath sounds  No wheezing or rales  Abdominal:      General: Bowel sounds are normal       Palpations: Abdomen is soft  There is no mass  Tenderness: There is no abdominal tenderness  There is no guarding or rebound  Musculoskeletal:         General: No tenderness or deformity  Cervical back: Normal range of motion and neck supple  Lymphadenopathy:      Cervical: No cervical adenopathy  Skin:     General: Skin is warm and dry  Findings: Wound present  No lesion or rash  Nails: There is no clubbing  Neurological:      Mental Status: He is alert and oriented to person, place, and time  Cranial Nerves: No cranial nerve deficit  Motor: No abnormal muscle tone  Coordination: Coordination normal       Deep Tendon Reflexes: Reflexes are normal and symmetric   Reflexes normal    Psychiatric:         Mood and Affect: Mood normal          Behavior: Behavior normal          Judgment: Judgment normal        Charm Breaker, DO

## 2023-05-22 ENCOUNTER — APPOINTMENT (OUTPATIENT)
Dept: LAB | Facility: CLINIC | Age: 33
End: 2023-05-22

## 2023-05-22 DIAGNOSIS — K52.9 CHRONIC DIARRHEA: ICD-10-CM

## 2023-05-22 DIAGNOSIS — R73.9 HYPERGLYCEMIA: ICD-10-CM

## 2023-05-22 LAB
25(OH)D3 SERPL-MCNC: 15.9 NG/ML (ref 30–100)
ALBUMIN SERPL BCP-MCNC: 3.6 G/DL (ref 3.5–5)
ALP SERPL-CCNC: 42 U/L (ref 46–116)
ALT SERPL W P-5'-P-CCNC: 55 U/L (ref 12–78)
ANION GAP SERPL CALCULATED.3IONS-SCNC: 3 MMOL/L (ref 4–13)
AST SERPL W P-5'-P-CCNC: 31 U/L (ref 5–45)
BASOPHILS # BLD AUTO: 0.07 THOUSANDS/ÂΜL (ref 0–0.1)
BASOPHILS NFR BLD AUTO: 1 % (ref 0–1)
BILIRUB SERPL-MCNC: 1.11 MG/DL (ref 0.2–1)
BUN SERPL-MCNC: 10 MG/DL (ref 5–25)
CALCIUM SERPL-MCNC: 9 MG/DL (ref 8.3–10.1)
CHLORIDE SERPL-SCNC: 108 MMOL/L (ref 96–108)
CHOLEST SERPL-MCNC: 151 MG/DL
CO2 SERPL-SCNC: 25 MMOL/L (ref 21–32)
CREAT SERPL-MCNC: 0.9 MG/DL (ref 0.6–1.3)
EOSINOPHIL # BLD AUTO: 0.2 THOUSAND/ÂΜL (ref 0–0.61)
EOSINOPHIL NFR BLD AUTO: 3 % (ref 0–6)
ERYTHROCYTE [DISTWIDTH] IN BLOOD BY AUTOMATED COUNT: 12.9 % (ref 11.6–15.1)
EST. AVERAGE GLUCOSE BLD GHB EST-MCNC: 103 MG/DL
GFR SERPL CREATININE-BSD FRML MDRD: 112 ML/MIN/1.73SQ M
GLUCOSE P FAST SERPL-MCNC: 82 MG/DL (ref 65–99)
HBA1C MFR BLD: 5.2 %
HCT VFR BLD AUTO: 47.9 % (ref 36.5–49.3)
HDLC SERPL-MCNC: 36 MG/DL
HGB BLD-MCNC: 16.5 G/DL (ref 12–17)
IMM GRANULOCYTES # BLD AUTO: 0.03 THOUSAND/UL (ref 0–0.2)
IMM GRANULOCYTES NFR BLD AUTO: 1 % (ref 0–2)
LDLC SERPL CALC-MCNC: 93 MG/DL (ref 0–100)
LYMPHOCYTES # BLD AUTO: 1.92 THOUSANDS/ÂΜL (ref 0.6–4.47)
LYMPHOCYTES NFR BLD AUTO: 31 % (ref 14–44)
MCH RBC QN AUTO: 31.1 PG (ref 26.8–34.3)
MCHC RBC AUTO-ENTMCNC: 34.4 G/DL (ref 31.4–37.4)
MCV RBC AUTO: 90 FL (ref 82–98)
MONOCYTES # BLD AUTO: 0.5 THOUSAND/ÂΜL (ref 0.17–1.22)
MONOCYTES NFR BLD AUTO: 8 % (ref 4–12)
NEUTROPHILS # BLD AUTO: 3.49 THOUSANDS/ÂΜL (ref 1.85–7.62)
NEUTS SEG NFR BLD AUTO: 56 % (ref 43–75)
NONHDLC SERPL-MCNC: 115 MG/DL
NRBC BLD AUTO-RTO: 0 /100 WBCS
PLATELET # BLD AUTO: 359 THOUSANDS/UL (ref 149–390)
PMV BLD AUTO: 10.1 FL (ref 8.9–12.7)
POTASSIUM SERPL-SCNC: 3.8 MMOL/L (ref 3.5–5.3)
PROT SERPL-MCNC: 7.4 G/DL (ref 6.4–8.4)
RBC # BLD AUTO: 5.3 MILLION/UL (ref 3.88–5.62)
SODIUM SERPL-SCNC: 136 MMOL/L (ref 135–147)
TRIGL SERPL-MCNC: 108 MG/DL
TSH SERPL DL<=0.05 MIU/L-ACNC: 2.33 UIU/ML (ref 0.45–4.5)
WBC # BLD AUTO: 6.21 THOUSAND/UL (ref 4.31–10.16)

## 2023-05-23 ENCOUNTER — APPOINTMENT (OUTPATIENT)
Dept: RADIOLOGY | Facility: MEDICAL CENTER | Age: 33
End: 2023-05-23

## 2023-05-23 ENCOUNTER — OFFICE VISIT (OUTPATIENT)
Dept: FAMILY MEDICINE CLINIC | Facility: CLINIC | Age: 33
End: 2023-05-23

## 2023-05-23 VITALS
HEIGHT: 74 IN | OXYGEN SATURATION: 98 % | BODY MASS INDEX: 40.43 KG/M2 | WEIGHT: 315 LBS | HEART RATE: 94 BPM | DIASTOLIC BLOOD PRESSURE: 80 MMHG | SYSTOLIC BLOOD PRESSURE: 130 MMHG

## 2023-05-23 DIAGNOSIS — M25.361 INSTABILITY OF RIGHT KNEE JOINT: Primary | ICD-10-CM

## 2023-05-23 DIAGNOSIS — M54.50 LEFT LUMBAR PAIN: ICD-10-CM

## 2023-05-23 DIAGNOSIS — G47.9 SLEEP DISORDER: ICD-10-CM

## 2023-05-23 DIAGNOSIS — M25.361 INSTABILITY OF RIGHT KNEE JOINT: ICD-10-CM

## 2023-05-23 RX ORDER — ESZOPICLONE 2 MG/1
2 TABLET, FILM COATED ORAL
Qty: 30 TABLET | Refills: 0 | Status: SHIPPED | OUTPATIENT
Start: 2023-05-23

## 2023-05-23 NOTE — PROGRESS NOTES
"Name: Fabienne Polanco      : 1990      MRN: [id number]  Encounter Provider: Daja Jo DO  Encounter Date: 2023   Encounter department: [facility] PRIMARY CARE    Assessment & Plan     1  Instability of right knee joint  Comments:  Check x-ray, refer to Ortho  Orders:  -     XR knee 4+ vw right injury; Future; Expected date: 2023  -     Ambulatory Referral to Orthopedic Surgery; Future    2  Left lumbar pain  Comments:  Check x-ray may continue ibuprofen as needed  Orders:  -     XR spine lumbar minimum 4 views non injury; Future; Expected date: 2023    3  Sleep disorder  Comments:  Trial of Lunesta, discontinue trazodone  Orders:  -     eszopiclone (LUNESTA) 2 mg tablet; Take 1 tablet (2 mg total) by mouth daily at bedtime as needed for sleep Take immediately before bedtime      BMI Counseling: Body mass index is 44 09 kg/m²  The BMI is above normal  Nutrition recommendations include decreasing portion sizes, encouraging healthy choices of fruits and vegetables, moderation in carbohydrate intake, increasing intake of lean protein and reducing intake of saturated and trans fat  Exercise recommendations include exercising 3-5 times per week  Rationale for BMI follow-up plan is due to patient being overweight or obese  Chief Complaint   Patient presents with   • Back Pain   • Knee Pain       Subjective      Patient presents to the office complaining of chronic right knee pain and instability  States that it \"pops out occasionally and then will pop back into place  This has been going on for a number of years  Original injury happened when he was playing catcher in baseball  Also complaining of left-sided low back pain that comes and goes  Has not bothered him a little bit but when it does bother him it can sometimes radiate down his left leg  No loss of bowel or bladder when that happens  Dog bites have healed up    He would like to review his recent " lab work  Also, trazodone did not help with sleep, but it made him disagreeable during the day  Review of Systems   Constitutional: Negative for appetite change, chills, diaphoresis, fatigue, fever and unexpected weight change  HENT: Negative for congestion, ear pain, hearing loss, nosebleeds, postnasal drip, rhinorrhea, sinus pressure, sore throat, tinnitus and trouble swallowing  Eyes: Negative for photophobia, pain, discharge, redness, itching and visual disturbance  Respiratory: Negative for cough, chest tightness, shortness of breath and wheezing  Cardiovascular: Negative for chest pain, palpitations and leg swelling  Denies orthopnea , dyspnea on exertion   Gastrointestinal: Negative for abdominal distention, abdominal pain, blood in stool, constipation, diarrhea, nausea and vomiting  Endocrine: Negative  Genitourinary: Negative for difficulty urinating, dysuria, flank pain, frequency, hematuria and urgency  Denies nocturia , erectile dysfunction   Musculoskeletal: Positive for arthralgias and back pain  Negative for gait problem, joint swelling and myalgias  Skin: Negative for pallor, rash and wound  Denies skin lesions   Allergic/Immunologic: Negative for environmental allergies, food allergies and immunocompromised state  Neurological: Negative for dizziness, tremors, seizures, syncope, speech difficulty, weakness, numbness and headaches  Hematological: Negative for adenopathy  Does not bruise/bleed easily  Psychiatric/Behavioral: Positive for sleep disturbance  Negative for behavioral problems, confusion and suicidal ideas  The patient is not nervous/anxious          Current Outpatient Medications on File Prior to Visit   Medication Sig   • clindamycin (CLEOCIN) 300 MG capsule Take 1 capsule (300 mg total) by mouth 3 (three) times a day for 7 days   • traZODone (DESYREL) 50 mg tablet Take 1 tablet (50 mg total) by mouth daily at bedtime (Patient not taking: "Reported on 5/18/2023)       Objective     /80 (BP Location: Left arm, Patient Position: Sitting, Cuff Size: Large)   Pulse 94   Ht 6' 2\" (1 88 m)   Wt (!) 156 kg (343 lb 6 4 oz)   SpO2 98%   BMI 44 09 kg/m²     Physical Exam  Vitals and nursing note reviewed  Constitutional:       Appearance: Normal appearance  He is well-developed  HENT:      Head: Normocephalic and atraumatic  Right Ear: Tympanic membrane and ear canal normal       Left Ear: Tympanic membrane and ear canal normal       Nose: Nose normal    Eyes:      Conjunctiva/sclera: Conjunctivae normal       Pupils: Pupils are equal, round, and reactive to light  Neck:      Thyroid: No thyromegaly  Vascular: No JVD  Trachea: No tracheal deviation  Cardiovascular:      Rate and Rhythm: Normal rate and regular rhythm  Heart sounds: No murmur heard  Pulmonary:      Effort: Pulmonary effort is normal       Breath sounds: Normal breath sounds  No wheezing or rales  Abdominal:      General: Bowel sounds are normal       Palpations: Abdomen is soft  There is no mass  Tenderness: There is no abdominal tenderness  There is no guarding or rebound  Musculoskeletal:         General: No deformity  Cervical back: Normal range of motion and neck supple  Lumbar back: Tenderness present  No spasms  Decreased range of motion  Negative right straight leg raise test and negative left straight leg raise test         Back:       Left knee: No swelling, erythema, ecchymosis or crepitus  Decreased range of motion  No tenderness  Instability Tests: Anterior drawer test negative  Posterior drawer test negative  Anterior Lachman test negative  Medial Polly test negative and lateral Polly test negative  Lymphadenopathy:      Cervical: No cervical adenopathy  Skin:     General: Skin is warm and dry  Capillary Refill: Capillary refill takes less than 2 seconds  Findings: No lesion or rash        Nails: " There is no clubbing  Neurological:      General: No focal deficit present  Mental Status: He is alert and oriented to person, place, and time  Cranial Nerves: No cranial nerve deficit  Motor: No abnormal muscle tone  Coordination: Coordination normal       Deep Tendon Reflexes: Reflexes are normal and symmetric  Reflexes normal    Psychiatric:         Mood and Affect: Mood normal          Judgment: Judgment normal        Appointment on 05/22/2023   Component Date Value Ref Range Status   • Hemoglobin A1C 05/22/2023 5 2  Normal 3 8-5 6%; PreDiabetic 5 7-6 4%;  Diabetic >=6 5%; Glycemic control for adults with diabetes <7 0% % Final   • EAG 05/22/2023 103  mg/dl Final   • WBC 05/22/2023 6 21  4 31 - 10 16 Thousand/uL Final   • RBC 05/22/2023 5 30  3 88 - 5 62 Million/uL Final   • Hemoglobin 05/22/2023 16 5  12 0 - 17 0 g/dL Final   • Hematocrit 05/22/2023 47 9  36 5 - 49 3 % Final   • MCV 05/22/2023 90  82 - 98 fL Final   • MCH 05/22/2023 31 1  26 8 - 34 3 pg Final   • MCHC 05/22/2023 34 4  31 4 - 37 4 g/dL Final   • RDW 05/22/2023 12 9  11 6 - 15 1 % Final   • MPV 05/22/2023 10 1  8 9 - 12 7 fL Final   • Platelets 03/15/2805 359  149 - 390 Thousands/uL Final   • nRBC 05/22/2023 0  /100 WBCs Final   • Neutrophils Relative 05/22/2023 56  43 - 75 % Final   • Immat GRANS % 05/22/2023 1  0 - 2 % Final   • Lymphocytes Relative 05/22/2023 31  14 - 44 % Final   • Monocytes Relative 05/22/2023 8  4 - 12 % Final   • Eosinophils Relative 05/22/2023 3  0 - 6 % Final   • Basophils Relative 05/22/2023 1  0 - 1 % Final   • Neutrophils Absolute 05/22/2023 3 49  1 85 - 7 62 Thousands/µL Final   • Immature Grans Absolute 05/22/2023 0 03  0 00 - 0 20 Thousand/uL Final   • Lymphocytes Absolute 05/22/2023 1 92  0 60 - 4 47 Thousands/µL Final   • Monocytes Absolute 05/22/2023 0 50  0 17 - 1 22 Thousand/µL Final   • Eosinophils Absolute 05/22/2023 0 20  0 00 - 0 61 Thousand/µL Final   • Basophils Absolute 05/22/2023 0  07  0 00 - 0 10 Thousands/µL Final   • Sodium 05/22/2023 136  135 - 147 mmol/L Final   • Potassium 05/22/2023 3 8  3 5 - 5 3 mmol/L Final   • Chloride 05/22/2023 108  96 - 108 mmol/L Final   • CO2 05/22/2023 25  21 - 32 mmol/L Final   • ANION GAP 05/22/2023 3 (L)  4 - 13 mmol/L Final   • BUN 05/22/2023 10  5 - 25 mg/dL Final   • Creatinine 05/22/2023 0 90  0 60 - 1 30 mg/dL Final    Standardized to IDMS reference method   • Glucose, Fasting 05/22/2023 82  65 - 99 mg/dL Final    Specimen collection should occur prior to Sulfasalazine administration due to the potential for falsely depressed results  Specimen collection should occur prior to Sulfapyridine administration due to the potential for falsely elevated results  • Calcium 05/22/2023 9 0  8 3 - 10 1 mg/dL Final   • AST 05/22/2023 31  5 - 45 U/L Final    Specimen collection should occur prior to Sulfasalazine administration due to the potential for falsely depressed results  • ALT 05/22/2023 55  12 - 78 U/L Final    Specimen collection should occur prior to Sulfasalazine and/or Sulfapyridine administration due to the potential for falsely depressed results  • Alkaline Phosphatase 05/22/2023 42 (L)  46 - 116 U/L Final   • Total Protein 05/22/2023 7 4  6 4 - 8 4 g/dL Final   • Albumin 05/22/2023 3 6  3 5 - 5 0 g/dL Final   • Total Bilirubin 05/22/2023 1 11 (H)  0 20 - 1 00 mg/dL Final    Use of this assay is not recommended for patients undergoing treatment with eltrombopag due to the potential for falsely elevated results     • eGFR 05/22/2023 112  ml/min/1 73sq m Final   • Cholesterol 05/22/2023 151  See Comment mg/dL Final    Cholesterol:         Pediatric <18 Years        Desirable          <170 mg/dL      Borderline High    170-199 mg/dL      High               >=200 mg/dL        Adult >=18 Years            Desirable         <200 mg/dL      Borderline High   200-239 mg/dL      High              >239 mg/dL     • Triglycerides 05/22/2023 108  See Comment mg/dL Final    Triglyceride:     0-9Y            <75mg/dL     10Y-17Y         <90 mg/dL       >=18Y     Normal          <150 mg/dL     Borderline High 150-199 mg/dL     High            200-499 mg/dL        Very High       >499 mg/dL    Specimen collection should occur prior to N-Acetylcysteine or Metamizole administration due to the potential for falsely depressed results  • HDL, Direct 05/22/2023 36 (L)  >=40 mg/dL Final    Specimen collection should occur prior to Metamizole administration due to the potential for falsley depressed results  • LDL Calculated 05/22/2023 93  0 - 100 mg/dL Final    LDL Cholesterol:     Optimal           <100 mg/dl     Near Optimal      100-129 mg/dl     Above Optimal       Borderline High 130-159 mg/dl       High            160-189 mg/dl       Very High       >189 mg/dl         This screening LDL is a calculated result  It does not have the accuracy of the Direct Measured LDL in the monitoring of patients with hyperlipidemia and/or statin therapy  Direct Measure LDL (RLT587) must be ordered separately in these patients     • Non-HDL-Chol (CHOL-HDL) 05/22/2023 115  mg/dl Final   • Vit D, 25-Hydroxy 05/22/2023 15 9 (L)  30 0 - 100 0 ng/mL Final    Vitamin D guidelines established by Clinical Guidelines Subcommittee  of the Endocrine Society Task Force, 2011    Deficiency <20ng/ml   Insufficiency 20-30ng/ml   Sufficient  ng/ml    • TSH 3RD GENERATON 05/22/2023 2 335  0 450 - 4 500 uIU/mL Final    Adult TSH (3rd generation) reference range follows the recommended guidelines of the American Thyroid Association, January, 2020    ]  Kate Barber DO

## 2023-05-24 LAB
ENDOMYSIUM IGA SER QL: NEGATIVE
GLIADIN PEPTIDE IGA SER-ACNC: 6 UNITS (ref 0–19)
GLIADIN PEPTIDE IGG SER-ACNC: 2 UNITS (ref 0–19)
IGA SERPL-MCNC: 274 MG/DL (ref 90–386)
TTG IGA SER-ACNC: <2 U/ML (ref 0–3)
TTG IGG SER-ACNC: 5 U/ML (ref 0–5)

## 2023-05-31 ENCOUNTER — TELEPHONE (OUTPATIENT)
Dept: FAMILY MEDICINE CLINIC | Facility: CLINIC | Age: 33
End: 2023-05-31

## 2023-05-31 DIAGNOSIS — M54.50 LEFT LUMBAR PAIN: Primary | ICD-10-CM

## 2023-06-07 ENCOUNTER — CONSULT (OUTPATIENT)
Dept: SURGERY | Facility: CLINIC | Age: 33
End: 2023-06-07
Payer: COMMERCIAL

## 2023-06-07 VITALS
HEART RATE: 114 BPM | TEMPERATURE: 98 F | BODY MASS INDEX: 40.43 KG/M2 | RESPIRATION RATE: 16 BRPM | HEIGHT: 74 IN | WEIGHT: 315 LBS | DIASTOLIC BLOOD PRESSURE: 82 MMHG | SYSTOLIC BLOOD PRESSURE: 111 MMHG

## 2023-06-07 DIAGNOSIS — K80.50 CHOLEDOCHOLITHIASIS: ICD-10-CM

## 2023-06-07 DIAGNOSIS — K80.00 CALCULUS OF GALLBLADDER WITH ACUTE CHOLECYSTITIS WITHOUT OBSTRUCTION: Primary | ICD-10-CM

## 2023-06-07 DIAGNOSIS — E66.01 MORBID OBESITY WITH BMI OF 40.0-44.9, ADULT (HCC): ICD-10-CM

## 2023-06-07 PROCEDURE — 99213 OFFICE O/P EST LOW 20 MIN: CPT | Performed by: SURGERY

## 2023-06-07 NOTE — PROGRESS NOTES
Assessment/Plan:    Calculus of gallbladder without biliary obstruction  I reviewed his hospitalization and his imaging  He is now status post ERCP and sphincterotomy  He does have a residual cholelithiasis  [facility] will [facility]   The risks benefits alternatives explained to him is agreeable to proceed  With increasing BMI is at increased risk of perioperative complications  Diagnoses and all orders for this visit:    Calculus of gallbladder with acute cholecystitis without obstruction  -     Ambulatory Referral to General Surgery  -     Case request operating room: CHOLECYSTECTOMY LAPAROSCOPIC W/ ROBOTICS; Standing  -     Basic metabolic panel; Future  -     CBC and differential; Future  -     EKG 12 lead; Future  -     Basic metabolic panel  -     CBC and differential  -     Case request operating room: CHOLECYSTECTOMY LAPAROSCOPIC W/ ROBOTICS    Choledocholithiasis  -     Ambulatory Referral to General Surgery  -     Case request operating room: CHOLECYSTECTOMY LAPAROSCOPIC W/ ROBOTICS; Standing  -     Basic metabolic panel; Future  -     CBC and differential; Future  -     EKG 12 lead; Future  -     Basic metabolic panel  -     CBC and differential  -     Case request operating room: CHOLECYSTECTOMY LAPAROSCOPIC W/ ROBOTICS    Morbid obesity with BMI of 40 0-44 9, adult ([facility] [address] )    Other orders  -     Diet NPO; Sips with meds; Standing  -     Apply Sequential Compression Device; Standing  -     Place sequential compression device; Standing          Subjective:      Patient ID: Jose Flores is a 28 y o  male  Mr Doroteo Turner is a 68-year-old gentleman here for follow-up for cholelithiasis and choledocholithiasis  He has been evaluated before and 2021 with an episode of mild acute cholecystitis was treated nonoperatively  On follow-up he was not ready for surgery and wanted to attempt management with diet changes    In March 2023 he was "admitted with choledocholithiasis and underwent ERCP sphincterotomy  He had post ERCP pancreatitis which managed conservatively  He was instructed to follow-up for interval cholecystectomy  He is here now for evaluation  He has been doing well and has been asymptomatic as he is also watching his diet  The following portions of the patient's history were reviewed and updated as appropriate: allergies, current medications, past family history, past medical history, past social history, past surgical history and problem list     Review of Systems   Constitutional: Negative for chills and fever  HENT: Negative for ear pain and sore throat  Eyes: Negative for pain and visual disturbance  Respiratory: Negative for cough and shortness of breath  Cardiovascular: Negative for chest pain and palpitations  Gastrointestinal: Negative for abdominal pain and vomiting  Genitourinary: Negative for dysuria and hematuria  Musculoskeletal: Negative for arthralgias and back pain  Skin: Negative for color change and rash  Neurological: Negative for seizures and syncope  Psychiatric/Behavioral: Negative for agitation and behavioral problems  All other systems reviewed and are negative  Objective:      /82   Pulse (!) 114   Temp 98 °F (36 7 °C)   Resp 16   Ht 6' 2\" (1 88 m)   Wt (!) 164 kg (362 lb 6 4 oz)   BMI 46 53 kg/m²          Physical Exam  Vitals and nursing note reviewed  Constitutional:       General: He is not in acute distress  Appearance: He is well-developed  He is obese  He is not diaphoretic  HENT:      Head: Normocephalic and atraumatic  Eyes:      Pupils: Pupils are equal, round, and reactive to light  Cardiovascular:      Rate and Rhythm: Normal rate and regular rhythm  Heart sounds: Normal heart sounds  No murmur heard  Pulmonary:      Effort: Pulmonary effort is normal  No respiratory distress  Breath sounds: Normal breath sounds  No wheezing   " Abdominal:      General: Bowel sounds are normal       Palpations: Abdomen is soft  Musculoskeletal:         General: Normal range of motion  Cervical back: Normal range of motion and neck supple  Skin:     General: Skin is warm and dry  Neurological:      Mental Status: He is alert and oriented to person, place, and time     Psychiatric:         Behavior: Behavior normal

## 2023-06-08 ENCOUNTER — OFFICE VISIT (OUTPATIENT)
Dept: OBGYN CLINIC | Facility: MEDICAL CENTER | Age: 33
End: 2023-06-08
Payer: COMMERCIAL

## 2023-06-08 VITALS
DIASTOLIC BLOOD PRESSURE: 86 MMHG | WEIGHT: 315 LBS | HEART RATE: 100 BPM | HEIGHT: 74 IN | SYSTOLIC BLOOD PRESSURE: 123 MMHG | BODY MASS INDEX: 40.43 KG/M2

## 2023-06-08 DIAGNOSIS — S83.004A DISLOCATION OF RIGHT PATELLA, INITIAL ENCOUNTER: Primary | ICD-10-CM

## 2023-06-08 DIAGNOSIS — M25.361 INSTABILITY OF RIGHT KNEE JOINT: ICD-10-CM

## 2023-06-08 PROCEDURE — 99243 OFF/OP CNSLTJ NEW/EST LOW 30: CPT | Performed by: STUDENT IN AN ORGANIZED HEALTH CARE EDUCATION/TRAINING PROGRAM

## 2023-06-08 NOTE — PROGRESS NOTES
Knee New Office Note    Assessment:     1  Instability of right knee joint    2  Dislocation of right patella, initial encounter        Plan:  Findings today are consistent with history of right patella dislocation  Imaging and prognosis was reviewed with the patient today  Patient was fitted with a right patella stabilizing brace  He should wear it when weight bearing for 6 weeks  He can continue with anti-inflammatories as needed for pain  Follow up in 6 weeks with Dr Bang Bennett if pain/instability persists  Problem List Items Addressed This Visit        Musculoskeletal and Integument    Dislocation of right patella    Relevant Orders    Durable Medical Equipment       Other    Instability of right knee joint - Primary    Relevant Orders    Durable Medical Equipment      Subjective:     Patient ID: Irvine Najjar is a 28 y o  male  Patient seen in consultation at the request of Dr Cheryl Vasquez DO    Chief Complaint:  HPI:  The patient presents with a chief complaint of right knee pain  The pain began 4 year(s) ago and is associated with an acute injury  Patient was down in a deep squat for a long period of time and he states when he went to stand his knee cap dislocated  He states he was able to relocate by straightening his knee  Since initial dislocation it has happening a couple times a year  Last dislocation was 6 months ago  He notes when he kneels on his knee causes posterior knee pain  The patient describes the pain as sharp and 8 out of 10 in intensity  Notes he does not have knee pain unless it dislocates  It is intermittent in timing, and localizes the pain to the anteromedial knee  The pain is worse with squatting, kneeling, twisting and relieved with rest, medication: Ibuprofen used and beneficial, avoiding painful activities  He admits to mechanical symptoms such as locking and catching  He denies instability of the knee    Patient has not had any treatment Allergy:  Allergies   Allergen Reactions   • Penicillins      UNKNOWN   • Sulfa Antibiotics      UNKNOWN     Medications:  all current active meds have been reviewed  Past Medical History:  Past Medical History:   Diagnosis Date   • Fracture of nasal bone    • GERD (gastroesophageal reflux disease)    • Gross hematuria    • Orthostatic hypotension      Past Surgical History:  Past Surgical History:   Procedure Laterality Date   • WRIST SURGERY       Family History:  Family History   Problem Relation Age of Onset   • Diabetes Father      Social History:  Social History     Substance and Sexual Activity   Alcohol Use No     Social History     Substance and Sexual Activity   Drug Use No     Social History     Tobacco Use   Smoking Status Former   • Types: Cigarettes   • Quit date: 2014   • Years since quittin 4   Smokeless Tobacco Never           ROS:  General: Per HPI  Skin: Negative, except if noted below  HEENT: Negative  Respiratory: Negative  Cardiovascular: Negative  Gastrointestinal: Negative  Urinary: Negative  Vascular: Negative  Musculoskeletal: Positive per HPI   Neurologic: Positive per HPI  Endocrine: Negative    Objective:  BP Readings from Last 1 Encounters:   23 123/86      Wt Readings from Last 1 Encounters:   23 (!) 157 kg (347 lb)        Respiratory:   non-labored respirations    Lymphatics:  no palpable lymph nodes    Gait:   normal    Neurologic:   Alert and oriented times 3  Patient with normal sensation except as noted below  Deep tendon reflexes 2+ except as noted in MSK exam    Bilateral Lower Extremity:  Right knee:      Inspection:  Skin intact    Overall limb alignment neutral    Effusion: none    ROM 0-120 w/o pain    Extensor Lag: none    Palpation: no Joint line tenderness to palpation    AP Stability at 90 deg stable    M/L stability in full extension stable    M/L stability in midflexion stable    Motor: 5/5 IP/Q/HS/TA/GS    Pulses: 2+ DP / 2+ PT    SILT "DP/SP/S/S/TN    Imaging:  My interpretation XR AP scanogram/AP bilateral knee/lateral/garcia/sunrise right knee: no joint space narrowing, subchondral sclerosis, subchondral cysts, osteophyte formation  No fracture or dislocation  BMI:   Estimated body mass index is 44 55 kg/m² as calculated from the following:    Height as of this encounter: 6' 2\" (1 88 m)  Weight as of this encounter: 157 kg (347 lb)  BSA:   Estimated body surface area is 2 74 meters squared as calculated from the following:    Height as of this encounter: 6' 2\" (1 88 m)  Weight as of this encounter: 157 kg (347 lb)  Scribe Attestation    I,:  Chelsea Camacho am acting as a scribe while in the presence of the attending physician :       I,:  Danial Reynolds, DO personally performed the services described in this documentation    as scribed in my presence  :           "

## 2023-06-09 RX ORDER — SODIUM CHLORIDE, SODIUM LACTATE, POTASSIUM CHLORIDE, CALCIUM CHLORIDE 600; 310; 30; 20 MG/100ML; MG/100ML; MG/100ML; MG/100ML
125 INJECTION, SOLUTION INTRAVENOUS CONTINUOUS
OUTPATIENT
Start: 2023-07-27

## 2023-06-09 NOTE — ASSESSMENT & PLAN NOTE
I reviewed his hospitalization and his imaging  He is now status post ERCP and sphincterotomy  He does have a residual cholelithiasis  We will plan to proceed with robotic cholecystectomy at [facility]   The risks benefits alternatives explained to him is agreeable to proceed  With increasing BMI is at increased risk of perioperative complications

## 2023-07-10 ENCOUNTER — CONSULT (OUTPATIENT)
Dept: PAIN MEDICINE | Facility: MEDICAL CENTER | Age: 33
End: 2023-07-10
Payer: COMMERCIAL

## 2023-07-10 VITALS
HEART RATE: 87 BPM | HEIGHT: 74 IN | WEIGHT: 315 LBS | DIASTOLIC BLOOD PRESSURE: 81 MMHG | BODY MASS INDEX: 40.43 KG/M2 | SYSTOLIC BLOOD PRESSURE: 126 MMHG

## 2023-07-10 DIAGNOSIS — G89.29 CHRONIC LEFT-SIDED LOW BACK PAIN WITHOUT SCIATICA: Primary | ICD-10-CM

## 2023-07-10 DIAGNOSIS — M54.50 LEFT LUMBAR PAIN: ICD-10-CM

## 2023-07-10 DIAGNOSIS — M54.50 CHRONIC LEFT-SIDED LOW BACK PAIN WITHOUT SCIATICA: Primary | ICD-10-CM

## 2023-07-10 DIAGNOSIS — M46.1 SACROILIITIS (HCC): ICD-10-CM

## 2023-07-10 PROCEDURE — 99244 OFF/OP CNSLTJ NEW/EST MOD 40: CPT | Performed by: PHYSICAL MEDICINE & REHABILITATION

## 2023-07-10 NOTE — PATIENT INSTRUCTIONS
Sacroiliitis   WHAT YOU NEED TO KNOW:   Sacroiliitis is a painful swelling of one or both of your sacroiliac joints that lasts at least 3 months. The sacroiliac joint connects your pelvis to the base of your spine. DISCHARGE INSTRUCTIONS:   Medicines:  Ask for more information about these and other medicines you may need to treat sacroiliitis:  Pain medicine: You may be given medicine to take away or decrease pain. Do not wait until the pain is severe before you take your medicine. You may be given the medicine as a pill to swallow or as a lotion that you put on the painful area. NSAIDs  help decrease swelling and pain or fever. This medicine is available with or without a doctor's order. NSAIDs can cause stomach bleeding or kidney problems in certain people. If you take blood thinner medicine, always ask your healthcare provider if NSAIDs are safe for you. Always read the medicine label and follow directions. Muscle relaxers  help decrease pain and muscle spasms. Take your medicine as directed. Contact your healthcare provider if you think your medicine is not helping or if you have side effects. Tell your provider if you are allergic to any medicine. Keep a list of the medicines, vitamins, and herbs you take. Include the amounts, and when and why you take them. Bring the list or the pill bottles to follow-up visits. Carry your medicine list with you in case of an emergency. Physical therapy:  Your healthcare provider may suggest physical therapy. Your physical therapist may teach you exercises to improve posture (the way you stand and sit), flexibility, and strength in your lower back. The therapist may also teach you how to remain safely active and prevent more injury. Follow the exercise plan given to you by your physical therapist.  Rest:  Follow your healthcare provider's instructions about how much rest you should get. Avoid activity that worsens your pain.   Ice or heat packs:  Use ice or heat packs on the sore area of your body to decrease the pain and swelling. Put ice in a plastic bag covered with a towel on your low back. Cover heated items with a towel to avoid burns. Use ice and heat as directed. Follow up with your healthcare provider or spine specialist within 1 to 2 weeks:  Write down your questions so you remember to ask them during your visits. Contact your healthcare provider or spine specialist if:   Your pain makes it hard for you to do your daily activities, such as work or school. Your pain does not go away after treatment. You feel depressed or anxious. You have questions about your condition or care. Return to the emergency department if:   You have a fever. Your pain is worse than before. Your pain prevents you from sleeping. © Copyright [employer] 2022 Information is for End User's use only and may not be sold, redistributed or otherwise used for commercial purposes. The above information is an  only. It is not intended as medical advice for individual conditions or treatments. Talk to your doctor, nurse or pharmacist before following any medical regimen to see if it is safe and effective for you.

## 2023-07-10 NOTE — PROGRESS NOTES
Assessment  1. Chronic left-sided low back pain without sciatica    2. Left lumbar pain    3. Sacroiliitis ([address]) - Left        Plan  1. At this time we will initiate physical therapy. And like the patient to complete therapy before considering any further interventional options which may include sacroiliac joint injection or lumbar medial branch nerve blocks in the future. My impressions and treatment recommendations were discussed in detail with the patient who verbalized understanding and had no further questions. Discharge instructions were provided. I personally saw and examined the patient and I agree with the above discussed plan of care. Orders Placed This Encounter   Procedures   • Ambulatory referral to Physical Therapy     Standing Status:   Future     Standing Expiration Date:   7/10/2024     Referral Priority:   Routine     Referral Type:   Physical Therapy     Referral Reason:   Specialty Services Required     Requested Specialty:   Physical Therapy     Number of Visits Requested:   1     Expiration Date:   7/10/2024     No orders of the defined types were placed in this encounter. History of Present Illness    Breezy Castro is a 28 y.o. male seen in consultation at the request of Dr. Vladimir De Jesus regarding chronic lower back pain. The patient's been experiencing symptoms since he was younger after a fractured tailbone. Over time he has been experiencing pain in the back without any radicular features. Currently describing moderate to severe intensity pain rated today as a 3/10 but this can increase with activity. The pain is nearly constant without any typical pattern characterized as shooting sharp pins-and-needles pressure-like throbbing dull and aching. He does describe some lower extremity weakness but does not require an assistive device for ambulation. Aggravating factors include lying down standing bending sitting walking exercise coughing and sneezing.   Alleviating factors are unknown. Diagnostic studies include lumbar spine x-ray. This does reveal some early degenerative change at the L5-S1 segment but no significant findings otherwise. He has not tried any conservative care at this time as he recently was able to regain insurance. He is interested in participating in physical therapy or other treatment as needed. Social history negative for tobacco marijuana and alcohol use. Currently using naproxen which gives some mild improvement. I have personally reviewed and/or updated the patient's past medical history, past surgical history, family history, social history, current medications, allergies, and vital signs today. Review of Systems   Constitutional: Negative for fever and unexpected weight change. HENT: Negative for trouble swallowing. Eyes: Negative for visual disturbance. Respiratory: Negative for shortness of breath and wheezing. Cardiovascular: Negative for chest pain and palpitations. Gastrointestinal: Negative for constipation, diarrhea, nausea and vomiting. Endocrine: Negative for cold intolerance, heat intolerance and polydipsia. Genitourinary: Negative for difficulty urinating and frequency. Musculoskeletal: Positive for back pain. Negative for arthralgias, gait problem, joint swelling and myalgias. Skin: Negative for rash. Neurological: Negative for dizziness, seizures, syncope, weakness and headaches. Hematological: Does not bruise/bleed easily. Psychiatric/Behavioral: Negative for dysphoric mood. All other systems reviewed and are negative.       Patient Active Problem List   Diagnosis   • Allergic rhinitis   • Asthma   • Obesity   • Change in bowel habits   • Diarrhea in adult patient   • Allergic dermatitis   • Calculus of gallbladder without biliary obstruction   • Morbid obesity with BMI of 40.0-44.9, adult ([address])   • Right upper quadrant abdominal pain   • Rectal bleeding   • Does not have health insurance   • Dislocation of right patella   • Instability of right knee joint       Past Medical History:   Diagnosis Date   • Anxiety    • Asthma    • Depression    • Fracture of nasal bone    • GERD (gastroesophageal reflux disease)    • Gross hematuria    • Orthostatic hypotension        Past Surgical History:   Procedure Laterality Date   • WRIST SURGERY         Family History   Problem Relation Age of Onset   • No Known Problems Mother    • Diabetes Father        Social History     Occupational History   • Not on file   Tobacco Use   • Smoking status: Former     Types: Cigarettes     Quit date: 2014     Years since quittin.5   • Smokeless tobacco: Never   Vaping Use   • Vaping Use: Never used   Substance and Sexual Activity   • Alcohol use: No   • Drug use: No   • Sexual activity: Not on file       Current Outpatient Medications on File Prior to Visit   Medication Sig   • eszopiclone (LUNESTA) 2 mg tablet Take 1 tablet (2 mg total) by mouth daily at bedtime as needed for sleep Take immediately before bedtime     No current facility-administered medications on file prior to visit. Allergies   Allergen Reactions   • Penicillins      UNKNOWN   • Sulfa Antibiotics      UNKNOWN       Physical Exam    /81   Pulse 87   Ht 6' 2" (1.88 m)   Wt (!) 154 kg (339 lb)   BMI 43.53 kg/m²     Constitutional: normal, well developed, well nourished, alert, in no distress and non-toxic and no overt pain behavior.   Eyes: anicteric  HEENT: grossly intact  Neck:  symmetric, trachea midline and no masses   Pulmonary:even and unlabored  Cardiovascular:No edema or pitting edema present  Skin:Normal without rashes or lesions and well hydrated  Psychiatric:Mood and affect appropriate  Neurologic:Cranial Nerves II-XII grossly intact  Musculoskeletal:normal, except for tenderness to palpation over the lower lumbar spine and left sacroiliac region, he does have pain reproduced with lumbar extension    Imaging  PACS Images    [facility] images for XR spine lumbar minimum 4 views non injury    XR spine lumbar minimum 4 views non injury: Result Notes     Mari Sic, DO   5/30/2023  1:57 PM EDT       X-ray of the spine shows degenerative disc disease at the L5-S1 interspace.  Chronic wedge compression of L1.  Let me know how his back is doing. Justin Kristopher the x-ray of his knee did not show any fracture or arthritis.  Let me know how that is doing as well with the medication. Yuri Quinn may need to see orthopedics with the instability that he was describing.            Study Result    Narrative & Impression   LUMBAR SPINE     INDICATION:   M54.50: Low back pain, unspecified.     COMPARISON: CT 3/17/2022, 9/1/2009     VIEWS:  XR SPINE LUMBAR MINIMUM 4 VIEWS NON INJURY        FINDINGS:     There are 5 non rib bearing lumbar vertebral bodies.     There is no evidence of acute fracture or destructive osseous lesion.  Stable chronic mild wedge compression deformity of L1.     Alignment is unremarkable.     Mild L5-S1 disc space narrowing and endplate spondylosis, consistent with degenerative disc disease.     The pedicles appear intact.     Soft tissues are unremarkable.     IMPRESSION:     No acute findings.        Workstation performed: [id number]

## 2023-07-20 NOTE — MISCELLANEOUS
Message  Return to work or school:    He is able to return to work on  04/22/2016            Signatures   Electronically signed by : Go Govea, ; Apr 22 2016 12:03PM EST                       (Author) General

## 2023-07-25 DIAGNOSIS — G47.9 SLEEP DISORDER: ICD-10-CM

## 2023-07-25 RX ORDER — ESZOPICLONE 2 MG/1
2 TABLET, FILM COATED ORAL
Qty: 30 TABLET | Refills: 0 | Status: SHIPPED | OUTPATIENT
Start: 2023-07-25

## 2023-07-30 ENCOUNTER — HOSPITAL ENCOUNTER (INPATIENT)
Facility: HOSPITAL | Age: 33
LOS: 2 days | Discharge: HOME HEALTH | DRG: 263 | End: 2023-08-01
Attending: EMERGENCY MEDICINE | Admitting: SURGERY
Payer: COMMERCIAL

## 2023-07-30 ENCOUNTER — APPOINTMENT (EMERGENCY)
Dept: CT IMAGING | Facility: HOSPITAL | Age: 33
DRG: 263 | End: 2023-07-30
Payer: COMMERCIAL

## 2023-07-30 DIAGNOSIS — K81.0 ACUTE CHOLECYSTITIS: ICD-10-CM

## 2023-07-30 DIAGNOSIS — K80.00 CALCULUS OF GALLBLADDER WITH ACUTE CHOLECYSTITIS WITHOUT OBSTRUCTION: Primary | ICD-10-CM

## 2023-07-30 DIAGNOSIS — R65.10 SIRS (SYSTEMIC INFLAMMATORY RESPONSE SYNDROME) (HCC): ICD-10-CM

## 2023-07-30 LAB
ALBUMIN SERPL BCP-MCNC: 4.1 G/DL (ref 3.5–5)
ALP SERPL-CCNC: 42 U/L (ref 34–104)
ALT SERPL W P-5'-P-CCNC: 28 U/L (ref 7–52)
ANION GAP SERPL CALCULATED.3IONS-SCNC: 6 MMOL/L
APTT PPP: 27 SECONDS (ref 23–37)
AST SERPL W P-5'-P-CCNC: 25 U/L (ref 13–39)
ATRIAL RATE: 83 BPM
BACTERIA UR QL AUTO: NORMAL /HPF
BASOPHILS # BLD AUTO: 0.02 THOUSANDS/ÂΜL (ref 0–0.1)
BASOPHILS NFR BLD AUTO: 0 % (ref 0–1)
BILIRUB DIRECT SERPL-MCNC: 0.47 MG/DL (ref 0–0.2)
BILIRUB SERPL-MCNC: 2.91 MG/DL (ref 0.2–1)
BILIRUB UR QL STRIP: NEGATIVE
BUN SERPL-MCNC: 6 MG/DL (ref 5–25)
CALCIUM SERPL-MCNC: 9.1 MG/DL (ref 8.4–10.2)
CARDIAC TROPONIN I PNL SERPL HS: <2 NG/L
CHLORIDE SERPL-SCNC: 104 MMOL/L (ref 96–108)
CLARITY UR: CLEAR
CO2 SERPL-SCNC: 26 MMOL/L (ref 21–32)
COLOR UR: YELLOW
CREAT SERPL-MCNC: 0.8 MG/DL (ref 0.6–1.3)
EOSINOPHIL # BLD AUTO: 0.03 THOUSAND/ÂΜL (ref 0–0.61)
EOSINOPHIL NFR BLD AUTO: 0 % (ref 0–6)
ERYTHROCYTE [DISTWIDTH] IN BLOOD BY AUTOMATED COUNT: 12.5 % (ref 11.6–15.1)
GFR SERPL CREATININE-BSD FRML MDRD: 118 ML/MIN/1.73SQ M
GLUCOSE SERPL-MCNC: 114 MG/DL (ref 65–140)
GLUCOSE UR STRIP-MCNC: NEGATIVE MG/DL
HCT VFR BLD AUTO: 48.1 % (ref 36.5–49.3)
HGB BLD-MCNC: 16.2 G/DL (ref 12–17)
HGB UR QL STRIP.AUTO: ABNORMAL
IMM GRANULOCYTES # BLD AUTO: 0.05 THOUSAND/UL (ref 0–0.2)
IMM GRANULOCYTES NFR BLD AUTO: 0 % (ref 0–2)
INR PPP: 1 (ref 0.84–1.19)
KETONES UR STRIP-MCNC: ABNORMAL MG/DL
LACTATE SERPL-SCNC: 0.8 MMOL/L (ref 0.5–2)
LEUKOCYTE ESTERASE UR QL STRIP: NEGATIVE
LIPASE SERPL-CCNC: 13 U/L (ref 11–82)
LYMPHOCYTES # BLD AUTO: 1.05 THOUSANDS/ÂΜL (ref 0.6–4.47)
LYMPHOCYTES NFR BLD AUTO: 7 % (ref 14–44)
MCH RBC QN AUTO: 30.3 PG (ref 26.8–34.3)
MCHC RBC AUTO-ENTMCNC: 33.7 G/DL (ref 31.4–37.4)
MCV RBC AUTO: 90 FL (ref 82–98)
MONOCYTES # BLD AUTO: 1.27 THOUSAND/ÂΜL (ref 0.17–1.22)
MONOCYTES NFR BLD AUTO: 9 % (ref 4–12)
NEUTROPHILS # BLD AUTO: 12.37 THOUSANDS/ÂΜL (ref 1.85–7.62)
NEUTS SEG NFR BLD AUTO: 84 % (ref 43–75)
NITRITE UR QL STRIP: NEGATIVE
NON-SQ EPI CELLS URNS QL MICRO: NORMAL /HPF
NRBC BLD AUTO-RTO: 0 /100 WBCS
P AXIS: 54 DEGREES
PH UR STRIP.AUTO: 5.5 [PH] (ref 4.5–8)
PLATELET # BLD AUTO: 343 THOUSANDS/UL (ref 149–390)
PMV BLD AUTO: 10.1 FL (ref 8.9–12.7)
POTASSIUM SERPL-SCNC: 3.6 MMOL/L (ref 3.5–5.3)
PR INTERVAL: 160 MS
PROCALCITONIN SERPL-MCNC: 0.16 NG/ML
PROT SERPL-MCNC: 7.3 G/DL (ref 6.4–8.4)
PROT UR STRIP-MCNC: NEGATIVE MG/DL
PROTHROMBIN TIME: 13.2 SECONDS (ref 11.6–14.5)
QRS AXIS: 6 DEGREES
QRSD INTERVAL: 90 MS
QT INTERVAL: 342 MS
QTC INTERVAL: 401 MS
RBC # BLD AUTO: 5.34 MILLION/UL (ref 3.88–5.62)
RBC #/AREA URNS AUTO: NORMAL /HPF
SODIUM SERPL-SCNC: 136 MMOL/L (ref 135–147)
SP GR UR STRIP.AUTO: 1.01 (ref 1–1.03)
T WAVE AXIS: 21 DEGREES
UROBILINOGEN UR QL STRIP.AUTO: 0.2 E.U./DL
VENTRICULAR RATE: 83 BPM
WBC # BLD AUTO: 14.79 THOUSAND/UL (ref 4.31–10.16)
WBC #/AREA URNS AUTO: NORMAL /HPF

## 2023-07-30 PROCEDURE — 71275 CT ANGIOGRAPHY CHEST: CPT

## 2023-07-30 PROCEDURE — 81001 URINALYSIS AUTO W/SCOPE: CPT

## 2023-07-30 PROCEDURE — 84484 ASSAY OF TROPONIN QUANT: CPT

## 2023-07-30 PROCEDURE — 85730 THROMBOPLASTIN TIME PARTIAL: CPT

## 2023-07-30 PROCEDURE — 99284 EMERGENCY DEPT VISIT MOD MDM: CPT

## 2023-07-30 PROCEDURE — 96365 THER/PROPH/DIAG IV INF INIT: CPT

## 2023-07-30 PROCEDURE — 87040 BLOOD CULTURE FOR BACTERIA: CPT

## 2023-07-30 PROCEDURE — 85610 PROTHROMBIN TIME: CPT

## 2023-07-30 PROCEDURE — 80048 BASIC METABOLIC PNL TOTAL CA: CPT

## 2023-07-30 PROCEDURE — 74174 CTA ABD&PLVS W/CONTRAST: CPT

## 2023-07-30 PROCEDURE — 80076 HEPATIC FUNCTION PANEL: CPT

## 2023-07-30 PROCEDURE — NC001 PR NO CHARGE: Performed by: SURGERY

## 2023-07-30 PROCEDURE — 85025 COMPLETE CBC W/AUTO DIFF WBC: CPT

## 2023-07-30 PROCEDURE — 93005 ELECTROCARDIOGRAM TRACING: CPT

## 2023-07-30 PROCEDURE — 96375 TX/PRO/DX INJ NEW DRUG ADDON: CPT

## 2023-07-30 PROCEDURE — 83605 ASSAY OF LACTIC ACID: CPT

## 2023-07-30 PROCEDURE — 93010 ELECTROCARDIOGRAM REPORT: CPT

## 2023-07-30 PROCEDURE — 83690 ASSAY OF LIPASE: CPT

## 2023-07-30 PROCEDURE — 84145 PROCALCITONIN (PCT): CPT

## 2023-07-30 PROCEDURE — 99285 EMERGENCY DEPT VISIT HI MDM: CPT

## 2023-07-30 PROCEDURE — 96361 HYDRATE IV INFUSION ADD-ON: CPT

## 2023-07-30 PROCEDURE — 36415 COLL VENOUS BLD VENIPUNCTURE: CPT

## 2023-07-30 PROCEDURE — G1004 CDSM NDSC: HCPCS

## 2023-07-30 RX ORDER — METRONIDAZOLE 500 MG/100ML
500 INJECTION, SOLUTION INTRAVENOUS EVERY 8 HOURS
Status: CANCELLED | OUTPATIENT
Start: 2023-07-30

## 2023-07-30 RX ORDER — METRONIDAZOLE 500 MG/100ML
500 INJECTION, SOLUTION INTRAVENOUS EVERY 8 HOURS
Status: DISCONTINUED | OUTPATIENT
Start: 2023-07-30 | End: 2023-08-01

## 2023-07-30 RX ORDER — OXYCODONE HYDROCHLORIDE 5 MG/1
5 TABLET ORAL EVERY 4 HOURS PRN
Status: DISCONTINUED | OUTPATIENT
Start: 2023-07-30 | End: 2023-08-01 | Stop reason: HOSPADM

## 2023-07-30 RX ORDER — ENOXAPARIN SODIUM 100 MG/ML
40 INJECTION SUBCUTANEOUS DAILY
Status: DISCONTINUED | OUTPATIENT
Start: 2023-07-31 | End: 2023-08-01 | Stop reason: HOSPADM

## 2023-07-30 RX ORDER — MORPHINE SULFATE 4 MG/ML
4 INJECTION, SOLUTION INTRAMUSCULAR; INTRAVENOUS ONCE
Status: COMPLETED | OUTPATIENT
Start: 2023-07-30 | End: 2023-07-30

## 2023-07-30 RX ORDER — CEFTRIAXONE 2 G/50ML
2000 INJECTION, SOLUTION INTRAVENOUS ONCE
Status: COMPLETED | OUTPATIENT
Start: 2023-07-30 | End: 2023-07-30

## 2023-07-30 RX ORDER — IBUPROFEN 200 MG
400 TABLET ORAL EVERY 6 HOURS PRN
COMMUNITY
End: 2023-08-15

## 2023-07-30 RX ORDER — ACETAMINOPHEN 325 MG/1
650 TABLET ORAL EVERY 6 HOURS PRN
Status: DISCONTINUED | OUTPATIENT
Start: 2023-07-30 | End: 2023-08-01 | Stop reason: HOSPADM

## 2023-07-30 RX ORDER — ONDANSETRON 2 MG/ML
4 INJECTION INTRAMUSCULAR; INTRAVENOUS EVERY 6 HOURS PRN
Status: DISCONTINUED | OUTPATIENT
Start: 2023-07-30 | End: 2023-08-01 | Stop reason: HOSPADM

## 2023-07-30 RX ORDER — KETOROLAC TROMETHAMINE 30 MG/ML
15 INJECTION, SOLUTION INTRAMUSCULAR; INTRAVENOUS ONCE
Status: COMPLETED | OUTPATIENT
Start: 2023-07-30 | End: 2023-07-30

## 2023-07-30 RX ORDER — SODIUM CHLORIDE, SODIUM GLUCONATE, SODIUM ACETATE, POTASSIUM CHLORIDE, MAGNESIUM CHLORIDE, SODIUM PHOSPHATE, DIBASIC, AND POTASSIUM PHOSPHATE .53; .5; .37; .037; .03; .012; .00082 G/100ML; G/100ML; G/100ML; G/100ML; G/100ML; G/100ML; G/100ML
125 INJECTION, SOLUTION INTRAVENOUS CONTINUOUS
Status: DISCONTINUED | OUTPATIENT
Start: 2023-07-30 | End: 2023-08-01 | Stop reason: HOSPADM

## 2023-07-30 RX ORDER — OXYCODONE HYDROCHLORIDE 10 MG/1
10 TABLET ORAL EVERY 4 HOURS PRN
Status: DISCONTINUED | OUTPATIENT
Start: 2023-07-30 | End: 2023-08-01 | Stop reason: HOSPADM

## 2023-07-30 RX ORDER — SENNOSIDES 8.6 MG
1 TABLET ORAL DAILY
Status: DISCONTINUED | OUTPATIENT
Start: 2023-07-31 | End: 2023-08-01 | Stop reason: HOSPADM

## 2023-07-30 RX ORDER — CEFAZOLIN SODIUM 2 G/50ML
2000 SOLUTION INTRAVENOUS EVERY 8 HOURS
Status: DISCONTINUED | OUTPATIENT
Start: 2023-07-30 | End: 2023-08-01

## 2023-07-30 RX ORDER — CEFTRIAXONE 1 G/50ML
1000 INJECTION, SOLUTION INTRAVENOUS EVERY 24 HOURS
Status: CANCELLED | OUTPATIENT
Start: 2023-07-30

## 2023-07-30 RX ORDER — HYDROMORPHONE HCL/PF 1 MG/ML
0.5 SYRINGE (ML) INJECTION
Status: DISCONTINUED | OUTPATIENT
Start: 2023-07-30 | End: 2023-08-01 | Stop reason: HOSPADM

## 2023-07-30 RX ADMIN — MORPHINE SULFATE 4 MG: 4 INJECTION INTRAVENOUS at 13:55

## 2023-07-30 RX ADMIN — CEFAZOLIN SODIUM 2000 MG: 2 SOLUTION INTRAVENOUS at 18:26

## 2023-07-30 RX ADMIN — OXYCODONE HYDROCHLORIDE 5 MG: 5 TABLET ORAL at 22:26

## 2023-07-30 RX ADMIN — SODIUM CHLORIDE 1000 ML: 0.9 INJECTION, SOLUTION INTRAVENOUS at 13:04

## 2023-07-30 RX ADMIN — KETOROLAC TROMETHAMINE 15 MG: 30 INJECTION, SOLUTION INTRAMUSCULAR; INTRAVENOUS at 13:04

## 2023-07-30 RX ADMIN — METRONIDAZOLE 500 MG: 500 INJECTION, SOLUTION INTRAVENOUS at 17:48

## 2023-07-30 RX ADMIN — SODIUM CHLORIDE, SODIUM GLUCONATE, SODIUM ACETATE, POTASSIUM CHLORIDE, MAGNESIUM CHLORIDE, SODIUM PHOSPHATE, DIBASIC, AND POTASSIUM PHOSPHATE 125 ML/HR: .53; .5; .37; .037; .03; .012; .00082 INJECTION, SOLUTION INTRAVENOUS at 18:35

## 2023-07-30 RX ADMIN — IOHEXOL 100 ML: 350 INJECTION, SOLUTION INTRAVENOUS at 14:16

## 2023-07-30 RX ADMIN — CEFTRIAXONE 2000 MG: 2 INJECTION, SOLUTION INTRAVENOUS at 16:32

## 2023-07-30 RX ADMIN — MORPHINE SULFATE 4 MG: 4 INJECTION INTRAVENOUS at 17:42

## 2023-07-30 NOTE — LETTER
[address] [facility] [address] [address]  Dept: [phone]    August 1, 2023     Patient: Ishmael Umaña   YOB: 1990   Date of Visit: 7/30/2023       To Whom it May Concern:    Hubert Gracia is under my professional care. He was seen in the hospital from 7/30/2023 to 08/01/23. He  can return to work on 8/16/23 after being seen and given clearance by the General Surgery team .    If you have any questions or concerns, please don't hesitate to call.          Sincerely,          Christiane Esteves MD

## 2023-07-30 NOTE — PLAN OF CARE
Problem: Potential for Falls  Goal: Patient will remain free of falls  Description: INTERVENTIONS:  - Educate patient/family on patient safety including physical limitations  - Instruct patient to call for assistance with activity   - Consult OT/PT to assist with strengthening/mobility   - Keep Call bell within reach  - Keep bed low and locked with side rails adjusted as appropriate  - Keep care items and personal belongings within reach  - Initiate and maintain comfort rounds  - Make Fall Risk Sign visible to staff  - Apply yellow socks and bracelet for high fall risk patients  - Consider moving patient to room near nurses station  Outcome: Progressing     Problem: INFECTION - ADULT  Goal: Absence or prevention of progression during hospitalization  Description: INTERVENTIONS:  - Assess and monitor for signs and symptoms of infection  - Monitor lab/diagnostic results  - Monitor all insertion sites, i.e. indwelling lines, tubes, and drains  - Monitor endotracheal if appropriate and nasal secretions for changes in amount and color  - [place] appropriate cooling/warming therapies per order  - Administer medications as ordered  - Instruct and encourage patient and family to use good hand hygiene technique  - Identify and instruct in appropriate isolation precautions for identified infection/condition  Outcome: Progressing  Goal: Absence of fever/infection during neutropenic period  Description: INTERVENTIONS:  - Monitor WBC    Outcome: Progressing     Problem: DISCHARGE PLANNING  Goal: Discharge to home or other facility with appropriate resources  Description: INTERVENTIONS:  - Identify barriers to discharge w/patient and caregiver  - Arrange for needed discharge resources and transportation as appropriate  - Identify discharge learning needs (meds, wound care, etc.)  - Arrange for interpretive services to assist at discharge as needed  - Refer to Case Management Department for coordinating discharge planning if the patient needs post-hospital services based on physician/advanced practitioner order or complex needs related to functional status, cognitive ability, or social support system  Outcome: Progressing     Problem: METABOLIC, FLUID AND ELECTROLYTES - ADULT  Goal: Electrolytes maintained within normal limits  Description: INTERVENTIONS:  - Monitor labs and assess patient for signs and symptoms of electrolyte imbalances  - Administer electrolyte replacement as ordered  - Monitor response to electrolyte replacements, including repeat lab results as appropriate  - Instruct patient on fluid and nutrition as appropriate  Outcome: Progressing  Goal: Fluid balance maintained  Description: INTERVENTIONS:  - Monitor labs   - Monitor I/O and WT  - Instruct patient on fluid and nutrition as appropriate  - Assess for signs & symptoms of volume excess or deficit  Outcome: Progressing  Goal: Glucose maintained within target range  Description: INTERVENTIONS:  - Monitor Blood Glucose as ordered  - Assess for signs and symptoms of hyperglycemia and hypoglycemia  - Administer ordered medications to maintain glucose within target range  - Assess nutritional intake and initiate nutrition service referral as needed  Outcome: Progressing

## 2023-07-30 NOTE — PLAN OF CARE
Problem: Potential for Falls  Goal: Patient will remain free of falls  Description: INTERVENTIONS:  - Educate patient/family on patient safety including physical limitations  - Instruct patient to call for assistance with activity   - Consult OT/PT to assist with strengthening/mobility   - Keep Call bell within reach  - Keep bed low and locked with side rails adjusted as appropriate  - Keep care items and personal belongings within reach  - Initiate and maintain comfort rounds  - Make Fall Risk Sign visible to staff  - Apply yellow socks and bracelet for high fall risk patients  - Consider moving patient to room near nurses station  Outcome: Progressing     Problem: INFECTION - ADULT  Goal: Absence or prevention of progression during hospitalization  Description: INTERVENTIONS:  - Assess and monitor for signs and symptoms of infection  - Monitor lab/diagnostic results  - Monitor all insertion sites, i.e. indwelling lines, tubes, and drains  - Monitor endotracheal if appropriate and nasal secretions for changes in amount and color  - [place] appropriate cooling/warming therapies per order  - Administer medications as ordered  - Instruct and encourage patient and family to use good hand hygiene technique  - Identify and instruct in appropriate isolation precautions for identified infection/condition  Outcome: Progressing  Goal: Absence of fever/infection during neutropenic period  Description: INTERVENTIONS:  - Monitor WBC    Outcome: Progressing     Problem: DISCHARGE PLANNING  Goal: Discharge to home or other facility with appropriate resources  Description: INTERVENTIONS:  - Identify barriers to discharge w/patient and caregiver  - Arrange for needed discharge resources and transportation as appropriate  - Identify discharge learning needs (meds, wound care, etc.)  - Arrange for interpretive services to assist at discharge as needed  - Refer to Case Management Department for coordinating discharge planning if the patient needs post-hospital services based on physician/advanced practitioner order or complex needs related to functional status, cognitive ability, or social support system  Outcome: Progressing     Problem: METABOLIC, FLUID AND ELECTROLYTES - ADULT  Goal: Electrolytes maintained within normal limits  Description: INTERVENTIONS:  - Monitor labs and assess patient for signs and symptoms of electrolyte imbalances  - Administer electrolyte replacement as ordered  - Monitor response to electrolyte replacements, including repeat lab results as appropriate  - Instruct patient on fluid and nutrition as appropriate  Outcome: Progressing  Goal: Fluid balance maintained  Description: INTERVENTIONS:  - Monitor labs   - Monitor I/O and WT  - Instruct patient on fluid and nutrition as appropriate  - Assess for signs & symptoms of volume excess or deficit  Outcome: Progressing  Goal: Glucose maintained within target range  Description: INTERVENTIONS:  - Monitor Blood Glucose as ordered  - Assess for signs and symptoms of hyperglycemia and hypoglycemia  - Administer ordered medications to maintain glucose within target range  - Assess nutritional intake and initiate nutrition service referral as needed  Outcome: Progressing

## 2023-07-30 NOTE — ED PROVIDER NOTES
History  Chief Complaint   Patient presents with   • Flank Pain     Reports he started yesterday with RUQ pain, h/o gallbladder attacks and kidney stones and thought maybe it was his gallbladder. However patient reports today he started with left flank pain and thinks he has a kidney stone. Denies urinary symptoms. Patient is a 26-year-old male with a history of GERD, cholelithiasis, nephrolithiasis, presenting for evaluation of abdominal and flank pain. States he started to have sharp constant right upper quadrant pain yesterday. Has known gallstones and was scheduled for elective cholecystectomy 2 days ago however postponed this. He started to have left flank pain that is sharp and intermittent today. Denies associated nausea, vomiting, dysuria, hematuria, diarrhea. No fevers. Reports shortness of breath however denies chest pain, cough, or leg swelling. Took Advil at home without relief. Prior to Admission Medications   Prescriptions Last Dose Informant Patient Reported? Taking?   eszopiclone (LUNESTA) 2 mg tablet Past Month  No Yes   Sig: Take 1 tablet (2 mg total) by mouth daily at bedtime as needed for sleep Take immediately before bedtime   ibuprofen (MOTRIN) 200 mg tablet More than a month  Yes No   Sig: Take 400 mg by mouth every 6 (six) hours as needed for mild pain      Facility-Administered Medications: None       Past Medical History:   Diagnosis Date   • Anxiety    • Asthma    • Depression    • Fracture of nasal bone    • GERD (gastroesophageal reflux disease)    • Gross hematuria    • Orthostatic hypotension        Past Surgical History:   Procedure Laterality Date   • WRIST SURGERY         Family History   Problem Relation Age of Onset   • No Known Problems Mother    • Diabetes Father      I have reviewed and agree with the history as documented.     E-Cigarette/Vaping   • E-Cigarette Use Never User      E-Cigarette/Vaping Substances   • Nicotine No    • THC No    • CBD No    • Flavoring No    • Other No    • Unknown No      Social History     Tobacco Use   • Smoking status: Former     Types: Cigarettes     Quit date: 2014     Years since quittin.5   • Smokeless tobacco: Never   Vaping Use   • Vaping Use: Never used   Substance Use Topics   • Alcohol use: No   • Drug use: No       Review of Systems   Constitutional: Negative for chills and fever. HENT: Negative for congestion, ear pain and sore throat. Eyes: Negative for pain and visual disturbance. Respiratory: Positive for shortness of breath. Negative for cough. Cardiovascular: Negative for chest pain and leg swelling. Gastrointestinal: Positive for abdominal pain. Negative for diarrhea, nausea and vomiting. Genitourinary: Positive for flank pain. Negative for dysuria and hematuria. Musculoskeletal: Negative for back pain and neck pain. Skin: Negative for rash. Neurological: Negative for dizziness and headaches. Psychiatric/Behavioral: Negative for confusion. Physical Exam  Physical Exam  Vitals and nursing note reviewed. Constitutional:       General: He is not in acute distress. Appearance: Normal appearance. He is not ill-appearing. HENT:      Head: Normocephalic and atraumatic. Right Ear: External ear normal.      Left Ear: External ear normal.      Nose: Nose normal.      Mouth/Throat:      Mouth: Mucous membranes are moist.      Pharynx: No posterior oropharyngeal erythema. Eyes:      General: No scleral icterus. Right eye: No discharge. Left eye: No discharge. Extraocular Movements: Extraocular movements intact. Conjunctiva/sclera: Conjunctivae normal.   Cardiovascular:      Rate and Rhythm: Normal rate and regular rhythm. Pulses: Normal pulses. Heart sounds: Normal heart sounds. Pulmonary:      Effort: Pulmonary effort is normal. No respiratory distress. Breath sounds: Normal breath sounds. Abdominal:      Palpations: Abdomen is soft. Tenderness: There is abdominal tenderness in the right upper quadrant. There is left CVA tenderness. There is no right CVA tenderness, guarding or rebound. Musculoskeletal:         General: No tenderness, deformity or signs of injury. Cervical back: Normal range of motion and neck supple. Skin:     General: Skin is dry. Coloration: Skin is not jaundiced. Findings: No erythema or rash. Neurological:      General: No focal deficit present. Mental Status: He is alert and oriented to person, place, and time. Mental status is at baseline. Motor: No weakness. Gait: Gait normal.   Psychiatric:         Mood and Affect: Mood normal.         Behavior: Behavior normal.         Thought Content:  Thought content normal.         Vital Signs  ED Triage Vitals [07/30/23 1233]   Temperature Pulse Respirations Blood Pressure SpO2   98.3 °F (36.8 °C) (!) 114 18 110/75 96 %      Temp Source Heart Rate Source Patient Position - Orthostatic VS BP Location FiO2 (%)   Oral Monitor Sitting Right arm --      Pain Score       8           Vitals:    07/30/23 1436 07/30/23 1746 07/30/23 1817 07/30/23 2312   BP: 149/70 127/81 126/79 114/75   Pulse: (!) 108 96 96 102   Patient Position - Orthostatic VS: Sitting Lying Lying Lying         Visual Acuity      ED Medications  Medications   metroNIDAZOLE (FLAGYL) IVPB (premix) 500 mg 100 mL (0 mg Intravenous Stopped 7/31/23 0112)   multi-electrolyte (PLASMALYTE-A/ISOLYTE-S PH 7.4) IV solution (125 mL/hr Intravenous New Bag 7/31/23 0457)   enoxaparin (LOVENOX) subcutaneous injection 40 mg (has no administration in time range)   senna (SENOKOT) tablet 8.6 mg (has no administration in time range)   ondansetron (ZOFRAN) injection 4 mg (has no administration in time range)   oxyCODONE (ROXICODONE) immediate release tablet 10 mg (10 mg Oral Given 7/31/23 0322)   oxyCODONE (ROXICODONE) IR tablet 5 mg (5 mg Oral Given 7/30/23 2226)   acetaminophen (TYLENOL) tablet 650 mg (has no administration in time range)   HYDROmorphone (DILAUDID) injection 0.5 mg (0.5 mg Intravenous Given 7/31/23 0609)   ceFAZolin (ANCEF) IVPB (premix in dextrose) 2,000 mg 50 mL (0 mg Intravenous Stopped 7/31/23 0411)   potassium chloride 20 mEq IVPB (premix) (20 mEq Intravenous New Bag 7/31/23 0634)   sodium phosphate 21 mmol in dextrose 5 % 250 mL Infusion (has no administration in time range)   magnesium sulfate 2 g/50 mL IVPB (premix) 2 g (2 g Intravenous New Bag 7/31/23 0638)   sodium chloride 0.9 % bolus 1,000 mL (0 mL Intravenous Stopped 7/30/23 1437)   ketorolac (TORADOL) injection 15 mg (15 mg Intravenous Given 7/30/23 1304)   morphine injection 4 mg (4 mg Intravenous Given 7/30/23 1355)   iohexol (OMNIPAQUE) 350 MG/ML injection (SINGLE-DOSE) 100 mL (100 mL Intravenous Given 7/30/23 1416)   cefTRIAXone (ROCEPHIN) IVPB (premix in dextrose) 2,000 mg 50 mL (0 mg Intravenous Stopped 7/30/23 1739)   morphine injection 4 mg (4 mg Intravenous Given 7/30/23 1742)   potassium chloride (K-DUR,KLOR-CON) CR tablet 40 mEq (40 mEq Oral Given 7/31/23 0637)       Diagnostic Studies  Results Reviewed     Procedure Component Value Units Date/Time    Blood culture #1 [[id number]] Collected: 07/30/23 1538    Lab Status: Preliminary result Specimen: Blood from Hand, Left Updated: 07/31/23 0001     Blood Culture Received in Microbiology Lab. Culture in Progress. Blood culture #2 [[id number]] Collected: 07/30/23 1518    Lab Status: Preliminary result Specimen: Blood from Hand, Right Updated: 07/31/23 0001     Blood Culture Received in Microbiology Lab. Culture in Progress.     Procalcitonin [[id number]]  (Normal) Collected: 07/30/23 1302    Lab Status: Final result Specimen: Blood from Arm, Right Updated: 07/30/23 1553     Procalcitonin 0.16 ng/ml     Lactic acid [[id number]]  (Normal) Collected: 07/30/23 1518    Lab Status: Final result Specimen: Blood from Hand, Right Updated: 07/30/23 1547     LACTIC ACID 0.8 mmol/L Narrative:      Result may be elevated if tourniquet was used during collection.     Protime-INR [[id number]]  (Normal) Collected: 07/30/23 1518    Lab Status: Final result Specimen: Blood from Arm, Right Updated: 07/30/23 1538     Protime 13.2 seconds      INR 1.00    APTT [[id number]]  (Normal) Collected: 07/30/23 1518    Lab Status: Final result Specimen: Blood from Arm, Right Updated: 07/30/23 1538     PTT 27 seconds     Urine Microscopic [[id number]]  (Normal) Collected: 07/30/23 1440    Lab Status: Final result Specimen: Urine, Clean Catch Updated: 07/30/23 1508     RBC, UA None Seen /hpf      WBC, UA 1-2 /hpf      Epithelial Cells Occasional /hpf      Bacteria, UA Occasional /hpf     Urine Macroscopic, POC [[id number]]  (Abnormal) Collected: 07/30/23 1440    Lab Status: Final result Specimen: Urine Updated: 07/30/23 1441     Color, UA Yellow     Clarity, UA Clear     pH, UA 5.5     Leukocytes, UA Negative     Nitrite, UA Negative     Protein, UA Negative mg/dl      Glucose, UA Negative mg/dl      Ketones, UA Trace mg/dl      Urobilinogen, UA 0.2 E.U./dl      Bilirubin, UA Negative     Occult Blood, UA Trace     Specific Gravity, UA 1.010    Narrative:      CLINITEK RESULT    HS Troponin 0hr (reflex protocol) [[id number]]  (Normal) Collected: 07/30/23 1302    Lab Status: Final result Specimen: Blood from Arm, Right Updated: 07/30/23 1334     hs TnI 0hr <2 ng/L     Basic metabolic panel [[id number]] Collected: 07/30/23 1302    Lab Status: Final result Specimen: Blood from Arm, Right Updated: 07/30/23 1329     Sodium 136 mmol/L      Potassium 3.6 mmol/L      Chloride 104 mmol/L      CO2 26 mmol/L      ANION GAP 6 mmol/L      BUN 6 mg/dL      Creatinine 0.80 mg/dL      Glucose 114 mg/dL      Calcium 9.1 mg/dL      eGFR 118 ml/min/1.73sq m     Narrative:      [place] guidelines for Chronic Kidney Disease (CKD):   •  Stage 1 with normal or high GFR (GFR > 90 mL/min/1.73 square meters)  •  Stage 2 Mild CKD (GFR = 60-89 mL/min/1.73 square meters)  •  Stage 3A Moderate CKD (GFR = 45-59 mL/min/1.73 square meters)  •  Stage 3B Moderate CKD (GFR = 30-44 mL/min/1.73 square meters)  •  Stage 4 Severe CKD (GFR = 15-29 mL/min/1.73 square meters)  •  Stage 5 End Stage CKD (GFR <15 mL/min/1.73 square meters)  Note: GFR calculation is accurate only with a steady state creatinine    Hepatic function panel [[id number]]  (Abnormal) Collected: 07/30/23 1302    Lab Status: Final result Specimen: Blood from Arm, Right Updated: 07/30/23 1329     Total Bilirubin 2.91 mg/dL      Bilirubin, Direct 0.47 mg/dL      Alkaline Phosphatase 42 U/L      AST 25 U/L      ALT 28 U/L      Total Protein 7.3 g/dL      Albumin 4.1 g/dL     Lipase [[id number]]  (Normal) Collected: 07/30/23 1302    Lab Status: Final result Specimen: Blood from Arm, Right Updated: 07/30/23 1329     Lipase 13 u/L     CBC and differential [[id number]]  (Abnormal) Collected: 07/30/23 1302    Lab Status: Final result Specimen: Blood from Arm, Right Updated: 07/30/23 1312     WBC 14.79 Thousand/uL      RBC 5.34 Million/uL      Hemoglobin 16.2 g/dL      Hematocrit 48.1 %      MCV 90 fL      MCH 30.3 pg      MCHC 33.7 g/dL      RDW 12.5 %      MPV 10.1 fL      Platelets 817 Thousands/uL      nRBC 0 /100 WBCs      Neutrophils Relative 84 %      Immat GRANS % 0 %      Lymphocytes Relative 7 %      Monocytes Relative 9 %      Eosinophils Relative 0 %      Basophils Relative 0 %      Neutrophils Absolute 12.37 Thousands/µL      Immature Grans Absolute 0.05 Thousand/uL      Lymphocytes Absolute 1.05 Thousands/µL      Monocytes Absolute 1.27 Thousand/µL      Eosinophils Absolute 0.03 Thousand/µL      Basophils Absolute 0.02 Thousands/µL                  CTA dissection protocol chest abdomen pelvis w wo contrast   Final Result by Cedric Kelsey MD (07/30 9169)      Findings in keeping with acute cholecystitis. No acute cardiothoracic abnormality. Workstation performed: [id number]         [address] right upper quadrant    (Results Pending)              Procedures  ECG 12 Lead Documentation Only    Date/Time: 7/30/2023 2:24 PM    Performed by: Noble Sr PA-C  Authorized by: Noble Sr PA-C    Indications / Diagnosis:  Sob  ECG reviewed by me, the ED Provider: yes    Patient location:  ED  Previous ECG:     Previous ECG:  Compared to current    Similarity:  No change  Interpretation:     Interpretation: abnormal    Rate:     ECG rate:  83    ECG rate assessment: normal    Rhythm:     Rhythm: sinus rhythm    Ectopy:     Ectopy: none    QRS:     QRS axis:  Normal    QRS intervals:  Normal  Conduction:     Conduction: normal    ST segments:     ST segments:  Normal  T waves:     T waves: inverted      Inverted:  V1             ED Course  ED Course as of 07/31/23 0711   Sun Jul 30, 2023   1319 WBC(!): 14.79  2 SIRS with tachycardia, will add on remaining sepsis labs. 1424 TOTAL BILIRUBIN(!): 2.91   6586 Basic metabolic panel  No electrolyte abnormalities, TRISTON. 1424 Lipase: 13   1424 hs TnI 0hr: <2   1430 Interpretation:     Interpretation: abnormal    Rate:     ECG rate:  83    ECG rate assessment: normal    Rhythm:     Rhythm: sinus rhythm    Ectopy:     Ectopy: none    QRS:     QRS axis:  Normal    QRS intervals:  Normal  Conduction:     Conduction: normal    ST segments:     ST segments:  Normal  T waves:     T waves: inverted      Inverted:  V1   1454 Ketones, UA(!): Trace   1454 Blood, UA(!): Trace  No leukocytes, nitrites. 1514 CTA dissection protocol chest abdomen pelvis w wo contrast  IMPRESSION:     Findings in keeping with acute cholecystitis.     No acute cardiothoracic abnormality. 1519 TT sent to surgery. 80 Pt has tolerated cephalosporins in the past per chart review from last admission. Will start rocephin and flagyl per surgery.                                SBIRT 20yo+    Flowsheet Row Most Recent Value Initial Alcohol Screen: US AUDIT-C     1. How often do you have a drink containing alcohol? 0 Filed at: 07/30/2023 1556   2. How many drinks containing alcohol do you have on a typical day you are drinking? 0 Filed at: 07/30/2023 1556   3a. Male UNDER 65: How often do you have five or more drinks on one occasion? 0 Filed at: 07/30/2023 1556   Audit-C Score 0 Filed at: 07/30/2023 1556   ABIGAIL: How many times in the past year have you. .. Used an illegal drug or used a prescription medication for non-medical reasons? Never Filed at: 07/30/2023 1556                    Medical Decision Making  Patient is a 19-year-old male presenting for evaluation of right upper quadrant pain for 2 days and left flank pain since this morning. No fevers or other associated symptoms. He is tachycardic on arrival with a heart rate of 114 but remaining vitals are within normal limits. Physical exam remarkable for tenderness in the right upper quadrant without rebound or guarding and left CVA tenderness. DDx including but not limited to: Cholelithiasis, cholecystitis, pancreatitis, gastritis, cystitis, pyelonephritis, nephrolithiasis, diverticulitis, atypical ACS, dissection. Plan: CBC, BMP, hepatic panel, lipase, UA, troponin, EKG, CT chest abdomen pelvis. Will treat symptomatically with fluids and pain meds. Leukocytosis at 14.79. Patient meets SIRS criteria with tachycardia and leukocytosis so added remaining sepsis labs. No anemia. No electrolyte abnormalities, TRISTON, elevated LFTs. Total bilirubin elevated at 2.91 and direct bilirubin is 0.47. Lipase normal.  UA has trace ketones and blood, no leukocytes or nitrates. Troponin less than 2, EKG normal sinus rhythm with nonspecific T wave inversion. Lactic, procalcitonin, coags normal.  Blood cultures pending. CT chest/abdomen/pelvis consistent with acute cholecystitis. Updated patient on findings. Pain well controlled with morphine.   Discussed case with surgery resident Dr. Shashank Kee and started rocephin/flagyl per their recommendation. Accepted inpatient under service of Dr. Kasie Arroyo. Discussed findings and plan for admission with patient/family who were in agreement. All questions were answered at bedside to the satisfaction of the patient/family. Patient remained stable throughout stay in the emergency department. Patient stable at time of admission. Acute cholecystitis: acute illness or injury  SIRS (systemic inflammatory response syndrome) ([address]): acute illness or injury  Amount and/or Complexity of Data Reviewed  Labs: ordered. Decision-making details documented in ED Course. Radiology: ordered. Decision-making details documented in ED Course. Risk  Prescription drug management. Decision regarding hospitalization. Disposition  Final diagnoses:   Acute cholecystitis   SIRS (systemic inflammatory response syndrome) ([address])     Time reflects when diagnosis was documented in both MDM as applicable and the Disposition within this note     Time User Action Codes Description Comment    7/30/2023  4:09 PM Mozella Bark Add [K81.0] Acute cholecystitis     7/30/2023  4:23 PM Mozella Bark Add [R65.10] SIRS (systemic inflammatory response syndrome) [facility])       ED Disposition     ED Disposition   Admit    Condition   Stable    Date/Time   Sun Jul 30, 2023  4:58 PM    Comment   Case was discussed with general surgery and the patient's admission status was agreed to be Admission Status: inpatient status to the service of Dr. Kasie Arroyo .            Follow-up Information    None         Current Discharge Medication List      CONTINUE these medications which have NOT CHANGED    Details   eszopiclone (LUNESTA) 2 mg tablet Take 1 tablet (2 mg total) by mouth daily at bedtime as needed for sleep Take immediately before bedtime  Qty: 30 tablet, Refills: 0    Associated Diagnoses: Sleep disorder      ibuprofen (MOTRIN) 200 mg tablet Take 400 mg by mouth every 6 (six) hours as needed for mild pain             No discharge procedures on file.     PDMP Review       Value Time User    PDMP Reviewed  Yes 7/25/2023  3:37 PM Adolfo March DO          ED Provider  Electronically Signed by           Brian Medina PA-C  07/31/23 7522

## 2023-07-30 NOTE — H&P
H&P: General surgery  Ramiro Bowman 28 y.o. male MRN: [id number]  Unit/Bed#: E4 -01 Encounter: [id number]        Assessment/Plan     Assessment:  Patient is a 28 y.o. male with pmhx of morbid obesity, asthma, and biliary disease including cholecystitis and choledocholithiasis status post ERCP and sphincterotomy complicated by posterior PTC pancreatitis in 2022 who presented with acute cholecystitis and possible choledocholithiasis although hyperbilirubinemia is not an obstructive pattern with a primary indirect component; additionally he did undergo sphincterotomy in  which decreases our index of suspicion for choledocholithiasis. Afebrile, VSS  WBC: 14.79; Hb.2; T. bili 2.91; direct bili 0.47  CTAP showed Pericholecystic inflammatory change is present in keeping with acute cholecystitis    Plan:  - Admit to general surgery with plan for laparoscopic cholecystectomy possible intraoperative cholangiogram on   - Clear liquid diet; n.p.o. at midnight  - Isolyte at 125  -IV antibiotics; ceftriaxone/Flagyl  - Given patient's history with MRI will obtain right upper quadrant ultrasound tomorrow morning  - Trend fractionated bilirubin and WBC  - DVT prophylaxis  - Encourage ambulation    History of Present Illness     HPI:  Ramiro Bowman is a 28 y.o. male ith pmhx of morbid obesity, asthma, and biliary disease including cholecystitis and choledocholithiasis status post ERCP and sphincterotomy complicated by posterior PTC pancreatitis in 2022 who presents with a 1 day history of right upper quadrant. He has loose stools at baseline and reports onset of constipation at this time time as his pain. He denies fevers, chills, nausea, vomiting chest pain, shortness of breath. He reports that he has had similar episodes numerous times over the past 4 years although the last one was greater than 1 year ago.   He does report that this was greater in intensity which prompted him to come to the ED. He was scheduled for robotic cholecystectomy with Dr. Kassidy Peters on  and had previously been scheduled on  although did not undergo his preop testing. Upon his initial presentation 4 years ago he did not have insurance which caused him to delay undergoing surgery. He denies any previous abdominal surgeries. He does not take any medications at home including anticoagulation or antiplatelet therapy.     Review of Systems   As above, otherwise negative  Consults    Historical Information   Past Medical History:   Diagnosis Date   • Anxiety    • Asthma    • Depression    • Fracture of nasal bone    • GERD (gastroesophageal reflux disease)    • Gross hematuria    • Orthostatic hypotension      Past Surgical History:   Procedure Laterality Date   • WRIST SURGERY       Social History   Social History     Substance and Sexual Activity   Alcohol Use No     Social History     Substance and Sexual Activity   Drug Use No     Social History     Tobacco Use   Smoking Status Former   • Types: Cigarettes   • Quit date: 2014   • Years since quittin.5   Smokeless Tobacco Never     Family History: non-contributory    Meds/Allergies   all medications and allergies reviewed  Allergies   Allergen Reactions   • Penicillins      UNKNOWN   • Sulfa Antibiotics      UNKNOWN       Objective   First Vitals:   Blood Pressure: 110/75 (23 1233)  Pulse: (!) 114 (23 1233)  Temperature: 98.3 °F (36.8 °C) (23 1233)  Temp Source: Oral (23 1233)  Respirations: 18 (23 1233)  Height: 6' 4" (193 cm) (23 1230)  Weight - Scale: (!) 150 kg (330 lb 11 oz) (23 1230)  SpO2: 96 % (23 1233)    Current Vitals:   Blood Pressure: 126/79 (23)  Pulse: 96 (23)  Temperature: 99.1 °F (37.3 °C) (23)  Temp Source: Temporal (23)  Respirations: 18 (23)  Height: 6' 4" (193 cm) (23 1230)  Weight - Scale: (!) 150 kg (330 lb 11 oz) (23 1230)  SpO2: 97 % (07/30/23 1817)      Intake/Output Summary (Last 24 hours) at 7/30/2023 1948  Last data filed at 7/30/2023 1739  Gross per 24 hour   Intake 1050 ml   Output --   Net 1050 ml       Invasive Devices     Peripheral Intravenous Line  Duration           Peripheral IV 07/30/23 Right Antecubital <1 day                Physical Exam   General: NAD  Skin: Warm, dry, anicteric  HEENT: Normocephalic, atraumatic  CV: RRR  Pulm:  Nonlabored breathing on room air  Abd: Obese abdomen, soft, mildly tender in the right upper quadrant with a positive [place], nondistended  MSK: Symmetric, no edema, no tenderness, no deformity  Neuro: AOx3, GCS 15      Lab Results: I have personally reviewed pertinent lab results. Imaging: I have personally reviewed pertinent reports. and I have personally reviewed pertinent films in PACS  EKG, Pathology, and Other Studies: I have personally reviewed pertinent reports.       Code Status: Level 1 - Full Code  Advance Directive and Living Will:      Power of :    POLST:

## 2023-07-30 NOTE — Clinical Note
Case was discussed with general surgery and the patient's admission status was agreed to be Admission Status: inpatient status to the service of Dr. Mao Beth .

## 2023-07-31 ENCOUNTER — ANESTHESIA (INPATIENT)
Dept: PERIOP | Facility: HOSPITAL | Age: 33
DRG: 263 | End: 2023-07-31
Payer: COMMERCIAL

## 2023-07-31 ENCOUNTER — APPOINTMENT (INPATIENT)
Dept: ULTRASOUND IMAGING | Facility: HOSPITAL | Age: 33
DRG: 263 | End: 2023-07-31
Payer: COMMERCIAL

## 2023-07-31 ENCOUNTER — ANESTHESIA EVENT (INPATIENT)
Dept: PERIOP | Facility: HOSPITAL | Age: 33
DRG: 263 | End: 2023-07-31
Payer: COMMERCIAL

## 2023-07-31 ENCOUNTER — APPOINTMENT (INPATIENT)
Dept: RADIOLOGY | Facility: HOSPITAL | Age: 33
DRG: 263 | End: 2023-07-31
Attending: SURGERY
Payer: COMMERCIAL

## 2023-07-31 PROBLEM — K81.9 CHOLECYSTITIS: Status: ACTIVE | Noted: 2023-07-31

## 2023-07-31 LAB
ALBUMIN SERPL BCP-MCNC: 3.6 G/DL (ref 3.5–5)
ALP SERPL-CCNC: 33 U/L (ref 34–104)
ALT SERPL W P-5'-P-CCNC: 21 U/L (ref 7–52)
ANION GAP SERPL CALCULATED.3IONS-SCNC: 10 MMOL/L
AST SERPL W P-5'-P-CCNC: 13 U/L (ref 13–39)
BASOPHILS # BLD AUTO: 0.03 THOUSANDS/ÂΜL (ref 0–0.1)
BASOPHILS NFR BLD AUTO: 0 % (ref 0–1)
BILIRUB DIRECT SERPL-MCNC: 0.47 MG/DL (ref 0–0.2)
BILIRUB SERPL-MCNC: 2.59 MG/DL (ref 0.2–1)
BUN SERPL-MCNC: 5 MG/DL (ref 5–25)
CALCIUM SERPL-MCNC: 8.3 MG/DL (ref 8.4–10.2)
CHLORIDE SERPL-SCNC: 103 MMOL/L (ref 96–108)
CO2 SERPL-SCNC: 23 MMOL/L (ref 21–32)
CREAT SERPL-MCNC: 0.7 MG/DL (ref 0.6–1.3)
EOSINOPHIL # BLD AUTO: 0.01 THOUSAND/ÂΜL (ref 0–0.61)
EOSINOPHIL NFR BLD AUTO: 0 % (ref 0–6)
ERYTHROCYTE [DISTWIDTH] IN BLOOD BY AUTOMATED COUNT: 12.5 % (ref 11.6–15.1)
GFR SERPL CREATININE-BSD FRML MDRD: 124 ML/MIN/1.73SQ M
GLUCOSE SERPL-MCNC: 106 MG/DL (ref 65–140)
HCT VFR BLD AUTO: 44.1 % (ref 36.5–49.3)
HGB BLD-MCNC: 14.7 G/DL (ref 12–17)
IMM GRANULOCYTES # BLD AUTO: 0.05 THOUSAND/UL (ref 0–0.2)
IMM GRANULOCYTES NFR BLD AUTO: 0 % (ref 0–2)
LYMPHOCYTES # BLD AUTO: 0.92 THOUSANDS/ÂΜL (ref 0.6–4.47)
LYMPHOCYTES NFR BLD AUTO: 6 % (ref 14–44)
MAGNESIUM SERPL-MCNC: 1.7 MG/DL (ref 1.9–2.7)
MCH RBC QN AUTO: 30.1 PG (ref 26.8–34.3)
MCHC RBC AUTO-ENTMCNC: 33.3 G/DL (ref 31.4–37.4)
MCV RBC AUTO: 90 FL (ref 82–98)
MONOCYTES # BLD AUTO: 1.52 THOUSAND/ÂΜL (ref 0.17–1.22)
MONOCYTES NFR BLD AUTO: 10 % (ref 4–12)
NEUTROPHILS # BLD AUTO: 12.34 THOUSANDS/ÂΜL (ref 1.85–7.62)
NEUTS SEG NFR BLD AUTO: 84 % (ref 43–75)
NRBC BLD AUTO-RTO: 0 /100 WBCS
PHOSPHATE SERPL-MCNC: 2.7 MG/DL (ref 2.7–4.5)
PLATELET # BLD AUTO: 288 THOUSANDS/UL (ref 149–390)
PMV BLD AUTO: 10 FL (ref 8.9–12.7)
POTASSIUM SERPL-SCNC: 3.2 MMOL/L (ref 3.5–5.3)
PROT SERPL-MCNC: 6.6 G/DL (ref 6.4–8.4)
RBC # BLD AUTO: 4.88 MILLION/UL (ref 3.88–5.62)
SODIUM SERPL-SCNC: 136 MMOL/L (ref 135–147)
WBC # BLD AUTO: 14.87 THOUSAND/UL (ref 4.31–10.16)

## 2023-07-31 PROCEDURE — 99232 SBSQ HOSP IP/OBS MODERATE 35: CPT | Performed by: SURGERY

## 2023-07-31 PROCEDURE — 74300 X-RAY BILE DUCTS/PANCREAS: CPT

## 2023-07-31 PROCEDURE — 80048 BASIC METABOLIC PNL TOTAL CA: CPT | Performed by: SURGERY

## 2023-07-31 PROCEDURE — 85025 COMPLETE CBC W/AUTO DIFF WBC: CPT | Performed by: SURGERY

## 2023-07-31 PROCEDURE — 83735 ASSAY OF MAGNESIUM: CPT | Performed by: SURGERY

## 2023-07-31 PROCEDURE — 80076 HEPATIC FUNCTION PANEL: CPT | Performed by: SURGERY

## 2023-07-31 PROCEDURE — 76705 ECHO EXAM OF ABDOMEN: CPT

## 2023-07-31 PROCEDURE — 47563 LAPARO CHOLECYSTECTOMY/GRAPH: CPT | Performed by: SURGERY

## 2023-07-31 PROCEDURE — 84100 ASSAY OF PHOSPHORUS: CPT | Performed by: SURGERY

## 2023-07-31 PROCEDURE — BF131ZZ FLUOROSCOPY OF GALLBLADDER AND BILE DUCTS USING LOW OSMOLAR CONTRAST: ICD-10-PCS | Performed by: SURGERY

## 2023-07-31 PROCEDURE — 88304 TISSUE EXAM BY PATHOLOGIST: CPT | Performed by: PATHOLOGY

## 2023-07-31 PROCEDURE — 0FT44ZZ RESECTION OF GALLBLADDER, PERCUTANEOUS ENDOSCOPIC APPROACH: ICD-10-PCS | Performed by: SURGERY

## 2023-07-31 RX ORDER — SODIUM CHLORIDE 9 MG/ML
INJECTION, SOLUTION INTRAVENOUS AS NEEDED
Status: DISCONTINUED | OUTPATIENT
Start: 2023-07-31 | End: 2023-07-31 | Stop reason: HOSPADM

## 2023-07-31 RX ORDER — SODIUM CHLORIDE 9 MG/ML
INJECTION, SOLUTION INTRAVENOUS CONTINUOUS PRN
Status: DISCONTINUED | OUTPATIENT
Start: 2023-07-31 | End: 2023-07-31

## 2023-07-31 RX ORDER — POTASSIUM CHLORIDE 14.9 MG/ML
20 INJECTION INTRAVENOUS ONCE
Status: COMPLETED | OUTPATIENT
Start: 2023-07-31 | End: 2023-07-31

## 2023-07-31 RX ORDER — MIDAZOLAM HYDROCHLORIDE 2 MG/2ML
INJECTION, SOLUTION INTRAMUSCULAR; INTRAVENOUS AS NEEDED
Status: DISCONTINUED | OUTPATIENT
Start: 2023-07-31 | End: 2023-07-31

## 2023-07-31 RX ORDER — POTASSIUM CHLORIDE 20 MEQ/1
40 TABLET, EXTENDED RELEASE ORAL ONCE
Status: COMPLETED | OUTPATIENT
Start: 2023-07-31 | End: 2023-07-31

## 2023-07-31 RX ORDER — BUPIVACAINE HYDROCHLORIDE 2.5 MG/ML
INJECTION, SOLUTION EPIDURAL; INFILTRATION; INTRACAUDAL AS NEEDED
Status: DISCONTINUED | OUTPATIENT
Start: 2023-07-31 | End: 2023-07-31 | Stop reason: HOSPADM

## 2023-07-31 RX ORDER — FENTANYL CITRATE/PF 50 MCG/ML
50 SYRINGE (ML) INJECTION
Status: COMPLETED | OUTPATIENT
Start: 2023-07-31 | End: 2023-07-31

## 2023-07-31 RX ORDER — SODIUM CHLORIDE, SODIUM LACTATE, POTASSIUM CHLORIDE, CALCIUM CHLORIDE 600; 310; 30; 20 MG/100ML; MG/100ML; MG/100ML; MG/100ML
INJECTION, SOLUTION INTRAVENOUS CONTINUOUS PRN
Status: DISCONTINUED | OUTPATIENT
Start: 2023-07-31 | End: 2023-07-31

## 2023-07-31 RX ORDER — PROPOFOL 10 MG/ML
INJECTION, EMULSION INTRAVENOUS CONTINUOUS PRN
Status: DISCONTINUED | OUTPATIENT
Start: 2023-07-31 | End: 2023-07-31

## 2023-07-31 RX ORDER — ROCURONIUM BROMIDE 10 MG/ML
INJECTION, SOLUTION INTRAVENOUS AS NEEDED
Status: DISCONTINUED | OUTPATIENT
Start: 2023-07-31 | End: 2023-07-31

## 2023-07-31 RX ORDER — ONDANSETRON 2 MG/ML
4 INJECTION INTRAMUSCULAR; INTRAVENOUS ONCE AS NEEDED
Status: DISCONTINUED | OUTPATIENT
Start: 2023-07-31 | End: 2023-07-31 | Stop reason: HOSPADM

## 2023-07-31 RX ORDER — SUCCINYLCHOLINE/SOD CL,ISO/PF 100 MG/5ML
SYRINGE (ML) INTRAVENOUS AS NEEDED
Status: DISCONTINUED | OUTPATIENT
Start: 2023-07-31 | End: 2023-07-31

## 2023-07-31 RX ORDER — MAGNESIUM SULFATE HEPTAHYDRATE 40 MG/ML
2 INJECTION, SOLUTION INTRAVENOUS ONCE
Status: COMPLETED | OUTPATIENT
Start: 2023-07-31 | End: 2023-07-31

## 2023-07-31 RX ORDER — HYDROMORPHONE HCL/PF 1 MG/ML
0.5 SYRINGE (ML) INJECTION
Status: DISCONTINUED | OUTPATIENT
Start: 2023-07-31 | End: 2023-07-31 | Stop reason: HOSPADM

## 2023-07-31 RX ORDER — ONDANSETRON 2 MG/ML
INJECTION INTRAMUSCULAR; INTRAVENOUS AS NEEDED
Status: DISCONTINUED | OUTPATIENT
Start: 2023-07-31 | End: 2023-07-31

## 2023-07-31 RX ORDER — FENTANYL CITRATE 50 UG/ML
INJECTION, SOLUTION INTRAMUSCULAR; INTRAVENOUS AS NEEDED
Status: DISCONTINUED | OUTPATIENT
Start: 2023-07-31 | End: 2023-07-31

## 2023-07-31 RX ORDER — PROPOFOL 10 MG/ML
INJECTION, EMULSION INTRAVENOUS AS NEEDED
Status: DISCONTINUED | OUTPATIENT
Start: 2023-07-31 | End: 2023-07-31

## 2023-07-31 RX ORDER — DEXMEDETOMIDINE HYDROCHLORIDE 100 UG/ML
INJECTION, SOLUTION INTRAVENOUS AS NEEDED
Status: DISCONTINUED | OUTPATIENT
Start: 2023-07-31 | End: 2023-07-31

## 2023-07-31 RX ADMIN — METRONIDAZOLE 500 MG: 500 INJECTION, SOLUTION INTRAVENOUS at 10:53

## 2023-07-31 RX ADMIN — PROPOFOL 60 MCG/KG/MIN: 10 INJECTION, EMULSION INTRAVENOUS at 16:02

## 2023-07-31 RX ADMIN — MAGNESIUM SULFATE HEPTAHYDRATE 2 G: 40 INJECTION, SOLUTION INTRAVENOUS at 06:38

## 2023-07-31 RX ADMIN — OXYCODONE HYDROCHLORIDE 10 MG: 10 TABLET ORAL at 19:34

## 2023-07-31 RX ADMIN — ROCURONIUM BROMIDE 10 MG: 10 INJECTION, SOLUTION INTRAVENOUS at 17:40

## 2023-07-31 RX ADMIN — OXYCODONE HYDROCHLORIDE 10 MG: 10 TABLET ORAL at 23:34

## 2023-07-31 RX ADMIN — SODIUM CHLORIDE, SODIUM LACTATE, POTASSIUM CHLORIDE, AND CALCIUM CHLORIDE: .6; .31; .03; .02 INJECTION, SOLUTION INTRAVENOUS at 16:19

## 2023-07-31 RX ADMIN — FENTANYL CITRATE 50 MCG: 50 INJECTION INTRAMUSCULAR; INTRAVENOUS at 17:31

## 2023-07-31 RX ADMIN — Medication 3000 MG: at 15:30

## 2023-07-31 RX ADMIN — ROCURONIUM BROMIDE 5 MG: 10 INJECTION, SOLUTION INTRAVENOUS at 15:41

## 2023-07-31 RX ADMIN — SODIUM PHOSPHATE, MONOBASIC, MONOHYDRATE AND SODIUM PHOSPHATE, DIBASIC, ANHYDROUS 21 MMOL: 142; 276 INJECTION, SOLUTION INTRAVENOUS at 11:42

## 2023-07-31 RX ADMIN — FENTANYL CITRATE 50 MCG: 50 INJECTION INTRAMUSCULAR; INTRAVENOUS at 15:37

## 2023-07-31 RX ADMIN — SODIUM CHLORIDE, SODIUM GLUCONATE, SODIUM ACETATE, POTASSIUM CHLORIDE, MAGNESIUM CHLORIDE, SODIUM PHOSPHATE, DIBASIC, AND POTASSIUM PHOSPHATE 125 ML/HR: .53; .5; .37; .037; .03; .012; .00082 INJECTION, SOLUTION INTRAVENOUS at 20:41

## 2023-07-31 RX ADMIN — HYDROMORPHONE HYDROCHLORIDE 0.5 MG: 1 INJECTION, SOLUTION INTRAMUSCULAR; INTRAVENOUS; SUBCUTANEOUS at 18:53

## 2023-07-31 RX ADMIN — POTASSIUM CHLORIDE 20 MEQ: 14.9 INJECTION, SOLUTION INTRAVENOUS at 06:34

## 2023-07-31 RX ADMIN — MIDAZOLAM 2 MG: 1 INJECTION INTRAMUSCULAR; INTRAVENOUS at 15:26

## 2023-07-31 RX ADMIN — SODIUM CHLORIDE: 0.9 INJECTION, SOLUTION INTRAVENOUS at 15:24

## 2023-07-31 RX ADMIN — HYDROMORPHONE HYDROCHLORIDE 0.5 MG: 1 INJECTION, SOLUTION INTRAMUSCULAR; INTRAVENOUS; SUBCUTANEOUS at 10:49

## 2023-07-31 RX ADMIN — CEFAZOLIN SODIUM 2000 MG: 2 SOLUTION INTRAVENOUS at 03:22

## 2023-07-31 RX ADMIN — CEFAZOLIN SODIUM 2000 MG: 2 SOLUTION INTRAVENOUS at 10:53

## 2023-07-31 RX ADMIN — FENTANYL CITRATE 50 MCG: 50 INJECTION INTRAMUSCULAR; INTRAVENOUS at 18:30

## 2023-07-31 RX ADMIN — OXYCODONE HYDROCHLORIDE 10 MG: 10 TABLET ORAL at 03:22

## 2023-07-31 RX ADMIN — Medication 140 MG: at 15:41

## 2023-07-31 RX ADMIN — METRONIDAZOLE 500 MG: 500 INJECTION, SOLUTION INTRAVENOUS at 23:33

## 2023-07-31 RX ADMIN — HYDROMORPHONE HYDROCHLORIDE 0.5 MG: 1 INJECTION, SOLUTION INTRAMUSCULAR; INTRAVENOUS; SUBCUTANEOUS at 06:09

## 2023-07-31 RX ADMIN — ROCURONIUM BROMIDE 30 MG: 10 INJECTION, SOLUTION INTRAVENOUS at 16:32

## 2023-07-31 RX ADMIN — METRONIDAZOLE: 500 INJECTION, SOLUTION INTRAVENOUS at 15:49

## 2023-07-31 RX ADMIN — ENOXAPARIN SODIUM 40 MG: 40 INJECTION SUBCUTANEOUS at 08:59

## 2023-07-31 RX ADMIN — DEXMEDETOMIDINE HCL 8 MCG: 100 INJECTION INTRAVENOUS at 17:49

## 2023-07-31 RX ADMIN — POTASSIUM CHLORIDE 40 MEQ: 1500 TABLET, EXTENDED RELEASE ORAL at 06:37

## 2023-07-31 RX ADMIN — METRONIDAZOLE 500 MG: 500 INJECTION, SOLUTION INTRAVENOUS at 00:32

## 2023-07-31 RX ADMIN — FENTANYL CITRATE 50 MCG: 50 INJECTION INTRAMUSCULAR; INTRAVENOUS at 18:25

## 2023-07-31 RX ADMIN — SODIUM CHLORIDE, SODIUM LACTATE, POTASSIUM CHLORIDE, AND CALCIUM CHLORIDE: .6; .31; .03; .02 INJECTION, SOLUTION INTRAVENOUS at 17:51

## 2023-07-31 RX ADMIN — ROCURONIUM BROMIDE 45 MG: 10 INJECTION, SOLUTION INTRAVENOUS at 15:46

## 2023-07-31 RX ADMIN — SODIUM CHLORIDE, SODIUM GLUCONATE, SODIUM ACETATE, POTASSIUM CHLORIDE, MAGNESIUM CHLORIDE, SODIUM PHOSPHATE, DIBASIC, AND POTASSIUM PHOSPHATE 125 ML/HR: .53; .5; .37; .037; .03; .012; .00082 INJECTION, SOLUTION INTRAVENOUS at 04:57

## 2023-07-31 RX ADMIN — HYDROMORPHONE HYDROCHLORIDE 0.5 MG: 1 INJECTION, SOLUTION INTRAMUSCULAR; INTRAVENOUS; SUBCUTANEOUS at 20:44

## 2023-07-31 RX ADMIN — PROPOFOL 200 MG: 10 INJECTION, EMULSION INTRAVENOUS at 15:41

## 2023-07-31 RX ADMIN — SUGAMMADEX 300 MG: 100 INJECTION, SOLUTION INTRAVENOUS at 17:51

## 2023-07-31 RX ADMIN — ONDANSETRON 4 MG: 2 INJECTION INTRAMUSCULAR; INTRAVENOUS at 17:48

## 2023-07-31 RX ADMIN — POTASSIUM CHLORIDE 20 MEQ: 14.9 INJECTION, SOLUTION INTRAVENOUS at 09:10

## 2023-07-31 RX ADMIN — OXYCODONE HYDROCHLORIDE 10 MG: 10 TABLET ORAL at 08:58

## 2023-07-31 NOTE — UTILIZATION REVIEW
Initial Clinical Review    Admission: Date/Time/Statement:   Admission Orders (From admission, onward)     Ordered        07/30/23 1701  Inpatient Admission  Once            07/30/23 1659  INPATIENT ADMISSION  Once,   Status:  Canceled                      Orders Placed This Encounter   Procedures   • Inpatient Admission     Standing Status:   Standing     Number of Occurrences:   1     Order Specific Question:   Level of Care     Answer:   Med Surg [16]     Order Specific Question:   Estimated length of stay     Answer:   Inpatient Only Surgery     ED Arrival Information     Expected   -    Arrival   7/30/2023 12:27    Acuity   Urgent            Means of arrival   Walk-In    Escorted by   Family Member    Service   Surgery-General    Admission type   Emergency            Arrival complaint   abd pain           Chief Complaint   Patient presents with   • Flank Pain     Reports he started yesterday with RUQ pain, h/o gallbladder attacks and kidney stones and thought maybe it was his gallbladder. However patient reports today he started with left flank pain and thinks he has a kidney stone. Denies urinary symptoms. Initial Presentation: 28 y.o. male presents to ED from home with right upper quadrant pain for 1 day. Has  Loose stools at baseline, now  With onset of constipation with pain. Had similar episodes numerous times over the past  4 years, last one more than 1 year ago. Current  Episode  Greater in intensity. Has  Cholecystectomy planned  8/24, was  Previously  Scheduled  For  7/28, did not  Do pre op testing. On initial exam 4 years  Ago, did not have insurance, caused him to delay surgery. PMH  Is  Asthma, cholecystitis and choledocholithiasis, S/P  ERCP and  Sphincterotomy complicated  By MENTAL [facility]  Pancreatitis  3/22. Ct abdomen shows acute cholecystitis. Labs reveal T bili  2.91,  WBC   14.79. Admit  Ip with Acute cholecystitis  And plan is  DIONE, IVF, monitor labs  And  OR  7/31.         Date: 7/31 Day 2:   For  Lap chester and intra op cholangiogram this PM.  Currently  In  OR   4:48  PM    ED Triage Vitals [07/30/23 1233]   Temperature Pulse Respirations Blood Pressure SpO2   98.3 °F (36.8 °C) (!) 114 18 110/75 96 %      Temp Source Heart Rate Source Patient Position - Orthostatic VS BP Location FiO2 (%)   Oral Monitor Sitting Right arm --      Pain Score       8          Wt Readings from Last 1 Encounters:   07/30/23 (!) 150 kg (330 lb 11 oz)     Additional Vital Signs:   97.5 °F (36.4 °C) 92 18 113/72 -- 95 % None (Room air) Lying    07/30/23 2312 99 °F (37.2 °C) 102 18 114/75 -- 97 % None (Room air) Lying   07/30/23 1817 99.1 °F (37.3 °C) 96 18 126/79 96 97 % None (Room air) Lying   07/30/23 1746 -- 96 18 127/81 -- 98 % None (Room air) Lying   07/30/23 1436 -- 108 Abnormal  18 149/70 95 97 % None (Room air) Sitting   07/30/23 1233 98.3 °F (36.8 °C) 114 Abnormal  18 110/75 -- 96 % None (Room air) Sitting       Pertinent Labs/Diagnostic Test Results:   US right upper quadrant   Final Result by Cat Haines MD (07/31 1042)      Findings of cholecystitis as reported on recent CT. No evidence of choledocholithiasis. Workstation performed: [id number]         CTA dissection protocol chest abdomen pelvis w wo contrast   Final Result by Familia Sparks MD (07/30 1509)      Findings in keeping with acute cholecystitis. No acute cardiothoracic abnormality.                   Workstation performed: [id number]         FL < 1 hour    (Results Pending)         Results from last 7 days   Lab Units 07/31/23  0325 07/30/23  1302   WBC Thousand/uL 14.87* 14.79*   HEMOGLOBIN g/dL 14.7 16.2   HEMATOCRIT % 44.1 48.1   PLATELETS Thousands/uL 288 343   NEUTROS ABS Thousands/µL 12.34* 12.37*         Results from last 7 days   Lab Units 07/31/23  0325 07/30/23  1302   SODIUM mmol/L 136 136   POTASSIUM mmol/L 3.2* 3.6   CHLORIDE mmol/L 103 104   CO2 mmol/L 23 26   ANION GAP mmol/L 10 6   BUN mg/dL 5 6   CREATININE mg/dL 0.70 0.80   EGFR ml/min/1.73sq m 124 118   CALCIUM mg/dL 8.3* 9.1   MAGNESIUM mg/dL 1.7*  --    PHOSPHORUS mg/dL 2.7  --      Results from last 7 days   Lab Units 07/31/23  0325 07/30/23  1302   AST U/L 13 25   ALT U/L 21 28   ALK PHOS U/L 33* 42   TOTAL PROTEIN g/dL 6.6 7.3   ALBUMIN g/dL 3.6 4.1   TOTAL BILIRUBIN mg/dL 2.59* 2.91*   BILIRUBIN DIRECT mg/dL 0.47* 0.47*         Results from last 7 days   Lab Units 07/31/23  0325 07/30/23  1302   GLUCOSE RANDOM mg/dL 106 114               Results from last 7 days   Lab Units 07/30/23  1302   HS TNI 0HR ng/L <2         Results from last 7 days   Lab Units 07/30/23  1518   PROTIME seconds 13.2   INR  1.00   PTT seconds 27         Results from last 7 days   Lab Units 07/30/23  1302   PROCALCITONIN ng/ml 0.16     Results from last 7 days   Lab Units 07/30/23  1518   LACTIC ACID mmol/L 0.8                                 Results from last 7 days   Lab Units 07/30/23  1302   LIPASE u/L 13                 Results from last 7 days   Lab Units 07/30/23  1440   CLARITY UA  Clear   COLOR UA  Yellow   SPEC GRAV UA  1.010   PH UA  5.5   GLUCOSE UA mg/dl Negative   KETONES UA mg/dl Trace*   BLOOD UA  Trace*   PROTEIN UA mg/dl Negative   NITRITE UA  Negative   BILIRUBIN UA  Negative   UROBILINOGEN UA E.U./dl 0.2   LEUKOCYTES UA  Negative   WBC UA /hpf 1-2   RBC UA /hpf None Seen   BACTERIA UA /hpf Occasional   EPITHELIAL CELLS WET PREP /hpf Occasional                   Results from last 7 days   Lab Units 07/30/23  1538 07/30/23  1518   BLOOD CULTURE  Received in Microbiology Lab. Culture in Progress. Received in Microbiology Lab. Culture in Progress.                    ED Treatment:   Medication Administration from 07/30/2023 1227 to 07/30/2023 1809       Date/Time Order Dose Route Action Comments     07/30/2023 1437 EDT sodium chloride 0.9 % bolus 1,000 mL 0 mL Intravenous Stopped --     07/30/2023 1304 EDT sodium chloride 0.9 % bolus 1,000 mL 1,000 mL Intravenous New Bag -- 07/30/2023 1304 EDT ketorolac (TORADOL) injection 15 mg 15 mg Intravenous Given --     07/30/2023 1355 EDT morphine injection 4 mg 4 mg Intravenous Given --     07/30/2023 1416 EDT iohexol (OMNIPAQUE) 350 MG/ML injection (SINGLE-DOSE) 100 mL 100 mL Intravenous Given --     07/30/2023 1739 EDT cefTRIAXone (ROCEPHIN) IVPB (premix in dextrose) 2,000 mg 50 mL 0 mg Intravenous Stopped --     07/30/2023 1632 EDT cefTRIAXone (ROCEPHIN) IVPB (premix in dextrose) 2,000 mg 50 mL 2,000 mg Intravenous New Bag --     07/30/2023 1748 EDT metroNIDAZOLE (FLAGYL) IVPB (premix) 500 mg 100 mL 500 mg Intravenous New Bag --     07/30/2023 1742 EDT morphine injection 4 mg 4 mg Intravenous Given --        Admitting Diagnosis: Acute cholecystitis [K81.0]  Flank pain [R10.9]  SIRS (systemic inflammatory response syndrome) (HCC) [R65.10]  Age/Sex: 28 y.o. male  Admission Orders:  Scheduled Medications:  [MAR Hold] cefazolin, 2,000 mg, Intravenous, Q8H  [MAR Hold] enoxaparin, 40 mg, Subcutaneous, Daily  [MAR Hold] metroNIDAZOLE, 500 mg, Intravenous, Q8H  [MAR Hold] senna, 1 tablet, Oral, Daily      Continuous IV Infusions:  multi-electrolyte, 125 mL/hr, Intravenous, Continuous      PRN Meds:  [MAR Hold] acetaminophen, 650 mg, Oral, Q6H PRN  [MAR Hold] HYDROmorphone, 0.5 mg, Intravenous, Q3H PRN  [MAR Hold] ondansetron, 4 mg, Intravenous, Q6H PRN  [MAR Hold] oxyCODONE, 10 mg, Oral, Q4H PRN  [MAR Hold] oxyCODONE, 5 mg, Oral, Q4H PRN  sodium chloride, , , PRN      Network Utilization Review Department  ATTENTION: Please call with any questions or concerns to [phone] and carefully listen to the prompts so that you are directed to the right person. All voicemails are confidential.  Diomedes Cody all requests for admission clinical reviews, approved or denied determinations and any other requests to dedicated fax number below belonging to the campus where the patient is receiving treatment.  List of dedicated fax numbers for the Facilities:  FACILITY NAME UR FAX NUMBER   ADMISSION DENIALS (Administrative/Medical Necessity) [phone] [address] (Maternity/NICU/Pediatrics) [phone]   [address] 1521 [facility] 1000 [facility] [phone] 1505 [facility] 207 [facility] [address] [place] [address] [address] [address] [facility] [address] [phone]

## 2023-07-31 NOTE — PROGRESS NOTES
Progress Note - General Surgery   Awilda Sharif 28 y.o. male MRN: [id number]  Unit/Bed#: E4 -01 Encounter: [id number]    Assessment:  Awilda Sharif is a 28 y.o. male with acute cholecystitis s/p 7/31 lap chester with IOC     Afebrile, Stable VS on room air. PE: soft, AT    ESTEBAN 160 cc ss    Plan:  Regular  ESTEBAN drain (gallbladder fossa)   Talk to CM about drain care at home  Plan for dc later today      Subjective/Objective     Subjective:   No acute events overnight. Pain controlled. -N. -V. +F. -BM. Objective:     Blood pressure 120/56, pulse 90, temperature 98.4 °F (36.9 °C), resp. rate 12, height 6' 4" (1.93 m), weight (!) 150 kg (330 lb 11 oz), SpO2 94 %. ,Body mass index is 40.25 kg/m².       Intake/Output Summary (Last 24 hours) at 7/31/2023 1948  Last data filed at 7/31/2023 1842  Gross per 24 hour   Intake 3600 ml   Output 40 ml   Net 3560 ml       Invasive Devices     Peripheral Intravenous Line  Duration           Peripheral IV 07/30/23 Right Antecubital 1 day    Peripheral IV 07/31/23 Right;Dorsal (posterior) Hand <1 day          Drain  Duration           Closed/Suction Drain Right RUQ <1 day                Physical Exam:   Gen: NAD, Comfortable  Neuro: A&O, No focal deficits  Head: Normal Cephalic, Atraumatic  Eye: EOMI, PERRLA, No scleral icterus  Neck: Supple, No JVD, Midline trachea  CV: RRR, Cap refill <2 sec  Pulm: Normal work of breathing, no respiratory distress  Abd: Soft, Non-Distended, appropriately tender  Ext: No edema, Non-tender  Skin: warm, dry, intact

## 2023-07-31 NOTE — PLAN OF CARE
Problem: Potential for Falls  Goal: Patient will remain free of falls  Description: INTERVENTIONS:  - Educate patient/family on patient safety including physical limitations  - Instruct patient to call for assistance with activity   - Consult OT/PT to assist with strengthening/mobility   - Keep Call bell within reach  - Keep bed low and locked with side rails adjusted as appropriate  - Keep care items and personal belongings within reach  - Initiate and maintain comfort rounds  - Make Fall Risk Sign visible to staff  - Apply yellow socks and bracelet for high fall risk patients  - Consider moving patient to room near nurses station  Outcome: Progressing     Problem: INFECTION - ADULT  Goal: Absence or prevention of progression during hospitalization  Description: INTERVENTIONS:  - Assess and monitor for signs and symptoms of infection  - Monitor lab/diagnostic results  - Monitor all insertion sites, i.e. indwelling lines, tubes, and drains  - Monitor endotracheal if appropriate and nasal secretions for changes in amount and color  - [place] appropriate cooling/warming therapies per order  - Administer medications as ordered  - Instruct and encourage patient and family to use good hand hygiene technique  - Identify and instruct in appropriate isolation precautions for identified infection/condition  Outcome: Progressing  Goal: Absence of fever/infection during neutropenic period  Description: INTERVENTIONS:  - Monitor WBC    Outcome: Progressing     Problem: DISCHARGE PLANNING  Goal: Discharge to home or other facility with appropriate resources  Description: INTERVENTIONS:  - Identify barriers to discharge w/patient and caregiver  - Arrange for needed discharge resources and transportation as appropriate  - Identify discharge learning needs (meds, wound care, etc.)  - Arrange for interpretive services to assist at discharge as needed  - Refer to Case Management Department for coordinating discharge planning if the patient needs post-hospital services based on physician/advanced practitioner order or complex needs related to functional status, cognitive ability, or social support system  Outcome: Progressing     Problem: METABOLIC, FLUID AND ELECTROLYTES - ADULT  Goal: Electrolytes maintained within normal limits  Description: INTERVENTIONS:  - Monitor labs and assess patient for signs and symptoms of electrolyte imbalances  - Administer electrolyte replacement as ordered  - Monitor response to electrolyte replacements, including repeat lab results as appropriate  - Instruct patient on fluid and nutrition as appropriate  Outcome: Progressing  Goal: Fluid balance maintained  Description: INTERVENTIONS:  - Monitor labs   - Monitor I/O and WT  - Instruct patient on fluid and nutrition as appropriate  - Assess for signs & symptoms of volume excess or deficit  Outcome: Progressing  Goal: Glucose maintained within target range  Description: INTERVENTIONS:  - Monitor Blood Glucose as ordered  - Assess for signs and symptoms of hyperglycemia and hypoglycemia  - Administer ordered medications to maintain glucose within target range  - Assess nutritional intake and initiate nutrition service referral as needed  Outcome: Progressing

## 2023-07-31 NOTE — PROGRESS NOTES
General Surgery  Progress Note   Dianelys Castillo 28 y.o. male MRN: [id number]  Unit/Bed#: E4 -01 Encounter: [id number]    Assessment:  Patient is a 28 y.o. male with pmhx of morbid obesity, asthma, and biliary disease including cholecystitis and choledocholithiasis status post ERCP and sphincterotomy complicated by posterior PTC pancreatitis in 2022 who presented with acute cholecystitis and possible choledocholithiasis although hyperbilirubinemia is not an obstructive pattern with a primary indirect component; additionally he did undergo sphincterotomy in  which decreases our index of suspicion for choledocholithiasis. Afebrile, VSS  WBC: 14.87 from 14.79; Hb.7 from 16.2; T. bili 2.59 from 2.91; direct bili 0.47 from 0.47    Plan:  - Planned for laparoscopic cholecystectomy possible IOC today; will reevaluate given nonobstructive pattern of hyperbilirubinemia  - NPO/IVF  - IV antibiotics  - DVT prophylaxis  - Encourage ambulation  - Incentive spirometry    Subjective/Objective     Subjective: Patient seen and examined at bedside, in no acute distress. No acute events overnight. Patient reports that pain is improved since presentation. Pt denies nausea or vomiting. Passing flatus. Objective:     Vitals:Blood pressure 113/72, pulse 92, temperature 97.5 °F (36.4 °C), temperature source Temporal, resp. rate 18, height 6' 4" (1.93 m), weight (!) 150 kg (330 lb 11 oz), SpO2 95 %. ,Body mass index is 40.25 kg/m².      Temp (24hrs), Av.5 °F (36.9 °C), Min:97.5 °F (36.4 °C), Max:99.1 °F (37.3 °C)  Current: Temperature: 97.5 °F (36.4 °C)      Intake/Output Summary (Last 24 hours) at 2023 4767  Last data filed at 2023 045  Gross per 24 hour   Intake 2050 ml   Output --   Net 2050 ml       Invasive Devices     Peripheral Intravenous Line  Duration           Peripheral IV 23 Right Antecubital <1 day                Physical Exam:  General: No acute distress, alert and oriented  CV: Well perfused, regular rate and rhythm  Lungs: Normal work of breathing, no increased respiratory effort  Abdomen: Soft, right upper quadrant tenderness, nondistended  extremities: No edema, clubbing or cyanosis  Skin: Warm, dry    Lab Results: Results: I have personally reviewed all pertinent laboratory/tests results  VTE Prophylaxis: Sequential compression device (Venodyne)  and Enoxaparin (Lovenox)    Jodi Helms MD  7/31/2023

## 2023-07-31 NOTE — PLAN OF CARE
Problem: Potential for Falls  Goal: Patient will remain free of falls  Description: INTERVENTIONS:  - Educate patient/family on patient safety including physical limitations  - Instruct patient to call for assistance with activity   - Consult OT/PT to assist with strengthening/mobility   - Keep Call bell within reach  - Keep bed low and locked with side rails adjusted as appropriate  - Keep care items and personal belongings within reach  - Initiate and maintain comfort rounds  - Make Fall Risk Sign visible to staff  - Apply yellow socks and bracelet for high fall risk patients  - Consider moving patient to room near nurses station  Outcome: Progressing     Problem: INFECTION - ADULT  Goal: Absence or prevention of progression during hospitalization  Description: INTERVENTIONS:  - Assess and monitor for signs and symptoms of infection  - Monitor lab/diagnostic results  - Monitor all insertion sites, i.e. indwelling lines, tubes, and drains  - Monitor endotracheal if appropriate and nasal secretions for changes in amount and color  - [place] appropriate cooling/warming therapies per order  - Administer medications as ordered  - Instruct and encourage patient and family to use good hand hygiene technique  - Identify and instruct in appropriate isolation precautions for identified infection/condition  Outcome: Progressing  Goal: Absence of fever/infection during neutropenic period  Description: INTERVENTIONS:  - Monitor WBC    Outcome: Progressing     Problem: DISCHARGE PLANNING  Goal: Discharge to home or other facility with appropriate resources  Description: INTERVENTIONS:  - Identify barriers to discharge w/patient and caregiver  - Arrange for needed discharge resources and transportation as appropriate  - Identify discharge learning needs (meds, wound care, etc.)  - Arrange for interpretive services to assist at discharge as needed  - Refer to Case Management Department for coordinating discharge planning if the patient needs post-hospital services based on physician/advanced practitioner order or complex needs related to functional status, cognitive ability, or social support system  Outcome: Progressing     Problem: METABOLIC, FLUID AND ELECTROLYTES - ADULT  Goal: Electrolytes maintained within normal limits  Description: INTERVENTIONS:  - Monitor labs and assess patient for signs and symptoms of electrolyte imbalances  - Administer electrolyte replacement as ordered  - Monitor response to electrolyte replacements, including repeat lab results as appropriate  - Instruct patient on fluid and nutrition as appropriate  Outcome: Progressing  Goal: Fluid balance maintained  Description: INTERVENTIONS:  - Monitor labs   - Monitor I/O and WT  - Instruct patient on fluid and nutrition as appropriate  - Assess for signs & symptoms of volume excess or deficit  Outcome: Progressing  Goal: Glucose maintained within target range  Description: INTERVENTIONS:  - Monitor Blood Glucose as ordered  - Assess for signs and symptoms of hyperglycemia and hypoglycemia  - Administer ordered medications to maintain glucose within target range  - Assess nutritional intake and initiate nutrition service referral as needed  Outcome: Progressing

## 2023-07-31 NOTE — OP NOTE
OPERATIVE REPORT  PATIENT NAME: Breezy Castro    :  1990  MRN: [id number]  Pt Location: [facility] OR ROOM 06    SURGERY DATE: 2023    Surgeon(s) and Role:     * Luis Wynn MD - Primary     * Danii Juan MD - Harmony Clark MD - Assisting    Preop Diagnosis:  Acute cholecystitis [K81.0]    Post-Op Diagnosis Codes:     * Acute cholecystitis [K81.0]     * Gangrenous cholecystitis [K81.0]    Procedure(s):  CHOLANGIOGRAM  CHOLECYSTECTOMY LAPAROSCOPIC    Specimen(s):  ID Type Source Tests Collected by Time Destination   1 : GALLBLADDER Tissue Gallbladder TISSUE EXAM Luis Wynn MD 2023 [id number]        Estimated Blood Loss:   Minimal    Drains:  Closed/Suction Drain Right RUQ (Active)   Number of days: 0       Anesthesia Type:   General  Operative Indications:  Acute cholecystitis [K81.0]      Operative Findings:  Acute gangrenous cholecystitis. Cholangiogram showed good position of the common duct with good flow into the duodenum as well as good backflow into the bifurcation. Complications:   None    Procedure and Technique:    Procedure Details   The patient was seen again in the Holding Room. The risks, benefits, complications, treatment options, and expected outcomes were discussed with the patient. The possibilities of reaction to medication, pulmonary aspiration, perforation of viscus, bleeding, recurrent infection, finding a normal gallbladder, the need for additional procedures, failure to diagnose a condition, the possible need to convert to an open procedure, and creating a complication requiring transfusion or operation were discussed with the patient. The patient and/or family concurred with the proposed plan, giving informed consent. The site of surgery properly noted/marked.  The patient was taken to Operating Room, identified as Breezy Castro and the procedure verified as Laparoscopic Cholecystectomy with Possible Intraoperative Cholangiogram. A Time Out was held and the above information confirmed. Prior to the induction of general anesthesia, antibiotic prophylaxis was administered. General endotracheal anesthesia was then administered and tolerated well. After the induction, the abdomen was prepped in the usual sterile fashion. The patient was positioned in the supine position. With the patient in a head-up position, an 11 mm trocar was placed in the umbilical area and three 5 mm trocars placed in the right abdomen, under direct vision. There was no evidence of intra-abdominal injury or bleeding. All skin incisions were infiltrated with a local anesthetic agent before making the incision and placing the trocars. The gallbladder was identified, the fundus grasped and retracted cephalad. Adhesions were lysed bluntly and with the electrocautery where indicated, taking care not to injure any adjacent organs or viscus. The infundibulum was grasped and retracted laterally, exposing the peritoneum overlying the triangle of Calot. The peritoneum was removed anteriorly and posteriorly to the gallbladder, with special attention to the backside of the gallbladder dissection. This allowed for freeing up the gallbladder. The critical view of the triangle Calot was carried out, dissecting out the cystic duct and cystic artery as the only two tubular structures leading to the gallbladder. Once these were clearly identified, the back wall of the gallbladder was lifted away from the cystic plate to expose the posterior aspect of this dissection, ensuring that there were no posterior structures leading into the liver. An incision was made in the cystic duct and the cholangiogram catheter introduced. The catheter was secured using an endoclip. The study showed no stones in the common bile duct and free flow of dye into the duodenum, and good visualization of the distal and proximal biliary tree. The catheter was then removed.      The cystic duct was then doubly ligated with surgical clips and/or Endoloop suture on the patient side and singly clipped on the gallbladder side and divided. The cystic artery was re-identified and ligated with clips and divided as well. The gallbladder was dissected from the liver bed in retrograde fashion with the electrocautery or harmonic scalpel where appropriate. The gallbladder was removed, via an Endo pouch, through the epigastric port. . The liver bed was irrigated and inspected. Hemostasis was achieved. Copious irrigation was utilized and was repeatedly aspirated until clear. Due to the gangrenous nature of the gallbladder and extensive inflammation a 15 Ag drain was placed through the lateral trocar and secured to nylon suture and placed in the gallbladder fossa. Pneumoperitoneum was completely reduced after viewing removal of the trocars under direct vision. The wounds were thoroughly irrigated and if needed, fascia was then closed with a figure of eight suture; the skin was then closed with 4-0 Monocryl sutures and a sterile dressing was applied. Instrument, sponge, and needle counts were correct at closure and at the conclusion of the case. The patient tolerated the procedure well. This text is generated with voice recognition software. There may be translation, syntax,  or grammatical errors. If you have any questions, please contact the dictating provider. I was present for the entire procedure.     Patient Disposition:  PACU         SIGNATURE: Amy Gunn MD  DATE: July 31, 2023  TIME: 5:54 PM

## 2023-07-31 NOTE — ANESTHESIA PREPROCEDURE EVALUATION
Procedure:  CHOLANGIOGRAM (Abdomen)  CHOLECYSTECTOMY LAPAROSCOPIC (Abdomen)    Relevant Problems   ANESTHESIA (within normal limits)      CARDIO (within normal limits)      ENDO (within normal limits)      GI/HEPATIC   (+) Rectal bleeding      /RENAL (within normal limits)      GYN (within normal limits)      HEMATOLOGY (within normal limits)      MUSCULOSKELETAL (within normal limits)      NEURO/PSYCH (within normal limits)      PULMONARY   (+) Asthma        Physical Exam    Airway    Mallampati score: III  TM Distance: >3 FB  Neck ROM: full     Dental   No notable dental hx     Cardiovascular  Rhythm: regular, Rate: normal, Cardiovascular exam normal    Pulmonary  Pulmonary exam normal Breath sounds clear to auscultation,     Other Findings        Anesthesia Plan  ASA Score- 3     Anesthesia Type- general with ASA Monitors. Additional Monitors:   Airway Plan: ETT. Plan Factors-Exercise tolerance (METS): >4 METS. Chart reviewed. Existing labs reviewed. Patient summary reviewed. Patient is not a current smoker. Induction- intravenous. Postoperative Plan- Plan for postoperative opioid use. Planned trial extubation    Informed Consent- Anesthetic plan and risks discussed with patient.

## 2023-08-01 ENCOUNTER — HOME HEALTH ADMISSION (OUTPATIENT)
Dept: HOME HEALTH SERVICES | Facility: HOME HEALTHCARE | Age: 33
End: 2023-08-01

## 2023-08-01 VITALS
BODY MASS INDEX: 38.36 KG/M2 | HEIGHT: 76 IN | HEART RATE: 102 BPM | WEIGHT: 315 LBS | SYSTOLIC BLOOD PRESSURE: 121 MMHG | RESPIRATION RATE: 16 BRPM | OXYGEN SATURATION: 96 % | TEMPERATURE: 97.3 F | DIASTOLIC BLOOD PRESSURE: 82 MMHG

## 2023-08-01 PROBLEM — K81.9 CHOLECYSTITIS: Status: RESOLVED | Noted: 2023-07-31 | Resolved: 2023-08-01

## 2023-08-01 LAB
ALBUMIN SERPL BCP-MCNC: 3.5 G/DL (ref 3.5–5)
ALP SERPL-CCNC: 36 U/L (ref 34–104)
ALT SERPL W P-5'-P-CCNC: 22 U/L (ref 7–52)
ANION GAP SERPL CALCULATED.3IONS-SCNC: 11 MMOL/L
AST SERPL W P-5'-P-CCNC: 24 U/L (ref 13–39)
BASOPHILS # BLD AUTO: 0.04 THOUSANDS/ÂΜL (ref 0–0.1)
BASOPHILS NFR BLD AUTO: 0 % (ref 0–1)
BILIRUB DIRECT SERPL-MCNC: 0.66 MG/DL (ref 0–0.2)
BILIRUB SERPL-MCNC: 2.52 MG/DL (ref 0.2–1)
BUN SERPL-MCNC: 6 MG/DL (ref 5–25)
CALCIUM SERPL-MCNC: 8.1 MG/DL (ref 8.4–10.2)
CHLORIDE SERPL-SCNC: 103 MMOL/L (ref 96–108)
CO2 SERPL-SCNC: 22 MMOL/L (ref 21–32)
CREAT SERPL-MCNC: 0.81 MG/DL (ref 0.6–1.3)
EOSINOPHIL # BLD AUTO: 0.01 THOUSAND/ÂΜL (ref 0–0.61)
EOSINOPHIL NFR BLD AUTO: 0 % (ref 0–6)
ERYTHROCYTE [DISTWIDTH] IN BLOOD BY AUTOMATED COUNT: 12.8 % (ref 11.6–15.1)
GFR SERPL CREATININE-BSD FRML MDRD: 117 ML/MIN/1.73SQ M
GLUCOSE SERPL-MCNC: 113 MG/DL (ref 65–140)
HCT VFR BLD AUTO: 43.8 % (ref 36.5–49.3)
HGB BLD-MCNC: 14.5 G/DL (ref 12–17)
IMM GRANULOCYTES # BLD AUTO: 0.08 THOUSAND/UL (ref 0–0.2)
IMM GRANULOCYTES NFR BLD AUTO: 1 % (ref 0–2)
LYMPHOCYTES # BLD AUTO: 1.23 THOUSANDS/ÂΜL (ref 0.6–4.47)
LYMPHOCYTES NFR BLD AUTO: 8 % (ref 14–44)
MAGNESIUM SERPL-MCNC: 2.1 MG/DL (ref 1.9–2.7)
MCH RBC QN AUTO: 30.7 PG (ref 26.8–34.3)
MCHC RBC AUTO-ENTMCNC: 33.1 G/DL (ref 31.4–37.4)
MCV RBC AUTO: 93 FL (ref 82–98)
MONOCYTES # BLD AUTO: 1.15 THOUSAND/ÂΜL (ref 0.17–1.22)
MONOCYTES NFR BLD AUTO: 7 % (ref 4–12)
NEUTROPHILS # BLD AUTO: 13.78 THOUSANDS/ÂΜL (ref 1.85–7.62)
NEUTS SEG NFR BLD AUTO: 84 % (ref 43–75)
NRBC BLD AUTO-RTO: 0 /100 WBCS
PLATELET # BLD AUTO: 311 THOUSANDS/UL (ref 149–390)
PMV BLD AUTO: 9.9 FL (ref 8.9–12.7)
POTASSIUM SERPL-SCNC: 3.8 MMOL/L (ref 3.5–5.3)
PROT SERPL-MCNC: 6.7 G/DL (ref 6.4–8.4)
RBC # BLD AUTO: 4.72 MILLION/UL (ref 3.88–5.62)
SODIUM SERPL-SCNC: 136 MMOL/L (ref 135–147)
WBC # BLD AUTO: 16.29 THOUSAND/UL (ref 4.31–10.16)

## 2023-08-01 PROCEDURE — 83735 ASSAY OF MAGNESIUM: CPT | Performed by: SURGERY

## 2023-08-01 PROCEDURE — 80076 HEPATIC FUNCTION PANEL: CPT | Performed by: SURGERY

## 2023-08-01 PROCEDURE — NC001 PR NO CHARGE: Performed by: SURGERY

## 2023-08-01 PROCEDURE — 99024 POSTOP FOLLOW-UP VISIT: CPT | Performed by: SURGERY

## 2023-08-01 PROCEDURE — 80048 BASIC METABOLIC PNL TOTAL CA: CPT | Performed by: SURGERY

## 2023-08-01 PROCEDURE — 85025 COMPLETE CBC W/AUTO DIFF WBC: CPT | Performed by: SURGERY

## 2023-08-01 RX ORDER — POTASSIUM CHLORIDE 20 MEQ/1
20 TABLET, EXTENDED RELEASE ORAL ONCE
Status: COMPLETED | OUTPATIENT
Start: 2023-08-01 | End: 2023-08-01

## 2023-08-01 RX ORDER — METRONIDAZOLE 500 MG/100ML
500 INJECTION, SOLUTION INTRAVENOUS EVERY 8 HOURS
Status: DISCONTINUED | OUTPATIENT
Start: 2023-08-01 | End: 2023-08-01 | Stop reason: HOSPADM

## 2023-08-01 RX ORDER — OXYCODONE HYDROCHLORIDE 5 MG/1
5 TABLET ORAL EVERY 4 HOURS PRN
Qty: 10 TABLET | Refills: 0 | Status: SHIPPED | OUTPATIENT
Start: 2023-08-01 | End: 2023-08-15

## 2023-08-01 RX ORDER — METHOCARBAMOL 500 MG/1
500 TABLET, FILM COATED ORAL 4 TIMES DAILY
Qty: 28 TABLET | Refills: 0 | Status: SHIPPED | OUTPATIENT
Start: 2023-08-01 | End: 2023-08-15

## 2023-08-01 RX ORDER — ACETAMINOPHEN 325 MG/1
325 TABLET ORAL EVERY 6 HOURS PRN
Qty: 28 TABLET | Refills: 0 | Status: SHIPPED | OUTPATIENT
Start: 2023-08-01 | End: 2023-08-15

## 2023-08-01 RX ORDER — CEFAZOLIN SODIUM 2 G/50ML
2000 SOLUTION INTRAVENOUS EVERY 8 HOURS
Status: DISCONTINUED | OUTPATIENT
Start: 2023-08-01 | End: 2023-08-01 | Stop reason: HOSPADM

## 2023-08-01 RX ADMIN — POTASSIUM CHLORIDE 20 MEQ: 1500 TABLET, EXTENDED RELEASE ORAL at 09:03

## 2023-08-01 RX ADMIN — CEFAZOLIN SODIUM 2000 MG: 2 SOLUTION INTRAVENOUS at 09:59

## 2023-08-01 RX ADMIN — HYDROMORPHONE HYDROCHLORIDE 0.5 MG: 1 INJECTION, SOLUTION INTRAMUSCULAR; INTRAVENOUS; SUBCUTANEOUS at 00:54

## 2023-08-01 RX ADMIN — CEFAZOLIN SODIUM 2000 MG: 2 SOLUTION INTRAVENOUS at 13:25

## 2023-08-01 RX ADMIN — METRONIDAZOLE 500 MG: 500 INJECTION, SOLUTION INTRAVENOUS at 09:11

## 2023-08-01 RX ADMIN — SODIUM CHLORIDE, SODIUM GLUCONATE, SODIUM ACETATE, POTASSIUM CHLORIDE, MAGNESIUM CHLORIDE, SODIUM PHOSPHATE, DIBASIC, AND POTASSIUM PHOSPHATE 125 ML/HR: .53; .5; .37; .037; .03; .012; .00082 INJECTION, SOLUTION INTRAVENOUS at 04:00

## 2023-08-01 RX ADMIN — HYDROMORPHONE HYDROCHLORIDE 0.5 MG: 1 INJECTION, SOLUTION INTRAMUSCULAR; INTRAVENOUS; SUBCUTANEOUS at 07:27

## 2023-08-01 RX ADMIN — ENOXAPARIN SODIUM 40 MG: 40 INJECTION SUBCUTANEOUS at 09:03

## 2023-08-01 RX ADMIN — METRONIDAZOLE 500 MG: 500 INJECTION, SOLUTION INTRAVENOUS at 13:59

## 2023-08-01 RX ADMIN — SENNOSIDES 8.6 MG: 8.6 TABLET, FILM COATED ORAL at 09:03

## 2023-08-01 RX ADMIN — CEFAZOLIN SODIUM 2000 MG: 2 SOLUTION INTRAVENOUS at 03:44

## 2023-08-01 RX ADMIN — ACETAMINOPHEN 325MG 650 MG: 325 TABLET ORAL at 11:02

## 2023-08-01 RX ADMIN — OXYCODONE HYDROCHLORIDE 10 MG: 10 TABLET ORAL at 03:45

## 2023-08-01 NOTE — CASE MANAGEMENT
Case Management Discharge Planning Note    Patient name Crystal Ramírez  Location East 4 /E4 -[id number]-* MRN [id number]  : 1990 Date 2023       Current Admission Date: 2023  Current Admission Diagnosis:Cholecystitis   Patient Active Problem List    Diagnosis Date Noted   • Cholecystitis 2023   • Dislocation of right patella 2023   • Instability of right knee joint 2023   • Right upper quadrant abdominal pain 2021   • Rectal bleeding 2021   • Does not have health insurance 2021   • Morbid obesity with BMI of 40.0-44.9, adult ([address] ) 2021   • Calculus of gallbladder without biliary obstruction 2020   • Allergic dermatitis 2019   • Change in bowel habits 2018   • Diarrhea in adult patient 2018   • Allergic rhinitis 2016   • Obesity 2016   • Asthma 2012      LOS (days): 2  Geometric Mean LOS (GMLOS) (days):   Days to GMLOS:     OBJECTIVE:  Risk of Unplanned Readmission Score: 7.5         Current admission status: Inpatient   Preferred Pharmacy:   [facility] [address] [place] [address], [place] - [address] [place] [address]  Phone: [phone] Fax: [phone]    Primary Care Provider: Bahman Lamas DO    Primary Insurance: [address] [place] [address]  Secondary Insurance:     DISCHARGE DETAILS:    Discharge planning discussed with[de-identified] Patient  Freedom of Choice: Yes  Comments - Freedom of Choice: Pt agreeable to d/c home w/ [facility]  CM contacted family/caregiver?: No- see comments (pt declined)     Did patient/caregiver verbalize understanding of patient care needs?: Yes  Were patient/caregiver advised of the risks associated with not following Treatment Team discharge recommendations?: Yes    Contacts  Patient Contacts: Patient  Relationship to Patient[de-identified] Family  Contact Method:  In Person  Reason/Outcome: Continuity of Care, Discharge  [facility]         Is the patient interested in [facility] at discharge?: Yes  [facility] requested[de-identified] [facility] Name[de-identified] [facility] Provider[de-identified] Referring Provider  Home Health Services Needed[de-identified] Post-Op Care and Assessment, Wound/Ostomy Care (drain)  Homebound Criteria Met[de-identified] Requires the Assistance of Another Person for Safe Ambulation or to Leave the Home  Supporting Clincal Findings[de-identified] Limited Endurance    DME Referral Provided  Referral made for DME?: No              Treatment Team Recommendation: Home with [address]  Discharge Destination Plan[de-identified] Home with [address]                                         Additional Comments: CM was notified by surgery that pt is medically cleared for d/c & will need [facility] for drain care. [facility] accepting pt for drain care. Pt agreeable & denies having any questions or concerns at this time. CM to continue to follow pt care & d/c.

## 2023-08-01 NOTE — ANESTHESIA POSTPROCEDURE EVALUATION
Post-Op Assessment Note    CV Status:  Stable    Pain management: adequate     Mental Status:  Alert and awake   Hydration Status:  Euvolemic   PONV Controlled:  Controlled   Airway Patency:  Patent      Post Op Vitals Reviewed: Yes            No notable events documented.     BP      Temp      Pulse     Resp      SpO2      /56   Pulse 90   Temp 98.4 °F (36.9 °C)   Resp 12   Ht 6' 4" (1.93 m)   Wt (!) 150 kg (330 lb 11 oz)   SpO2 94%   BMI 40.25 kg/m²

## 2023-08-01 NOTE — PLAN OF CARE
Problem: DISCHARGE PLANNING  Goal: Discharge to home or other facility with appropriate resources  Description: INTERVENTIONS:  - Identify barriers to discharge w/patient and caregiver  - Arrange for needed discharge resources and transportation as appropriate  - Identify discharge learning needs (meds, wound care, etc.)  - Arrange for interpretive services to assist at discharge as needed  - Refer to Case Management Department for coordinating discharge planning if the patient needs post-hospital services based on physician/advanced practitioner order or complex needs related to functional status, cognitive ability, or social support system  Outcome: Adequate for Discharge

## 2023-08-01 NOTE — NURSING NOTE
Patient has been dischaged, accompanied by PCA. Discharge instructions reviewed, incision dressing changed, ESTEBAN tube education completed. Rx to [place]. Patient's personal ride awaiting.

## 2023-08-01 NOTE — DISCHARGE SUMMARY
Discharge Summary - Ishmael Umaña 28 y.o. male MRN: [id number]    Unit/Bed#: E4 -01 Encounter: [id number]    Admission Date:   Admission Orders (From admission, onward)     Ordered        07/30/23 1701  Inpatient Admission  Once            07/30/23 1659  INPATIENT ADMISSION  Once,   Status:  Canceled                        Admitting Diagnosis: Acute cholecystitis [K81.0]  Flank pain [R10.9]  SIRS (systemic inflammatory response syndrome) (HCC) [R65.10]    HPI: Ishmael Umaña is a 28 y.o. male ith pmhx of morbid obesity, asthma, and biliary disease including cholecystitis and choledocholithiasis status post ERCP and sphincterotomy complicated by posterior PTC pancreatitis in March 2022 who presents with a 1 day history of right upper quadrant. He has loose stools at baseline and reports onset of constipation at this time time as his pain. He denies fevers, chills, nausea, vomiting chest pain, shortness of breath. He reports that he has had similar episodes numerous times over the past 4 years although the last one was greater than 1 year ago. He does report that this was greater in intensity which prompted him to come to the ED. He was scheduled for robotic cholecystectomy with Dr. Bianca Holguin on 8/24 and had previously been scheduled on 7/28 although did not undergo his preop testing. Upon his initial presentation 4 years ago he did not have insurance which caused him to delay undergoing surgery. He denies any previous abdominal surgeries. He does not take any medications at home including anticoagulation or antiplatelet therapy. Procedures Performed:   Orders Placed This Encounter   Procedures   • ED ECG Documentation Only       Summary of Hospital Course: Patient was admitted to the general surgery service with plan for laparoscopic cholecystectomy and intraoperative cholangiogram pending ultrasound findings.   7/31 right upper quadrant ultrasound demonstrated normal bile ducts without evidence of cholecystitis. Patient underwent an uncomplicated laparoscopic cholecystectomy on 7/31. There were no intraoperative complications. A ESTEBAN drain was placed in the right upper quadrant. Postop day 1 the patient was tolerating a diet, was out of bed and ambulating, was voiding without difficulty, and was having bowel function. Antibiotics were discontinued on postop day 1 and the patient was discharged home with home health for adequate nursing drain care. Patient was instructed to follow-up in outpatient clinic in 2 weeks for postoperative check. Patient was instructed to continue regular diet, was provided with pain scripts, and all additional questions or concerns were answered. Significant Findings, Care, Treatment and Services Provided: See above. Complications: None. Discharge Diagnosis: Acute cholecystitis. Medical Problems     Resolved Problems  Date Reviewed: 5/23/2023          Resolved    * (Principal) Cholecystitis 8/1/2023     Resolved by  Maddy Rabago PA-C          Condition at Discharge: good         Discharge instructions/Information to patient and family:   See after visit summary for information provided to patient and family. Provisions for Follow-Up Care:  See after visit summary for information related to follow-up care and any pertinent home health orders. PCP: Mari Lara DO    Disposition: Home with home health. Planned Readmission: No      Discharge Statement   I spent 30 minutes discharging the patient. This time was spent on the day of discharge. I had direct contact with the patient on the day of discharge. Additional documentation is required if more than 30 minutes were spent on discharge. Discharge Medications:  See after visit summary for reconciled discharge medications provided to patient and family.

## 2023-08-02 ENCOUNTER — HOME CARE VISIT (OUTPATIENT)
Dept: HOME HEALTH SERVICES | Facility: HOME HEALTHCARE | Age: 33
End: 2023-08-02

## 2023-08-02 ENCOUNTER — TRANSITIONAL CARE MANAGEMENT (OUTPATIENT)
Dept: FAMILY MEDICINE CLINIC | Facility: CLINIC | Age: 33
End: 2023-08-02

## 2023-08-02 NOTE — UTILIZATION REVIEW
NOTIFICATION OF ADMISSION DISCHARGE   This is a Notification of Discharge from [address] [place] [address]. Please be advised that this patient has been discharge from our facility. Below you will find the admission and discharge date and time including the patient’s disposition. UTILIZATION REVIEW CONTACT:  Garnette Snellen, MA  Utilization   Network Utilization Review Department  Phone: [phone] x carefully listen to the prompts. All voicemails are confidential.  Email: [email or web address]     ADMISSION INFORMATION  PRESENTATION DATE: 7/30/2023 12:30 PM  OBERVATION ADMISSION DATE:   INPATIENT ADMISSION DATE: 7/30/23  4:59 PM   DISCHARGE DATE: 8/1/2023  3:35 PM   DISPOSITION:Home with Home Health Care    IMPORTANT INFORMATION:  Send all requests for admission clinical reviews, approved or denied determinations and any other requests to dedicated fax number below belonging to the campus where the patient is receiving treatment.  List of dedicated fax numbers:  [place] DENIALS (Administrative/Medical Necessity) [phone] [facility] [facility] (Maternity/NICU/Pediatrics) [phone]   [facility] [phone]   [facility] [phone] [address] [facility] [address] [phone]   [address] [place] [address] [phone]   [facility] [phone]   [address] [place], [place] [address]e [facility] [phone]   [address] [place] [address] [phone] [address] [facility] [phone]   [address] [facility] [address] [facility] [phone]

## 2023-08-03 PROCEDURE — 88304 TISSUE EXAM BY PATHOLOGIST: CPT | Performed by: PATHOLOGY

## 2023-08-04 ENCOUNTER — TELEPHONE (OUTPATIENT)
Dept: SURGERY | Facility: CLINIC | Age: 33
End: 2023-08-04

## 2023-08-04 LAB
BACTERIA BLD CULT: NORMAL
BACTERIA BLD CULT: NORMAL

## 2023-08-04 NOTE — TELEPHONE ENCOUNTER
Post op call. Spoke to patient. Patient is doing well. Pain well controlled, patient ambulating well. No questions or concerns. Reminded patient of post op appointment and to call if there are any questions or concerns prior to appointment.

## 2023-08-08 ENCOUNTER — OFFICE VISIT (OUTPATIENT)
Dept: SURGERY | Facility: CLINIC | Age: 33
End: 2023-08-08

## 2023-08-08 ENCOUNTER — TELEPHONE (OUTPATIENT)
Dept: SURGERY | Facility: CLINIC | Age: 33
End: 2023-08-08

## 2023-08-08 VITALS
BODY MASS INDEX: 38.36 KG/M2 | HEART RATE: 84 BPM | RESPIRATION RATE: 16 BRPM | WEIGHT: 315 LBS | DIASTOLIC BLOOD PRESSURE: 82 MMHG | TEMPERATURE: 97.3 F | SYSTOLIC BLOOD PRESSURE: 111 MMHG | HEIGHT: 76 IN

## 2023-08-08 DIAGNOSIS — Z90.49 S/P LAPAROSCOPIC CHOLECYSTECTOMY: Primary | ICD-10-CM

## 2023-08-08 DIAGNOSIS — Z09 S/P UMBILICAL HERNIA REPAIR, FOLLOW-UP EXAM: Primary | ICD-10-CM

## 2023-08-08 PROCEDURE — 99024 POSTOP FOLLOW-UP VISIT: CPT | Performed by: PHYSICIAN ASSISTANT

## 2023-08-08 NOTE — PROGRESS NOTES
Assessment/Plan:   Ishmael Umaña is a 28 y.o.male who comes in today for postoperative check after cholecystectomy with Dr. Bianca Holguin on 7/31/23. Pathology: Reviewed with patient, all questions answered. Final Diagnosis   A.  Gallbladder, cholecystectomy:  -Acute and chronic cholecystitis with surface erosion, necrosis, and hemorrhage.  -Negative for dysplasia or carcinoma. -Gallstones are not identified.  -1 benign lymph node with reactive change present. ESTEBAN drain removed in office today. Postoperative restrictions reviewed. All questions answered. ______________________________________________________  HPI:  Ishmael Umaña is a 28 y.o.male who comes in today for postoperative check after cholecystectomy with Dr. Bianca Holguin on 7/31/23. Currently doing well without problems, no fever or chills,no nausea and no vomiting. Summary of Hospital Course: Patient was admitted to the general surgery service with plan for laparoscopic cholecystectomy and intraoperative cholangiogram pending ultrasound findings. 7/31 right upper quadrant ultrasound demonstrated normal bile ducts without evidence of cholecystitis. Patient underwent an uncomplicated laparoscopic cholecystectomy on 7/31. There were no intraoperative complications. A ESTEBAN drain was placed in the right upper quadrant. Postop day 1 the patient was tolerating a diet, was out of bed and ambulating, was voiding without difficulty, and was having bowel function. Antibiotics were discontinued on postop day 1 and the patient was discharged home with home health for adequate nursing drain care. Patient was instructed to follow-up in outpatient clinic in 2 weeks for postoperative check. Patient was instructed to continue regular diet, was provided with pain scripts, and all additional questions or concerns were answered.     ROS:  General ROS: negative for - chills, fatigue, fever or night sweats, weight loss  Respiratory ROS: no cough, shortness of breath, or wheezing  Cardiovascular ROS: no chest pain or dyspnea on exertion  Genito-Urinary ROS: no dysuria, trouble voiding, or hematuria  Musculoskeletal ROS: negative for - gait disturbance, joint pain or muscle pain  Neurological ROS: no TIA or stroke symptoms  GI ROS: see HPI  Skin ROS: no new rashes or lesions   Lymphatic ROS: no new adenopathy noted by pt.    GYN ROS: see HPI, no new GYN history or bleeding noted  Psy ROS: no new mental or behavioral disturbances         Patient Active Problem List   Diagnosis   • Allergic rhinitis   • Asthma   • Obesity   • Change in bowel habits   • Diarrhea in adult patient   • Allergic dermatitis   • Calculus of gallbladder without biliary obstruction   • Morbid obesity with BMI of 40.0-44.9, adult ([facility])   • Right upper quadrant abdominal pain   • Rectal bleeding   • Does not have health insurance   • Dislocation of right patella   • Instability of right knee joint       Allergies:  Penicillins and Sulfa antibiotics      Current Outpatient Medications:   •  acetaminophen (TYLENOL) 325 mg tablet, Take 1 tablet (325 mg total) by mouth every 6 (six) hours as needed for mild pain, headaches or fever for up to 28 doses, Disp: 28 tablet, Rfl: 0  •  eszopiclone (LUNESTA) 2 mg tablet, Take 1 tablet (2 mg total) by mouth daily at bedtime as needed for sleep Take immediately before bedtime, Disp: 30 tablet, Rfl: 0  •  ibuprofen (MOTRIN) 200 mg tablet, Take 400 mg by mouth every 6 (six) hours as needed for mild pain, Disp: , Rfl:   •  methocarbamol (ROBAXIN) 500 mg tablet, Take 1 tablet (500 mg total) by mouth 4 (four) times a day for 7 days (Patient not taking: Reported on 8/8/2023), Disp: 28 tablet, Rfl: 0  •  oxyCODONE (ROXICODONE) 5 immediate release tablet, Take 1 tablet (5 mg total) by mouth every 4 (four) hours as needed for moderate pain for up to 10 doses Max Daily Amount: 30 mg (Patient not taking: Reported on 8/8/2023), Disp: 10 tablet, Rfl: 0    Past Medical History:   Diagnosis Date   • Anxiety    • Asthma    • Depression    • Fracture of nasal bone    • GERD (gastroesophageal reflux disease)    • Gross hematuria    • Orthostatic hypotension        Past Surgical History:   Procedure Laterality Date   • CHOLANGIOGRAM N/A 7/31/2023    Procedure: CHOLANGIOGRAM;  Surgeon: Sheila Campos MD;  [facility];  Service: General   • CHOLECYSTECTOMY LAPAROSCOPIC N/A 7/31/2023    Procedure: Pedro Franco;  Surgeon: Sheila Campos MD;  Location: [facility];  Service: General   • WRIST SURGERY         Family History   Problem Relation Age of Onset   • No Known Problems Mother    • Diabetes Father         reports that he quit smoking about 9 years ago. His smoking use included cigarettes. He has never used smokeless tobacco. He reports that he does not drink alcohol and does not use drugs. Vitals:    08/08/23 1455   BP: 111/82   Pulse: 84   Resp: 16   Temp: (!) 97.3 °F (36.3 °C)       PHYSICAL EXAM  General: normal, cooperative, no distress  Abdominal: soft, nondistended or nontender  Incision: clean, dry, and intact and healing well; ESTEBAN drain in RUQ removed with no issue.          Saul Ryan PA-C    Date: 8/8/2023 Time: 3:00 PM

## 2023-08-15 ENCOUNTER — OFFICE VISIT (OUTPATIENT)
Dept: FAMILY MEDICINE CLINIC | Facility: CLINIC | Age: 33
End: 2023-08-15
Payer: COMMERCIAL

## 2023-08-15 VITALS
HEART RATE: 97 BPM | OXYGEN SATURATION: 99 % | WEIGHT: 315 LBS | DIASTOLIC BLOOD PRESSURE: 78 MMHG | HEIGHT: 76 IN | SYSTOLIC BLOOD PRESSURE: 118 MMHG | BODY MASS INDEX: 38.36 KG/M2

## 2023-08-15 DIAGNOSIS — Z90.49 S/P LAPAROSCOPIC CHOLECYSTECTOMY: Primary | ICD-10-CM

## 2023-08-15 DIAGNOSIS — K59.03 CONSTIPATION DUE TO PAIN MEDICATION: ICD-10-CM

## 2023-08-15 DIAGNOSIS — E66.01 MORBID OBESITY WITH BMI OF 40.0-44.9, ADULT (HCC): ICD-10-CM

## 2023-08-15 PROBLEM — Z59.89 DOES NOT HAVE HEALTH INSURANCE: Status: RESOLVED | Noted: 2021-09-28 | Resolved: 2023-08-15

## 2023-08-15 PROBLEM — K80.20 CALCULUS OF GALLBLADDER WITHOUT BILIARY OBSTRUCTION: Status: RESOLVED | Noted: 2020-12-22 | Resolved: 2023-08-15

## 2023-08-15 PROBLEM — Z59.71 DOES NOT HAVE HEALTH INSURANCE: Status: RESOLVED | Noted: 2021-09-28 | Resolved: 2023-08-15

## 2023-08-15 PROBLEM — R19.7 DIARRHEA IN ADULT PATIENT: Status: RESOLVED | Noted: 2018-08-03 | Resolved: 2023-08-15

## 2023-08-15 PROBLEM — R10.11 RIGHT UPPER QUADRANT ABDOMINAL PAIN: Status: RESOLVED | Noted: 2021-09-28 | Resolved: 2023-08-15

## 2023-08-15 PROBLEM — K62.5 RECTAL BLEEDING: Status: RESOLVED | Noted: 2021-09-28 | Resolved: 2023-08-15

## 2023-08-15 PROBLEM — R19.4 CHANGE IN BOWEL HABITS: Status: RESOLVED | Noted: 2018-08-03 | Resolved: 2023-08-15

## 2023-08-15 PROCEDURE — 99495 TRANSJ CARE MGMT MOD F2F 14D: CPT | Performed by: FAMILY MEDICINE

## 2023-08-15 NOTE — PROGRESS NOTES
Name: Jocelyn Walters      : 1990      MRN: [id number]  Encounter Provider: Mari Lara DO  Encounter Date: 8/15/2023   Encounter department: [facility]    Assessment & Plan     1. S/P laparoscopic cholecystectomy    2. Constipation due to pain medication  Comments:  No longer taking, encouraged to drink 6 glasses of water per day, increase fiber in his diet. 3. Morbid obesity with BMI of 40.0-44.9, adult ([address])  Comments:  Diet and exercise discussed, weight loss encouraged. Chief Complaint   Patient presents with   • Transition of Care Management     Patient would like to discuss medication        Subjective      Patient presents to the office for transition of care visit after recent laparoscopic cholecystectomy. He was actually scheduled for the surgery, when he developed acute symptoms and had to be done more urgently. Since then he denies any fever, chills, chest pain, dyspnea, or abdominal pain. He does admit to constipation which usually is not an issue for him. He admits he does not drink much water or eat much fiber. Review of Systems   Constitutional: Positive for unexpected weight change (14 pound weight loss). Negative for appetite change, chills, diaphoresis, fatigue and fever. HENT: Negative for congestion, ear pain, hearing loss, nosebleeds, postnasal drip, rhinorrhea, sinus pressure, sore throat, tinnitus and trouble swallowing. Eyes: Negative for photophobia, pain, discharge, redness, itching and visual disturbance. Respiratory: Negative for cough, chest tightness, shortness of breath and wheezing. Cardiovascular: Negative for chest pain, palpitations and leg swelling. Denies orthopnea , dyspnea on exertion   Gastrointestinal: Positive for constipation. Negative for abdominal distention, abdominal pain, blood in stool, diarrhea, nausea and vomiting. Endocrine: Negative.     Genitourinary: Negative for difficulty urinating, dysuria, flank pain, frequency, hematuria and urgency. Denies nocturia , erectile dysfunction   Musculoskeletal: Negative for arthralgias, back pain, gait problem, joint swelling and myalgias. Skin: Negative for pallor, rash and wound (Healing well. ). Denies skin lesions   Allergic/Immunologic: Negative for environmental allergies, food allergies and immunocompromised state. Neurological: Negative for dizziness, tremors, seizures, syncope, speech difficulty, weakness, numbness and headaches. Hematological: Negative for adenopathy. Does not bruise/bleed easily. Psychiatric/Behavioral: Negative for behavioral problems, confusion, sleep disturbance and suicidal ideas. The patient is not nervous/anxious.         Current Outpatient Medications on File Prior to Visit   Medication Sig   • [DISCONTINUED] acetaminophen (TYLENOL) 325 mg tablet Take 1 tablet (325 mg total) by mouth every 6 (six) hours as needed for mild pain, headaches or fever for up to 28 doses (Patient not taking: Reported on 8/15/2023)   • [DISCONTINUED] eszopiclone (LUNESTA) 2 mg tablet Take 1 tablet (2 mg total) by mouth daily at bedtime as needed for sleep Take immediately before bedtime (Patient not taking: Reported on 8/15/2023)   • [DISCONTINUED] ibuprofen (MOTRIN) 200 mg tablet Take 400 mg by mouth every 6 (six) hours as needed for mild pain (Patient not taking: Reported on 8/15/2023)   • [DISCONTINUED] methocarbamol (ROBAXIN) 500 mg tablet Take 1 tablet (500 mg total) by mouth 4 (four) times a day for 7 days (Patient not taking: Reported on 8/8/2023)   • [DISCONTINUED] oxyCODONE (ROXICODONE) 5 immediate release tablet Take 1 tablet (5 mg total) by mouth every 4 (four) hours as needed for moderate pain for up to 10 doses Max Daily Amount: 30 mg (Patient not taking: Reported on 8/8/2023)       Objective     /78 (BP Location: Left arm, Patient Position: Sitting, Cuff Size: Large)   Pulse 97   Ht 6' 4" (1.93 m) Wt (!) 150 kg (330 lb 6.4 oz)   SpO2 99%   BMI 40.22 kg/m²     Physical Exam  Vitals and nursing note reviewed. Constitutional:       Appearance: He is well-developed. He is obese. HENT:      Head: Normocephalic and atraumatic. Right Ear: Tympanic membrane and ear canal normal.      Left Ear: Tympanic membrane and ear canal normal.      Nose: Nose normal.   Eyes:      Conjunctiva/sclera: Conjunctivae normal.      Pupils: Pupils are equal, round, and reactive to light. Neck:      Thyroid: No thyromegaly. Vascular: No JVD. Trachea: No tracheal deviation. Cardiovascular:      Rate and Rhythm: Normal rate and regular rhythm. Heart sounds: No murmur heard. Pulmonary:      Effort: Pulmonary effort is normal.      Breath sounds: Normal breath sounds. No wheezing or rales. Abdominal:      General: Bowel sounds are normal.      Palpations: Abdomen is soft. There is no mass. Tenderness: There is no abdominal tenderness. There is no guarding or rebound. Musculoskeletal:         General: No tenderness or deformity. Cervical back: Normal range of motion and neck supple. Lymphadenopathy:      Cervical: No cervical adenopathy. Skin:     General: Skin is warm and dry. Findings: No lesion or rash. Nails: There is no clubbing. Comments: Surgical incisions well-healed without separation. Neurological:      Mental Status: He is alert and oriented to person, place, and time. Cranial Nerves: No cranial nerve deficit. Motor: No abnormal muscle tone. Coordination: Coordination normal.      Deep Tendon Reflexes: Reflexes are normal and symmetric.  Reflexes normal.   Psychiatric:         Judgment: Judgment normal.       Hao Big, DO

## 2023-12-19 ENCOUNTER — TELEPHONE (OUTPATIENT)
Dept: SURGERY | Facility: HOSPITAL | Age: 33
End: 2023-12-19

## 2023-12-19 NOTE — TELEPHONE ENCOUNTER
Patient had laparoscopic cholecystectomy done by Dr.Daniel Heredia on 7/31/23. He states he has been having pain the last two days that is very similar to the pain he experienced prior to surgery at rib cage and RUQ area. Also feels like he may have a fever. Recommended to patient that he go to the ER for further evaluation. Agreed and will do so.

## 2023-12-20 ENCOUNTER — HOSPITAL ENCOUNTER (EMERGENCY)
Facility: HOSPITAL | Age: 33
Discharge: HOME | End: 2023-12-21
Attending: EMERGENCY MEDICINE
Payer: COMMERCIAL

## 2023-12-20 DIAGNOSIS — R10.9 ABDOMINAL PAIN: Primary | ICD-10-CM

## 2023-12-20 PROCEDURE — 80048 BASIC METABOLIC PNL TOTAL CA: CPT

## 2023-12-20 PROCEDURE — 36415 COLL VENOUS BLD VENIPUNCTURE: CPT

## 2023-12-20 PROCEDURE — 85025 COMPLETE CBC W/AUTO DIFF WBC: CPT

## 2023-12-20 PROCEDURE — 83690 ASSAY OF LIPASE: CPT

## 2023-12-20 PROCEDURE — 80076 HEPATIC FUNCTION PANEL: CPT

## 2023-12-20 PROCEDURE — 99284 EMERGENCY DEPT VISIT MOD MDM: CPT

## 2023-12-21 ENCOUNTER — APPOINTMENT (EMERGENCY)
Dept: CT IMAGING | Facility: HOSPITAL | Age: 33
End: 2023-12-21
Payer: COMMERCIAL

## 2023-12-21 VITALS
HEART RATE: 94 BPM | TEMPERATURE: 98 F | OXYGEN SATURATION: 96 % | DIASTOLIC BLOOD PRESSURE: 83 MMHG | RESPIRATION RATE: 18 BRPM | WEIGHT: 315 LBS | SYSTOLIC BLOOD PRESSURE: 128 MMHG | BODY MASS INDEX: 42.13 KG/M2

## 2023-12-21 LAB
ALBUMIN SERPL BCP-MCNC: 4.2 G/DL (ref 3.5–5)
ALP SERPL-CCNC: 48 U/L (ref 34–104)
ALT SERPL W P-5'-P-CCNC: 47 U/L (ref 7–52)
ANION GAP SERPL CALCULATED.3IONS-SCNC: 7 MMOL/L
AST SERPL W P-5'-P-CCNC: 28 U/L (ref 13–39)
BASOPHILS # BLD AUTO: 0.04 THOUSANDS/ÂΜL (ref 0–0.1)
BASOPHILS NFR BLD AUTO: 0 % (ref 0–1)
BILIRUB DIRECT SERPL-MCNC: 0.16 MG/DL (ref 0–0.2)
BILIRUB SERPL-MCNC: 1.31 MG/DL (ref 0.2–1)
BUN SERPL-MCNC: 12 MG/DL (ref 5–25)
CALCIUM SERPL-MCNC: 9.1 MG/DL (ref 8.4–10.2)
CHLORIDE SERPL-SCNC: 104 MMOL/L (ref 96–108)
CO2 SERPL-SCNC: 26 MMOL/L (ref 21–32)
CREAT SERPL-MCNC: 0.99 MG/DL (ref 0.6–1.3)
EOSINOPHIL # BLD AUTO: 0.12 THOUSAND/ÂΜL (ref 0–0.61)
EOSINOPHIL NFR BLD AUTO: 1 % (ref 0–6)
ERYTHROCYTE [DISTWIDTH] IN BLOOD BY AUTOMATED COUNT: 12.7 % (ref 11.6–15.1)
GFR SERPL CREATININE-BSD FRML MDRD: 99 ML/MIN/1.73SQ M
GLUCOSE SERPL-MCNC: 133 MG/DL (ref 65–140)
HCT VFR BLD AUTO: 47.1 % (ref 36.5–49.3)
HGB BLD-MCNC: 16.6 G/DL (ref 12–17)
IMM GRANULOCYTES # BLD AUTO: 0.13 THOUSAND/UL (ref 0–0.2)
IMM GRANULOCYTES NFR BLD AUTO: 1 % (ref 0–2)
LIPASE SERPL-CCNC: 51 U/L (ref 11–82)
LYMPHOCYTES # BLD AUTO: 2.64 THOUSANDS/ÂΜL (ref 0.6–4.47)
LYMPHOCYTES NFR BLD AUTO: 27 % (ref 14–44)
MCH RBC QN AUTO: 30.6 PG (ref 26.8–34.3)
MCHC RBC AUTO-ENTMCNC: 35.2 G/DL (ref 31.4–37.4)
MCV RBC AUTO: 87 FL (ref 82–98)
MONOCYTES # BLD AUTO: 0.72 THOUSAND/ÂΜL (ref 0.17–1.22)
MONOCYTES NFR BLD AUTO: 7 % (ref 4–12)
NEUTROPHILS # BLD AUTO: 6.21 THOUSANDS/ÂΜL (ref 1.85–7.62)
NEUTS SEG NFR BLD AUTO: 64 % (ref 43–75)
NRBC BLD AUTO-RTO: 0 /100 WBCS
PLATELET # BLD AUTO: 377 THOUSANDS/UL (ref 149–390)
PMV BLD AUTO: 10.2 FL (ref 8.9–12.7)
POTASSIUM SERPL-SCNC: 3.7 MMOL/L (ref 3.5–5.3)
PROT SERPL-MCNC: 7.4 G/DL (ref 6.4–8.4)
RBC # BLD AUTO: 5.43 MILLION/UL (ref 3.88–5.62)
SODIUM SERPL-SCNC: 137 MMOL/L (ref 135–147)
WBC # BLD AUTO: 9.86 THOUSAND/UL (ref 4.31–10.16)

## 2023-12-21 PROCEDURE — G1004 CDSM NDSC: HCPCS

## 2023-12-21 PROCEDURE — 74177 CT ABD & PELVIS W/CONTRAST: CPT

## 2023-12-21 PROCEDURE — 99285 EMERGENCY DEPT VISIT HI MDM: CPT

## 2023-12-21 RX ORDER — OMEPRAZOLE 20 MG/1
20 CAPSULE, DELAYED RELEASE ORAL DAILY
Qty: 20 CAPSULE | Refills: 0 | Status: SHIPPED | OUTPATIENT
Start: 2023-12-21

## 2023-12-21 RX ADMIN — IOHEXOL 100 ML: 350 INJECTION, SOLUTION INTRAVENOUS at 00:56

## 2023-12-21 NOTE — DISCHARGE INSTRUCTIONS
Follow up with GI/Surgery as discussed if pain continues    Take Omeprazole for pain as prescribed    Return for worsening pain, fever, chest pain, trouble breathing,  or any other new/concerning symptoms

## 2023-12-21 NOTE — ED PROVIDER NOTES
History  Chief Complaint   Patient presents with    Abdominal Pain     States mid upper abd pain radiating to RUQ for 4 days. States feels like when he had gallstones, but had gallbladder removed in July. Also complaining nausea.       Patient is a 33-year-old male with history of choledocholithiasis, status postcholecystectomy, GERD who presents to the ED for evaluation of epigastric and right upper abdominal pain for the past 4 days.  He reports eating improves his pain.  He reports the pain feels somewhat similar to when he had gallstones, prompting his ED visit and concern.  He did experience some nausea tonight, but reports this resolved.  He reports he has diarrhea baseline which is unchanged.  He otherwise denies fever, chills, chest pain, dyspnea, diarrhea, melena, hematochezia, constipation, dysuria, hematuria, testicular pain/swelling, back pain.        None       Past Medical History:   Diagnosis Date    Anxiety     Asthma     Calculus of gallbladder without biliary obstruction 12/22/2020    Change in bowel habits 8/3/2018    Depression     Diarrhea in adult patient 8/3/2018    Does not have health insurance 9/28/2021    Fracture of nasal bone     GERD (gastroesophageal reflux disease)     Gross hematuria     Orthostatic hypotension     Rectal bleeding 9/28/2021       Past Surgical History:   Procedure Laterality Date    CHOLANGIOGRAM N/A 7/31/2023    Procedure: CHOLANGIOGRAM;  Surgeon: Sandor Heredia MD;  Location: [facility];  Service: General    CHOLECYSTECTOMY LAPAROSCOPIC N/A 7/31/2023    Procedure: CHOLECYSTECTOMY LAPAROSCOPIC;  Surgeon: Sandor Heredia MD;  Location: [facility];  Service: General    WRIST SURGERY         Family History   Problem Relation Age of Onset    No Known Problems Mother     Diabetes Father      I have reviewed and agree with the history as documented.    E-Cigarette/Vaping    E-Cigarette Use Never User      E-Cigarette/Vaping Substances    Nicotine No     THC No     CBD No      Flavoring No     Other No     Unknown No      Social History     Tobacco Use    Smoking status: Former     Current packs/day: 0.00     Types: Cigarettes     Quit date: 2014     Years since quittin.9    Smokeless tobacco: Never   Vaping Use    Vaping status: Never Used   Substance Use Topics    Alcohol use: No    Drug use: No       Review of Systems   Constitutional:  Negative for chills and fever.   HENT:  Negative for congestion and rhinorrhea.    Respiratory:  Negative for cough and shortness of breath.    Cardiovascular:  Negative for chest pain and leg swelling.   Gastrointestinal:  Positive for abdominal pain, diarrhea (baseline) and nausea. Negative for blood in stool, constipation, rectal pain and vomiting.   Genitourinary:  Negative for dysuria, flank pain, scrotal swelling and testicular pain.   Musculoskeletal:  Negative for arthralgias and myalgias.   Skin:  Negative for rash and wound.   Neurological:  Negative for dizziness, weakness, numbness and headaches.   Psychiatric/Behavioral:  Negative for behavioral problems.        Physical Exam  Physical Exam  Vitals and nursing note reviewed.   Constitutional:       General: He is not in acute distress.     Appearance: He is well-developed. He is not ill-appearing or toxic-appearing.   HENT:      Head: Normocephalic and atraumatic.   Eyes:      Conjunctiva/sclera: Conjunctivae normal.   Cardiovascular:      Rate and Rhythm: Normal rate and regular rhythm.      Heart sounds: No murmur heard.  Pulmonary:      Effort: Pulmonary effort is normal. No respiratory distress.      Breath sounds: Normal breath sounds.   Abdominal:      Palpations: Abdomen is soft.      Tenderness: There is abdominal tenderness in the epigastric area. There is no guarding or rebound. Negative signs include Castle's sign and McBurney's sign.   Musculoskeletal:         General: No swelling.      Cervical back: Neck supple.   Skin:     General: Skin is warm and dry.       Capillary Refill: Capillary refill takes less than 2 seconds.   Neurological:      Mental Status: He is alert.   Psychiatric:         Mood and Affect: Mood normal.         Vital Signs  ED Triage Vitals [12/20/23 2335]   Temperature Pulse Respirations Blood Pressure SpO2   98 °F (36.7 °C) 103 18 (!) 157/105 96 %      Temp Source Heart Rate Source Patient Position - Orthostatic VS BP Location FiO2 (%)   Oral Monitor Sitting Left arm --      Pain Score       3           Vitals:    12/20/23 2335 12/21/23 0130 12/21/23 0133   BP: (!) 157/105 128/83 128/83   Pulse: 103  94   Patient Position - Orthostatic VS: Sitting           Visual Acuity      ED Medications  Medications   iohexol (OMNIPAQUE) 350 MG/ML injection (MULTI-DOSE) 100 mL (100 mL Intravenous Given 12/21/23 0056)       Diagnostic Studies  Results Reviewed       Procedure Component Value Units Date/Time    CBC and differential [[id number]] Collected: 12/20/23 2352    Lab Status: Final result Specimen: Blood from Hand, Right Updated: 12/21/23 0015     WBC 9.86 Thousand/uL      RBC 5.43 Million/uL      Hemoglobin 16.6 g/dL      Hematocrit 47.1 %      MCV 87 fL      MCH 30.6 pg      MCHC 35.2 g/dL      RDW 12.7 %      MPV 10.2 fL      Platelets 377 Thousands/uL      nRBC 0 /100 WBCs      Neutrophils Relative 64 %      Immat GRANS % 1 %      Lymphocytes Relative 27 %      Monocytes Relative 7 %      Eosinophils Relative 1 %      Basophils Relative 0 %      Neutrophils Absolute 6.21 Thousands/µL      Immature Grans Absolute 0.13 Thousand/uL      Lymphocytes Absolute 2.64 Thousands/µL      Monocytes Absolute 0.72 Thousand/µL      Eosinophils Absolute 0.12 Thousand/µL      Basophils Absolute 0.04 Thousands/µL     Basic metabolic panel [[id number]] Collected: 12/20/23 2352    Lab Status: Final result Specimen: Blood from Hand, Right Updated: 12/21/23 0014     Sodium 137 mmol/L      Potassium 3.7 mmol/L      Chloride 104 mmol/L      CO2 26 mmol/L      ANION GAP 7 mmol/L       BUN 12 mg/dL      Creatinine 0.99 mg/dL      Glucose 133 mg/dL      Calcium 9.1 mg/dL      eGFR 99 ml/min/1.73sq m     Narrative:      National Kidney Disease Foundation guidelines for Chronic Kidney Disease (CKD):     Stage 1 with normal or high GFR (GFR > 90 mL/min/1.73 square meters)    Stage 2 Mild CKD (GFR = 60-89 mL/min/1.73 square meters)    Stage 3A Moderate CKD (GFR = 45-59 mL/min/1.73 square meters)    Stage 3B Moderate CKD (GFR = 30-44 mL/min/1.73 square meters)    Stage 4 Severe CKD (GFR = 15-29 mL/min/1.73 square meters)    Stage 5 End Stage CKD (GFR <15 mL/min/1.73 square meters)  Note: GFR calculation is accurate only with a steady state creatinine    Hepatic function panel [[id number]]  (Abnormal) Collected: 12/20/23 2352    Lab Status: Final result Specimen: Blood from Hand, Right Updated: 12/21/23 0014     Total Bilirubin 1.31 mg/dL      Bilirubin, Direct 0.16 mg/dL      Alkaline Phosphatase 48 U/L      AST 28 U/L      ALT 47 U/L      Total Protein 7.4 g/dL      Albumin 4.2 g/dL     Lipase [[id number]]  (Normal) Collected: 12/20/23 2352    Lab Status: Final result Specimen: Blood from Hand, Right Updated: 12/21/23 0014     Lipase 51 u/L                    CT abdomen pelvis with contrast   Final Result by Brendan King MD (12/21 0111)      No acute intra-abdominal/pelvic abnormalities noted with findings detailed above.         Workstation performed: [id number]                    Procedures  Procedures         ED Course           SBIRT 20yo+      Flowsheet Row Most Recent Value   Initial Alcohol Screen: US AUDIT-C     1. How often do you have a drink containing alcohol? 0 Filed at: 12/20/2023 2340   2. How many drinks containing alcohol do you have on a typical day you are drinking?  0 Filed at: 12/20/2023 2340   3a. Male UNDER 65: How often do you have five or more drinks on one occasion? 0 Filed at: 12/20/2023 2340   Audit-C Score 0 Filed at: 12/20/2023 2340   ABIGAIL: How many times in the past  year have you...    Used an illegal drug or used a prescription medication for non-medical reasons? Never Filed at: 12/20/2023 2340                      Medical Decision Making  DDx including but not limited to: gastroenteritis, gastritis, PUD, GERD, gastroparesis, hepatitis, pancreatitis, colitis, enteritis, food poisoning, mesenteric adenitis, IBD, IBS, ileus, bowel obstruction, volvulus, choledocholithiasis, perforated viscus, splenic etiology, constipation, intussusception    Will obtain CBC to evaluate for leukocytosis, anemia.  Will obtain CMP to evaluate kidney function, for electrolyte disturbance.  Will obtain lipase to evaluate for pancreatitis.  Will obtain  CT AP    Patient's bilirubin elevated, however improved from previous several.  Labs otherwise unremarkable.  CT without acute findings.  Incidental findings discussed with patient who verbalized understanding.  Given improvement in pain after eating, will trial omeprazole.  Advised patient to follow-up with GI for possible PUD    At the time of discharge, the patient is in no acute distress. I discussed with the patient the diagnosis, treatment plan, follow-up, return precautions, and discharge instructions; they were given the opportunity to ask questions and verbalized understanding.      Problems Addressed:  Abdominal pain: acute illness or injury    Amount and/or Complexity of Data Reviewed  External Data Reviewed: labs, radiology and notes.  Labs: ordered. Decision-making details documented in ED Course.  Radiology: ordered. Decision-making details documented in ED Course.    Risk  Prescription drug management.             Disposition  Final diagnoses:   Abdominal pain     Time reflects when diagnosis was documented in both MDM as applicable and the Disposition within this note       Time User Action Codes Description Comment    12/21/2023  1:23 AM Zahida Cisneros Add [R10.9] Abdominal pain           ED Disposition       ED Disposition    Discharge    Condition   Stable    Date/Time   Thu Dec 21, 2023 0123    Comment   Gerry ZEINAB Roadarmel discharge to home/self care.                   Follow-up Information       Follow up With Specialties Details Why Contact Info Additional Information    [facility]   [address]  [address]  [facility] [address] [phone] [facility] [place], [facility] [address], [address], [place], [place], [address] [phone]    Sandor Heredia MD General Surgery, Wound Care   [facility] [address]  [facility] [address]  [phone]               Discharge Medication List as of 12/21/2023  1:28 AM        START taking these medications    Details   omeprazole (PriLOSEC) 20 mg delayed release capsule Take 1 capsule (20 mg total) by mouth daily, Starting Thu 12/21/2023, Normal             No discharge procedures on file.    PDMP Review         Value Time User    PDMP Reviewed  Yes 8/1/2023 10:51 AM Coleen Chris PA-C            ED Provider  Electronically Signed by             Zahida Cisneros PA-C  12/21/23 9515

## 2024-01-10 DIAGNOSIS — G47.9 SLEEP DISORDER: ICD-10-CM

## 2024-01-10 DIAGNOSIS — R10.9 ABDOMINAL PAIN: ICD-10-CM

## 2024-01-10 NOTE — TELEPHONE ENCOUNTER
Omeprazole was given when patient was in the hospital. Requesting that his pcp take over prescription    Reason for call:   [x] Refill   [] Prior Auth  [] Other:     Office:   [x] PCP/Provider -   [] Specialty/Provider -     Medication:   Omeprazole 20mg- Take 1 capsule by mouth daily  Eszopiclone 2mg tablet- take 1 tablet by mouth daily at bedtime as needed for sleep    Pharmacy: [facility] [address] [place]     Does the patient have enough for 3 days?   [] Yes   [] No - Send as HP to POD

## 2024-01-11 RX ORDER — ESZOPICLONE 2 MG/1
2 TABLET, FILM COATED ORAL
Qty: 30 TABLET | Refills: 0 | OUTPATIENT
Start: 2024-01-11

## 2024-01-11 RX ORDER — OMEPRAZOLE 20 MG/1
20 CAPSULE, DELAYED RELEASE ORAL DAILY
Qty: 30 CAPSULE | Refills: 0 | Status: SHIPPED | OUTPATIENT
Start: 2024-01-11

## 2024-02-10 DIAGNOSIS — R10.9 ABDOMINAL PAIN: ICD-10-CM

## 2024-02-12 RX ORDER — OMEPRAZOLE 20 MG/1
20 CAPSULE, DELAYED RELEASE ORAL DAILY
Qty: 90 CAPSULE | Refills: 1 | Status: SHIPPED | OUTPATIENT
Start: 2024-02-12

## 2024-02-15 ENCOUNTER — OFFICE VISIT (OUTPATIENT)
Dept: FAMILY MEDICINE CLINIC | Facility: CLINIC | Age: 34
End: 2024-02-15
Payer: COMMERCIAL

## 2024-02-15 VITALS
BODY MASS INDEX: 38.36 KG/M2 | TEMPERATURE: 97.7 F | HEART RATE: 96 BPM | OXYGEN SATURATION: 97 % | DIASTOLIC BLOOD PRESSURE: 80 MMHG | WEIGHT: 315 LBS | HEIGHT: 76 IN | SYSTOLIC BLOOD PRESSURE: 120 MMHG

## 2024-02-15 DIAGNOSIS — K29.30 CHRONIC SUPERFICIAL GASTRITIS WITHOUT BLEEDING: ICD-10-CM

## 2024-02-15 DIAGNOSIS — G89.29 CHRONIC LOW BACK PAIN, UNSPECIFIED BACK PAIN LATERALITY, UNSPECIFIED WHETHER SCIATICA PRESENT: ICD-10-CM

## 2024-02-15 DIAGNOSIS — G47.9 SLEEP DIFFICULTIES: ICD-10-CM

## 2024-02-15 DIAGNOSIS — E66.01 CLASS 3 SEVERE OBESITY DUE TO EXCESS CALORIES WITH BODY MASS INDEX (BMI) OF 40.0 TO 44.9 IN ADULT, UNSPECIFIED WHETHER SERIOUS COMORBIDITY PRESENT (HCC): ICD-10-CM

## 2024-02-15 DIAGNOSIS — Z79.899 HIGH RISK MEDICATION USE: ICD-10-CM

## 2024-02-15 DIAGNOSIS — E80.6 HYPERBILIRUBINEMIA: ICD-10-CM

## 2024-02-15 DIAGNOSIS — Z00.00 ANNUAL PHYSICAL EXAM: Primary | ICD-10-CM

## 2024-02-15 DIAGNOSIS — M54.50 CHRONIC LOW BACK PAIN, UNSPECIFIED BACK PAIN LATERALITY, UNSPECIFIED WHETHER SCIATICA PRESENT: ICD-10-CM

## 2024-02-15 PROCEDURE — 99395 PREV VISIT EST AGE 18-39: CPT | Performed by: FAMILY MEDICINE

## 2024-02-15 PROCEDURE — 99214 OFFICE O/P EST MOD 30 MIN: CPT | Performed by: FAMILY MEDICINE

## 2024-02-15 RX ORDER — ESZOPICLONE 2 MG/1
2 TABLET, FILM COATED ORAL
Qty: 30 TABLET | Refills: 0 | Status: SHIPPED | OUTPATIENT
Start: 2024-02-15

## 2024-02-15 NOTE — PROGRESS NOTES
ADULT ANNUAL PHYSICAL  [facility] - [facility] PRIMARY CARE    NAME: Gerry Perkins  AGE: 33 y.o. SEX: male  : 1990     DATE: 2/15/2024     Assessment and Plan:     Annual physical exam completed today.  Labs ordered as below.  Does have a history of elevated bilirubin levels, we will recheck a hepatic function panel.  Continues to have low back pain.  Will refer patient over to spine and pain management.  Does report sleep difficulties where he only sleeps about 1 to 3 hours at a time.  He was given Lunesta in the past and did well with this.  PDMP was reviewed last filled 2023.  I will refill this medication, to be used sparingly.  Controlled substance agreement signed today and urine drug screening ordered today.  He has been doing well with his omeprazole for his abdominal pain.  Work on diet and exercise.  RTC in 6 months for follow-up or sooner as needed    Problem List Items Addressed This Visit     Chronic superficial gastritis without bleeding   Other Visit Diagnoses     Annual physical exam    -  Primary    Chronic low back pain, unspecified back pain laterality, unspecified whether sciatica present        Relevant Orders    Ambulatory referral to Spine & Pain Management    Sleep difficulties        Relevant Medications    eszopiclone (LUNESTA) 2 mg tablet    Hyperbilirubinemia        Relevant Orders    Comprehensive metabolic panel    Hepatic function panel    Class 3 severe obesity due to excess calories with body mass index (BMI) of 40.0 to 44.9 in adult, unspecified whether serious comorbidity present (HCC)        Relevant Orders    Lipid panel    Comprehensive metabolic panel    Hepatic function panel    High risk medication use        Relevant Orders    [facility] All Prescribed Meds and Special Instructions    Amphetamines, Methamphetamines    Butalbital    Phenobarbital    Secobarbital    Alprazolam    Clonazepam    Diazepam, Temazepam,  Oxazepam    Lorazepam    Gabapentin    Pregabalin    Cocaine    Heroin    Buprenorphine    Levorphanol    Meperidine    Naltrexone    Fentanyl    Methadone    Oxycodone    Tapentadol    THC    Tramadol    Codeine, Hydrocodone, Hydropmorphone, Morphine    Bath Salts    Ethyl Glucuronide/Ethyl Sulfate    Kratom    Spice    Methylphenidate    Phentermine    Validity Oxidant    Validity Creatinine    Validity pH    Validity Specific    Xylazine Definitive Test          Immunizations and preventive care screenings were discussed with patient today. Appropriate education was printed on patient's after visit summary.    Counseling:  Dental Health: discussed importance of regular tooth brushing, flossing, and dental visits.  Exercise: the importance of regular exercise/physical activity was discussed. Recommend exercise 3-5 times per week for at least 30 minutes.          Return in 6 months (on 8/15/2024) for Recheck.     Chief Complaint:     Chief Complaint   Patient presents with   • Follow-up     F/u chk up ,sleeping med       History of Present Illness:     Adult Annual Physical   Patient here for a comprehensive physical exam. The patient reports problems - sleeping difficulties .    Diet and Physical Activity  Diet/Nutrition: well balanced diet.   Exercise: no formal exercise.      Depression Screening  PHQ-2/9 Depression Screening         General Health  Sleep: gets 1-3 hours of sleep on average.   Hearing: normal - bilateral.  Vision: no vision problems.   Dental: regular dental visits.        Health  History of STDs?: no.    Advanced Care Planning  Do you have an advanced directive? No. May have on file  Do you have a durable medical power of ? no  ACP document given to the patient? no     Review of Systems:     Review of Systems   Constitutional:  Negative for activity change, appetite change, chills, fatigue and fever.   HENT:  Negative for congestion, rhinorrhea, sneezing and sore throat.    Eyes:   Negative for pain, discharge, redness and itching.   Respiratory:  Negative for cough, chest tightness, shortness of breath and wheezing.    Cardiovascular:  Negative for chest pain and palpitations.   Gastrointestinal:  Negative for abdominal pain, constipation, diarrhea, nausea and vomiting.   Musculoskeletal:  Negative for arthralgias, gait problem, myalgias and neck pain.   Skin:  Negative for rash.   Neurological:  Negative for dizziness, weakness, numbness and headaches.   Hematological:  Negative for adenopathy.   Psychiatric/Behavioral:  Positive for sleep disturbance. Negative for confusion. The patient is not nervous/anxious.    All other systems reviewed and are negative.     Past Medical History:     Past Medical History:   Diagnosis Date   • Anxiety    • Asthma    • Calculus of gallbladder without biliary obstruction 12/22/2020   • Change in bowel habits 8/3/2018   • Depression    • Diarrhea in adult patient 8/3/2018   • Does not have health insurance 9/28/2021   • Fracture of nasal bone    • GERD (gastroesophageal reflux disease)    • Gross hematuria    • Orthostatic hypotension    • Rectal bleeding 9/28/2021      Past Surgical History:     Past Surgical History:   Procedure Laterality Date   • CHOLANGIOGRAM N/A 7/31/2023    Procedure: CHOLANGIOGRAM;  Surgeon: Sandor Heredia MD;  Location: [facility];  Service: General   • CHOLECYSTECTOMY LAPAROSCOPIC N/A 7/31/2023    Procedure: CHOLECYSTECTOMY LAPAROSCOPIC;  Surgeon: Sandor Heredia MD;  Location: [facility];  Service: General   • WRIST SURGERY        Social History:     Social History     Socioeconomic History   • Marital status: Single     Spouse name: None   • Number of children: None   • Years of education: None   • Highest education level: None   Occupational History   • None   Tobacco Use   • Smoking status: Former     Current packs/day: 0.00     Types: Cigarettes     Quit date: 1/1/2014     Years since quitting: 10.1   • Smokeless tobacco:  "Never   Vaping Use   • Vaping status: Never Used   Substance and Sexual Activity   • Alcohol use: No   • Drug use: No   • Sexual activity: None   Other Topics Concern   • None   Social History Narrative   • None     Social Determinants of Health     Financial Resource Strain: Not on file   Food Insecurity: Not on file   Transportation Needs: Not on file   Physical Activity: Not on file   Stress: Not on file   Social Connections: Not on file   Intimate Partner Violence: Not on file   Housing Stability: Not on file      Family History:     Family History   Problem Relation Age of Onset   • No Known Problems Mother    • Diabetes Father       Current Medications:     Current Outpatient Medications   Medication Sig Dispense Refill   • eszopiclone (LUNESTA) 2 mg tablet Take 1 tablet (2 mg total) by mouth daily at bedtime as needed for sleep Take immediately before bedtime 30 tablet 0   • omeprazole (PriLOSEC) 20 mg delayed release capsule TAKE 1 CAPSULE BY MOUTH EVERY DAY 90 capsule 1     No current facility-administered medications for this visit.      Allergies:     Allergies   Allergen Reactions   • Penicillins      UNKNOWN   • Sulfa Antibiotics      UNKNOWN      Physical Exam:     /80   Pulse 96   Temp 97.7 °F (36.5 °C) (Temporal)   Ht 6' 4\" (1.93 m)   Wt (!) 160 kg (353 lb 6.4 oz)   SpO2 97%   BMI 43.02 kg/m²     Physical Exam  Vitals and nursing note reviewed.   Constitutional:       General: He is not in acute distress.     Appearance: Normal appearance. He is well-developed. He is obese. He is not ill-appearing or toxic-appearing.   HENT:      Head: Normocephalic and atraumatic.      Right Ear: Tympanic membrane, ear canal and external ear normal. There is no impacted cerumen.      Left Ear: Tympanic membrane, ear canal and external ear normal. There is no impacted cerumen.      Nose: Nose normal. No congestion or rhinorrhea.      Mouth/Throat:      Mouth: Mucous membranes are moist.      Pharynx: " Oropharynx is clear. No oropharyngeal exudate or posterior oropharyngeal erythema.   Eyes:      General: No scleral icterus.        Right eye: No discharge.         Left eye: No discharge.      Conjunctiva/sclera: Conjunctivae normal.      Pupils: Pupils are equal, round, and reactive to light.   Cardiovascular:      Rate and Rhythm: Normal rate and regular rhythm.      Pulses: Normal pulses.      Heart sounds: Normal heart sounds. No murmur heard.  Pulmonary:      Effort: Pulmonary effort is normal. No respiratory distress.      Breath sounds: Normal breath sounds.   Abdominal:      General: Abdomen is flat. There is no distension.      Palpations: Abdomen is soft.      Tenderness: There is no abdominal tenderness.   Musculoskeletal:         General: No swelling, tenderness, deformity or signs of injury. Normal range of motion.      Cervical back: Normal range of motion.   Skin:     General: Skin is warm and dry.      Capillary Refill: Capillary refill takes less than 2 seconds.      Findings: No rash.   Neurological:      General: No focal deficit present.      Mental Status: He is alert.      Motor: No weakness.   Psychiatric:         Mood and Affect: Mood normal.         Behavior: Behavior normal.          Abner Escobedo MD   [facility] PRIMARY CARE

## 2024-02-21 LAB
4-HYDROXY XYLAZINE: NEGATIVE NG/ML
6MAM UR QL CFM: NEGATIVE NG/ML
7AMINOCLONAZEPAM UR QL CFM: NEGATIVE NG/ML
A-OH ALPRAZ UR QL CFM: NEGATIVE NG/ML
ACCEPTABLE CREAT UR QL: NORMAL MG/DL
ACCEPTIBLE SP GR UR QL: NORMAL
AMPHET UR QL CFM: NEGATIVE NG/ML
BUPRENORPHINE UR QL CFM: NEGATIVE NG/ML
BUTALBITAL UR QL CFM: NEGATIVE NG/ML
BZE UR QL CFM: NEGATIVE NG/ML
CODEINE UR QL CFM: NEGATIVE NG/ML
EDDP UR QL CFM: NEGATIVE NG/ML
ETHYL GLUCURONIDE UR QL CFM: NEGATIVE NG/ML
ETHYL SULFATE UR QL SCN: NEGATIVE NG/ML
EUTYLONE UR QL: NEGATIVE NG/ML
FENTANYL UR QL CFM: NEGATIVE NG/ML
GLIADIN IGG SER IA-ACNC: NEGATIVE NG/ML
HYDROCODONE UR QL CFM: NEGATIVE NG/ML
HYDROMORPHONE UR QL CFM: NEGATIVE NG/ML
LORAZEPAM UR QL CFM: NEGATIVE NG/ML
ME-PHENIDATE UR QL CFM: NEGATIVE NG/ML
MEPERIDINE UR QL CFM: NEGATIVE NG/ML
METHADONE UR QL CFM: NEGATIVE NG/ML
METHAMPHET UR QL CFM: NEGATIVE NG/ML
MORPHINE UR QL CFM: NEGATIVE NG/ML
NALTREXONE UR QL CFM: NEGATIVE NG/ML
NITRITE UR QL: NORMAL UG/ML
NORBUPRENORPHINE UR QL CFM: NEGATIVE NG/ML
NORDIAZEPAM UR QL CFM: NEGATIVE NG/ML
NORFENTANYL UR QL CFM: NEGATIVE NG/ML
NORHYDROCODONE UR QL CFM: NEGATIVE NG/ML
NORMEPERIDINE UR QL CFM: NEGATIVE NG/ML
NOROXYCODONE UR QL CFM: NEGATIVE NG/ML
OXAZEPAM UR QL CFM: NEGATIVE NG/ML
OXYCODONE UR QL CFM: NEGATIVE NG/ML
OXYMORPHONE UR QL CFM: NEGATIVE NG/ML
PARA-FLUOROFENTANYL QUANTIFICATION: NORMAL NG/ML
PHENOBARB UR QL CFM: NEGATIVE NG/ML
RESULT ALL_PRESCRIBED MEDS AND SPECIAL INSTRUCTIONS: NORMAL
SECOBARBITAL UR QL CFM: NEGATIVE NG/ML
SL AMB 4-ANPP QUANTIFICATION: NORMAL NG/ML
SL AMB 5F-ADB-M7 METABOLITE QUANTIFICATION: NEGATIVE NG/ML
SL AMB 7-OH-MITRAGYNINE (KRATOM ALKALOID) QUANTIFICATION: NEGATIVE NG/ML
SL AMB AB-FUBINACA-M3 METABOLITE QUANTIFICATION: NEGATIVE NG/ML
SL AMB ACETYL FENTANYL QUANTIFICATION: NORMAL NG/ML
SL AMB ACETYL NORFENTANYL QUANTIFICATION: NORMAL NG/ML
SL AMB ACRYL FENTANYL QUANTIFICATION: NORMAL NG/ML
SL AMB CARFENTANIL QUANTIFICATION: NORMAL NG/ML
SL AMB CTHC (MARIJUANA METABOLITE) QUANTIFICATION: NEGATIVE NG/ML
SL AMB DEXTRORPHAN (DEXTROMETHORPHAN METABOLITE) QUANT: NEGATIVE NG/ML
SL AMB GABAPENTIN QUANTIFICATION: NEGATIVE
SL AMB JWH018 METABOLITE QUANTIFICATION: NEGATIVE NG/ML
SL AMB JWH073 METABOLITE QUANTIFICATION: NEGATIVE NG/ML
SL AMB MDMB-FUBINACA-M1 METABOLITE QUANTIFICATION: NEGATIVE NG/ML
SL AMB METHYLONE QUANTIFICATION: NEGATIVE NG/ML
SL AMB N-DESMETHYL-TRAMADOL QUANTIFICATION: NEGATIVE NG/ML
SL AMB PHENTERMINE QUANTIFICATION: NEGATIVE NG/ML
SL AMB PREGABALIN QUANTIFICATION: NEGATIVE
SL AMB RCS4 METABOLITE QUANTIFICATION: NEGATIVE NG/ML
SL AMB RITALINIC ACID QUANTIFICATION: NEGATIVE NG/ML
SMOOTH MUSCLE AB TITR SER IF: NEGATIVE NG/ML
SPECIMEN DRAWN SERPL: NEGATIVE NG/ML
SPECIMEN PH ACCEPTABLE UR: NORMAL
TAPENTADOL UR QL CFM: NEGATIVE NG/ML
TEMAZEPAM UR QL CFM: NEGATIVE NG/ML
TRAMADOL UR QL CFM: NEGATIVE NG/ML
URATE/CREAT 24H UR: NEGATIVE NG/ML
XYLAZINE QUANTIFICATION: NEGATIVE NG/ML

## 2024-03-28 ENCOUNTER — HOSPITAL ENCOUNTER (EMERGENCY)
Facility: HOSPITAL | Age: 34
Discharge: HOME | End: 2024-03-29
Attending: EMERGENCY MEDICINE
Payer: COMMERCIAL

## 2024-03-28 DIAGNOSIS — J02.0 STREP PHARYNGITIS: Primary | ICD-10-CM

## 2024-03-28 PROCEDURE — 99284 EMERGENCY DEPT VISIT MOD MDM: CPT

## 2024-03-28 PROCEDURE — 99283 EMERGENCY DEPT VISIT LOW MDM: CPT

## 2024-03-28 RX ORDER — ACETAMINOPHEN 325 MG/1
650 TABLET ORAL ONCE
Status: COMPLETED | OUTPATIENT
Start: 2024-03-29 | End: 2024-03-29

## 2024-03-28 RX ORDER — DEXAMETHASONE 4 MG/1
10 TABLET ORAL ONCE
Status: COMPLETED | OUTPATIENT
Start: 2024-03-29 | End: 2024-03-29

## 2024-03-28 NOTE — Clinical Note
Forrest Aviles accompanied Gerry Perkins to the emergency department on 3/28/2024.    Return date if applicable:     Gerry (+) Strep throat     If you have any questions or concerns, please don't hesitate to call.      Nomi Saldivar PA-C

## 2024-03-28 NOTE — Clinical Note
Gerry Perkins was seen and treated in our emergency department on 3/28/2024.                Diagnosis:     Gerry  .    He may return on this date:     (+) Strep throat      If you have any questions or concerns, please don't hesitate to call.      Nomi Saldivar PA-C    ______________________________           _______________          _______________  Hospital Representative                              Date                                Time

## 2024-03-29 VITALS
DIASTOLIC BLOOD PRESSURE: 78 MMHG | TEMPERATURE: 98.2 F | WEIGHT: 315 LBS | HEIGHT: 76 IN | HEART RATE: 117 BPM | RESPIRATION RATE: 18 BRPM | BODY MASS INDEX: 38.36 KG/M2 | SYSTOLIC BLOOD PRESSURE: 130 MMHG | OXYGEN SATURATION: 97 %

## 2024-03-29 LAB
FLUAV RNA RESP QL NAA+PROBE: NEGATIVE
FLUBV RNA RESP QL NAA+PROBE: NEGATIVE
RSV RNA RESP QL NAA+PROBE: NEGATIVE
S PYO DNA THROAT QL NAA+PROBE: DETECTED
SARS-COV-2 RNA RESP QL NAA+PROBE: NEGATIVE

## 2024-03-29 PROCEDURE — 87070 CULTURE OTHR SPECIMN AEROBIC: CPT

## 2024-03-29 PROCEDURE — 0241U HB NFCT DS VIR RESP RNA 4 TRGT: CPT

## 2024-03-29 PROCEDURE — 87651 STREP A DNA AMP PROBE: CPT

## 2024-03-29 RX ORDER — AZITHROMYCIN 250 MG/1
500 TABLET, FILM COATED ORAL ONCE
Status: COMPLETED | OUTPATIENT
Start: 2024-03-29 | End: 2024-03-29

## 2024-03-29 RX ORDER — AZITHROMYCIN 250 MG/1
500 TABLET, FILM COATED ORAL DAILY
Qty: 8 TABLET | Refills: 0 | Status: SHIPPED | OUTPATIENT
Start: 2024-03-29 | End: 2024-03-29

## 2024-03-29 RX ORDER — AZITHROMYCIN 250 MG/1
500 TABLET, FILM COATED ORAL DAILY
Qty: 8 TABLET | Refills: 0 | Status: SHIPPED | OUTPATIENT
Start: 2024-03-29 | End: 2024-04-02

## 2024-03-29 RX ADMIN — ACETAMINOPHEN 325MG 650 MG: 325 TABLET ORAL at 00:01

## 2024-03-29 RX ADMIN — AZITHROMYCIN DIHYDRATE 500 MG: 250 TABLET ORAL at 01:35

## 2024-03-29 RX ADMIN — DEXAMETHASONE 10 MG: 4 TABLET ORAL at 00:01

## 2024-03-29 NOTE — DISCHARGE INSTRUCTIONS
Please return to the emergency department for any concerns as outlined in the after visit summary or for any other concerns.    Please follow-up with your primary care provider in 2 days for re-evaluation and further management.    Continue ibuprofen or acetaminophen for fever/pain control.     Complete the full course of antibiotics as prescribed.     Continue to stay well hydrated.

## 2024-03-29 NOTE — ED PROVIDER NOTES
History  Chief Complaint   Patient presents with    Sore Throat     Sore throat and fever for past 3 days. Pt thinks he has tonsillitis.      33-year-old male with no reported past medical history presents ED with complaints of a sore throat and fever.  States began with a sore throat 3 days ago.  The symptoms have been persistent.  States he looked in his throat today and it looked red with some white exudates as well as some tonsil stones.  Reports has had tonsil stones previously.  Also reports today that he had a fever of about 101F.  He did take ibuprofen a few hours prior to arrival.  States the pain is exacerbated with swallowing.  No stridor, tripod positioning or inability to maintain secretions.  Does complain of some discomfort in the front of his neck as well.  Had 1 episode of NBNB emesis 3 days ago with no recurrent nausea or vomiting.  No complaints of abdominal pain.  No chest pain or shortness of breath.  No headache, confusion, rash or any other specific complaint.  No known sick contacts or recent travel outside the [place].        Sore Throat  Associated symptoms: fever        Prior to Admission Medications   Prescriptions Last Dose Informant Patient Reported? Taking?   eszopiclone (LUNESTA) 2 mg tablet   No No   Sig: Take 1 tablet (2 mg total) by mouth daily at bedtime as needed for sleep Take immediately before bedtime   omeprazole (PriLOSEC) 20 mg delayed release capsule   No No   Sig: TAKE 1 CAPSULE BY MOUTH EVERY DAY      Facility-Administered Medications: None       Past Medical History:   Diagnosis Date    Anxiety     Asthma     Calculus of gallbladder without biliary obstruction 12/22/2020    Change in bowel habits 8/3/2018    Depression     Diarrhea in adult patient 8/3/2018    Does not have health insurance 9/28/2021    Fracture of nasal bone     GERD (gastroesophageal reflux disease)     Gross hematuria     Orthostatic hypotension     Rectal bleeding 9/28/2021       Past Surgical History:    Procedure Laterality Date    CHOLANGIOGRAM N/A 7/31/2023    Procedure: CHOLANGIOGRAM;  Surgeon: Sandor Heredia MD;  [facility];  Service: General    CHOLECYSTECTOMY LAPAROSCOPIC N/A 7/31/2023    Procedure: CHOLECYSTECTOMY LAPAROSCOPIC;  Surgeon: Sandor Heredia MD;  [facility];  Service: General    WRIST SURGERY         Family History   Problem Relation Age of Onset    No Known Problems Mother     Diabetes Father      I have reviewed and agree with the history as documented.    E-Cigarette/Vaping    E-Cigarette Use Never User      E-Cigarette/Vaping Substances    Nicotine No     THC No     CBD No     Flavoring No     Other No     Unknown No      Social History     Tobacco Use    Smoking status: Former     Current packs/day: 0.00     Types: Cigarettes     Quit date: 1/1/2014     Years since quitting: 10.2    Smokeless tobacco: Never   Vaping Use    Vaping status: Never Used   Substance Use Topics    Alcohol use: No    Drug use: Yes     Types: Marijuana       Review of Systems   Constitutional:  Positive for fever.   HENT:  Positive for sore throat.        Physical Exam  Physical Exam  Vitals and nursing note reviewed.   Constitutional:       General: He is not in acute distress.     Appearance: He is well-developed.      Comments: Awake, alert, interactive and resting in the stretcher in no acute distress.  Patient is not ill or toxic appearing.     HENT:      Head: Normocephalic and atraumatic.      Mouth/Throat:      Comments: MMM. Erythematous posterior oropharynx and erythema over the tonsils bilaterally.  There does appear to be some mild exudates on the tonsils.  No unilateral swelling.  Normal phonation.  No stridor.  Patient maintaining airway and secretions without issue.  Eyes:      Conjunctiva/sclera: Conjunctivae normal.   Neck:      Meningeal: Brudzinski's sign and Kernig's sign absent.   Cardiovascular:      Rate and Rhythm: Normal rate and regular rhythm.      Heart sounds: No  murmur heard.  Pulmonary:      Effort: Pulmonary effort is normal. No respiratory distress.      Breath sounds: Normal breath sounds.   Abdominal:      Palpations: Abdomen is soft.      Tenderness: There is no abdominal tenderness.   Musculoskeletal:         General: No swelling.      Cervical back: Neck supple.   Lymphadenopathy:      Cervical: Cervical adenopathy present.      Comments: Mild bilateral anterior cervical adenopathy.   Skin:     General: Skin is warm and dry.      Capillary Refill: Capillary refill takes less than 2 seconds.   Neurological:      Mental Status: He is alert.   Psychiatric:         Mood and Affect: Mood normal.         Vital Signs  ED Triage Vitals   Temperature Pulse Respirations Blood Pressure SpO2   03/28/24 2303 03/28/24 2303 03/28/24 2303 03/28/24 2303 03/28/24 2303   98.2 °F (36.8 °C) (!) 132 18 123/84 96 %      Temp Source Heart Rate Source Patient Position - Orthostatic VS BP Location FiO2 (%)   03/28/24 2303 03/28/24 2303 03/28/24 2303 03/28/24 2303 --   Oral Monitor Sitting Right arm       Pain Score       03/29/24 0001       5           Vitals:    03/28/24 2303 03/29/24 0007 03/29/24 0137   BP: 123/84  130/78   Pulse: (!) 132 (!) 120 (!) 117   Patient Position - Orthostatic VS: Sitting  Sitting         Visual Acuity  Visual Acuity      Flowsheet Row Most Recent Value   L Pupil Size (mm) 3   R Pupil Size (mm) 3            ED Medications  Medications   dexamethasone (DECADRON) tablet 10 mg (10 mg Oral Given 3/29/24 0001)   acetaminophen (TYLENOL) tablet 650 mg (650 mg Oral Given 3/29/24 0001)   azithromycin (ZITHROMAX) tablet 500 mg (500 mg Oral Given 3/29/24 0135)       Diagnostic Studies  Results Reviewed       Procedure Component Value Units Date/Time    FLU/RSV/COVID - if FLU/RSV clinically relevant [[id number]]  (Normal) Collected: 03/29/24 0004    Lab Status: Final result Specimen: Nares from Nose Updated: 03/29/24 0053     SARS-CoV-2 Negative     INFLUENZA A PCR Negative      INFLUENZA B PCR Negative     RSV PCR Negative    Narrative:      FOR PEDIATRIC PATIENTS - copy/paste COVID Guidelines URL to browser: [email or web address]    SARS-CoV-2 assay is a Nucleic Acid Amplification assay intended for the  qualitative detection of nucleic acid from SARS-CoV-2 in nasopharyngeal  swabs. Results are for the presumptive identification of SARS-CoV-2 RNA.    Positive results are indicative of infection with SARS-CoV-2, the virus  causing COVID-19, but do not rule out bacterial infection or co-infection  with other viruses. Laboratories within the [place] and its  territories are required to report all positive results to the appropriate  public health authorities. Negative results do not preclude SARS-CoV-2  infection and should not be used as the sole basis for treatment or other  patient management decisions. Negative results must be combined with  clinical observations, patient history, and epidemiological information.  This test has not been FDA cleared or approved.    This test has been authorized by FDA under an Emergency Use Authorization  (EUA). This test is only authorized for the duration of time the  declaration that circumstances exist justifying the authorization of the  emergency use of an in vitro diagnostic tests for detection of SARS-CoV-2  virus and/or diagnosis of COVID-19 infection under section 564(b)(1) of  the Act, 21 U.S.C. 360bbb-3(b)(1), unless the authorization is terminated  or revoked sooner. The test has been validated but independent review by FDA  and CLIA is pending.    Test performed using [employer] GeneXpert: This RT-PCR assay targets N2,  a region unique to SARS-CoV-2. A conserved region in the E-gene was chosen  for pan-Sarbecovirus detection which includes SARS-CoV-2.    According to CMS-2020-01-R, this platform meets the definition of high-throughput technology.    Strep A PCR [[id number]]   "(Abnormal) Collected: 03/29/24 0004    Lab Status: Final result Specimen: Throat Updated: 03/29/24 0041     STREP A PCR Detected    Throat culture [[id number]] Collected: 03/29/24 0004    Lab Status: In process Specimen: Throat Updated: 03/29/24 0009                   No orders to display              Procedures  Procedures         ED Course  ED Course as of 03/29/24 0150   Fri Mar 29, 2024   0128 STREP A PCR(!): Detected  Consistent with presenting complaints and findings on PE. Will treat. Reportedly has allergy to penicillin, will give azithromycin    0130 Per chart review pt frequently has tachycardia at ED visits. Pt otherwise resting in the stretcher in NAD. Communicated all finding from today's w/u with the patient at bedside including need for abx. Supportive care and f/u as outlined in the AVS and discussed with pt prior to d/c.   Strict return precautions verbally communicated to the patient as outlined in the AVS.  All patient questions and concerns were answered.  Patient verbally communicated their understanding and agreement to the above plan.  Patient stable at discharge.       Portions of the record may have been created with voice recognition software. Occasional wrong word or \"sound a like\" substitutions may have occurred due to the inherent limitations of voice recognition software. Read the chart carefully and recognize, using context, where substitutions have occurred.                                               Medical Decision Making  DDx including but not limited to: pharyngitis, mononucleosis, viral illness; doubt epiglottitis, peritonsillar abscess, retropharyngeal abscess.   Had at length discussion with patient at bedside discussing testing for strep pharyngitis or empirically treating.  Patient ultimately prefers to be tested for strep.  Will send a throat culture as a backup.  Will test patient for COVID/flu/RSV although no other specific viral complaints.  Will provide pain control " with acetaminophen and Decadron.  Offered viscous lidocaine but patient deferred at this time.  Will reevaluate after above-stated plan.      Amount and/or Complexity of Data Reviewed  Labs: ordered. Decision-making details documented in ED Course.    Risk  OTC drugs.  Prescription drug management.             Disposition  Final diagnoses:   Strep pharyngitis     Time reflects when diagnosis was documented in both MDM as applicable and the Disposition within this note       Time User Action Codes Description Comment    3/29/2024  1:26 AM Nomi Saldivar [J02.0] Strep pharyngitis           ED Disposition       ED Disposition   Discharge    Condition   Stable    Date/Time   Fri Mar 29, 2024  1:26 AM    Comment   Gerry ARRIOLA Roadarmel discharge to home/self care.                   Follow-up Information       Follow up With Specialties Details Why Contact Info Additional Information    [facility] Emergency Department Emergency Medicine Go to  As needed, If symptoms worsen [address] [place] [id number]  [phone] [facility] Emergency Department, [address], [place], [address]            Discharge Medication List as of 3/29/2024  1:30 AM        START taking these medications    Details   azithromycin (ZITHROMAX) 250 mg tablet Take 2 tablets (500 mg total) by mouth daily for 4 days, Starting Fri 3/29/2024, Until Tue 4/2/2024, Print           CONTINUE these medications which have NOT CHANGED    Details   eszopiclone (LUNESTA) 2 mg tablet Take 1 tablet (2 mg total) by mouth daily at bedtime as needed for sleep Take immediately before bedtime, Starting Thu 2/15/2024, Normal      omeprazole (PriLOSEC) 20 mg delayed release capsule TAKE 1 CAPSULE BY MOUTH EVERY DAY, Starting Mon 2/12/2024, Normal             No discharge procedures on file.    PDMP Review         Value Time User    PDMP Reviewed  Yes 2/15/2024 10:21 AM Abner Escobedo MD            ED  Provider  Electronically Signed by             Nomi Saldivar PA-C  03/29/24 0158

## 2024-03-31 LAB — BACTERIA THROAT CULT: NORMAL

## 2024-04-01 ENCOUNTER — TELEPHONE (OUTPATIENT)
Age: 34
End: 2024-04-01

## 2024-04-01 NOTE — TELEPHONE ENCOUNTER
Patient called and stated that the Ed prescribe him zithromax.  After taking it his skin ( face, arms, Neck, and chest became warm tyoo the tough).  He stopped taking it and assumed his was having a reaction.  I booked him for tomorrow but wanted to see if Dr. Escobedo wanted to overbook today.

## 2024-04-02 ENCOUNTER — OFFICE VISIT (OUTPATIENT)
Dept: FAMILY MEDICINE CLINIC | Facility: CLINIC | Age: 34
End: 2024-04-02
Payer: COMMERCIAL

## 2024-04-02 VITALS
DIASTOLIC BLOOD PRESSURE: 84 MMHG | WEIGHT: 315 LBS | OXYGEN SATURATION: 98 % | HEART RATE: 107 BPM | HEIGHT: 76 IN | SYSTOLIC BLOOD PRESSURE: 120 MMHG | TEMPERATURE: 97.7 F | BODY MASS INDEX: 38.36 KG/M2

## 2024-04-02 DIAGNOSIS — F19.90 EXCESSIVE USE OF NONSTEROIDAL ANTI-INFLAMMATORY DRUG (NSAID): ICD-10-CM

## 2024-04-02 DIAGNOSIS — F41.9 ANXIETY: ICD-10-CM

## 2024-04-02 DIAGNOSIS — K08.89 TOOTH PAIN: ICD-10-CM

## 2024-04-02 DIAGNOSIS — R06.83 SNORING: ICD-10-CM

## 2024-04-02 DIAGNOSIS — T50.905A ADVERSE EFFECT OF DRUG, INITIAL ENCOUNTER: ICD-10-CM

## 2024-04-02 DIAGNOSIS — R00.2 PALPITATIONS: ICD-10-CM

## 2024-04-02 DIAGNOSIS — K21.9 GASTROESOPHAGEAL REFLUX DISEASE, UNSPECIFIED WHETHER ESOPHAGITIS PRESENT: ICD-10-CM

## 2024-04-02 DIAGNOSIS — K29.30 CHRONIC SUPERFICIAL GASTRITIS WITHOUT BLEEDING: ICD-10-CM

## 2024-04-02 DIAGNOSIS — E66.01 MORBID OBESITY WITH BMI OF 40.0-44.9, ADULT (HCC): ICD-10-CM

## 2024-04-02 DIAGNOSIS — J02.0 STREP PHARYNGITIS: Primary | ICD-10-CM

## 2024-04-02 PROCEDURE — 99215 OFFICE O/P EST HI 40 MIN: CPT | Performed by: FAMILY MEDICINE

## 2024-04-02 RX ORDER — CLINDAMYCIN HYDROCHLORIDE 300 MG/1
300 CAPSULE ORAL 3 TIMES DAILY
Qty: 30 CAPSULE | Refills: 0 | Status: SHIPPED | OUTPATIENT
Start: 2024-04-02 | End: 2024-04-12

## 2024-04-02 RX ORDER — MELOXICAM 15 MG/1
15 TABLET ORAL DAILY
Qty: 30 TABLET | Refills: 0 | Status: SHIPPED | OUTPATIENT
Start: 2024-04-02

## 2024-04-02 RX ORDER — HYDROXYZINE HYDROCHLORIDE 25 MG/1
25 TABLET, FILM COATED ORAL EVERY 6 HOURS PRN
Qty: 30 TABLET | Refills: 0 | Status: SHIPPED | OUTPATIENT
Start: 2024-04-02

## 2024-04-02 NOTE — PROGRESS NOTES
Chief Complaint   Patient presents with   • Follow-up     Follow up ER Step      Name: Gerry Perkins      : 1990      MRN: [id number]  Encounter Provider: Abner Escobedo MD  Encounter Date: 2024   Encounter department: [facility]    Assessment & Plan     We will order clindamycin for patient strep throat.  He will make us aware if he has any reaction to this.  He does have a history of GERD with chronic gastritis.  This is most likely secondary to many years of using high doses of NSAIDs.  He should continue his omeprazole and may want to consider being seen by a gastroenterologist.  Palpitations most likely secondary to patient's anxiety, will start patient on Atarax and check a thyroid function testing.  Refer patient over to sleep medicine due to his history of snoring.  He does have a BMI of 42, states that he does not exercise due to being the primary caregiver for his children.  Refer patient over to bariatrics.  I advised patient to stop using ibuprofen, states that Tylenol does not work.  I will provide him with a short supply of Mobic to be taken with food once daily for his tooth pain.  I did discuss that he will need to find a dentist to help with his tooth pain as I will not continue to provide prescriptions for NSAIDs for him.  RTC as scheduled    1. Strep pharyngitis  -     clindamycin (CLEOCIN) 300 MG capsule; Take 1 capsule (300 mg total) by mouth 3 (three) times a day for 10 days    2. Gastroesophageal reflux disease, unspecified whether esophagitis present    3. Palpitations  -     TSH, 3rd generation; Future  -     T4, free; Future    4. Snoring  -     Ambulatory Referral to Sleep Medicine; Future    5. Anxiety  -     hydrOXYzine HCL (ATARAX) 25 mg tablet; Take 1 tablet (25 mg total) by mouth every 6 (six) hours as needed for itching    6. Tooth pain  -     meloxicam (Mobic) 15 mg tablet; Take 1 tablet (15 mg total) by mouth daily    7. Morbid obesity  with BMI of 40.0-44.9, adult ([facility])  -     Ambulatory Referral to Weight Management; Future    8. Adverse effect of drug, initial encounter    9. Excessive use of nonsteroidal anti-inflammatory drug (NSAID)    10. Chronic superficial gastritis without bleeding           Subjective      He presents today for an ER follow-up after being diagnosed with strep throat.  He would also like to discuss multiple other concerns that he has today.  He was started on azithromycin due to strep throat, states that after he took this medication he felt flushed and felt like a rash over his body.  He stopped taking this medication.  He does have an allergy to penicillins.  Also been complaining of abdominal pain, he has been taking omeprazole with some benefit.  He does admit to taking 1000 mg of ibuprofen at a time for many years.  Does have a history of anxiety, states has been having occasional palpitations.  Believes that the palpitations are from his anxiety.  He is also stating that he snores a lot and would like to see sleep medicine.  He is also complaining of tooth pain, states that he called dentist and they do not take his insurance.  He would also like to discuss seeing a weight doctor for possible bariatric surgery.      Review of Systems   Constitutional:  Negative for activity change, appetite change, chills, fatigue and fever.   HENT:  Positive for dental problem and sore throat. Negative for congestion, rhinorrhea and sneezing.    Eyes:  Negative for pain, discharge, redness and itching.   Respiratory:  Negative for cough, chest tightness, shortness of breath and wheezing.    Cardiovascular:  Positive for palpitations. Negative for chest pain.   Gastrointestinal:  Positive for abdominal pain. Negative for constipation, diarrhea, nausea and vomiting.   Musculoskeletal:  Negative for arthralgias, gait problem, myalgias and neck pain.   Skin:  Negative for rash.   Neurological:  Negative for dizziness, weakness,  "numbness and headaches.   Hematological:  Negative for adenopathy.   Psychiatric/Behavioral:  Negative for confusion. The patient is not nervous/anxious.    All other systems reviewed and are negative.      Current Outpatient Medications on File Prior to Visit   Medication Sig   • eszopiclone (LUNESTA) 2 mg tablet Take 1 tablet (2 mg total) by mouth daily at bedtime as needed for sleep Take immediately before bedtime   • omeprazole (PriLOSEC) 20 mg delayed release capsule TAKE 1 CAPSULE BY MOUTH EVERY DAY   • azithromycin (ZITHROMAX) 250 mg tablet Take 2 tablets (500 mg total) by mouth daily for 4 days (Patient not taking: Reported on 4/2/2024)       Objective     /84   Pulse (!) 107   Temp 97.7 °F (36.5 °C)   Ht 6' 4\" (1.93 m)   Wt (!) 158 kg (348 lb)   SpO2 98%   BMI 42.36 kg/m²     Physical Exam  Vitals reviewed.   Constitutional:       General: He is not in acute distress.     Appearance: Normal appearance. He is well-developed. He is obese. He is not toxic-appearing or diaphoretic.   HENT:      Head: Normocephalic and atraumatic.      Right Ear: External ear normal.      Nose: Nose normal.      Mouth/Throat:      Mouth: Mucous membranes are moist.      Pharynx: Posterior oropharyngeal erythema present. No oropharyngeal exudate.   Eyes:      General: No scleral icterus.        Right eye: No discharge.         Left eye: No discharge.      Conjunctiva/sclera: Conjunctivae normal.   Cardiovascular:      Rate and Rhythm: Normal rate and regular rhythm.      Pulses: Normal pulses.      Heart sounds: Normal heart sounds. No murmur heard.  Pulmonary:      Effort: Pulmonary effort is normal. No respiratory distress.      Breath sounds: Normal breath sounds. No wheezing.   Abdominal:      General: There is no distension.      Palpations: Abdomen is soft. There is no mass.      Tenderness: There is no abdominal tenderness.      Hernia: No hernia is present.   Musculoskeletal:         General: No swelling, " tenderness, deformity or signs of injury. Normal range of motion.      Cervical back: Normal range of motion.   Skin:     General: Skin is warm.      Findings: No bruising, erythema, lesion or rash.   Neurological:      General: No focal deficit present.      Mental Status: He is alert.      Cranial Nerves: No cranial nerve deficit.      Motor: No weakness.      Gait: Gait normal.   Psychiatric:         Mood and Affect: Mood normal.         Behavior: Behavior normal.       Abner Escobedo MD

## 2024-04-25 DIAGNOSIS — K08.89 TOOTH PAIN: ICD-10-CM

## 2024-04-25 RX ORDER — MELOXICAM 15 MG/1
15 TABLET ORAL DAILY
Qty: 30 TABLET | Refills: 0 | Status: SHIPPED | OUTPATIENT
Start: 2024-04-25

## 2024-04-29 ENCOUNTER — TELEPHONE (OUTPATIENT)
Dept: FAMILY MEDICINE CLINIC | Facility: CLINIC | Age: 34
End: 2024-04-29

## 2024-05-01 NOTE — TELEPHONE ENCOUNTER
PA for Meloxicam 15MG tablets    Submitted via    [x]CMM-KEY [id number]  [][employer]-Case ID #   []Faxed to plan   []Other website   []Phone call Case ID #     Office notes sent, clinical questions answered. Awaiting determination    Turnaround time for your insurance to make a decision on your Prior Authorization can take 7-21 business days.

## 2024-05-02 NOTE — TELEPHONE ENCOUNTER
PA for meloxicam cancelled due to     []Approval on file-dates approved   [x]Medication already on Formulary  []Brand Name Preferred  []Patient no longer covered by insurance    Patient advised by     []My Chart Message  []Phone call    Message sent to office clinical pool   No      Scanned into Media  yes

## 2024-05-18 ENCOUNTER — OFFICE VISIT (OUTPATIENT)
Dept: URGENT CARE | Facility: MEDICAL CENTER | Age: 34
End: 2024-05-18
Payer: COMMERCIAL

## 2024-05-18 VITALS
TEMPERATURE: 99.7 F | DIASTOLIC BLOOD PRESSURE: 80 MMHG | RESPIRATION RATE: 18 BRPM | OXYGEN SATURATION: 97 % | SYSTOLIC BLOOD PRESSURE: 140 MMHG | HEART RATE: 112 BPM

## 2024-05-18 DIAGNOSIS — J01.00 ACUTE MAXILLARY SINUSITIS, RECURRENCE NOT SPECIFIED: Primary | ICD-10-CM

## 2024-05-18 DIAGNOSIS — J40 BRONCHITIS: ICD-10-CM

## 2024-05-18 PROCEDURE — G0382 LEV 3 HOSP TYPE B ED VISIT: HCPCS | Performed by: FAMILY MEDICINE

## 2024-05-18 PROCEDURE — S9083 URGENT CARE CENTER GLOBAL: HCPCS | Performed by: FAMILY MEDICINE

## 2024-05-18 RX ORDER — PREDNISONE 20 MG/1
TABLET ORAL
Qty: 18 TABLET | Refills: 0 | Status: SHIPPED | OUTPATIENT
Start: 2024-05-18

## 2024-05-18 RX ORDER — ALBUTEROL SULFATE 90 UG/1
2 AEROSOL, METERED RESPIRATORY (INHALATION) EVERY 6 HOURS PRN
Qty: 18 G | Refills: 0 | Status: SHIPPED | OUTPATIENT
Start: 2024-05-18

## 2024-05-18 RX ORDER — DOXYCYCLINE 100 MG/1
100 TABLET ORAL 2 TIMES DAILY
Qty: 20 TABLET | Refills: 0 | Status: SHIPPED | OUTPATIENT
Start: 2024-05-18 | End: 2024-05-28

## 2024-05-18 RX ORDER — CHLORHEXIDINE GLUCONATE ORAL RINSE 1.2 MG/ML
SOLUTION DENTAL
COMMUNITY
Start: 2024-04-22

## 2024-05-18 RX ORDER — IBUPROFEN 800 MG/1
800 TABLET ORAL EVERY 8 HOURS PRN
COMMUNITY
Start: 2024-04-22

## 2024-05-18 RX ORDER — AMOXICILLIN 500 MG/1
500 CAPSULE ORAL 3 TIMES DAILY
COMMUNITY
Start: 2024-04-22

## 2024-05-18 NOTE — PATIENT INSTRUCTIONS
I prescribed doxycycline 100 mg twice a day for 10 days, Proventil inhaler 2 puffs every 6 hours as needed for 7 days then as needed.  Prednisone tapering from 60 down to 20 mg over 9 days.  Recommended patient obtain over-the-counter plain Mucinex for expectoration.    Acute Bronchitis   WHAT YOU NEED TO KNOW:   Acute bronchitis is swelling and irritation in your lungs. It is usually caused by a virus and most often happens in the winter. Bronchitis may also be caused by bacteria or by a chemical irritant, such as smoke.  DISCHARGE INSTRUCTIONS:   Return to the emergency department if:   You cough up blood.    Your lips or fingernails turn blue.    You feel like you are not getting enough air when you breathe.    Call your doctor if:   Your symptoms do not go away or get worse, even after treatment.    Your cough does not get better within 4 weeks.    You have questions or concerns about your condition or care.    Medicines:  You may need any of the following:  Cough suppressants  decrease your urge to cough.    Decongestants  help loosen mucus in your lungs and make it easier to cough up. This can help you breathe easier.    Inhalers  may be given. Your healthcare provider may give you one or more inhalers to help you breathe easier and cough less. An inhaler gives you medicine to open your airways. Ask your healthcare provider to show you how to use your inhaler correctly.         Antiviral medicine  treats infections caused by a virus.    Antibiotics  may be given if your bronchitis is caused by bacteria or if you have lung condition.    Acetaminophen  decreases pain and fever. It is available without a doctor's order. Ask how much to take and how often to take it. Follow directions. Read the labels of all other medicines you are using to see if they also contain acetaminophen, or ask your doctor or pharmacist. Acetaminophen can cause liver damage if not taken correctly.    NSAIDs  help decrease swelling and  pain or fever. This medicine is available with or without a doctor's order. NSAIDs can cause stomach bleeding or kidney problems in certain people. If you take blood thinner medicine, always ask your healthcare provider if NSAIDs are safe for you. Always read the medicine label and follow directions.    Self-care:   Drink liquids as directed.  You may need to drink more liquids than usual to stay hydrated. Ask how much liquid to drink each day and which liquids are best for you.    Use a cool mist humidifier.  This increases air moisture in your home. This may make it easier for you to breathe and help decrease your cough.     Get more rest.  Rest helps your body to heal. Slowly start to do more each day. Rest when you feel it is needed.    Prevent acute bronchitis:       Ask about vaccines you may need.  Get a flu vaccine each year as soon as recommended, usually in September or October. Ask your healthcare provider if you should also get a pneumonia or COVID-19 vaccine. Your healthcare provider can tell you if you should also get other vaccines, and when to get them.    Prevent the spread of germs.  You can decrease your risk for acute bronchitis and other illnesses by doing the following:     Wash your hands often with soap and water. Carry germ-killing hand lotion or gel with you. You can use the lotion or gel to clean your hands when soap and water are not available.         Do not touch your eyes, nose, or mouth unless you have washed your hands first.    Always cover your mouth when you cough to prevent the spread of germs. It is best to cough into a tissue or your shirt sleeve instead of into your hand. Ask those around you to cover their mouths when they cough.    Try to avoid people who have a cold or the flu. If you are sick, stay away from others as much as possible.    Avoid irritants in the air.  Avoid chemicals, fumes, and dust. Wear a face mask if you must work around dust or fumes. Stay inside on  days when air pollution levels are high. If you have allergies, stay inside when pollen counts are high. Do not use aerosol products, such as spray-on deodorant, bug spray, and hair spray.    Do not smoke or be around others who are smoking.  Nicotine and other chemicals in cigarettes and cigars can cause lung damage. Ask your healthcare provider for information if you currently smoke and need help to quit. E-cigarettes or smokeless tobacco still contain nicotine. Talk to your healthcare provider before you use these products.  Follow up with your doctor as directed:  Write down questions you have so you will remember to ask them during your follow-up visits.  © Copyright [employer] 2023 Information is for End User's use only and may not be sold, redistributed or otherwise used for commercial purposes.  The above information is an  only. It is not intended as medical advice for individual conditions or treatments. Talk to your doctor, nurse or pharmacist before following any medical regimen to see if it is safe and effective for you.  Rhinosinusitis   WHAT YOU NEED TO KNOW:   Rhinosinusitis (RS) is inflammation or infection of your nasal passages and sinuses. RS is most often caused by a virus but may be caused by bacteria. Acute RS lasts up to 12 weeks. Chronic RS lasts more than 12 weeks. Recurrent RS means you have 4 or more infections in 1 year.       DISCHARGE INSTRUCTIONS:   Return to the emergency department if:   You have trouble breathing, or wheezing that is getting worse.    You have a stiff neck, a fever, or a bad headache.    You cannot open your eye.    Your eyeball bulges out, or you cannot move your eye.    You are more sleepy than usual, or you notice changes in your ability to think, move, or talk.    You have swelling of your forehead or scalp.    Call your doctor if:   You have vision changes, such as double vision.    Your eye and eyelid are red, swollen, and painful.    Your  symptoms do not improve after 10 days.    You have nausea and are vomiting.    Your nose is bleeding.    You have questions or concerns about your condition or care.    Medicines:  Your symptoms may go away on their own. Your healthcare provider may recommend watchful waiting for up to 10 days before starting antibiotics. Antibiotics will not help if the infection is caused by a virus. Check with your provider before you take any over-the-counter medicines. You may need any of the following:  Acetaminophen  decreases pain and fever. It is available without a doctor's order. Ask how much to take and how often to take it. Follow directions. Read the labels of all other medicines you are using to see if they also contain acetaminophen, or ask your doctor or pharmacist. Acetaminophen can cause liver damage if not taken correctly.    NSAIDs , such as ibuprofen, help decrease swelling, pain, and fever. This medicine is available with or without a doctor's order. NSAIDs can cause stomach bleeding or kidney problems in certain people. If you take blood thinner medicine, always ask your healthcare provider if NSAIDs are safe for you. Always read the medicine label and follow directions.    Nasal steroid sprays  help decrease inflammation in your nose and sinuses.    Decongestants  help reduce swelling and drain mucus in the nose and sinuses. They may help you breathe easier. Do not use decongestants for more than 3 days.    Antihistamines  help dry mucus in the nose and relieve sneezing.    Antibiotics  help treat or prevent a bacterial infection.    Self-care:   Rinse your sinuses as directed.  Use a sinus rinse device to rinse your nasal passages with a saline (salt water) solution or distilled water. Do not  use tap water. A sinus rinse will help thin the mucus in your nose and rinse away pollen and dirt. It will also help lower swelling so you can breathe normally.    Use a humidifier  to increase air moisture in your  home. Moist air may make it easier for you to breathe and help decrease your cough.    Sleep with your head raised.  Place an extra pillow under your head before you go to sleep to help your sinuses drain.    Drink liquids as directed.  Ask your healthcare provider how much liquid to drink each day and which liquids are best for you. Liquids will thin the mucus in your nose and help it drain. Do not have drinks that contain alcohol or caffeine.    Do not smoke, and avoid secondhand smoke.  Nicotine and other chemicals in cigarettes and cigars can make your symptoms worse. Ask your healthcare provider for information if you currently smoke and need help to quit. E-cigarettes or smokeless tobacco still contain nicotine. Talk to your healthcare provider before you use these products.    Prevent the spread of germs:   Wash your hands often with soap and water.  Wash your hands after you use the bathroom, change a child's diaper, or sneeze. Wash your hands before you prepare or eat food.         Stay away from people who are sick.  Some germs spread easily and quickly through contact.    Follow up with your doctor as directed:  You may be referred to an ear, nose, and throat specialist. Write down your questions so you remember to ask them during your visits.  © Copyright [employer] 2023 Information is for End User's use only and may not be sold, redistributed or otherwise used for commercial purposes.  The above information is an  only. It is not intended as medical advice for individual conditions or treatments. Talk to your doctor, nurse or pharmacist before following any medical regimen to see if it is safe and effective for you.

## 2024-05-19 NOTE — PROGRESS NOTES
[facility]        NAME: Gerry Perkins is a 33 y.o. male  : 1990    MRN: [id number]  DATE: May 18, 2024  TIME: 10:56 PM    Assessment and Plan   Acute maxillary sinusitis, recurrence not specified [J01.00]  1. Acute maxillary sinusitis, recurrence not specified  doxycycline (ADOXA) 100 MG tablet    predniSONE 20 mg tablet      2. Bronchitis  albuterol (PROVENTIL HFA,VENTOLIN HFA) 90 mcg/act inhaler    doxycycline (ADOXA) 100 MG tablet    predniSONE 20 mg tablet            Patient Instructions       Follow up with PCP in 3-5 days.  Proceed to  ER if symptoms worsen.    If tests have been performed at [facility], our office will contact you with results if changes need to be made to the care plan discussed with you at the visit.  You can review your full results on [facility].    Chief Complaint     Chief Complaint   Patient presents with    Cold Like Symptoms     Patient c/o cough, and nasal congestion x 1 week          History of Present Illness       32-year-old male here today with complaint of cough nasal congestion for the past week.  Last day or 2 with no significant improvement.        Review of Systems   Review of Systems   HENT:  Positive for postnasal drip.    Respiratory:  Positive for cough.          Current Medications       Current Outpatient Medications:     albuterol (PROVENTIL HFA,VENTOLIN HFA) 90 mcg/act inhaler, Inhale 2 puffs every 6 (six) hours as needed for wheezing, Disp: 18 g, Rfl: 0    amoxicillin (AMOXIL) 500 mg capsule, Take 500 mg by mouth 3 (three) times a day, Disp: , Rfl:     chlorhexidine (PERIDEX) 0.12 % solution, SWISH AND SPIT 15MLS BY MOUTH ONCE DAILY OR AS DIRECTED, Disp: , Rfl:     doxycycline (ADOXA) 100 MG tablet, Take 1 tablet (100 mg total) by mouth 2 (two) times a day for 10 days, Disp: 20 tablet, Rfl: 0    ibuprofen (MOTRIN) 800 mg tablet, Take 800 mg by mouth every 8 (eight) hours as needed, Disp: , Rfl:     predniSONE 20 mg tablet, 3 tablets  once daily day 1 through 3 then 2 tablets daily day 4 through 6 then 1 tablet daily day 7 through 9., Disp: 18 tablet, Rfl: 0    eszopiclone (LUNESTA) 2 mg tablet, Take 1 tablet (2 mg total) by mouth daily at bedtime as needed for sleep Take immediately before bedtime, Disp: 30 tablet, Rfl: 0    hydrOXYzine HCL (ATARAX) 25 mg tablet, Take 1 tablet (25 mg total) by mouth every 6 (six) hours as needed for itching, Disp: 30 tablet, Rfl: 0    meloxicam (MOBIC) 15 mg tablet, TAKE 1 TABLET (15 MG TOTAL) BY MOUTH DAILY., Disp: 30 tablet, Rfl: 0    omeprazole (PriLOSEC) 20 mg delayed release capsule, TAKE 1 CAPSULE BY MOUTH EVERY DAY, Disp: 90 capsule, Rfl: 1    Current Allergies     Allergies as of 05/18/2024 - Reviewed 05/18/2024   Allergen Reaction Noted    Penicillins  07/13/2012    Sulfa antibiotics  07/13/2012            The following portions of the patient's history were reviewed and updated as appropriate: allergies, current medications, past family history, past medical history, past social history, past surgical history and problem list.     Past Medical History:   Diagnosis Date    Anxiety     Asthma     Calculus of gallbladder without biliary obstruction 12/22/2020    Change in bowel habits 8/3/2018    Depression     Diarrhea in adult patient 8/3/2018    Does not have health insurance 9/28/2021    Fracture of nasal bone     GERD (gastroesophageal reflux disease)     Gross hematuria     Orthostatic hypotension     Rectal bleeding 9/28/2021       Past Surgical History:   Procedure Laterality Date    CHOLANGIOGRAM N/A 7/31/2023    Procedure: CHOLANGIOGRAM;  Surgeon: Sandor Heredia MD;  Location: [facility];  Service: General    CHOLECYSTECTOMY LAPAROSCOPIC N/A 7/31/2023    Procedure: CHOLECYSTECTOMY LAPAROSCOPIC;  Surgeon: Sandor Heredia MD;  Location: [facility];  Service: General    WRIST SURGERY         Family History   Problem Relation Age of Onset    No Known Problems Mother     Diabetes Father           Medications have been verified.        Objective   /80   Pulse (!) 112   Temp 99.7 °F (37.6 °C)   Resp 18   SpO2 97%   No LMP for male patient.       Physical Exam     Physical Exam  Vitals and nursing note reviewed.   Constitutional:       Appearance: Normal appearance.   HENT:      Nose:      Comments: Hypertrophic turbinate.     Mouth/Throat:      Comments: Cobblestoning observed posterior pharynx.  Pulmonary:      Effort: Pulmonary effort is normal.      Breath sounds: Normal breath sounds.   Neurological:      Mental Status: He is alert.

## 2024-05-20 ENCOUNTER — TELEPHONE (OUTPATIENT)
Age: 34
End: 2024-05-20

## 2024-06-04 ENCOUNTER — OFFICE VISIT (OUTPATIENT)
Dept: FAMILY MEDICINE CLINIC | Facility: CLINIC | Age: 34
End: 2024-06-04
Payer: COMMERCIAL

## 2024-06-04 VITALS
WEIGHT: 315 LBS | OXYGEN SATURATION: 99 % | DIASTOLIC BLOOD PRESSURE: 86 MMHG | HEART RATE: 105 BPM | SYSTOLIC BLOOD PRESSURE: 138 MMHG | HEIGHT: 76 IN | TEMPERATURE: 96.6 F | BODY MASS INDEX: 38.36 KG/M2

## 2024-06-04 DIAGNOSIS — H66.001 NON-RECURRENT ACUTE SUPPURATIVE OTITIS MEDIA OF RIGHT EAR WITHOUT SPONTANEOUS RUPTURE OF TYMPANIC MEMBRANE: ICD-10-CM

## 2024-06-04 DIAGNOSIS — Z13.31 POSITIVE DEPRESSION SCREENING: Primary | ICD-10-CM

## 2024-06-04 PROCEDURE — 99213 OFFICE O/P EST LOW 20 MIN: CPT | Performed by: FAMILY MEDICINE

## 2024-06-04 RX ORDER — CEFUROXIME AXETIL 500 MG/1
500 TABLET ORAL EVERY 12 HOURS SCHEDULED
Qty: 20 TABLET | Refills: 0 | Status: SHIPPED | OUTPATIENT
Start: 2024-06-04 | End: 2024-06-14

## 2024-06-04 NOTE — PROGRESS NOTES
Ambulatory Visit  Name: Gerry Perkins      : 1990      MRN: [id number]  Encounter Provider: Leslie Gordillo DO  Encounter Date: 2024   Encounter department: [facility] PRIMARY CARE    Assessment & Plan   1. Positive depression screening  Assessment & Plan:  Continue as needed hydroxyzine and keep followup with PCP  2. Non-recurrent acute suppurative otitis media of right ear without spontaneous rupture of tympanic membrane  Assessment & Plan:  Cefuroxime 500mg BID for 10 days restart the zrytec   Orders:  -     cefuroxime (CEFTIN) 500 mg tablet; Take 1 tablet (500 mg total) by mouth every 12 (twelve) hours for 10 days      Depression Screening and Follow-up Plan: Patient's depression screening was positive with a PHQ-2 score of 5. Their PHQ-9 score was 12. Patient with underlying depression and was advised to continue current medications as prescribed. Patient advised to follow-up with PCP for further management.       Chief Complaint   Patient presents with    Ear Fullness     Rt ear clogged/can't hear         History of Present Illness     Patient is here for right ear pain and decrease in hearing Patient had sinus issues in mid May and did recover However last 2 days right ear is painful Patient has no fever or chills He has no URI symptoms at this time Patietn has not been using his zyrtec faithfully     Ear Fullness   There is pain in the right ear. This is a recurrent problem. The current episode started yesterday. The problem occurs constantly. The problem has been unchanged. There has been no fever. The pain is at a severity of 2/10. The pain is mild. Associated symptoms include hearing loss. Pertinent negatives include no abdominal pain, coughing, diarrhea, ear discharge, headaches, neck pain, rash, rhinorrhea, sore throat or vomiting. He has tried nothing for the symptoms. The treatment provided no relief. There is no history of a chronic ear infection, hearing loss or a  "tympanostomy tube.       Review of Systems   Constitutional:  Negative for activity change, appetite change, chills, fatigue and fever.   HENT:  Positive for ear pain and hearing loss. Negative for ear discharge, postnasal drip, rhinorrhea, sinus pressure, sinus pain and sore throat.    Respiratory:  Negative for cough.    Gastrointestinal:  Negative for abdominal pain, diarrhea and vomiting.   Musculoskeletal:  Negative for neck pain.   Skin:  Negative for rash.   Neurological:  Negative for headaches.       Objective     /86   Pulse 105   Temp (!) 96.6 °F (35.9 °C) (Temporal)   Ht 6' 4\" (1.93 m)   Wt (!) 158 kg (348 lb)   SpO2 99%   BMI 42.36 kg/m²     Physical Exam  Vitals and nursing note reviewed.   Constitutional:       Appearance: He is obese.   HENT:      Head: Normocephalic.      Left Ear: Tympanic membrane and external ear normal.      Ears:      Comments: Right TM with bulging and redness      Nose: Nose normal.   Eyes:      Extraocular Movements: Extraocular movements intact.      Conjunctiva/sclera: Conjunctivae normal.      Pupils: Pupils are equal, round, and reactive to light.   Cardiovascular:      Rate and Rhythm: Normal rate and regular rhythm.      Heart sounds: Normal heart sounds.   Pulmonary:      Effort: Pulmonary effort is normal.      Breath sounds: Normal breath sounds.   Skin:     Findings: No rash.   Neurological:      General: No focal deficit present.      Mental Status: He is alert and oriented to person, place, and time.   Psychiatric:         Mood and Affect: Mood normal.         Behavior: Behavior normal.       Administrative Statements     "

## 2024-06-24 ENCOUNTER — OFFICE VISIT (OUTPATIENT)
Dept: BARIATRICS | Facility: CLINIC | Age: 34
End: 2024-06-24
Payer: COMMERCIAL

## 2024-06-24 VITALS
BODY MASS INDEX: 39.17 KG/M2 | HEART RATE: 78 BPM | WEIGHT: 315 LBS | RESPIRATION RATE: 17 BRPM | SYSTOLIC BLOOD PRESSURE: 115 MMHG | DIASTOLIC BLOOD PRESSURE: 80 MMHG | HEIGHT: 75 IN | TEMPERATURE: 98.2 F

## 2024-06-24 DIAGNOSIS — E66.01 MORBID OBESITY WITH BMI OF 40.0-44.9, ADULT (HCC): Primary | ICD-10-CM

## 2024-06-24 DIAGNOSIS — J45.909 ASTHMA: ICD-10-CM

## 2024-06-24 PROCEDURE — 99243 OFF/OP CNSLTJ NEW/EST LOW 30: CPT | Performed by: INTERNAL MEDICINE

## 2024-06-24 RX ORDER — TIRZEPATIDE 2.5 MG/.5ML
2.5 INJECTION, SOLUTION SUBCUTANEOUS WEEKLY
Qty: 2 ML | Refills: 0 | Status: SHIPPED | OUTPATIENT
Start: 2024-06-24 | End: 2024-07-22

## 2024-06-24 NOTE — PROGRESS NOTES
Assessment/Plan:  Gerry was seen today for consult.    Diagnoses and all orders for this visit:    Morbid obesity with BMI of 40.0-44.9, adult ([facility])  -     Ambulatory Referral to Weight Management  -     tirzepatide (Zepbound) 2.5 mg/0.5 mL auto-injector; Inject 0.5 mL (2.5 mg total) under the skin once a week for 28 days    Asthma    - Discussed options of HealthyCORE-Intensive Lifestyle Intervention Program, Very Low Calorie Diet-VLCD, Conservative Program, Brett-En-Y Gastric Bypass, and Vertical Sleeve Gastrectomy and the role of weight loss medications.  - Explained the importance of making lifestyle changes first before starting anti-obesity medications.  - Patient should demonstrate lifestyle changes first before anti-obesity medication initiated.   - Patient is interested in pursuing Conservative Program  - Initial weight loss goal of 5-10% weight loss for improved health as studies have shown this is where we see the greatest impact on improving health and decreasing risk of obesity related conditions.  - Weight loss can improve patient's co-morbid conditions and/or prevent weight-related complications.  - Labs reviewed: As below.      General Recommendations:  Nutrition:  Eat breakfast daily.  Do not skip meals.     Food log (ie.) www.[place].com, sparkpeople.com, loseit.com, calorieking.com, etc.    Practice mindful eating.  Be sure to set aside time to eat, eat slowly, and savor your food.    Hydration:    At least 64oz of water daily.  No sugar sweetened beverages.  No juice (eat the fruit instead).    Exercise:  Studies have shown that the ideal exercise goal is somewhere between 150 to 300 minutes of moderate intensity exercise a week.  Start with exercising 10 minutes every other day and gradually increase physical activity with a goal of at least 150 minutes of moderate intensity exercise a week, divided over at least 3 days a week.  An example of this would be exercising 30 minutes a day, 5 days  a week.  Resistance training can increase muscle mass and increase our resting metabolic rate.   FULL BODY resistance training is recommended 2-3 times a week.  Do not do this on consecutive days to allow for muscle recovery.    Aim for a bare minimum 5000 steps, even on days you do not exercise.    Monitoring:   Weigh yourself daily.  If this causes undue stress, then just weigh yourself once a week.  Weigh yourself the same time of the day with the same amount of clothing on.  Preferably this should be done after waking up, before you eat, and with no clothing or minimal clothing on.    Specific Goals:  Food log (ie.) www.[place].com,sparkpeople.com,Cu[employer].com,Travelogy.com,etc.   No sugary beverages. At least 64oz of water daily.  Increase physical activity by 10 minutes daily    Calorie goal:  6445-4687 calories (Provided with meal plan to follow).    START ZEPBOUND  I have sent Zebound to your pharmacy. The prior authorization process will been done through our prior authorization team and can take up to 3 weeks to process through the insurance.      - Start Zepbound 2.5 mg subcutaneously injection weekly.  You will need a nurse visit in 4 weeks.  If you are doing well at that time the dose will be increased to Zepbound 5mg weekly.     - Side effects of Zepbound include nausea, vomiting, diarrhea, or constipation. Keep an eye on your heart rate while on Zepbound. If you resting heart rate is greater than 100 beats per minutes, please notify me. If you develop severe abdominal pain, stop Zepbound and go to the emergency room, as that could be a sign of pancreatitis.      - Please notify me if you have surgery, upper endoscopy, or colonoscopy scheduled, as we typically hold Zepbound for one week prior to the procedure.   - Zepbound can reduce the effectiveness of oral hormonal birth control (birth control pills). Recommend a barrier backup method such as condoms to prevent pregnancy.       Return visit:   Nurse visit in 6 weeks.    Follow-up in 4 months.      Total time spent reviewing chart, interviewing patient, examining patient, discussing plan, answering all questions, and documentin min.       ______________________________________________________________________    Subjective:   Chief Complaint   Patient presents with    Consult     MWM-Consult;Gw-250lb ; waist- 58in       HPI: Gerry Perkins  is a 33 y.o. male with history of asthma, and excess weight, here to pursue weight loss management.  Previous notes and records have been reviewed.    HPI  Wt Readings from Last 20 Encounters:   24 (!) 158 kg (348 lb)   24 (!) 158 kg (348 lb)   24 (!) 158 kg (348 lb)   24 (!) 159 kg (350 lb 5 oz)   02/15/24 (!) 160 kg (353 lb 6.4 oz)   23 (!) 157 kg (346 lb 2 oz)   08/15/23 (!) 150 kg (330 lb 6.4 oz)   23 (!) 153 kg (338 lb)   23 (!) 150 kg (330 lb 11 oz)   07/10/23 (!) 154 kg (339 lb)   23 (!) 157 kg (347 lb)   23 (!) 164 kg (362 lb 6.4 oz)   23 (!) 156 kg (343 lb 6.4 oz)   23 (!) 154 kg (340 lb 9.6 oz)   22 (!) 153 kg (337 lb)   22 (!) 153 kg (337 lb 4.9 oz)   21 (!) 153 kg (338 lb 6 oz)   21 (!) 153 kg (337 lb 4.9 oz)   21 (!) 153 kg (338 lb 6.4 oz)   21 (!) 151 kg (332 lb)     Excess Weight:  Highest weight: 362  Current weight: 250  What has been tried: Diet and Exercise  Contributing factors: Poor Food Choices, Insufficient Physical Activity, and Stress/Emotional Eating Eats until he cannot eat anymore  Associated symptoms and effects: increased joint pain back pain, JASON    Food logging:  Hunger/Cravings: + Hunger  Dining out:  twice a week - pizza  Hydration:  Water 80-100oz (eliminated soda)  Alcohol:  No  Smoking:  No  Exercise: Just started last week.  Walking and strength training.  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  Denies any hx of glaucoma, seizures, + kidney stones, + gallstones s/p cholecystectomy .  Denies Hx of CAD, PAD, palpitations, arrhythmia.   Denies uncontrolled anxiety or depression, suicidal behavior or thinking.  Past Surgical History:   Procedure Laterality Date    CHOLANGIOGRAM N/A 7/31/2023    Procedure: CHOLANGIOGRAM;  Surgeon: Sandor Heredia MD;  Location: [facility];  Service: General    CHOLECYSTECTOMY LAPAROSCOPIC N/A 7/31/2023    Procedure: CHOLECYSTECTOMY LAPAROSCOPIC;  Surgeon: Sandor Heredia MD;  Location: [facility];  Service: General    WRIST SURGERY       The following portions of the patient's history were reviewed and updated as appropriate: allergies, current medications, past family history, past medical history, past social history, past surgical history, and problem list.    Review Of Systems:  Review of Systems   Constitutional:  Negative for activity change, appetite change, chills, fatigue and fever.   HENT:  Negative for trouble swallowing.    Respiratory:  Negative for cough and shortness of breath.    Cardiovascular:  Negative for chest pain, palpitations and leg swelling.   Gastrointestinal:  Negative for abdominal pain, constipation, diarrhea, nausea and vomiting.   Endocrine: Negative for cold intolerance and heat intolerance.   Genitourinary:  Negative for difficulty urinating and dysuria.   Musculoskeletal:  Negative for arthralgias, back pain, gait problem and myalgias.   Skin:  Negative for  "pallor and rash.   Neurological:  Negative for headaches.   Psychiatric/Behavioral:  Negative for dysphoric mood and suicidal ideas. The patient is not nervous/anxious.        Objective:  /80   Pulse 78   Temp 98.2 °F (36.8 °C)   Resp 17   Ht 6' 3\" (1.905 m)   Wt (!) 158 kg (348 lb)   BMI 43.50 kg/m²   Physical Exam    Labs and Imaging  Recent labs and imaging have been personally reviewed.  Lab Results   Component Value Date    WBC 9.86 12/20/2023    HGB 16.6 12/20/2023    HCT 47.1 12/20/2023    MCV 87 12/20/2023     12/20/2023     Lab Results   Component Value Date     03/24/2016    SODIUM 137 12/20/2023    K 3.7 12/20/2023     12/20/2023    CO2 26 12/20/2023    AGAP 7 12/20/2023    BUN 12 12/20/2023    CREATININE 0.99 12/20/2023    GLUC 133 12/20/2023    GLUF 82 05/22/2023    CALCIUM 9.1 12/20/2023    AST 28 12/20/2023    ALT 47 12/20/2023    ALKPHOS 48 12/20/2023    PROT 7.5 03/24/2016    TP 7.4 12/20/2023    BILITOT 2.1 (H) 03/24/2016    TBILI 1.31 (H) 12/20/2023    EGFR 99 12/20/2023     Lab Results   Component Value Date    HGBA1C 5.2 05/22/2023     Lab Results   Component Value Date    WKC7DSZJZKVO 2.335 05/22/2023     Lab Results   Component Value Date    CHOLESTEROL 151 05/22/2023     Lab Results   Component Value Date    HDL 36 (L) 05/22/2023     Lab Results   Component Value Date    TRIG 108 05/22/2023     Lab Results   Component Value Date    LDLCALC 93 05/22/2023       "

## 2024-06-24 NOTE — PATIENT INSTRUCTIONS
General Recommendations:  Nutrition:  Eat breakfast daily.  Do not skip meals.     Food log (ie.) www.[employer].com, sparkpeople.com, loseit.com, calorieking.com, etc.    Practice mindful eating.  Be sure to set aside time to eat, eat slowly, and savor your food.    Hydration:    At least 64oz of water daily.  No sugar sweetened beverages.  No juice (eat the fruit instead).    Exercise:  Studies have shown that the ideal exercise goal is somewhere between 150 to 300 minutes of moderate intensity exercise a week.  Start with exercising 10 minutes every other day and gradually increase physical activity with a goal of at least 150 minutes of moderate intensity exercise a week, divided over at least 3 days a week.  An example of this would be exercising 30 minutes a day, 5 days a week.  Resistance training can increase muscle mass and increase our resting metabolic rate.   FULL BODY resistance training is recommended 2-3 times a week.  Do not do this on consecutive days to allow for muscle recovery.    Aim for a bare minimum 5000 steps, even on days you do not exercise.    Monitoring:   Weigh yourself daily.  If this causes undue stress, then just weigh yourself once a week.  Weigh yourself the same time of the day with the same amount of clothing on.  Preferably this should be done after waking up, before you eat, and with no clothing or minimal clothing on.    Specific Goals:  Food log (ie.) www.myfitnesspal.com,sparkpeople.com,loseit.com,calorieking.com,etc.   No sugary beverages. At least 64oz of water daily.  Increase physical activity by 10 minutes daily    Calorie goal:  6776-4061 calories (Provided with meal plan to follow).    START ZEPBOUND  I have sent Zebound to your pharmacy. The prior authorization process will been done through our prior authorization team and can take up to 3 weeks to process through the insurance.      - Start Zepbound 2.5 mg subcutaneously injection weekly.  You will need a nurse  visit in 4 weeks.  If you are doing well at that time the dose will be increased to Zepbound 5mg weekly.     - Side effects of Zepbound include nausea, vomiting, diarrhea, or constipation. Keep an eye on your heart rate while on Zepbound. If you resting heart rate is greater than 100 beats per minutes, please notify me. If you develop severe abdominal pain, stop Zepbound and go to the emergency room, as that could be a sign of pancreatitis.      - Please notify me if you have surgery, upper endoscopy, or colonoscopy scheduled, as we typically hold Zepbound for one week prior to the procedure.   - Zepbound can reduce the effectiveness of oral hormonal birth control (birth control pills). Recommend a barrier backup method such as condoms to prevent pregnancy.       Return visit:  Nurse visit in 6 weeks.    Follow-up in 4 months.

## 2024-06-25 ENCOUNTER — TELEPHONE (OUTPATIENT)
Age: 34
End: 2024-06-25

## 2024-06-25 NOTE — TELEPHONE ENCOUNTER
PA for ZEPBOUND    Submitted via    []CMM-KEY   [x][employer]-Case ID # [id number]   []Faxed to plan   []Other website   []Phone call Case ID #     Office notes sent, clinical questions answered. Awaiting determination    Turnaround time for your insurance to make a decision on your Prior Authorization can take 7-21 business days.

## 2024-06-25 NOTE — TELEPHONE ENCOUNTER
PA for Zepbound Approved     Date(s) approved June 25, 2024 to December 22, 2024     Case #     Patient advised by          [x] [employer] Message  [] Phone call   []LMOM  []L/M to call office as no active Communication consent on file  []Unable to leave detailed message as VM not approved on Communication consent       Pharmacy advised by    [x]Fax  []Phone call    Approval letter scanned into Media Yes

## 2024-06-27 ENCOUNTER — TELEPHONE (OUTPATIENT)
Age: 34
End: 2024-06-27

## 2024-06-27 NOTE — TELEPHONE ENCOUNTER
"Pt of Dr. LOPEZ calling in to report he started the zepbound 2.5mg 2 days ago in the evening and the following day (yesterday) he could hardly eat due to a tightness sensation in his stomach/cramping. Pt states it was not pain, more like a tight feeling. Denies nausea. Pt states he feels like he is constipated.   Encouraged fluid intake.     Pt would like to know if he should be \"forcing himself to eat\" and if he should continue medication next week.     Pt also would like to move the 6 week nurse check appointment up as he was able to find the medication sooner than anticipated.   "

## 2024-06-28 NOTE — TELEPHONE ENCOUNTER
Spoke with patient in regards to Dr. Melendez, DO message. Regarding the Weight Loss Medication. Patient stated that he is feeling better as of yesterday 9/27/24. He thinks his symptoms were side effects of the medication. He did want to move his Nurse Visit appointment up earlier and this was rescheduled to 8/5/24.

## 2024-07-04 PROBLEM — H66.001 NON-RECURRENT ACUTE SUPPURATIVE OTITIS MEDIA OF RIGHT EAR WITHOUT SPONTANEOUS RUPTURE OF TYMPANIC MEMBRANE: Status: RESOLVED | Noted: 2024-06-04 | Resolved: 2024-07-04

## 2024-07-12 ENCOUNTER — TELEPHONE (OUTPATIENT)
Dept: BARIATRICS | Facility: CLINIC | Age: 34
End: 2024-07-12

## 2024-07-12 ENCOUNTER — CLINICAL SUPPORT (OUTPATIENT)
Dept: BARIATRICS | Facility: CLINIC | Age: 34
End: 2024-07-12

## 2024-07-12 VITALS
WEIGHT: 315 LBS | SYSTOLIC BLOOD PRESSURE: 110 MMHG | TEMPERATURE: 97.1 F | RESPIRATION RATE: 16 BRPM | BODY MASS INDEX: 39.17 KG/M2 | HEART RATE: 98 BPM | DIASTOLIC BLOOD PRESSURE: 72 MMHG | HEIGHT: 75 IN

## 2024-07-12 DIAGNOSIS — R63.5 ABNORMAL WEIGHT GAIN: Primary | ICD-10-CM

## 2024-07-12 DIAGNOSIS — E66.01 MORBID OBESITY WITH BMI OF 40.0-44.9, ADULT (HCC): ICD-10-CM

## 2024-07-12 PROCEDURE — RECHECK

## 2024-07-12 RX ORDER — TIRZEPATIDE 2.5 MG/.5ML
2.5 INJECTION, SOLUTION SUBCUTANEOUS WEEKLY
Qty: 2 ML | Refills: 0 | Status: SHIPPED | OUTPATIENT
Start: 2024-07-12 | End: 2024-08-09

## 2024-07-12 NOTE — TELEPHONE ENCOUNTER
----- Message from Drew Melendez DO sent at 7/12/2024 11:20 AM EDT -----  I recommend he continue with Zepbound 2.5 mg for an additional month prior to increasing.  Refill sent.    Drew Melendez  ----- Message -----  From: Caroline Fahrney  Sent: 7/12/2024  11:06 AM EDT  To: Drew Melendez DO

## 2024-07-12 NOTE — PROGRESS NOTES
Patient last visit weight: 348.2lb  Patient current visit weight: 340.4lb     If you are taking phentermine or other oral weight loss medications, are you experiencing any of the following symptoms:  Headache:   Blurred Vision:   Chest Pain:   Palpitations:  Insomnia:   SPECIFY ORAL MEDICATION AND DOSAGE:     If you are taking an injectable medication,  are you experiencing any of the following symptoms:  Bloating: NO  Nausea: YES- 2nd dose   Vomiting: YES-2nd dose   Constipation: NO   Diarrhea: NO  SPECIFY INJECTABLE MEDICATION AND CURRENT DOSAGE: Zepbound 2.5mg.     Patient stated he did not tolerate first two doses well. Is tolerating 3rd dose well but appetite suppression not as strong.       Vitals:    Is BP less than 100/60? NO  Is BP greater than 140/90? NO  Is HR greater than 100? NO  **If yes to any of the above, have patient relax and repeat in 5-10 minutes**    Repeat values:    Is BP less than 100/60?  Is BP greater than 140/90?  Is HR greater than 100?  **If values remain outside of ranges above, please consult provider for next steps**

## 2024-07-18 ENCOUNTER — OFFICE VISIT (OUTPATIENT)
Dept: SLEEP CENTER | Facility: CLINIC | Age: 34
End: 2024-07-18
Payer: COMMERCIAL

## 2024-07-18 VITALS
WEIGHT: 315 LBS | BODY MASS INDEX: 39.17 KG/M2 | HEIGHT: 75 IN | DIASTOLIC BLOOD PRESSURE: 80 MMHG | HEART RATE: 100 BPM | SYSTOLIC BLOOD PRESSURE: 124 MMHG

## 2024-07-18 DIAGNOSIS — G47.19 EXCESSIVE DAYTIME SLEEPINESS: Primary | ICD-10-CM

## 2024-07-18 DIAGNOSIS — R06.83 SNORING: ICD-10-CM

## 2024-07-18 DIAGNOSIS — E66.01 MORBID OBESITY WITH BMI OF 40.0-44.9, ADULT (HCC): ICD-10-CM

## 2024-07-18 PROCEDURE — 99204 OFFICE O/P NEW MOD 45 MIN: CPT | Performed by: PHYSICIAN ASSISTANT

## 2024-07-18 NOTE — ASSESSMENT & PLAN NOTE
He has symptoms that could be consistent with obstructive sleep apnea including loud snoring, frequent brief nighttime awakenings, and excessive daytime sleepiness.  Physical exam findings include a BMI of 42 and Mallampati of 3.  I placed an order for an in lab sleep study.  Patient states he is agreeable to treating with CPAP if he is found to have sleep apnea.  We also discussed that with weight loss it is possible to improve and sometimes even resolve obstructive sleep apnea.  He is currently working with bariatrics and weight management.

## 2024-07-18 NOTE — PATIENT INSTRUCTIONS
I placed an order today for an in lab sleep study.  Please schedule this test as soon as possible.  Once your study is completed, your results will be available within 1 to 2 weeks in [facility].  A nurse will call and review them with you.  If you are found to have sleep apnea, we would recommend starting CPAP and following up with you 31 to 90 days after you begin treatment.    Nursing Support:  When: Monday through Friday 7A-5PM except holidays  Where: Our direct line is [phone].    If you are having a true emergency please call 911.  In the event that the line is busy or it is after hours please leave a voice message and we will return your call.  Please speak clearly, leaving your full name, birth date, best number to reach you and the reason for your call.   Medication refills: We will need the name of the medication, the dosage, the ordering provider, whether you get a 30 or 90 day refill, and the pharmacy name and address.  Medications will be ordered by the provider only.  Nurses cannot call in prescriptions.  Please allow 7 days for medication refills.  Physician requested updates: If your provider requested that you call with an update after starting medication, please be ready to provide us the medication and dosage, what time you take your medication, the time you attempt to fall asleep, time you fall asleep, when you wake up, and what time you get out of bed.  Sleep Study Results: We will contact you with sleep study results and/or next steps after the physician has reviewed your testing.

## 2024-07-18 NOTE — PROGRESS NOTES
"Consultation - [facility]  Gerry Perkins, 1990, MRN: [id number]    7/18/2024        Reason for Consult / Principal Problem:    Suspected Obstructive Sleep Apnea  Obesity      Thank you for the opportunity of participating in the evaluation and care of this patient in the Sleep Clinic at [facility].      Subjective:     HPI: Geryr Perkins is a 33 y.o. year old male who presents today upon referral of his PCP for suspected obstructive sleep apnea.  He states he is currently following with bariatrics and has recently started taking Zepbound.  He states he has lost a total of 10 pounds in the past month.  His doctor felt it would be important to be evaluated for obstructive sleep apnea due to morbid obesity, daytime sleepiness, and loud snoring.  Patient states he had a sleep study many years ago when he weighed approximately 100 pounds less and it was negative for sleep apnea.  He states he never used CPAP previously.  He also admits to difficulty in falling asleep.  He states he has difficulty in \"shutting his mind off\".  He is following with his PCP currently for anxiety and depression symptoms.    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    Sleep Schedule:       Bedtime:  12 AM, not sleepy, uses Lunesta approximately 1 time per week if he has significant difficulty in sleeping, also uses medical marijuana approximately 1 time per week as well, states he sleeps very well with Lunesta but realizes its habit-forming and only uses it when he absolutely needs to      Latency:  2-3 hours lays in bed and rests, phone use      Wakeup time:  10 AM during summers, 7:45 during school year     Awakenings:       Frequency:  3-4 times per night       Causes:  " "repositioning, unknown       Duration:  brief     Daytime Sleepiness / Inappropriate Sleep:       Most severe:  not sleepy unless sedentary      Naps :  no naps        Inappropriate drowsiness / sleep: Can doze off if sedentary with TV    Snoring:  loud snoring     Apnea:  not witnessed     Change in Weight: Gain of approximately 100 pounds since previous sleep study 13 years ago    Restless Leg Syndrome:  no clinical symptoms consistent with this diagnosis     Other Complaints:   No reports of sleep walking, sleep talking, sleep paralysis or hallucinations surrounding sleep.  Denies waking up with headaches, bruxism, and dry mouth.     Social History:      Caffeine:  rarely        Tobacco:   reports that he quit smoking about 10 years ago. His smoking use included cigarettes. He has never used smokeless tobacco.     E-cig/Vaping:    E-Cigarette/Vaping    E-Cigarette Use Never User       E-Cigarette/Vaping Substances    Nicotine No     THC No     CBD No     Flavoring No     Other No     Unknown No          Alcohol:   reports no history of alcohol use.       Drugs:   reports current drug use. Drug: Marijuana. Smokes 1 time per week        The review of systems and following portions of the patient's history were reviewed and updated as appropriate: allergies, current medications, past family history, past medical history, past social history, past surgical history and problem list.        Objective:       Vitals:    07/18/24 0854   BP: 124/80   BP Location: Left arm   Patient Position: Sitting   Cuff Size: Large   Pulse: 100   Weight: (!) 154 kg (339 lb)   Height: 6' 3\" (1.905 m)     Body mass index is 42.37 kg/m².  Neck Circumference: 18.5  Estero Sleepiness Scale: Total score: 15      Current Outpatient Medications:     albuterol (PROVENTIL HFA,VENTOLIN HFA) 90 mcg/act inhaler, Inhale 2 puffs every 6 (six) hours as needed for wheezing (Patient taking differently: Inhale 2 puffs if needed for wheezing), Disp: 18 g, " Rfl: 0    eszopiclone (LUNESTA) 2 mg tablet, Take 1 tablet (2 mg total) by mouth daily at bedtime as needed for sleep Take immediately before bedtime, Disp: 30 tablet, Rfl: 0    hydrOXYzine HCL (ATARAX) 25 mg tablet, Take 1 tablet (25 mg total) by mouth every 6 (six) hours as needed for itching (Patient taking differently: Take 25 mg by mouth if needed for itching), Disp: 30 tablet, Rfl: 0    omeprazole (PriLOSEC) 20 mg delayed release capsule, TAKE 1 CAPSULE BY MOUTH EVERY DAY, Disp: 90 capsule, Rfl: 1    tirzepatide (Zepbound) 2.5 mg/0.5 mL auto-injector, Inject 0.5 mL (2.5 mg total) under the skin once a week for 28 days, Disp: 2 mL, Rfl: 0    meloxicam (MOBIC) 15 mg tablet, TAKE 1 TABLET (15 MG TOTAL) BY MOUTH DAILY. (Patient not taking: Reported on 7/12/2024), Disp: 30 tablet, Rfl: 0    Physical Exam  General Appearance:   Alert, cooperative, no distress, appears stated age, obese     Head:   Normocephalic, without obvious abnormality, atraumatic     Eyes:   PERRL, conjunctiva/corneas clear          Nose:  Nares normal, septum midline, mucosa normal, no drainage or sinus tenderness           Throat:  Lips, teeth and gums normal; tongue normal in size and midline in position; mucosa moist, uvula normal, tonsils 1+, Mallampati class 3       Neck:  Supple, symmetrical, trachea midline, no adenopathy; no thyromegaly noted     Lungs:      Clear to auscultation bilaterally, respirations unlabored     Heart:   Regular rate and rhythm, no murmur       Extremities:  Extremities normal, atraumatic, no cyanosis or edema       Skin:  Skin color, texture, turgor normal, no rashes or lesions       Neurologic:  No focal deficits noted.          ASSESSMENT / PLAN     1. Excessive daytime sleepiness  Assessment & Plan:  He has symptoms that could be consistent with obstructive sleep apnea including loud snoring, frequent brief nighttime awakenings, and excessive daytime sleepiness.  Physical exam findings include a BMI of 42  and Mallampati of 3.  I placed an order for an in lab sleep study.  Patient states he is agreeable to treating with CPAP if he is found to have sleep apnea.  We also discussed that with weight loss it is possible to improve and sometimes even resolve obstructive sleep apnea.  He is currently working with bariatrics and weight management.  Orders:  -     Diagnostic Sleep Study; Future; Expected date: 07/19/2024  2. Snoring  -     Ambulatory Referral to Sleep Medicine  -     Diagnostic Sleep Study; Future; Expected date: 07/19/2024  3. Morbid obesity with BMI of 40.0-44.9, adult ([facility])  -     Diagnostic Sleep Study; Future; Expected date: 07/19/2024         Counseling / Coordination of Care    I have spent a total time of 50 minutes on 07/18/24 in caring for this patient including Risks and benefits of tx options, Instructions for management, Patient and family education, Importance of tx compliance, Risk factor reductions, Impressions, Counseling / Coordination of care, Documenting in the medical record, Reviewing / ordering tests, medicine, procedures  , and Obtaining or reviewing history  .    The following instructions have been given to the patient today:    Patient Instructions   I placed an order today for an in lab sleep study.  Please schedule this test as soon as possible.  Once your study is completed, your results will be available within 1 to 2 weeks in [facility].  A nurse will call and review them with you.  If you are found to have sleep apnea, we would recommend starting CPAP and following up with you 31 to 90 days after you begin treatment.    Nursing Support:  When: Monday through Friday 7A-5PM except holidays  Where: Our direct line is [phone].    If you are having a true emergency please call 911.  In the event that the line is busy or it is after hours please leave a voice message and we will return your call.  Please speak clearly, leaving your full name, birth date, best number to reach you  and the reason for your call.   Medication refills: We will need the name of the medication, the dosage, the ordering provider, whether you get a 30 or 90 day refill, and the pharmacy name and address.  Medications will be ordered by the provider only.  Nurses cannot call in prescriptions.  Please allow 7 days for medication refills.  Physician requested updates: If your provider requested that you call with an update after starting medication, please be ready to provide us the medication and dosage, what time you take your medication, the time you attempt to fall asleep, time you fall asleep, when you wake up, and what time you get out of bed.  Sleep Study Results: We will contact you with sleep study results and/or next steps after the physician has reviewed your testing.           Sunita Juarez PA-C  [facility]

## 2024-08-09 ENCOUNTER — TELEPHONE (OUTPATIENT)
Age: 34
End: 2024-08-09

## 2024-08-09 NOTE — TELEPHONE ENCOUNTER
Pt called in to request refill for Zepbound. Pt will take last pen next Tuesday (08/13/24). Pt is doing good with current dose and is ready for the upgrade. Pt stated needs to go up because the current dose is not longer containing his appetite. Please, send script to [facility].

## 2024-08-12 DIAGNOSIS — E66.01 MORBID OBESITY WITH BMI OF 40.0-44.9, ADULT (HCC): Primary | ICD-10-CM

## 2024-08-12 RX ORDER — TIRZEPATIDE 5 MG/.5ML
5 INJECTION, SOLUTION SUBCUTANEOUS WEEKLY
Qty: 2 ML | Refills: 2 | Status: SHIPPED | OUTPATIENT
Start: 2024-08-12 | End: 2024-11-04

## 2024-09-05 ENCOUNTER — TELEPHONE (OUTPATIENT)
Age: 34
End: 2024-09-05

## 2024-09-05 NOTE — TELEPHONE ENCOUNTER
Patient calling in regarding new dose of Zepbound.  Patient states that would like to move up to 7.5mg even though the Zepbound 5mg was already ordered on 8/12/24  Patient states he is tolerating the 5mg well.  Please advise.

## 2024-09-06 DIAGNOSIS — E66.01 MORBID OBESITY WITH BMI OF 40.0-44.9, ADULT (HCC): Primary | ICD-10-CM

## 2024-09-06 RX ORDER — TIRZEPATIDE 7.5 MG/.5ML
7.5 INJECTION, SOLUTION SUBCUTANEOUS WEEKLY
Qty: 2 ML | Refills: 1 | Status: SHIPPED | OUTPATIENT
Start: 2024-09-06 | End: 2024-11-01

## 2024-10-01 ENCOUNTER — TELEPHONE (OUTPATIENT)
Age: 34
End: 2024-10-01

## 2024-10-01 DIAGNOSIS — E66.01 MORBID OBESITY WITH BMI OF 40.0-44.9, ADULT (HCC): Primary | ICD-10-CM

## 2024-10-01 RX ORDER — TIRZEPATIDE 10 MG/.5ML
10 INJECTION, SOLUTION SUBCUTANEOUS WEEKLY
Qty: 2 ML | Refills: 2 | Status: SHIPPED | OUTPATIENT
Start: 2024-10-01 | End: 2024-10-01 | Stop reason: SDUPTHER

## 2024-10-01 RX ORDER — TIRZEPATIDE 10 MG/.5ML
10 INJECTION, SOLUTION SUBCUTANEOUS WEEKLY
Qty: 2 ML | Refills: 2 | Status: SHIPPED | OUTPATIENT
Start: 2024-10-01 | End: 2024-11-26

## 2024-10-01 NOTE — TELEPHONE ENCOUNTER
Patient of Jono Jiménez PA-C. Currently on Zepbound 7.5 mg. Checking in. Doing well on dose. No symptoms or side effects noted. Would like to increase to next dose.  Please send to:  [facility].

## 2024-10-02 ENCOUNTER — OFFICE VISIT (OUTPATIENT)
Dept: FAMILY MEDICINE CLINIC | Facility: CLINIC | Age: 34
End: 2024-10-02
Payer: COMMERCIAL

## 2024-10-02 VITALS
TEMPERATURE: 97.7 F | SYSTOLIC BLOOD PRESSURE: 122 MMHG | OXYGEN SATURATION: 97 % | DIASTOLIC BLOOD PRESSURE: 72 MMHG | HEIGHT: 75 IN | BODY MASS INDEX: 38.64 KG/M2 | HEART RATE: 100 BPM | WEIGHT: 310.8 LBS

## 2024-10-02 DIAGNOSIS — G47.9 SLEEP DIFFICULTIES: ICD-10-CM

## 2024-10-02 PROCEDURE — 99213 OFFICE O/P EST LOW 20 MIN: CPT | Performed by: FAMILY MEDICINE

## 2024-10-02 RX ORDER — ESZOPICLONE 2 MG/1
2 TABLET, FILM COATED ORAL
Qty: 30 TABLET | Refills: 0 | Status: SHIPPED | OUTPATIENT
Start: 2024-10-02

## 2024-10-02 NOTE — TELEPHONE ENCOUNTER
Informed pt script was sent to the pharmacy. Stated he weighed himself this morning and his current weight is 310lbs.

## 2024-10-02 NOTE — PROGRESS NOTES
"Ambulatory Visit  Name: Gerry Perkins      : 1990      MRN: [id number]  Encounter Provider: Abner Escobedo MD  Encounter Date: 10/2/2024   Encounter department: [facility]    Assessment & Plan  Sleep difficulties  DMP reviewed, up-to-date on controlled substance agreement as well as urine drug screening.  Patient seldomly uses this medication.  Okay to refill today, sent to pharmacy.  Orders:    eszopiclone (LUNESTA) 2 mg tablet; Take 1 tablet (2 mg total) by mouth daily at bedtime as needed for sleep Take immediately before bedtime       History of Present Illness     He presents today for a follow-up on sleep difficulties.  Has been taking Lunesta sparingly.  Last fill was 2024.  States he does well with this medication.  Requesting refill today          Review of Systems   Constitutional:  Negative for activity change, appetite change, chills, fatigue and fever.   HENT:  Negative for congestion, rhinorrhea, sneezing and sore throat.    Eyes:  Negative for pain, discharge, redness and itching.   Respiratory:  Negative for cough, chest tightness, shortness of breath and wheezing.    Cardiovascular:  Negative for chest pain and palpitations.   Gastrointestinal:  Negative for abdominal pain, constipation, diarrhea, nausea and vomiting.   Musculoskeletal:  Negative for arthralgias, gait problem, myalgias and neck pain.   Skin:  Negative for rash.   Neurological:  Negative for dizziness, weakness, numbness and headaches.   Hematological:  Negative for adenopathy.   Psychiatric/Behavioral:  Positive for sleep disturbance. Negative for confusion and dysphoric mood. The patient is not nervous/anxious.    All other systems reviewed and are negative.          Objective     /72   Pulse 100   Temp 97.7 °F (36.5 °C) (Temporal)   Ht 6' 3\" (1.905 m)   Wt (!) 141 kg (310 lb 12.8 oz)   SpO2 97%   BMI 38.85 kg/m²     Physical Exam  Vitals reviewed.   Constitutional:      "  General: He is not in acute distress.     Appearance: Normal appearance. He is well-developed.   HENT:      Head: Normocephalic and atraumatic.      Right Ear: External ear normal.      Left Ear: External ear normal.      Nose: Nose normal.      Mouth/Throat:      Mouth: Mucous membranes are moist.   Eyes:      General: No scleral icterus.        Right eye: No discharge.         Left eye: No discharge.      Conjunctiva/sclera: Conjunctivae normal.   Cardiovascular:      Rate and Rhythm: Normal rate and regular rhythm.      Pulses: Normal pulses.      Heart sounds: Normal heart sounds. No murmur heard.  Pulmonary:      Effort: Pulmonary effort is normal. No respiratory distress.      Breath sounds: Normal breath sounds. No wheezing.   Musculoskeletal:         General: No swelling, tenderness, deformity or signs of injury. Normal range of motion.      Cervical back: Normal range of motion.   Skin:     General: Skin is warm and dry.      Capillary Refill: Capillary refill takes less than 2 seconds.      Findings: No lesion or rash.   Neurological:      General: No focal deficit present.      Mental Status: He is alert. Mental status is at baseline.      Motor: No weakness.      Gait: Gait normal.   Psychiatric:         Mood and Affect: Mood normal.         Behavior: Behavior normal.

## 2024-10-02 NOTE — ASSESSMENT & PLAN NOTE
DMP reviewed, up-to-date on controlled substance agreement as well as urine drug screening.  Patient seldomly uses this medication.  Okay to refill today, sent to pharmacy.  Orders:    eszopiclone (LUNESTA) 2 mg tablet; Take 1 tablet (2 mg total) by mouth daily at bedtime as needed for sleep Take immediately before bedtime

## 2024-10-08 ENCOUNTER — TELEPHONE (OUTPATIENT)
Age: 34
End: 2024-10-08

## 2024-10-08 NOTE — TELEPHONE ENCOUNTER
Patient called requesting refill for Zepbound 10 mg. Patient made aware medication was refilled on 10/1/24 for 2 mL with 2 refills to [facility] pharmacy. Patient instructed to contact the pharmacy to obtain refills of medication. Patient verbalized understanding.

## 2024-11-04 DIAGNOSIS — E66.01 MORBID OBESITY WITH BMI OF 40.0-44.9, ADULT (HCC): ICD-10-CM

## 2024-11-04 NOTE — TELEPHONE ENCOUNTER
How are you tolerating the medication?   [] Nausea  [] Vomiting  [] Diarrhea  [x] Asymptomatic  [] Other:    Last visit weight: 350 lb    Current weight: 295 lb    Date of last injection:  10/29/24    How many injections do you have left: one    Would you like an increase in your dose?  [x] Yes   [] No     Reason for call:   [x] Refill   [] Prior Auth  [] Other:     Office:   [x] Specialty/Provider - wt mgt / P[facility]    Medication: Zepbound    Dose/Frequency: 12.5mg/0.5mL    Quantity: 2mL    Pharmacy: [facility] [id number] [place], [place] - [address] [facility]     Does the patient have enough for 3 days?   [x] Yes   [] No - Send as HP to POD

## 2024-11-05 DIAGNOSIS — E66.01 MORBID OBESITY WITH BMI OF 40.0-44.9, ADULT (HCC): Primary | ICD-10-CM

## 2024-11-05 RX ORDER — TIRZEPATIDE 12.5 MG/.5ML
12.5 INJECTION, SOLUTION SUBCUTANEOUS WEEKLY
Qty: 2 ML | Refills: 1 | Status: SHIPPED | OUTPATIENT
Start: 2024-11-05 | End: 2024-12-31

## 2024-11-05 RX ORDER — TIRZEPATIDE 10 MG/.5ML
10 INJECTION, SOLUTION SUBCUTANEOUS WEEKLY
Qty: 2 ML | Refills: 0 | OUTPATIENT
Start: 2024-11-05 | End: 2024-12-31

## 2024-11-05 RX ORDER — TIRZEPATIDE 12.5 MG/.5ML
12.5 INJECTION, SOLUTION SUBCUTANEOUS WEEKLY
Qty: 2 ML | Refills: 1 | Status: SHIPPED | OUTPATIENT
Start: 2024-11-05 | End: 2024-11-05 | Stop reason: SDUPTHER

## 2024-11-20 DIAGNOSIS — R10.9 ABDOMINAL PAIN: ICD-10-CM

## 2024-11-26 ENCOUNTER — TELEPHONE (OUTPATIENT)
Dept: BARIATRICS | Facility: CLINIC | Age: 34
End: 2024-11-26

## 2024-12-09 ENCOUNTER — TELEPHONE (OUTPATIENT)
Age: 34
End: 2024-12-09

## 2024-12-09 NOTE — TELEPHONE ENCOUNTER
Patient called to cancel is MWM juwan due to illness, and realized it was scheduled in incorrect office.  Please call patient to reschedule MWM follow up in [facility]

## 2024-12-11 ENCOUNTER — OFFICE VISIT (OUTPATIENT)
Dept: BARIATRICS | Facility: CLINIC | Age: 34
End: 2024-12-11
Payer: COMMERCIAL

## 2024-12-11 VITALS
HEART RATE: 89 BPM | HEIGHT: 75 IN | DIASTOLIC BLOOD PRESSURE: 66 MMHG | WEIGHT: 286 LBS | BODY MASS INDEX: 35.56 KG/M2 | OXYGEN SATURATION: 99 % | SYSTOLIC BLOOD PRESSURE: 110 MMHG | TEMPERATURE: 97.8 F

## 2024-12-11 DIAGNOSIS — E66.813 CLASS 3 OBESITY: Primary | ICD-10-CM

## 2024-12-11 DIAGNOSIS — G47.00 INSOMNIA: ICD-10-CM

## 2024-12-11 DIAGNOSIS — K21.9 GASTROESOPHAGEAL REFLUX DISEASE, UNSPECIFIED WHETHER ESOPHAGITIS PRESENT: ICD-10-CM

## 2024-12-11 PROCEDURE — 99213 OFFICE O/P EST LOW 20 MIN: CPT | Performed by: STUDENT IN AN ORGANIZED HEALTH CARE EDUCATION/TRAINING PROGRAM

## 2024-12-11 RX ORDER — TIRZEPATIDE 15 MG/.5ML
15 INJECTION, SOLUTION SUBCUTANEOUS WEEKLY
Qty: 2 ML | Refills: 6 | Status: SHIPPED | OUTPATIENT
Start: 2024-12-11

## 2024-12-11 NOTE — PROGRESS NOTES
Assessment & Plan  Class 3 obesity  Increase Zepbound from 12.5 to 15 mg    Recommended to not skip meals.  Spoke about practicing mindful eating with frequent meals and portion control.  Recommend to avoid snacking at bedtime especially with a high carbohydrate load and to be consistent with protein intake. Recommend adequate hydration which is at least half your body weight in ounces unless medically contraindicated (ie. Systolic heart failure) with also considering GI losses and medication induced fluid loss if applicable. Recommend incorporating resistance training and/or strength training to help improve metabolic rate if able to tolerate.      Initial weight: 348  Current weight: 286  TBW loss%: 17 %  Gastroesophageal reflux disease  Continue with omeprazole  Insomnia  Continue with Lunesta     Return in about 3 months (around 3/11/2025) for followup .       Total time spent reviewing chart, interviewing patient, examining patient, discussing plan, answering all questions, and documentin min. Most recent notes and records were reviewed.       ______________________________________________________________________        Subjective:     Chief Complaint   Patient presents with    Follow-up     6mo mwm fup Waist - 48.7       HPI: Gerry Perkins  is a 34 y.o. male with past medical history of class 3 obesity, GERD, insomnia  presents to the clinic for follow-up.  Patient reports he is doing well with the medication overall.  He states he is still having some cravings but would like to increase in dose to help further promote his weight loss    Current Medication: Zepbound 12.5  Medication Side effects: None  Weight loss plan:  Conservative Program.       Diet:   eggs toast jelly protein shake (breakfast),  Lunch- pasta  Dinner- Chicken hamburger, steak.     Hydration: a gallon a day   Exercise:    Review Of Systems:  Constitutional:  Negative for diaphoresis.   Gastrointestinal:  Negative for abdominal pain,  "and vomiting.   Skin:  Negative for pallor.   Psychiatric/Behavioral:  Negative for behavioral problems, confusion, dysphoric mood and hallucinations.    All other systems reviewed and are negative.     Objective:  /66 (BP Location: Left arm, Patient Position: Sitting, Cuff Size: Adult)   Pulse 89   Temp 97.8 °F (36.6 °C) (Temporal)   Ht 6' 3\" (1.905 m)   Wt 130 kg (286 lb)   SpO2 99%   BMI 35.75 kg/m²     Wt Readings from Last 10 Encounters:   12/11/24 130 kg (286 lb)   10/02/24 (!) 141 kg (310 lb 12.8 oz)   07/18/24 (!) 154 kg (339 lb)   07/12/24 (!) 154 kg (340 lb 6.4 oz)   06/24/24 (!) 158 kg (348 lb)   06/04/24 (!) 158 kg (348 lb)   04/02/24 (!) 158 kg (348 lb)   03/28/24 (!) 159 kg (350 lb 5 oz)   02/15/24 (!) 160 kg (353 lb 6.4 oz)   12/20/23 (!) 157 kg (346 lb 2 oz)       Physical Exam  Constitutional:       General: No acute distress.  Well-nourished  HENT:      Head: Normocephalic and atraumatic.   Eyes:      Extraocular Movements: Extraocular movements intact.      Conjunctiva/sclera: Conjunctivae normal.      Pupils: Pupils are equal, round, and reactive to light.   Cardiovascular:      Rate and Rhythm: Normal rate.   Pulmonary:      Effort: Pulmonary effort is normal.   Neurological:      General: No focal deficit present.  AO x 3     Mental Status: Alert and oriented to person, place, and time. Mental status is at baseline.   Psychiatric:         Mood and Affect: Mood normal.         Behavior: Behavior normal.     Labs and Imaging  Recent labs and imaging have been personally reviewed.    Lab Results   Component Value Date    WBC 9.86 12/20/2023    HGB 16.6 12/20/2023    HCT 47.1 12/20/2023    MCV 87 12/20/2023     12/20/2023       Lab Results   Component Value Date     03/24/2016    SODIUM 137 12/20/2023    K 3.7 12/20/2023     12/20/2023    CO2 26 12/20/2023    AGAP 7 12/20/2023    BUN 12 12/20/2023    CREATININE 0.99 12/20/2023    GLUC 133 12/20/2023    GLUF 82 " 05/22/2023    CALCIUM 9.1 12/20/2023    AST 28 12/20/2023    ALT 47 12/20/2023    ALKPHOS 48 12/20/2023    PROT 7.5 03/24/2016    TP 7.4 12/20/2023    BILITOT 2.1 (H) 03/24/2016    TBILI 1.31 (H) 12/20/2023    EGFR 99 12/20/2023       Lab Results   Component Value Date    HGBA1C 5.2 05/22/2023       Lab Results   Component Value Date    WXI7ZNENAWMU 2.335 05/22/2023       Lab Results   Component Value Date    CHOLESTEROL 151 05/22/2023       Lab Results   Component Value Date    HDL 36 (L) 05/22/2023       Lab Results   Component Value Date    TRIG 108 05/22/2023       Lab Results   Component Value Date    LDLCALC 93 05/22/2023

## 2024-12-16 ENCOUNTER — TELEPHONE (OUTPATIENT)
Dept: BARIATRICS | Facility: CLINIC | Age: 34
End: 2024-12-16

## 2024-12-16 NOTE — TELEPHONE ENCOUNTER
PA for Zepbound 15mg SUBMITTED to [facility]     via    [x]CMM-KEY: [id number]  []Surescripts-Case ID #   []Availity-Auth ID # NDC #   []Faxed to plan   []Other website   []Phone call Case ID #     []PA sent as URGENT    All office notes, labs and other pertaining documents and studies sent. Clinical questions answered. Awaiting determination from insurance company.     Turnaround time for your insurance to make a decision on your Prior Authorization can take 7-21 business days.

## 2025-03-11 ENCOUNTER — TELEPHONE (OUTPATIENT)
Dept: BARIATRICS | Facility: CLINIC | Age: 35
End: 2025-03-11

## 2025-03-24 ENCOUNTER — NURSE TRIAGE (OUTPATIENT)
Age: 35
End: 2025-03-24

## 2025-03-24 NOTE — TELEPHONE ENCOUNTER
"Reason for Disposition   MODERATE headache (e.g., interferes with normal activities) present > 24 hours and unexplained    Answer Assessment - Initial Assessment Questions  1. LOCATION: \"Where does it hurt?\"       Back of head- feels like pressure  2. ONSET: \"When did the headache start?\" (e.g., minutes, hours, days)       3/20/25  3. PATTERN: \"Does the pain come and go, or has it been constant since it started?\"      Constant pressure in the back of his head  4. SEVERITY: \"How bad is the pain?\" and \"What does it keep you from doing?\"  (e.g., Scale 1-10; mild, moderate, or severe)      Moderate to severe  5. RECURRENT SYMPTOM: \"Have you ever had headaches before?\" If Yes, ask: \"When was the last time?\" and \"What happened that time?\"       denies  6. CAUSE: \"What do you think is causing the headache?\"      Unsure- is concerned regarding sinus/ears  7. MIGRAINE: \"Have you been diagnosed with migraine headaches?\" If Yes, ask: \"Is this headache similar?\"       denies  8. HEAD INJURY: \"Has there been any recent injury to the head?\"       denies  9. OTHER SYMPTOMS: \"Do you have any other symptoms?\" (e.g., fever, stiff neck, eye pain, sore throat, cold symptoms)      Increase in dizziness upon standing, no changes to vision, no neurological changes,   10. PREGNANCY: \"Is there any chance you are pregnant?\" \"When was your last menstrual period?\"        N/A    Protocols used: Headache-Adult-[place]  FOLLOW UP: headache and dizziness    REASON FOR CONVERSATION: Headache    SYMPTOMS: pressure in the back of his head and clogged R ear    OTHER: Patient will try OTC pain relief, ice and OTC antihistamine for relief.     DISPOSITION: See Today or Tomorrow in Office Patient was scheduled for a visit on 3/25/25    "

## 2025-03-25 ENCOUNTER — OFFICE VISIT (OUTPATIENT)
Dept: FAMILY MEDICINE CLINIC | Facility: CLINIC | Age: 35
End: 2025-03-25
Payer: COMMERCIAL

## 2025-03-25 VITALS
HEART RATE: 106 BPM | BODY MASS INDEX: 34.82 KG/M2 | SYSTOLIC BLOOD PRESSURE: 100 MMHG | OXYGEN SATURATION: 98 % | WEIGHT: 280 LBS | DIASTOLIC BLOOD PRESSURE: 80 MMHG | HEIGHT: 75 IN | TEMPERATURE: 97.4 F

## 2025-03-25 DIAGNOSIS — R51.9 ACUTE NONINTRACTABLE HEADACHE, UNSPECIFIED HEADACHE TYPE: ICD-10-CM

## 2025-03-25 DIAGNOSIS — H69.93 DYSFUNCTION OF BOTH EUSTACHIAN TUBES: Primary | ICD-10-CM

## 2025-03-25 DIAGNOSIS — R42 DIZZINESS: ICD-10-CM

## 2025-03-25 PROCEDURE — 99214 OFFICE O/P EST MOD 30 MIN: CPT | Performed by: NURSE PRACTITIONER

## 2025-03-25 RX ORDER — MECLIZINE HCL 12.5 MG 12.5 MG/1
12.5 TABLET ORAL EVERY 8 HOURS PRN
Qty: 15 TABLET | Refills: 0 | Status: SHIPPED | OUTPATIENT
Start: 2025-03-25

## 2025-03-25 RX ORDER — PREDNISONE 20 MG/1
40 TABLET ORAL DAILY
Qty: 6 TABLET | Refills: 0 | Status: SHIPPED | OUTPATIENT
Start: 2025-03-25 | End: 2025-03-28

## 2025-03-25 NOTE — PROGRESS NOTES
Name: Gerry Perkins      : 1990      MRN: [id number]  Encounter Provider: RICK Terry  Encounter Date: 3/25/2025   Encounter department: [facility]  :  Assessment & Plan  Dysfunction of both eustachian tubes  Start Flonase, this is an intranasal corticosteroid. This is 1-2 sprays each nostril once daily. Please be aware that this can cause nasal mucosa irritation. Stop using if you experience any nose bleeds. This can be used for allergy symptoms as well as ear discomfort/pressure.     Start prednisone, this is the steroid. 40mg daily for 3 days. Take with food. It's better to take it earlier in the day than later as it could keep you up at night. Do not mix with NSAIDs such as aleve, ibuprofen, advil, motrin.   Orders:  •  predniSONE 20 mg tablet; Take 2 tablets (40 mg total) by mouth daily for 3 days    Dizziness  Meclizine as needed. If no improvement recommend vestibular therapy.     Orders:  •  meclizine (ANTIVERT) 12.5 MG tablet; Take 1 tablet (12.5 mg total) by mouth every 8 (eight) hours as needed for dizziness    Acute nonintractable headache, unspecified headache type  Neuro exam normal. Likely related to allergies/ seasonal changes-associated with symptoms listed above. Treatment listed above.   Reviewed red flag symptoms/ when to go to ER for any changes.               History of Present Illness   Here to discuss headache/ dizziness.   Pressure back of head going to ears- thought it was ear infection. Triggering vertigo as well. Tried allergy medicine-mild improvement. Ears feel clogged. No head injury. Increased stress recently.       Review of Systems   Constitutional:  Negative for chills and fever.   HENT:  Positive for ear pain. Negative for congestion.    Eyes:  Negative for discharge.   Respiratory:  Negative for shortness of breath.    Cardiovascular:  Negative for chest pain.   Gastrointestinal:  Negative for constipation and diarrhea.  "  Genitourinary:  Negative for difficulty urinating.   Musculoskeletal:  Negative for joint swelling.   Skin:  Negative for rash.   Neurological:  Positive for dizziness and headaches.   Hematological:  Negative for adenopathy.   Psychiatric/Behavioral:  The patient is not nervous/anxious.        Objective   /80   Pulse (!) 106   Temp (!) 97.4 °F (36.3 °C)   Ht 6' 3\" (1.905 m)   Wt 127 kg (280 lb)   SpO2 98%   BMI 35.00 kg/m²      Physical Exam  Vitals and nursing note reviewed.   Constitutional:       General: He is not in acute distress.     Appearance: Normal appearance. He is obese. He is not ill-appearing, toxic-appearing or diaphoretic.   HENT:      Head: Normocephalic and atraumatic.      Right Ear: External ear normal. A middle ear effusion is present. There is no impacted cerumen. Tympanic membrane is retracted. Tympanic membrane is not perforated, erythematous or bulging.      Left Ear: External ear normal. A middle ear effusion is present. There is no impacted cerumen. Tympanic membrane is retracted. Tympanic membrane is not perforated, erythematous or bulging.      Ears:      Comments: Fluid worse right ear than left      Nose: Nose normal.      Mouth/Throat:      Mouth: Mucous membranes are moist.      Pharynx: Oropharynx is clear. No oropharyngeal exudate or posterior oropharyngeal erythema.   Eyes:      General: Lids are normal. No scleral icterus.        Right eye: No discharge.         Left eye: No discharge.      Extraocular Movements: Extraocular movements intact.      Conjunctiva/sclera: Conjunctivae normal.      Pupils: Pupils are equal, round, and reactive to light.   Cardiovascular:      Rate and Rhythm: Normal rate and regular rhythm. No extrasystoles are present.     Heart sounds: S1 normal and S2 normal. No murmur heard.  Pulmonary:      Effort: Pulmonary effort is normal. No respiratory distress.      Breath sounds: Normal breath sounds. No stridor or decreased air movement. No " wheezing or rhonchi.   Musculoskeletal:         General: Normal range of motion.      Cervical back: Normal range of motion and neck supple.   Skin:     General: Skin is warm and dry.   Neurological:      General: No focal deficit present.      Mental Status: He is alert and oriented to person, place, and time. Mental status is at baseline.      Cranial Nerves: No cranial nerve deficit.      Sensory: No sensory deficit.      Motor: No weakness.      Coordination: Coordination normal.      Gait: Gait normal.   Psychiatric:         Attention and Perception: Attention and perception normal.         Mood and Affect: Mood and affect normal.         Speech: Speech normal.         Behavior: Behavior is cooperative.         Cognition and Memory: Cognition normal.         Judgment: Judgment normal.

## 2025-03-25 NOTE — PATIENT INSTRUCTIONS
Start Flonase, this is an intranasal corticosteroid. This is 1-2 sprays each nostril once daily. Please be aware that this can cause nasal mucosa irritation. Stop using if you experience any nose bleeds. This can be used for allergy symptoms as well as ear discomfort/pressure.     Start prednisone, this is the steroid. 40mg daily for 3 days. Take with food. It's better to take it earlier in the day than later as it could keep you up at night. Do not mix with NSAIDs such as aleve, ibuprofen, advil, motrin.     Can use meclizine as needed.     Stay hydrated.     Please call the office if you are experiencing any worsening of symptoms or no symptom improvement.

## 2025-03-31 ENCOUNTER — TELEPHONE (OUTPATIENT)
Age: 35
End: 2025-03-31

## 2025-03-31 NOTE — TELEPHONE ENCOUNTER
Patient called, request a sooner appointment , F/U to 3/25/2025 issue.Patient states pressure in ears is concerning,and not getting better.  Confirmed provider availability, patient request  a callback with suggestions. Please advise patient at [phone], if any further questions.

## 2025-03-31 NOTE — TELEPHONE ENCOUNTER
"Returned pt's call; pt says ear infection has mostly cleared up but the head pressure he's been feeling with for last 2 weeks continues and feels \"wrong\". Pt did have some neck stiffness yesterday, but is mostly gone today while feeling like someone \"has their hand or fist wrapped around back of head\".     Offered pt a SD with a different provider for tomorrow, but pt would prefer to see DG as she saw him for this issue on 3/25/25. Pt is also considering going to the ER if feeling persists/gets worse but would like an appt regardless. Offered SD w/ DG on 4/3/25; pt will call back to cancel if needed should he see ER before appt.  "

## 2025-04-02 ENCOUNTER — APPOINTMENT (EMERGENCY)
Dept: CT IMAGING | Facility: HOSPITAL | Age: 35
End: 2025-04-02
Payer: COMMERCIAL

## 2025-04-02 ENCOUNTER — TELEPHONE (OUTPATIENT)
Age: 35
End: 2025-04-02

## 2025-04-02 ENCOUNTER — HOSPITAL ENCOUNTER (EMERGENCY)
Facility: HOSPITAL | Age: 35
Discharge: HOME | End: 2025-04-02
Attending: EMERGENCY MEDICINE
Payer: COMMERCIAL

## 2025-04-02 VITALS
BODY MASS INDEX: 35.11 KG/M2 | SYSTOLIC BLOOD PRESSURE: 115 MMHG | HEIGHT: 75 IN | OXYGEN SATURATION: 95 % | TEMPERATURE: 98.2 F | DIASTOLIC BLOOD PRESSURE: 61 MMHG | WEIGHT: 282.41 LBS | RESPIRATION RATE: 18 BRPM | HEART RATE: 79 BPM

## 2025-04-02 DIAGNOSIS — M54.2 NECK PAIN: ICD-10-CM

## 2025-04-02 DIAGNOSIS — R51.9 HEAD PAIN: Primary | ICD-10-CM

## 2025-04-02 LAB
ANION GAP SERPL CALCULATED.3IONS-SCNC: 6 MMOL/L (ref 4–13)
BASOPHILS # BLD AUTO: 0.05 THOUSANDS/ÂΜL (ref 0–0.1)
BASOPHILS NFR BLD AUTO: 1 % (ref 0–1)
BUN SERPL-MCNC: 8 MG/DL (ref 5–25)
CALCIUM SERPL-MCNC: 9.4 MG/DL (ref 8.4–10.2)
CHLORIDE SERPL-SCNC: 102 MMOL/L (ref 96–108)
CO2 SERPL-SCNC: 28 MMOL/L (ref 21–32)
CREAT SERPL-MCNC: 0.85 MG/DL (ref 0.6–1.3)
EOSINOPHIL # BLD AUTO: 0.12 THOUSAND/ÂΜL (ref 0–0.61)
EOSINOPHIL NFR BLD AUTO: 1 % (ref 0–6)
ERYTHROCYTE [DISTWIDTH] IN BLOOD BY AUTOMATED COUNT: 13.2 % (ref 11.6–15.1)
GFR SERPL CREATININE-BSD FRML MDRD: 113 ML/MIN/1.73SQ M
GLUCOSE SERPL-MCNC: 89 MG/DL (ref 65–140)
HCT VFR BLD AUTO: 52.9 % (ref 36.5–49.3)
HGB BLD-MCNC: 17.9 G/DL (ref 12–17)
IMM GRANULOCYTES # BLD AUTO: 0.04 THOUSAND/UL (ref 0–0.2)
IMM GRANULOCYTES NFR BLD AUTO: 0 % (ref 0–2)
LYMPHOCYTES # BLD AUTO: 2.15 THOUSANDS/ÂΜL (ref 0.6–4.47)
LYMPHOCYTES NFR BLD AUTO: 21 % (ref 14–44)
MCH RBC QN AUTO: 31 PG (ref 26.8–34.3)
MCHC RBC AUTO-ENTMCNC: 33.8 G/DL (ref 31.4–37.4)
MCV RBC AUTO: 92 FL (ref 82–98)
MONOCYTES # BLD AUTO: 0.67 THOUSAND/ÂΜL (ref 0.17–1.22)
MONOCYTES NFR BLD AUTO: 6 % (ref 4–12)
NEUTROPHILS # BLD AUTO: 7.4 THOUSANDS/ÂΜL (ref 1.85–7.62)
NEUTS SEG NFR BLD AUTO: 71 % (ref 43–75)
NRBC BLD AUTO-RTO: 0 /100 WBCS
PLATELET # BLD AUTO: 339 THOUSANDS/UL (ref 149–390)
PMV BLD AUTO: 9.6 FL (ref 8.9–12.7)
POTASSIUM SERPL-SCNC: 4 MMOL/L (ref 3.5–5.3)
RBC # BLD AUTO: 5.78 MILLION/UL (ref 3.88–5.62)
SODIUM SERPL-SCNC: 136 MMOL/L (ref 135–147)
WBC # BLD AUTO: 10.43 THOUSAND/UL (ref 4.31–10.16)

## 2025-04-02 PROCEDURE — 99283 EMERGENCY DEPT VISIT LOW MDM: CPT

## 2025-04-02 PROCEDURE — 99284 EMERGENCY DEPT VISIT MOD MDM: CPT | Performed by: PHYSICIAN ASSISTANT

## 2025-04-02 PROCEDURE — 70481 CT ORBIT/EAR/FOSSA W/DYE: CPT

## 2025-04-02 PROCEDURE — 36415 COLL VENOUS BLD VENIPUNCTURE: CPT | Performed by: PHYSICIAN ASSISTANT

## 2025-04-02 PROCEDURE — 85025 COMPLETE CBC W/AUTO DIFF WBC: CPT | Performed by: PHYSICIAN ASSISTANT

## 2025-04-02 PROCEDURE — 96374 THER/PROPH/DIAG INJ IV PUSH: CPT

## 2025-04-02 PROCEDURE — 80048 BASIC METABOLIC PNL TOTAL CA: CPT | Performed by: PHYSICIAN ASSISTANT

## 2025-04-02 RX ORDER — CYCLOBENZAPRINE HCL 10 MG
10 TABLET ORAL 2 TIMES DAILY PRN
Qty: 10 TABLET | Refills: 0 | Status: SHIPPED | OUTPATIENT
Start: 2025-04-02 | End: 2025-04-03

## 2025-04-02 RX ORDER — NAPROXEN 500 MG/1
500 TABLET ORAL 2 TIMES DAILY WITH MEALS
Qty: 30 TABLET | Refills: 0 | Status: SHIPPED | OUTPATIENT
Start: 2025-04-02

## 2025-04-02 RX ORDER — KETOROLAC TROMETHAMINE 30 MG/ML
15 INJECTION, SOLUTION INTRAMUSCULAR; INTRAVENOUS ONCE
Status: COMPLETED | OUTPATIENT
Start: 2025-04-02 | End: 2025-04-02

## 2025-04-02 RX ADMIN — KETOROLAC TROMETHAMINE 15 MG: 30 INJECTION, SOLUTION INTRAMUSCULAR; INTRAVENOUS at 12:59

## 2025-04-02 RX ADMIN — IOHEXOL 85 ML: 350 INJECTION, SOLUTION INTRAVENOUS at 13:46

## 2025-04-02 NOTE — DISCHARGE INSTRUCTIONS
DISCHARGE INSTRUCTIONS:    FOLLOW UP WITH YOUR PRIMARY CARE PROVIDER OR THE [facility]. MAKE AN APPOINTMENT TO BE SEEN.     TAKE MEDICATION AS PRESCRIBED. IF RASH, SHORTNESS OF BREATH OR TROUBLE SWALLOWING, STOP TAKING THE MEDICATION AND BE SEEN.     REST.    IF SYMPTOMS WORSEN OR NEW SYMPTOMS ARISE, RETURN TO THE ER TO BE SEEN.

## 2025-04-02 NOTE — TELEPHONE ENCOUNTER
Pt was prescribed a medication at the ER and was told by the pharmacist that it has an ingredient he is allergic to. I attempted to contact the clinical line but no one answered. Pt has an appt tomorrow and stated he will ask Swetha about the medication tomorrow.

## 2025-04-02 NOTE — ED PROVIDER NOTES
Time reflects when diagnosis was documented in both MDM as applicable and the Disposition within this note       Time User Action Codes Description Comment    4/2/2025  3:53 PM Nel Muñoz Add [R51.9] Head pain     4/2/2025  4:02 PM Muñoz, Nel JC Add [M54.2] Neck pain           ED Disposition       ED Disposition   Discharge    Condition   Stable    Date/Time   Wed Apr 2, 2025  3:53 PM    Comment   Gerry ARRIOLA Roadarmel discharge to home/self care.                   Assessment & Plan       Medical Decision Making  34y.o male presents to the ER for right posterior head pain for 2 weeks. Patient is mildly tachycardic. Otherwise vitals are stable. Patient is in no acute distress. On exam, pupils are equal and reactive. EOM intact and non-tender. No hemotympanum or signs of ear infection. Moist mucous membranes seen. No trouble swallowing or handling secretions. No neck swelling. Breathing is non-labored. No tachypnea or accessory muscle use. Lungs are clear. Heart is regular rhythm. Abdomen is not distended. Patient neurologically intact. No spinal tenderness to palpation. DDX consists of but not limited to: mastoiditis, strain, migraine. Will check labs and imaging.    1340 WBC(!): 10.43    1340 Hemoglobin(!): 17.9    1340 Platelet Count: 339    1340 Basic metabolic panel - Normal.    1447 CT orbits/temporal bones/skull base w contrast  Normal temporal bone CT. No evidence of mastoiditis, as clinically questioned.     Partially imaged mild sinus disease.    1550      Informed patient of lab and imaging findings. Patient reports improvement in symptoms. Will discharge.    The management plan was discussed in detail with the patient at bedside and all questions were answered.  Prior to discharge, we provided both verbal and written instructions.  We discussed with the patient the signs and symptoms for which to return to the emergency department.  All questions were answered and patient was comfortable with the  plan of care and discharged to home.  Instructed the patient to follow up with the primary care provider and/or specialist provided and their written instructions.  The patient verbalized understanding of our discussion and plan of care, and agrees to return to the Emergency Department for concerns and progression of illness.    At discharge, I instructed the patient to:  -follow up with pcp  -take Naproxen and Flexeril as prescribed  -rest  -follow up with spine program  -return to the ER if symptoms worsened or new symptoms arose  Patient agreed to this plan and was stable at time of discharge.     Problems Addressed:  Head pain: acute illness or injury  Neck pain: acute illness or injury    Amount and/or Complexity of Data Reviewed  Independent Historian:      Details: Patient is historian  Labs: ordered. Decision-making details documented in ED Course.  Radiology: ordered. Decision-making details documented in ED Course.    Risk  Prescription drug management.        ED Course as of 04/02/25 1657   Wed Apr 02, 2025   1340 WBC(!): 10.43   1340 Hemoglobin(!): 17.9   1340 Platelet Count: 339   1340 Basic metabolic panel  Normal.   1447 CT orbits/temporal bones/skull base w contrast  Normal temporal bone CT. No evidence of mastoiditis, as clinically questioned.     Partially imaged mild sinus disease.         Medications   ketorolac (TORADOL) injection 15 mg (15 mg Intravenous Given 4/2/25 1259)   iohexol (OMNIPAQUE) 350 MG/ML injection (SINGLE-DOSE) 85 mL (85 mL Intravenous Given 4/2/25 1346)       ED Risk Strat Scores                            SBIRT 22yo+      Flowsheet Row Most Recent Value   Initial Alcohol Screen: US AUDIT-C     1. How often do you have a drink containing alcohol? 0 Filed at: 04/02/2025 7233   2. How many drinks containing alcohol do you have on a typical day you are drinking?  0 Filed at: 04/02/2025 8917   3a. Male UNDER 65: How often do you have five or more drinks on one occasion? 0 Filed  at: 04/02/2025 1157   Audit-C Score 0 Filed at: 04/02/2025 1150   ABIGAIL: How many times in the past year have you...    Used an illegal drug or used a prescription medication for non-medical reasons? Never Filed at: 04/02/2025 1155                            History of Present Illness       Chief Complaint   Patient presents with    Headache     Pt reports R-sided back head pain/pressure. Denies other symptoms, hx of vertigo        Past Medical History:   Diagnosis Date    Anxiety     Asthma     Calculus of gallbladder without biliary obstruction 12/22/2020    Change in bowel habits 8/3/2018    Depression     Diarrhea in adult patient 8/3/2018    Does not have health insurance 9/28/2021    Fracture of nasal bone     GERD (gastroesophageal reflux disease)     Gross hematuria     Orthostatic hypotension     Rectal bleeding 9/28/2021      Past Surgical History:   Procedure Laterality Date    CHOLANGIOGRAM N/A 07/31/2023    Procedure: CHOLANGIOGRAM;  Surgeon: Sandor Heredia MD;  Location: [facility];  Service: General    CHOLECYSTECTOMY      CHOLECYSTECTOMY LAPAROSCOPIC N/A 07/31/2023    Procedure: CHOLECYSTECTOMY LAPAROSCOPIC;  Surgeon: Sandor Heredia MD;  Location: [facility];  Service: General    WRIST SURGERY        Family History   Problem Relation Age of Onset    No Known Problems Mother     Diabetes Father       Social History     Tobacco Use    Smoking status: Former     Current packs/day: 0.00     Average packs/day: 2.0 packs/day for 5.0 years (10.0 ttl pk-yrs)     Types: Cigarettes     Start date: 1/1/2010     Quit date: 4/1/2015     Years since quitting: 10.0    Smokeless tobacco: Never   Vaping Use    Vaping status: Never Used   Substance Use Topics    Alcohol use: No    Drug use: Yes     Types: Marijuana     Comment: Has Medical Card - rarely uses for sleep      E-Cigarette/Vaping    E-Cigarette Use Never User       E-Cigarette/Vaping Substances    Nicotine No     THC No     CBD No     Flavoring No      Other No     Unknown No       I have reviewed and agree with the history as documented.     34y.o male with PMH of anxiety, asthma, gallstones, depression, GERD and orthostatic hypotension presents to the ER for right posterior head pain for 2 weeks. Patient has been taking Motrin intermittently when pain gets really bad. He describes his pain as sharp and non-radiating. Pain is constant. He was recently treated for bilateral ear infections. He denies fever, chills, URI symptoms, chest pain, dyspnea, N/V/D, abdominal pain, weakness, paresthesias or vision changes. Patient states this does not feel like a headache.      History provided by:  Patient   used: No        Review of Systems   Constitutional:  Negative for activity change, appetite change, chills and fever.   HENT:  Negative for congestion, drooling, ear discharge, ear pain, facial swelling, rhinorrhea and sore throat.    Eyes:  Negative for redness.   Respiratory:  Negative for cough and shortness of breath.    Cardiovascular:  Negative for chest pain.   Gastrointestinal:  Negative for abdominal pain, diarrhea, nausea and vomiting.   Musculoskeletal:  Positive for neck pain. Negative for back pain and neck stiffness.   Skin:  Negative for rash.   Allergic/Immunologic: Negative for food allergies.   Neurological:  Negative for weakness, numbness and headaches.           Objective       ED Triage Vitals [04/02/25 1141]   Temperature Pulse Blood Pressure Respirations SpO2 Patient Position - Orthostatic VS   98.2 °F (36.8 °C) 105 109/81 18 99 % Sitting      Temp Source Heart Rate Source BP Location FiO2 (%) Pain Score    Oral Monitor Right arm -- 2      Vitals      Date and Time Temp Pulse SpO2 Resp BP Pain Score FACES Pain Rating User   04/02/25 1544 -- 79 95 % 18 115/61 No Pain -- TS   04/02/25 1259 -- -- -- -- -- 4 -- TS   04/02/25 1141 98.2 °F (36.8 °C) 105 99 % 18 109/81 2 -- CO            Physical Exam  Vitals and nursing note  reviewed.   Constitutional:       General: He is not in acute distress.     Appearance: He is not toxic-appearing.   HENT:      Head: Normocephalic and atraumatic.        Right Ear: Tympanic membrane and ear canal normal. No drainage, swelling or tenderness. No foreign body. There is mastoid tenderness. No hemotympanum. Tympanic membrane is not injected, perforated or erythematous.      Left Ear: Tympanic membrane, ear canal and external ear normal. No drainage, swelling or tenderness. No foreign body. No mastoid tenderness. No hemotympanum. Tympanic membrane is not injected, perforated or erythematous.      Nose: Nose normal.      Mouth/Throat:      Lips: Pink. No lesions.      Mouth: Mucous membranes are moist.   Eyes:      Extraocular Movements: Extraocular movements intact.      Conjunctiva/sclera: Conjunctivae normal.      Pupils: Pupils are equal, round, and reactive to light.   Neck:      Trachea: Phonation normal. No tracheal deviation.   Cardiovascular:      Rate and Rhythm: Normal rate and regular rhythm.      Heart sounds: Normal heart sounds, S1 normal and S2 normal. No murmur heard.     No friction rub. No gallop.   Pulmonary:      Effort: Pulmonary effort is normal. No respiratory distress.      Breath sounds: Normal breath sounds. No decreased breath sounds, wheezing, rhonchi or rales.   Chest:      Chest wall: No tenderness.   Abdominal:      General: There is no distension.   Musculoskeletal:         General: No deformity. Normal range of motion.      Cervical back: Normal, normal range of motion and neck supple.      Thoracic back: Normal.      Lumbar back: Normal.   Skin:     General: Skin is warm and dry.      Findings: No rash.   Neurological:      Mental Status: He is alert.      GCS: GCS eye subscore is 4. GCS verbal subscore is 5. GCS motor subscore is 6.      Cranial Nerves: No cranial nerve deficit, dysarthria or facial asymmetry.      Sensory: Sensation is intact. No sensory deficit.       Motor: No weakness, abnormal muscle tone or seizure activity.      Gait: Gait normal.   Psychiatric:         Mood and Affect: Mood normal.         Results Reviewed       Procedure Component Value Units Date/Time    Basic metabolic panel [[id number]] Collected: 04/02/25 1302    Lab Status: Final result Specimen: Blood from Arm, Right Updated: 04/02/25 1326     Sodium 136 mmol/L      Potassium 4.0 mmol/L      Chloride 102 mmol/L      CO2 28 mmol/L      ANION GAP 6 mmol/L      BUN 8 mg/dL      Creatinine 0.85 mg/dL      Glucose 89 mg/dL      Calcium 9.4 mg/dL      eGFR 113 ml/min/1.73sq m     Narrative:      National Kidney Disease Foundation guidelines for Chronic Kidney Disease (CKD):     Stage 1 with normal or high GFR (GFR > 90 mL/min/1.73 square meters)    Stage 2 Mild CKD (GFR = 60-89 mL/min/1.73 square meters)    Stage 3A Moderate CKD (GFR = 45-59 mL/min/1.73 square meters)    Stage 3B Moderate CKD (GFR = 30-44 mL/min/1.73 square meters)    Stage 4 Severe CKD (GFR = 15-29 mL/min/1.73 square meters)    Stage 5 End Stage CKD (GFR <15 mL/min/1.73 square meters)  Note: GFR calculation is accurate only with a steady state creatinine    CBC and differential [[id number]]  (Abnormal) Collected: 04/02/25 1302    Lab Status: Final result Specimen: Blood from Arm, Right Updated: 04/02/25 1310     WBC 10.43 Thousand/uL      RBC 5.78 Million/uL      Hemoglobin 17.9 g/dL      Hematocrit 52.9 %      MCV 92 fL      MCH 31.0 pg      MCHC 33.8 g/dL      RDW 13.2 %      MPV 9.6 fL      Platelets 339 Thousands/uL      nRBC 0 /100 WBCs      Segmented % 71 %      Immature Grans % 0 %      Lymphocytes % 21 %      Monocytes % 6 %      Eosinophils Relative 1 %      Basophils Relative 1 %      Absolute Neutrophils 7.40 Thousands/µL      Absolute Immature Grans 0.04 Thousand/uL      Absolute Lymphocytes 2.15 Thousands/µL      Absolute Monocytes 0.67 Thousand/µL      Eosinophils Absolute 0.12 Thousand/µL      Basophils Absolute 0.05  Thousands/µL             CT orbits/temporal bones/skull base w contrast   Final Interpretation by Remberto Preston MD (04/02 1438)      Normal temporal bone CT. No evidence of mastoiditis, as clinically questioned.      Partially imaged mild sinus disease.            Workstation performed: [id number]             Procedures    ED Medication and Procedure Management   Prior to Admission Medications   Prescriptions Last Dose Informant Patient Reported? Taking?   albuterol (PROVENTIL HFA,VENTOLIN HFA) 90 mcg/act inhaler   No No   Sig: Inhale 2 puffs every 6 (six) hours as needed for wheezing   eszopiclone (LUNESTA) 2 mg tablet   No No   Sig: Take 1 tablet (2 mg total) by mouth daily at bedtime as needed for sleep Take immediately before bedtime   hydrOXYzine HCL (ATARAX) 25 mg tablet   No No   Sig: Take 1 tablet (25 mg total) by mouth every 6 (six) hours as needed for itching   meclizine (ANTIVERT) 12.5 MG tablet   No No   Sig: Take 1 tablet (12.5 mg total) by mouth every 8 (eight) hours as needed for dizziness   omeprazole (PriLOSEC) 20 mg delayed release capsule   No No   Sig: TAKE 1 CAPSULE BY MOUTH EVERY DAY   tirzepatide (Zepbound) 15 mg/0.5 mL auto-injector   No No   Sig: Inject 0.5 mL (15 mg total) under the skin once a week      Facility-Administered Medications: None     Discharge Medication List as of 4/2/2025  4:03 PM        START taking these medications    Details   cyclobenzaprine (FLEXERIL) 10 mg tablet Take 1 tablet (10 mg total) by mouth 2 (two) times a day as needed for muscle spasms, Starting Wed 4/2/2025, Normal      naproxen (NAPROSYN) 500 mg tablet Take 1 tablet (500 mg total) by mouth 2 (two) times a day with meals, Starting Wed 4/2/2025, Normal           CONTINUE these medications which have NOT CHANGED    Details   albuterol (PROVENTIL HFA,VENTOLIN HFA) 90 mcg/act inhaler Inhale 2 puffs every 6 (six) hours as needed for wheezing, Starting Sat 5/18/2024, Normal      eszopiclone (LUNESTA) 2  mg tablet Take 1 tablet (2 mg total) by mouth daily at bedtime as needed for sleep Take immediately before bedtime, Starting Wed 10/2/2024, Normal      hydrOXYzine HCL (ATARAX) 25 mg tablet Take 1 tablet (25 mg total) by mouth every 6 (six) hours as needed for itching, Starting Tue 4/2/2024, Normal      meclizine (ANTIVERT) 12.5 MG tablet Take 1 tablet (12.5 mg total) by mouth every 8 (eight) hours as needed for dizziness, Starting Tue 3/25/2025, Normal      omeprazole (PriLOSEC) 20 mg delayed release capsule TAKE 1 CAPSULE BY MOUTH EVERY DAY, Starting Thu 11/21/2024, Normal      tirzepatide (Zepbound) 15 mg/0.5 mL auto-injector Inject 0.5 mL (15 mg total) under the skin once a week, Starting Wed 12/11/2024, Normal             ED SEPSIS DOCUMENTATION   Time reflects when diagnosis was documented in both MDM as applicable and the Disposition within this note       Time User Action Codes Description Comment    4/2/2025  3:53 PM Nel Muñoz [R51.9] Head pain     4/2/2025  4:02 PM Nel Muñoz [M54.2] Neck pain                  Nel Muñoz PA-C  04/02/25 0923

## 2025-04-03 ENCOUNTER — OFFICE VISIT (OUTPATIENT)
Dept: FAMILY MEDICINE CLINIC | Facility: CLINIC | Age: 35
End: 2025-04-03
Payer: COMMERCIAL

## 2025-04-03 ENCOUNTER — NURSE TRIAGE (OUTPATIENT)
Dept: PHYSICAL THERAPY | Facility: OTHER | Age: 35
End: 2025-04-03

## 2025-04-03 VITALS
HEIGHT: 75 IN | RESPIRATION RATE: 14 BRPM | BODY MASS INDEX: 34.99 KG/M2 | TEMPERATURE: 97.6 F | OXYGEN SATURATION: 97 % | DIASTOLIC BLOOD PRESSURE: 80 MMHG | WEIGHT: 281.4 LBS | SYSTOLIC BLOOD PRESSURE: 124 MMHG | HEART RATE: 106 BPM

## 2025-04-03 DIAGNOSIS — H92.03 OTALGIA OF BOTH EARS: ICD-10-CM

## 2025-04-03 DIAGNOSIS — M54.41 CHRONIC BILATERAL LOW BACK PAIN WITH BILATERAL SCIATICA: ICD-10-CM

## 2025-04-03 DIAGNOSIS — M54.42 CHRONIC BILATERAL LOW BACK PAIN WITH BILATERAL SCIATICA: ICD-10-CM

## 2025-04-03 DIAGNOSIS — M62.89 MUSCLE TIGHTNESS: Primary | ICD-10-CM

## 2025-04-03 DIAGNOSIS — J34.9 SINUS DISEASE: ICD-10-CM

## 2025-04-03 DIAGNOSIS — G89.29 CHRONIC BILATERAL LOW BACK PAIN WITH BILATERAL SCIATICA: ICD-10-CM

## 2025-04-03 DIAGNOSIS — M54.2 ACUTE NECK PAIN: Primary | ICD-10-CM

## 2025-04-03 PROBLEM — E66.01 MORBID OBESITY WITH BMI OF 40.0-44.9, ADULT (HCC): Status: RESOLVED | Noted: 2021-01-05 | Resolved: 2025-04-03

## 2025-04-03 PROCEDURE — 99214 OFFICE O/P EST MOD 30 MIN: CPT | Performed by: NURSE PRACTITIONER

## 2025-04-03 RX ORDER — METHOCARBAMOL 500 MG/1
500 TABLET, FILM COATED ORAL 3 TIMES DAILY PRN
Qty: 15 TABLET | Refills: 0 | Status: SHIPPED | OUTPATIENT
Start: 2025-04-03

## 2025-04-03 NOTE — PROGRESS NOTES
"Name: Gerry Perkins      : 1990      MRN: [id number]  Encounter Provider: RICK Terry  Encounter Date: 4/3/2025   Encounter department: [facility]  :  Assessment & Plan  Muscle tightness  Change to robaxin. Continue with PT.   If no improvement please call, can consider further imaging  Orders:  •  methocarbamol (ROBAXIN) 500 mg tablet; Take 1 tablet (500 mg total) by mouth 3 (three) times a day as needed for muscle spasms    Otalgia of both ears  Follow up with ENT due ot ongoing issue.   Orders:  •  Ambulatory Referral to Otolaryngology; Future    Sinus disease  Mild noted on CT. Monitor if symptoms worsen/ change consider treating with antibiotics for sinusitis. At this time, not symptomatic requiring intervention              History of Present Illness   f/u to 3/25/25 visit; ear infection mostly cleared up but head pressure has presisted for 2wks and pt feels \"wrong\"    Patient was seen  diagnosed with eustachian tube dysfunction likely causing dizziness.  At that time was given prednisone burst and Flonase.  Meclizine ordered to be used as needed.  Patient did go to emergency room yesterday for headache and neck pain.  They did labs and imaging.  They recommended to follow-up with primary care provider use naproxen and Flexeril as needed.  CT of orbits temporal bones and skull base with contrast showed normal temporal bone no evidence of mastoiditis.  Partially imaged mild sinus disease present.  CBC there did show slightly elevated white count at 10.4.    He was hoping the imaging would see more of his neck. They did refer him to PT. He is making an appointment. Neck pain still present. Ears are a little better.           Review of Systems   Constitutional:  Negative for chills and fever.   Eyes:  Negative for discharge.   Respiratory:  Negative for shortness of breath.    Cardiovascular:  Negative for chest pain.   Gastrointestinal:  Negative for " "constipation and diarrhea.   Genitourinary:  Negative for difficulty urinating.   Musculoskeletal:  Positive for neck pain. Negative for joint swelling.   Skin:  Negative for rash.   Neurological:  Negative for headaches.   Hematological:  Negative for adenopathy.   Psychiatric/Behavioral:  The patient is not nervous/anxious.        Objective   /80 (BP Location: Left arm, Patient Position: Sitting, Cuff Size: Large)   Pulse (!) 106   Temp 97.6 °F (36.4 °C) (Temporal)   Resp 14   Ht 6' 3\" (1.905 m)   Wt 128 kg (281 lb 6.4 oz)   SpO2 97%   BMI 35.17 kg/m²      Physical Exam  Vitals and nursing note reviewed.   Constitutional:       General: He is not in acute distress.     Appearance: Normal appearance. He is well-developed. He is not diaphoretic.   HENT:      Head: Normocephalic and atraumatic.      Right Ear: External ear normal. A middle ear effusion (mild) is present. Tympanic membrane is retracted.      Left Ear: External ear normal. A middle ear effusion (mild) is present. Tympanic membrane is retracted.   Eyes:      General: Lids are normal.         Right eye: No discharge.         Left eye: No discharge.      Conjunctiva/sclera: Conjunctivae normal.   Pulmonary:      Effort: Pulmonary effort is normal. No respiratory distress.   Musculoskeletal:         General: No deformity.   Skin:     General: Skin is warm and dry.   Neurological:      General: No focal deficit present.      Mental Status: He is alert and oriented to person, place, and time.   Psychiatric:         Speech: Speech normal.         Behavior: Behavior normal.         Thought Content: Thought content normal.         Judgment: Judgment normal.         "

## 2025-04-03 NOTE — TELEPHONE ENCOUNTER
Additional Information   Negative: Has the patient had unexplained weight loss?   Negative: Does the patient have a fever?   Negative: Is the patient experiencing blood in sputum?   Negative: Is the patient experiencing urine retention?   Negative: Is the patient experiencing acute drop foot or paralysis?   Negative: Has the patient experienced major trauma? (fall from height, high speed collision, direct blow to spine) and is also experiencing nausea, light-headedness, or loss of consciousness?   Negative: Is this a chronic condition?    Protocols used: [facility] Protocol    Patient states thed neck pain is acute and styarted 2 weeks ago.   Patient also states he has had chronic bilateral low back pain with sciatica for many years and like that to be evaluated.    [facility] was reviewed in detail and what we can provide for their neck and back pain.  Patient is agreeable to being triaged and would like to proceed with Physical Therapy.    Referral was placed for Physical Therapy at the [place] site. Patients information was sent to the  to make evaluation appointment. Patient made aware that the PT office  will be calling to schedule the appointment.  Patient was provided with the phone number to the PT office.    No further questions and/or concerns were voiced by the patient at this time. Patient states understanding of the referral that was placed.    Referral Closed.

## 2025-04-03 NOTE — TELEPHONE ENCOUNTER
Additional Information   Negative: Is this related to a work injury?   Negative: Is this related to an MVA?   Negative: Are you currently recieving homecare services?    Background - Initial Assessment  Clinical complaint: Pain is bilat neck. NKI Started 2 weeks ago. Pt also states he has bilat mid to low back, that will radiate on and off down bilat legs, R>L. Pt was seen in ED 4/2/25, however ED note does not mention any back pain. Pt states he has had back pain for years as he had a tailbone fx when he was younger. No prior neck or back sx. Pt states he does have low back numbness if sitting too long. Pain in neck and back are constant and his quality of pain fluctuates. Seen in ED 4/2/25. Pt is currently following up in his PCP office  Date of onset: 2 weeks neck  Frequency of pain: constant  Quality of pain: quality fluctuates    Protocols used: [facility] Protocol

## 2025-04-03 NOTE — PATIENT INSTRUCTIONS
Please call the office if you are experiencing any worsening of symptoms or no symptom improvement.

## 2025-04-10 ENCOUNTER — EVALUATION (OUTPATIENT)
Dept: PHYSICAL THERAPY | Facility: MEDICAL CENTER | Age: 35
End: 2025-04-10
Payer: COMMERCIAL

## 2025-04-10 DIAGNOSIS — M54.41 CHRONIC BILATERAL LOW BACK PAIN WITH BILATERAL SCIATICA: ICD-10-CM

## 2025-04-10 DIAGNOSIS — G89.29 CHRONIC BILATERAL LOW BACK PAIN WITH BILATERAL SCIATICA: ICD-10-CM

## 2025-04-10 DIAGNOSIS — R29.3 BAD POSTURE: Primary | ICD-10-CM

## 2025-04-10 DIAGNOSIS — M54.2 ACUTE NECK PAIN: ICD-10-CM

## 2025-04-10 DIAGNOSIS — M54.42 CHRONIC BILATERAL LOW BACK PAIN WITH BILATERAL SCIATICA: ICD-10-CM

## 2025-04-10 PROCEDURE — 97162 PT EVAL MOD COMPLEX 30 MIN: CPT | Performed by: PHYSICAL THERAPIST

## 2025-04-10 NOTE — PROGRESS NOTES
PT Evaluation     Today's date: 4/10/2025  Patient name: Gerry Perkins  : 1990  MRN: [id number]  Referring provider: Chai Hou PT  Dx:   Encounter Diagnosis     ICD-10-CM    1. Bad posture  R29.3       2. Acute neck pain  M54.2 Ambulatory referral to PT spine      3. Chronic bilateral low back pain with bilateral sciatica  M54.42 Ambulatory referral to PT spine    M54.41     G89.29                      Assessment/Plan  Patient is a very pleasant 33 yo male who presents with symptoms of neck pain and mid back pain secondary to poor posturing and dramatically increased thoracic kyphosis.  He has an history of cervical pain and vestibular issues however his current symptoms seem to be more consistent with postural dysfunction.  Patient demonstrates poor postural control as well as poor stabilizer activation with static positioning.  Patient will benefit from skilled PT services to address issues of muscle hypertrophy as well as pain/headaches.  Patient is expected to progress well and should be seen 2x a week for the next 4-6 weeks.  Patient should follow up with PCP in regards to findings consistent with orthostatic hypotension.     Subjective  Patient states that he has been having issues with dizziness and vertigo with going from sit to stand or laying to standing.  He has lost consciousness 3x over the past several years but has to be very careful with how he is doing things.  With this issue comes neck pain and mid back pain.  Lower back is an issue as well but not as severe as the neck.  Patient has come to address this problem but also notes that he would like to learn how he can exercise with the vestibular issues.  Patient notes that he has lived with this for years but over the last year or so it has worsened.  He is taking medication for weight loss and is down 80lbs but has about 30 more to go.       Objective  Red Flags: none  Pain: 3-7/10  Reflexes:     Right Left   Biceps 2+ 2+    Triceps 2+ 2+   Brachioradialis 2+ 2+   Patellar 2+ 2+   Achilles 2+ 1+   Inverted supinator sign neg neg   clonus neg neg   Babinski neg neg         Posture: dramatic mid thoracic kyphosis.  Forward head with rounded shoulders.  Increased upper cervical extension  AROM: limited in all planes of movement cervical and thoracic with pain with cervical extension and rotation  PAROM: cervical diminished at C2-4 B with comparable sign.  Thoracic motion limited with extension and patient was not able to get past neutral   Palpation: TTP throughout cervical and thoracic musculature  Special Tests: no neurological signs. Symmetrical upper quarter screen  Coordination of Movement/strengthe: Patient demonstrates poor activation of Longus Capitus and Longus Coli with loss of feed forward mechanism with reaching tasks.   Patient was able to perform activation with cueing.  Poor postural control with limitation and quick fatiguability of stabilizing musculature.  Goals:  - Pt I with initial HEP in 1-2 visits  - Improve tolerance to functional exercise by reducing pain by 75%  - improved stabilizing structure activation and improved feed forward mechanism with distal activation  - Increase Functional Status Measure measured by FOTO by [facility]         Precautions: orthostatic hypotension

## 2025-04-16 ENCOUNTER — APPOINTMENT (OUTPATIENT)
Dept: PHYSICAL THERAPY | Facility: MEDICAL CENTER | Age: 35
End: 2025-04-16
Payer: COMMERCIAL

## 2025-04-18 ENCOUNTER — OFFICE VISIT (OUTPATIENT)
Dept: PHYSICAL THERAPY | Facility: MEDICAL CENTER | Age: 35
End: 2025-04-18
Payer: COMMERCIAL

## 2025-04-18 DIAGNOSIS — M54.2 ACUTE NECK PAIN: Primary | ICD-10-CM

## 2025-04-18 DIAGNOSIS — R29.3 BAD POSTURE: ICD-10-CM

## 2025-04-18 DIAGNOSIS — G89.29 CHRONIC BILATERAL LOW BACK PAIN WITH BILATERAL SCIATICA: ICD-10-CM

## 2025-04-18 DIAGNOSIS — M54.42 CHRONIC BILATERAL LOW BACK PAIN WITH BILATERAL SCIATICA: ICD-10-CM

## 2025-04-18 DIAGNOSIS — M54.41 CHRONIC BILATERAL LOW BACK PAIN WITH BILATERAL SCIATICA: ICD-10-CM

## 2025-04-18 PROCEDURE — 97112 NEUROMUSCULAR REEDUCATION: CPT | Performed by: PHYSICAL THERAPIST

## 2025-04-18 PROCEDURE — 97110 THERAPEUTIC EXERCISES: CPT | Performed by: PHYSICAL THERAPIST

## 2025-04-18 NOTE — PROGRESS NOTES
Daily Note     Today's date: 2025  Patient name: Gerry Perkins  : 1990  MRN: [id number]  Referring provider: Chai Hou PT  Dx:   Encounter Diagnosis     ICD-10-CM    1. Acute neck pain  M54.2       2. Chronic bilateral low back pain with bilateral sciatica  M54.42     M54.41     G89.29       3. Bad posture  R29.3                      Subjective: patient states that he has been having a bit more stiffness in the neck and upper back muscles with trying to get himself up right with improved posturing.        Objective: See treatment diary below      Assessment: Tolerated treatment well. Patient exhibited good technique with therapeutic exercises and would benefit from continued PT.  Patient was given thoracic extension program as well as posturing stability exercises to address issues.  HEP was given and patient was performing all TE as indicated.       Plan: Continue per plan of care.      Precautions: orthostatic hypotension

## 2025-04-18 NOTE — HOME EXERCISE EDUCATION
Program_ID:[id number]   Access Code: [email or web address]  URL: [email or web address]  Date: 04-  Prepared By: Chai Hou    Program Notes      Exercises      - Doorway Pec Stretch at 90 Degrees Abduction - 1 x daily - 7 x weekly - 3 sets - 10 reps      - Seated Thoracic Lumbar Extension with Pectoralis Stretch - 1 x daily - 7 x weekly - 3 sets - 10 reps      - Thoracic Extension Mobilization on Foam Roll - 1 x daily - 7 x weekly - 3 sets - 10 reps      - Standing with Forearms Thoracic Rotation - 1 x daily - 7 x weekly - 3 sets - 10 reps      - Shoulder W - External Rotation with Resistance - 1 x daily - 7 x weekly - 3 sets - 10 reps      - Shoulder Extension with Resistance - Neutral - 1 x daily - 7 x weekly - 3 sets - 10 reps      - Scapular Retraction with Resistance - 1 x daily - 7 x weekly - 3 sets - 10 reps      - Standing Shoulder Horizontal Abduction with Anchored Resistance - 1 x daily - 7 x weekly - 3 sets - 10 reps

## 2025-04-21 ENCOUNTER — OFFICE VISIT (OUTPATIENT)
Dept: PHYSICAL THERAPY | Facility: MEDICAL CENTER | Age: 35
End: 2025-04-21
Payer: COMMERCIAL

## 2025-04-21 DIAGNOSIS — M54.42 CHRONIC BILATERAL LOW BACK PAIN WITH BILATERAL SCIATICA: ICD-10-CM

## 2025-04-21 DIAGNOSIS — M54.41 CHRONIC BILATERAL LOW BACK PAIN WITH BILATERAL SCIATICA: ICD-10-CM

## 2025-04-21 DIAGNOSIS — R29.3 BAD POSTURE: ICD-10-CM

## 2025-04-21 DIAGNOSIS — M54.2 ACUTE NECK PAIN: Primary | ICD-10-CM

## 2025-04-21 DIAGNOSIS — G89.29 CHRONIC BILATERAL LOW BACK PAIN WITH BILATERAL SCIATICA: ICD-10-CM

## 2025-04-21 PROCEDURE — 97110 THERAPEUTIC EXERCISES: CPT | Performed by: PHYSICAL THERAPIST

## 2025-04-21 NOTE — PROGRESS NOTES
Daily Note     Today's date: 2025  Patient name: Gerry Perkins  : 1990  MRN: [id number]  Referring provider: Chai Hou PT  Dx:   Encounter Diagnosis     ICD-10-CM    1. Acute neck pain  M54.2       2. Chronic bilateral low back pain with bilateral sciatica  M54.42     M54.41     G89.29       3. Bad posture  R29.3                      Subjective: patient states that he has been doing a bit better.  Working on his home program.        Objective: See treatment diary below      Assessment: Tolerated treatment well. Patient exhibited good technique with therapeutic exercises and would benefit from continued PT.  Patient was given thoracic extension program as well as posturing stability exercises to address issues.  HEP was given and patient was performing all TE as indicated.       Plan: Continue per plan of care.      Precautions: orthostatic hypotension   Daily Treatment Diary     Manual                                                                                   Exercise Diary              UBE             pec stretch [id number]            No monnies 15x2 blue band            Shoulder extension 15x2 blue band            Scapular retractions 15x2 black band            Horizontal abduction 15x2 blue band            Chin tucks 15x2            Thoracic extension over foam roller 5secx3            Foam roller on wall 5 min            3 way loop 10x2                                                                                                                                                  Modalities

## 2025-04-25 ENCOUNTER — APPOINTMENT (OUTPATIENT)
Dept: PHYSICAL THERAPY | Facility: MEDICAL CENTER | Age: 35
End: 2025-04-25
Payer: COMMERCIAL

## 2025-04-30 ENCOUNTER — APPOINTMENT (OUTPATIENT)
Dept: PHYSICAL THERAPY | Facility: MEDICAL CENTER | Age: 35
End: 2025-04-30
Payer: COMMERCIAL

## 2025-05-01 ENCOUNTER — APPOINTMENT (OUTPATIENT)
Dept: RADIOLOGY | Facility: MEDICAL CENTER | Age: 35
End: 2025-05-01
Payer: COMMERCIAL

## 2025-05-01 ENCOUNTER — OFFICE VISIT (OUTPATIENT)
Dept: FAMILY MEDICINE CLINIC | Facility: CLINIC | Age: 35
End: 2025-05-01
Payer: COMMERCIAL

## 2025-05-01 VITALS
SYSTOLIC BLOOD PRESSURE: 126 MMHG | DIASTOLIC BLOOD PRESSURE: 72 MMHG | OXYGEN SATURATION: 96 % | WEIGHT: 282.6 LBS | BODY MASS INDEX: 35.14 KG/M2 | HEIGHT: 75 IN | TEMPERATURE: 97.5 F | HEART RATE: 86 BPM

## 2025-05-01 DIAGNOSIS — R42 DIZZINESS: ICD-10-CM

## 2025-05-01 DIAGNOSIS — E66.9 OBESITY (BMI 30-39.9): ICD-10-CM

## 2025-05-01 DIAGNOSIS — M77.8 ELBOW TENDINITIS: Primary | ICD-10-CM

## 2025-05-01 DIAGNOSIS — Z87.81 HISTORY OF WRIST FRACTURE: ICD-10-CM

## 2025-05-01 DIAGNOSIS — R20.2 PARESTHESIAS: ICD-10-CM

## 2025-05-01 DIAGNOSIS — E55.9 VITAMIN D DEFICIENCY: ICD-10-CM

## 2025-05-01 DIAGNOSIS — R20.9 COLD EXTREMITIES: ICD-10-CM

## 2025-05-01 PROCEDURE — 99214 OFFICE O/P EST MOD 30 MIN: CPT | Performed by: NURSE PRACTITIONER

## 2025-05-01 PROCEDURE — 73110 X-RAY EXAM OF WRIST: CPT

## 2025-05-01 PROCEDURE — 73090 X-RAY EXAM OF FOREARM: CPT

## 2025-05-01 NOTE — PATIENT INSTRUCTIONS
Stay well hydrated.     Discuss decreasing zepbound dosage.     Use compression stockings daily.     Complete labs.     If no improvement then follow up with cardiology.     Please call the office if you are experiencing any worsening of symptoms or no symptom improvement.       It is not uncommon to have a stall or decrease in weight loss with GLP medications. There are some factors that can help change this.   Make sure you are drinking enough water.   Make sure you are getting enough protein in your diet.   Make sure you are getting enough calories in- if you are in a large deficit it will impair any weight loss.   You can try switching injection site locations.

## 2025-05-01 NOTE — PROGRESS NOTES
Name: Gerry Perkins      : 1990      MRN: [id number]  Encounter Provider: RICK Terry  Encounter Date: 2025   Encounter department: [facility]    :  Assessment & Plan  Elbow tendinitis  Recommend imaging due to past surgical internal fixation if no acute findings then PT   Orders:  •  Ambulatory Referral to Physical Therapy; Future    Paresthesias  Update labs   Orders:  •  Vitamin B12; Future    Vitamin D deficiency  Update labs   Orders:  •  Vitamin D 25 hydroxy; Future    Obesity (BMI 30-39.9)  Discussed decreasing zepbound with weight management.   Stall information provided. Can change injection site as well.     Orders:  •  Ambulatory Referral to Physical Therapy; Future  •  Vitamin D 25 hydroxy; Future  •  Vitamin B12; Future  •  Lipid panel; Future  •  CBC and differential; Future  •  Comprehensive metabolic panel; Future  •  TSH, 3rd generation with Free T4 reflex; Future    Cold extremities  Update labs.   Orders:  •  TSH, 3rd generation with Free T4 reflex; Future  •  Iron Panel (Includes Ferritin, Iron Sat%, Iron, and TIBC); Future    History of wrist fracture  X rays to be done   Orders:  •  XR forearm 2 vw right; Future  •  XR wrist 3+ vw right; Future    Dizziness  Reviewed differentials. Neuro exam WNL. Stay hydrated. Monitor BP. Wear compression stockings during the day. Complete labs. If no improvement then refer to cardiology.   Orthostatic BPs WNL         Assessment & Plan             History of Present Illness     Patient presents with:  Dizziness and elbow pain.       Numbness: Feeling numbness/tingling feeling in both arms-started around a month ago  Located on ulnar aspect of forearm, feels like it's bruised/ sore. Present right and left side but worse on right .   Pain worse with elbow being bent.     Dizziness:  Lasts up to a minute usually a few seconds. Exclusively with positional changes when standing up/ getting out of bed.   Has  "passed out due to these symptoms when he was younger. Has noticed symptoms more apparent since weight loss. Currently on zepbound 15 mg weekly.  Has been going to PT and they think he may have orthostatic hypertension and not vertigo-Would like to discuss. Has had this since childhood. EKGs done since symptoms first began. Last done 2023       History of Present Illness       Review of Systems   Constitutional:  Negative for chills and fever.   Eyes:  Negative for discharge.   Respiratory:  Negative for shortness of breath.    Cardiovascular:  Negative for chest pain.   Gastrointestinal:  Negative for constipation and diarrhea.   Genitourinary:  Negative for difficulty urinating.   Musculoskeletal:  Positive for arthralgias. Negative for joint swelling.   Skin:  Negative for rash.   Neurological:  Positive for dizziness. Negative for headaches.   Hematological:  Negative for adenopathy.   Psychiatric/Behavioral:  The patient is not nervous/anxious.      Objective   /72 (BP Location: Right arm, Patient Position: Standing, Cuff Size: Large)   Pulse 86   Temp 97.5 °F (36.4 °C) (Temporal)   Ht 6' 3\" (1.905 m)   Wt 128 kg (282 lb 9.6 oz)   SpO2 96%   BMI 35.32 kg/m²     Physical Exam    Physical Exam  Vitals and nursing note reviewed.   Constitutional:       General: He is not in acute distress.     Appearance: Normal appearance. He is well-developed. He is not diaphoretic.   HENT:      Head: Normocephalic and atraumatic.      Right Ear: External ear normal.      Left Ear: External ear normal.      Nose: Nose normal.   Eyes:      General: Lids are normal. No scleral icterus.        Right eye: No discharge.         Left eye: No discharge.      Extraocular Movements: Extraocular movements intact.      Conjunctiva/sclera: Conjunctivae normal.   Cardiovascular:      Rate and Rhythm: Normal rate and regular rhythm.      Heart sounds: No murmur heard.  Pulmonary:      Effort: Pulmonary effort is normal. No " respiratory distress.      Breath sounds: Normal breath sounds. No wheezing.   Musculoskeletal:         General: No deformity.      Right elbow: No swelling or deformity. Normal range of motion. Tenderness present.      Left elbow: No swelling or deformity. Normal range of motion. No tenderness.   Skin:     General: Skin is warm and dry.   Neurological:      General: No focal deficit present.      Mental Status: He is alert and oriented to person, place, and time. Mental status is at baseline.      Cranial Nerves: No cranial nerve deficit.      Sensory: No sensory deficit.      Motor: No weakness.      Coordination: Coordination normal.      Gait: Gait normal.   Psychiatric:         Speech: Speech normal.         Behavior: Behavior normal.         Thought Content: Thought content normal.         Judgment: Judgment normal.

## 2025-05-02 ENCOUNTER — RESULTS FOLLOW-UP (OUTPATIENT)
Dept: FAMILY MEDICINE CLINIC | Facility: CLINIC | Age: 35
End: 2025-05-02

## 2025-05-09 ENCOUNTER — TELEPHONE (OUTPATIENT)
Dept: FAMILY MEDICINE CLINIC | Facility: CLINIC | Age: 35
End: 2025-05-09

## 2025-05-09 NOTE — TELEPHONE ENCOUNTER
Left message to contact PT to schedule appointment phone number provided advised to call office with any questions

## 2025-05-20 ENCOUNTER — OFFICE VISIT (OUTPATIENT)
Dept: FAMILY MEDICINE CLINIC | Facility: CLINIC | Age: 35
End: 2025-05-20
Payer: COMMERCIAL

## 2025-05-20 VITALS
HEIGHT: 75 IN | DIASTOLIC BLOOD PRESSURE: 70 MMHG | WEIGHT: 280 LBS | TEMPERATURE: 97.5 F | OXYGEN SATURATION: 99 % | HEART RATE: 95 BPM | SYSTOLIC BLOOD PRESSURE: 98 MMHG | BODY MASS INDEX: 34.82 KG/M2

## 2025-05-20 DIAGNOSIS — J45.20 MILD INTERMITTENT ASTHMA WITHOUT COMPLICATION: ICD-10-CM

## 2025-05-20 DIAGNOSIS — E66.09 CLASS 2 OBESITY DUE TO EXCESS CALORIES WITHOUT SERIOUS COMORBIDITY WITH BODY MASS INDEX (BMI) OF 35.0 TO 35.9 IN ADULT: ICD-10-CM

## 2025-05-20 DIAGNOSIS — E66.812 CLASS 2 OBESITY DUE TO EXCESS CALORIES WITHOUT SERIOUS COMORBIDITY WITH BODY MASS INDEX (BMI) OF 35.0 TO 35.9 IN ADULT: ICD-10-CM

## 2025-05-20 DIAGNOSIS — G47.9 SLEEP DIFFICULTIES: ICD-10-CM

## 2025-05-20 DIAGNOSIS — Z00.00 HEALTH MAINTENANCE EXAMINATION: Primary | ICD-10-CM

## 2025-05-20 PROBLEM — Z13.31 POSITIVE DEPRESSION SCREENING: Status: RESOLVED | Noted: 2024-06-04 | Resolved: 2025-05-20

## 2025-05-20 PROCEDURE — 99395 PREV VISIT EST AGE 18-39: CPT | Performed by: NURSE PRACTITIONER

## 2025-05-20 RX ORDER — ESZOPICLONE 2 MG/1
2 TABLET, FILM COATED ORAL
Qty: 30 TABLET | Refills: 0 | Status: SHIPPED | OUTPATIENT
Start: 2025-05-20

## 2025-05-20 NOTE — ASSESSMENT & PLAN NOTE
Continue with Lunesta-well controlled  Contract updated 5/21/25  [place] prescription drug monitoring program was checked and verified for refill.     Orders:  •  eszopiclone (LUNESTA) 2 mg tablet; Take 1 tablet (2 mg total) by mouth daily at bedtime as needed for sleep Take immediately before bedtime

## 2025-05-21 PROBLEM — G47.19 EXCESSIVE DAYTIME SLEEPINESS: Status: RESOLVED | Noted: 2024-07-18 | Resolved: 2025-05-21

## 2025-05-30 DIAGNOSIS — R10.9 ABDOMINAL PAIN: ICD-10-CM

## 2025-05-30 RX ORDER — OMEPRAZOLE 20 MG/1
20 CAPSULE, DELAYED RELEASE ORAL DAILY
Qty: 90 CAPSULE | Refills: 1 | Status: SHIPPED | OUTPATIENT
Start: 2025-05-30

## 2025-05-30 NOTE — TELEPHONE ENCOUNTER
Requested medication(s) are due for refill today: Yes  **If antibiotic or given during sick visit, contact patient to discuss current symptoms.   **Confirm prescribing provider    LOV:  5/20/25  **If longer then 1 year, contact patient to schedule annual PRIOR to refilling. Once scheduled, adjust refill for 30 days, no refills.  **Update CareEverywhere to confirm not being seen elsewhere    NOV:  11/20/25    Is patient due for annual visit: No  **If future appointment, adjust to annual/follow up.  ** No appointment call to schedule annual/follow up.    Route to PCP, unless PCP no longer here, then physician they are seeing next.

## 2025-06-19 DIAGNOSIS — E66.813 CLASS 3 OBESITY: ICD-10-CM

## 2025-06-19 RX ORDER — TIRZEPATIDE 15 MG/.5ML
INJECTION, SOLUTION SUBCUTANEOUS
Qty: 2 ML | Refills: 6 | Status: SHIPPED | OUTPATIENT
Start: 2025-06-19

## 2025-07-08 ENCOUNTER — TELEPHONE (OUTPATIENT)
Dept: BARIATRICS | Facility: CLINIC | Age: 35
End: 2025-07-08

## 2025-07-08 ENCOUNTER — OFFICE VISIT (OUTPATIENT)
Dept: BARIATRICS | Facility: CLINIC | Age: 35
End: 2025-07-08
Payer: COMMERCIAL

## 2025-07-08 VITALS
TEMPERATURE: 97.8 F | HEIGHT: 75 IN | BODY MASS INDEX: 34.12 KG/M2 | SYSTOLIC BLOOD PRESSURE: 126 MMHG | DIASTOLIC BLOOD PRESSURE: 74 MMHG | WEIGHT: 274.4 LBS | HEART RATE: 86 BPM | OXYGEN SATURATION: 98 %

## 2025-07-08 DIAGNOSIS — K21.9 GASTROESOPHAGEAL REFLUX DISEASE, UNSPECIFIED WHETHER ESOPHAGITIS PRESENT: ICD-10-CM

## 2025-07-08 DIAGNOSIS — E66.813 CLASS 3 OBESITY: Primary | ICD-10-CM

## 2025-07-08 DIAGNOSIS — J45.909 ASTHMA: ICD-10-CM

## 2025-07-08 PROCEDURE — 99214 OFFICE O/P EST MOD 30 MIN: CPT | Performed by: STUDENT IN AN ORGANIZED HEALTH CARE EDUCATION/TRAINING PROGRAM

## 2025-07-08 RX ORDER — TIRZEPATIDE 15 MG/.5ML
15 INJECTION, SOLUTION SUBCUTANEOUS WEEKLY
Qty: 2 ML | Refills: 6 | Status: SHIPPED | OUTPATIENT
Start: 2025-07-08

## 2025-07-08 NOTE — PROGRESS NOTES
Assessment & Plan  Class 3 obesity  Initial weight: 348  Previous weight: 286  Current weight: 274    TBW loss%: 21    Continue Zepbound 15mg     Recommend to avoid skipping any meals. Meal frequency (3 meals 2-3 snacks in between) can potentially improve metabolism and allow for better portion control by decreasing Ghrelin (hunger hormone)     Mindful Eating and Drinking is necessary to promote healthy behaviors that can lead to decreased adipose tissue which can be curative and preventative for comorbidities such as hypertension, diabetes, anxiety, depression, osteoarthritis, dyslipidemia, asthma, liver disease, cardiovascular disease, stroke, sleep apnea and cancers.       Macronutrients: (Nutrients that the body needs for energy and support vital functions)    Protein is necessary to promote satiety, metabolism, and muscle growth/repair. Increased energy expenditure is used for the processes of breaking down and rebuilding proteins within the body   Carbohydrates are essential as it is the body's main fuel source for daily activities.  Recommend that carbohydrates be consumed mostly during the times you are more active rather than high amounts before bedtime  Fats: Essential vitamins like A, D, and E, protect your organs, support cell growth and contribute to maintaining healthy cholesterol levels      Fluid intake which is at least half your body weight in ounces is necessary to help control cravings (decreasing confusion for appetite vs water deprivation) as the human body is made up of 50-70% of Fluids. If there is a diversion for water alone, would recommend flavored water (example-splash of lemonade or ice tea) to help promote compliance. Fluids include Teas, water, flavored water, seltzer water, coffee, shakes    Metabolism:    Metabolism can also be promoted by meal frequency, protein intake and increased muscle mass     Daily Calorie Needs: are individualized and will need to take into account any fluid  losses, increased activity levels which may need to be adjusted daily. Recommended to not skip any meals even if there is a lack of appetite as it can be suppressed by caffeine or any prior heavy meal due to Leptin (satiety hormone).  Calorie and fluid intake will need to be increased if there is any increased activity during the day compared to previous days.  With proper fluid and macronutrient intake throughout the day, portion control and decreased cravings will improve     Weight check: BMI and weight serve as only guidelines as it is not factor in muscle mass, fat, water. Weights can fluctuate depending on fluid shifts vs what foods are consumed prior to checking your weight       Return in about 4 months (around 2025) for followup .       Total time spent reviewing chart, interviewing patient, examining patient, discussing plan, answering all questions, and documentin min. Most recent notes and records were reviewed.       ______________________________________________________________________        Subjective:     Chief Complaint   Patient presents with    Follow-up     6 MO MWM F/U   Waist-  48        HPI: Gerry Perkins  is a 34 y.o. male with past medical history of class 3 obesity, GERD, insomnia  presents to the clinic for follow-up. Patient reports that his weight has been around 270s to 280. His dietary routine has been off due to family events    Current Medication: Zepbound 15mg    Dietary regimen:   Breakfast: Cereal, protein granola,   Lunch- yogurt, protein   Dinner- Chicken hamburger, steak  -Skips breakfast sometimes     Hydration: More gallon       Review Of Systems:  Constitutional:  Negative for diaphoresis.   Gastrointestinal:  Negative for abdominal pain, and vomiting.   Skin:  Negative for pallor.   Psychiatric/Behavioral:  Negative for behavioral problems, confusion, dysphoric mood and hallucinations.    All other systems reviewed and are negative.     Objective:  /74   " Pulse 86   Temp 97.8 °F (36.6 °C)   Ht 6' 2.5\" (1.892 m)   Wt 124 kg (274 lb 6.4 oz)   SpO2 98%   BMI 34.76 kg/m²     Wt Readings from Last 10 Encounters:   07/08/25 124 kg (274 lb 6.4 oz)   05/20/25 127 kg (280 lb)   05/01/25 128 kg (282 lb 9.6 oz)   04/03/25 128 kg (281 lb 6.4 oz)   04/02/25 128 kg (282 lb 6.6 oz)   03/25/25 127 kg (280 lb)   12/11/24 130 kg (286 lb)   10/02/24 (!) 141 kg (310 lb 12.8 oz)   07/18/24 (!) 154 kg (339 lb)   07/12/24 (!) 154 kg (340 lb 6.4 oz)       Physical Exam  Constitutional:       General: No acute distress.  Well-nourished  HENT:      Head: Normocephalic and atraumatic.   Eyes:      Extraocular Movements: Extraocular movements intact.      Conjunctiva/sclera: Conjunctivae normal.      Pupils: Pupils are equal, round, and reactive to light.   Cardiovascular:      Rate and Rhythm: Normal rate.   Pulmonary:      Effort: Pulmonary effort is normal.   Neurological:      General: No focal deficit present.  AO x 3     Mental Status: Alert and oriented to person, place, and time. Mental status is at baseline.   Psychiatric:         Mood and Affect: Mood normal.         Behavior: Behavior normal.     Labs and Imaging  Recent labs and imaging have been personally reviewed.    Lab Results   Component Value Date    WBC 10.43 (H) 04/02/2025    HGB 17.9 (H) 04/02/2025    HCT 52.9 (H) 04/02/2025    MCV 92 04/02/2025     04/02/2025       Lab Results   Component Value Date     03/24/2016    SODIUM 136 04/02/2025    K 4.0 04/02/2025     04/02/2025    CO2 28 04/02/2025    AGAP 6 04/02/2025    BUN 8 04/02/2025    CREATININE 0.85 04/02/2025    GLUC 89 04/02/2025    GLUF 82 05/22/2023    CALCIUM 9.4 04/02/2025    AST 28 12/20/2023    ALT 47 12/20/2023    ALKPHOS 48 12/20/2023    PROT 7.5 03/24/2016    TP 7.4 12/20/2023    BILITOT 2.1 (H) 03/24/2016    TBILI 1.31 (H) 12/20/2023    EGFR 113 04/02/2025       Lab Results   Component Value Date    HGBA1C 5.2 05/22/2023       Lab " Results   Component Value Date    UKI1RLVVOJIJ 2.335 05/22/2023       Lab Results   Component Value Date    CHOLESTEROL 151 05/22/2023       Lab Results   Component Value Date    HDL 36 (L) 05/22/2023       Lab Results   Component Value Date    TRIG 108 05/22/2023       Lab Results   Component Value Date    LDLCALC 93 05/22/2023

## 2025-07-08 NOTE — TELEPHONE ENCOUNTER
PA for Zepbound 15 mg SUBMITTED to [facility]    via    [x]CMM-KEY: [id number]  []Surescripts-Case ID #   []Availity-Auth ID #  NDC #   []Faxed to plan   []Other website   []Phone call Case ID #     []PA sent as URGENT    All office notes, labs and other pertaining documents and studies sent. Clinical questions answered. Awaiting determination from insurance company.     Turnaround time for your insurance to make a decision on your Prior Authorization can take 7-21 business days.

## 2025-07-10 NOTE — TELEPHONE ENCOUNTER
PA for zepbound 15mg APPROVED     Date(s) approved 07/08/2025-01/04/2026    Case #    Patient advised by          [x]MyChart Message  []Phone call   []LMOM  []L/M to call office as no active Communication consent on file  []Unable to leave detailed message as VM not approved on Communication consent       Pharmacy advised by    [x]Fax  []Phone call  []Secure Chat    Specialty Pharmacy    []     Approval letter scanned into Media Yes

## 2025-08-11 ENCOUNTER — HOSPITAL ENCOUNTER (EMERGENCY)
Facility: HOSPITAL | Age: 35
Discharge: HOME | End: 2025-08-11
Attending: EMERGENCY MEDICINE | Admitting: EMERGENCY MEDICINE
Payer: COMMERCIAL

## (undated) DEVICE — JACKSON-PRATT 100CC BULB RESERVOIR: Brand: CARDINAL HEALTH

## (undated) DEVICE — SCD SEQUENTIAL COMPRESSION COMFORT SLEEVE MEDIUM KNEE LENGTH: Brand: KENDALL SCD

## (undated) DEVICE — TAUT INTRODUCER KIT

## (undated) DEVICE — METZENBAUM ADTEC SINGLE USE DISSECTING SCISSORS, SHAFT ONLY, MONOPOLAR, CURVED TO LEFT, WORKING LENGTH: 12 1/4", (310 MM), DIAM. 5 MM, INSULATED, DOUBLE ACTION, STERILE, DISPOSABLE, PACKAGE OF 10 PIECES: Brand: AESCULAP

## (undated) DEVICE — GLOVE INDICATOR PI UNDERGLOVE SZ 8 BLUE

## (undated) DEVICE — DISPOSABLE OR TOWEL: Brand: CARDINAL HEALTH

## (undated) DEVICE — LIGAMAX 5 MM ENDOSCOPIC MULTIPLE CLIP APPLIER: Brand: LIGAMAX

## (undated) DEVICE — JP CHAN DRN SIL HUBLESS 15FR W/TRO: Brand: CARDINAL HEALTH

## (undated) DEVICE — TISSUE RETRIEVAL SYSTEM: Brand: INZII RETRIEVAL SYSTEM

## (undated) DEVICE — SYRINGE 30ML LL

## (undated) DEVICE — TAUT CATH INTRODUCER 4.5 FR

## (undated) DEVICE — ADHESIVE SKIN HIGH VISCOSITY EXOFIN 1ML

## (undated) DEVICE — [HIGH FLOW INSUFFLATOR,  DO NOT USE IF PACKAGE IS DAMAGED,  KEEP DRY,  KEEP AWAY FROM SUNLIGHT,  PROTECT FROM HEAT AND RADIOACTIVE SOURCES.]: Brand: PNEUMOSURE

## (undated) DEVICE — BLUE HEAT SCOPE WARMER

## (undated) DEVICE — NEEDLE HYPO 22G X 1-1/2 IN

## (undated) DEVICE — GLOVE SRG BIOGEL ECLIPSE 7.5

## (undated) DEVICE — SUT ETHILON 3-0 PS-1 18 IN 1663G

## (undated) DEVICE — 3000CC GUARDIAN II: Brand: GUARDIAN

## (undated) DEVICE — ELECTRODE LAP J HOOK SPLIT STEM E-Z CLEAN 33CM -0021S

## (undated) DEVICE — GLOVE SRG BIOGEL 6.5

## (undated) DEVICE — STOPCOCK 3-WAY

## (undated) DEVICE — DRAPE C-ARM X-RAY

## (undated) DEVICE — ALLENTOWN LAP CHOLE APP PACK: Brand: CARDINAL HEALTH

## (undated) DEVICE — PLUMEPEN PRO 10FT

## (undated) DEVICE — DRAPE EQUIPMENT RF WAND

## (undated) DEVICE — TROCAR: Brand: KII SLEEVE

## (undated) DEVICE — TROCAR: Brand: KII FIOS FIRST ENTRY

## (undated) DEVICE — SYRINGE 20ML LL

## (undated) DEVICE — CHLORAPREP HI-LITE 26ML ORANGE

## (undated) DEVICE — SPECIMEN CONTAINER STERILE PEEL PACK

## (undated) DEVICE — IV EXTENSION TUBING 33 IN

## (undated) DEVICE — PMI DISPOSABLE PUNCTURE CLOSURE DEVICE / SUTURE GRASPER: Brand: PMI

## (undated) DEVICE — GLOVE INDICATOR PI UNDERGLOVE SZ 6.5 BLUE

## (undated) DEVICE — INTENDED FOR TISSUE SEPARATION, AND OTHER PROCEDURES THAT REQUIRE A SHARP SURGICAL BLADE TO PUNCTURE OR CUT.: Brand: BARD-PARKER SAFETY BLADES SIZE 11, STERILE

## (undated) DEVICE — SUT MONOCRYL 4-0 PS-2 27 IN Y426H

## (undated) DEVICE — TUBING SMOKE EVAC W/FILTRATION DEVICE PLUMEPORT ACTIV

## (undated) DEVICE — SUT VICRYL 0 UR-6 27 IN J603H